# Patient Record
Sex: FEMALE | Race: WHITE | NOT HISPANIC OR LATINO | URBAN - METROPOLITAN AREA
[De-identification: names, ages, dates, MRNs, and addresses within clinical notes are randomized per-mention and may not be internally consistent; named-entity substitution may affect disease eponyms.]

---

## 2021-02-18 ENCOUNTER — EMERGENCY (EMERGENCY)
Facility: HOSPITAL | Age: 71
LOS: 1 days | Discharge: HOME | End: 2021-02-18
Attending: EMERGENCY MEDICINE | Admitting: EMERGENCY MEDICINE
Payer: MEDICARE

## 2021-02-18 VITALS
OXYGEN SATURATION: 99 % | SYSTOLIC BLOOD PRESSURE: 149 MMHG | TEMPERATURE: 97 F | DIASTOLIC BLOOD PRESSURE: 71 MMHG | WEIGHT: 119.93 LBS | HEART RATE: 81 BPM | RESPIRATION RATE: 18 BRPM | HEIGHT: 62 IN

## 2021-02-18 VITALS
HEART RATE: 76 BPM | TEMPERATURE: 98 F | SYSTOLIC BLOOD PRESSURE: 141 MMHG | RESPIRATION RATE: 18 BRPM | OXYGEN SATURATION: 97 % | DIASTOLIC BLOOD PRESSURE: 85 MMHG
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
## 2021-02-18 DIAGNOSIS — R51.9 HEADACHE, UNSPECIFIED: ICD-10-CM
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
## 2021-02-18 DIAGNOSIS — S02.2XXA FRACTURE OF NASAL BONES, INITIAL ENCOUNTER FOR CLOSED FRACTURE: ICD-10-CM
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
## 2021-02-18 PROCEDURE — 99284 EMERGENCY DEPT VISIT MOD MDM: CPT

## 2021-02-18 PROCEDURE — 70486 CT MAXILLOFACIAL W/O DYE: CPT | Mod: 26,MH

## 2021-02-18 PROCEDURE — 70450 CT HEAD/BRAIN W/O DYE: CPT | Mod: 26,MH

## 2021-02-18 NOTE — ED PROVIDER NOTE - OBJECTIVE STATEMENT
71 y/o F with ovarian cancer (on chemo) presents to the ED today s/p fall while getting out of bed. Patient sustained direct trauma to the face and subsequently developed epistaxis which resolved when applying pressure. She denies any LOC but does report swelling around the nasal area and HA. Otherwise patient denies any blurry vision, chest pain, shortness of breath, nausea, vomiting, abdominal pain.

## 2021-02-18 NOTE — ED ADULT NURSE NOTE - CHIEF COMPLAINT QUOTE
walk in s/p fall at home getting OOB and hit her nose on the night stand sustaining a nosebleed for a few minutes. slight dizziness and HA. on Chemotherapy for ovarian CA. Denies blood thinner

## 2021-02-18 NOTE — ED ADULT TRIAGE NOTE - CHIEF COMPLAINT QUOTE
walk in s/p fall at home getting OOB and hit her nose on the night stand sustaining a nosebleed for a few minutes. slight dizziness and HA. on Chemotherapy for ovarian CA. Denies blood thinner walk in s/p fall at home getting OOB and hit her nose on the night stand sustaining a nosebleed. slight dizziness and HA. on Chemotherapy for ovarian CA. Denies blood thinner

## 2021-02-18 NOTE — ED PROVIDER NOTE - CLINICAL SUMMARY MEDICAL DECISION MAKING FREE TEXT BOX
69 y/o F presents to the ED after sustaining direct trauma to the face while getting out of bed this morning with epistaxis that resolved with pressure. On exam, nasal and infraorbital swelling, ecchymosis noted over the nasal bridge with tenderness to palpation but no active bleeding. Will check heat CT o r/o fracture vs intracranial bleeding and reassess.

## 2021-02-18 NOTE — ED ADULT NURSE NOTE - OBJECTIVE STATEMENT
70y female presents to ED s/p fall. Pt states she rolled out of bed and struck her face on the nightstand. Nose bled, dizziness, and blurry vision afterwards that have since resolved. Pt has hx of ovarian cancer, on chemotherapy. A&Ox4.

## 2021-02-18 NOTE — ED PROVIDER NOTE - CARE PROVIDER_API CALL
Gen Magana)  Otolaryngology  [address] [facility], [address]nd [place] [address]  Phone: [phone]  Fax: [phone]  Follow Up Time:

## 2021-02-18 NOTE — ED PROVIDER NOTE - ENMT, MLM
Airway patent, ecchymosis and tenderness over the nasal bridge, no crepitus, no active bleeding. No septal hematoma, Swelling noted over the nasal bridge and infraorbital.

## 2021-02-18 NOTE — ED PROVIDER NOTE - PATIENT PORTAL LINK FT
You can access the FollowMyHealth Patient Portal offered by [facility] by registering at the following website: [email or web address]. By joining [employer]’s FollowMyHealth portal, you will also be able to view your health information using other applications (apps) compatible with our system.

## 2021-02-18 NOTE — ED PROVIDER NOTE - IV ALTEPLASE DOOR HIDDEN
I still want her on the Cipro but she can hold the citalopram until she is done with a course..  Find out how she is doing and if she was able to get an appointment with the urologist   show

## 2021-02-18 NOTE — ED ADULT NURSE REASSESSMENT NOTE - NS ED NURSE REASSESS COMMENT FT1
Pt alert with her significant other at the bedside, pt on the phone. Some swelling noted to her nose

## 2021-09-10 PROBLEM — C56.9 MALIGNANT NEOPLASM OF UNSPECIFIED OVARY: Chronic | Status: ACTIVE | Noted: 2021-02-18

## 2021-09-14 ENCOUNTER — OUTPATIENT (OUTPATIENT)
Dept: OUTPATIENT SERVICES | Facility: HOSPITAL | Age: 71
LOS: 1 days | End: 2021-09-14
Payer: MEDICARE

## 2021-09-14 ENCOUNTER — APPOINTMENT (OUTPATIENT)
Age: 71
End: 2021-09-14

## 2021-09-14 VITALS
DIASTOLIC BLOOD PRESSURE: 84 MMHG | SYSTOLIC BLOOD PRESSURE: 138 MMHG | WEIGHT: 110.01 LBS | HEIGHT: 61 IN | OXYGEN SATURATION: 98 % | RESPIRATION RATE: 17 BRPM | TEMPERATURE: 99 F | HEART RATE: 85 BPM

## 2021-09-14 PROCEDURE — 96409 CHEMO IV PUSH SNGL DRUG: CPT

## 2021-09-14 RX ORDER — FLUOROURACIL 50 MG/ML
2600 INJECTION, SOLUTION INTRAVENOUS ONCE
Refills: 0 | Status: COMPLETED | OUTPATIENT
Start: 2021-09-14 | End: 2021-09-14

## 2021-09-14 RX ADMIN — FLUOROURACIL 5.24 MILLIGRAM(S): 50 INJECTION, SOLUTION INTRAVENOUS at 16:29

## 2021-09-16 ENCOUNTER — APPOINTMENT (OUTPATIENT)
Age: 71
End: 2021-09-16

## 2021-09-16 ENCOUNTER — OUTPATIENT (OUTPATIENT)
Dept: OUTPATIENT SERVICES | Facility: HOSPITAL | Age: 71
LOS: 1 days | End: 2021-09-16

## 2021-09-16 VITALS
HEART RATE: 73 BPM | TEMPERATURE: 99 F | SYSTOLIC BLOOD PRESSURE: 103 MMHG | DIASTOLIC BLOOD PRESSURE: 64 MMHG | RESPIRATION RATE: 18 BRPM | OXYGEN SATURATION: 100 %

## 2021-09-16 DIAGNOSIS — C18.1 MALIGNANT NEOPLASM OF APPENDIX: ICD-10-CM

## 2021-09-17 ENCOUNTER — APPOINTMENT (OUTPATIENT)
Age: 71
End: 2021-09-17

## 2021-09-17 ENCOUNTER — OUTPATIENT (OUTPATIENT)
Dept: OUTPATIENT SERVICES | Facility: HOSPITAL | Age: 71
LOS: 1 days | End: 2021-09-17
Payer: MEDICARE

## 2021-09-17 DIAGNOSIS — C18.1 MALIGNANT NEOPLASM OF APPENDIX: ICD-10-CM

## 2021-09-17 PROCEDURE — 96374 THER/PROPH/DIAG INJ IV PUSH: CPT

## 2021-09-17 RX ORDER — ONDANSETRON 8 MG/1
8 TABLET, FILM COATED ORAL ONCE
Refills: 0 | Status: COMPLETED | OUTPATIENT
Start: 2021-09-17 | End: 2021-09-17

## 2021-09-17 RX ADMIN — ONDANSETRON 8 MILLIGRAM(S): 8 TABLET, FILM COATED ORAL at 10:45

## 2021-09-17 RX ADMIN — ONDANSETRON 216 MILLIGRAM(S): 8 TABLET, FILM COATED ORAL at 10:30

## 2021-09-22 DIAGNOSIS — C18.1 MALIGNANT NEOPLASM OF APPENDIX: ICD-10-CM

## 2021-10-01 ENCOUNTER — OUTPATIENT (OUTPATIENT)
Dept: OUTPATIENT SERVICES | Facility: HOSPITAL | Age: 71
LOS: 1 days | End: 2021-10-01
Payer: MEDICARE

## 2021-10-01 ENCOUNTER — APPOINTMENT (OUTPATIENT)
Age: 71
End: 2021-10-01

## 2021-10-01 VITALS
HEART RATE: 71 BPM | HEIGHT: 61 IN | SYSTOLIC BLOOD PRESSURE: 125 MMHG | OXYGEN SATURATION: 98 % | WEIGHT: 110.01 LBS | TEMPERATURE: 97 F | RESPIRATION RATE: 18 BRPM | DIASTOLIC BLOOD PRESSURE: 73 MMHG

## 2021-10-01 RX ORDER — FLUOROURACIL 50 MG/ML
2600 INJECTION, SOLUTION INTRAVENOUS ONCE
Refills: 0 | Status: COMPLETED | OUTPATIENT
Start: 2021-10-01 | End: 2021-10-01

## 2021-10-01 RX ADMIN — FLUOROURACIL 5.24 MILLIGRAM(S): 50 INJECTION, SOLUTION INTRAVENOUS at 16:25

## 2021-10-03 VITALS
DIASTOLIC BLOOD PRESSURE: 71 MMHG | SYSTOLIC BLOOD PRESSURE: 11 MMHG | HEART RATE: 71 BPM | TEMPERATURE: 98 F | OXYGEN SATURATION: 99 % | RESPIRATION RATE: 18 BRPM

## 2021-10-03 PROCEDURE — 96409 CHEMO IV PUSH SNGL DRUG: CPT

## 2021-10-03 RX ADMIN — Medication 300 UNIT(S): at 12:16

## 2021-10-08 DIAGNOSIS — C18.1 MALIGNANT NEOPLASM OF APPENDIX: ICD-10-CM

## 2021-10-13 ENCOUNTER — APPOINTMENT (OUTPATIENT)
Age: 71
End: 2021-10-13

## 2021-10-13 ENCOUNTER — OUTPATIENT (OUTPATIENT)
Dept: OUTPATIENT SERVICES | Facility: HOSPITAL | Age: 71
LOS: 1 days | End: 2021-10-13
Payer: MEDICARE

## 2021-10-13 VITALS
OXYGEN SATURATION: 100 % | DIASTOLIC BLOOD PRESSURE: 73 MMHG | SYSTOLIC BLOOD PRESSURE: 111 MMHG | HEART RATE: 71 BPM | RESPIRATION RATE: 16 BRPM | TEMPERATURE: 98 F

## 2021-10-13 DIAGNOSIS — C18.1 MALIGNANT NEOPLASM OF APPENDIX: ICD-10-CM

## 2021-10-13 PROCEDURE — 96409 CHEMO IV PUSH SNGL DRUG: CPT

## 2021-10-13 RX ORDER — FLUOROURACIL 50 MG/ML
2600 INJECTION, SOLUTION INTRAVENOUS ONCE
Refills: 0 | Status: COMPLETED | OUTPATIENT
Start: 2021-10-13 | End: 2021-10-13

## 2021-10-13 RX ADMIN — FLUOROURACIL 5.24 MILLIGRAM(S): 50 INJECTION, SOLUTION INTRAVENOUS at 16:00

## 2021-10-15 ENCOUNTER — APPOINTMENT (OUTPATIENT)
Age: 71
End: 2021-10-15

## 2021-10-26 ENCOUNTER — APPOINTMENT (OUTPATIENT)
Age: 71
End: 2021-10-26

## 2021-10-26 ENCOUNTER — OUTPATIENT (OUTPATIENT)
Dept: OUTPATIENT SERVICES | Facility: HOSPITAL | Age: 71
LOS: 1 days | End: 2021-10-26
Payer: MEDICARE

## 2021-10-26 VITALS
TEMPERATURE: 98 F | RESPIRATION RATE: 18 BRPM | SYSTOLIC BLOOD PRESSURE: 137 MMHG | DIASTOLIC BLOOD PRESSURE: 80 MMHG | OXYGEN SATURATION: 100 % | HEART RATE: 73 BPM

## 2021-10-26 DIAGNOSIS — C18.0 MALIGNANT NEOPLASM OF CECUM: ICD-10-CM

## 2021-10-26 PROCEDURE — 96409 CHEMO IV PUSH SNGL DRUG: CPT

## 2021-10-26 RX ORDER — FLUOROURACIL 50 MG/ML
2600 INJECTION, SOLUTION INTRAVENOUS ONCE
Refills: 0 | Status: COMPLETED | OUTPATIENT
Start: 2021-10-26 | End: 2021-10-26

## 2021-10-26 RX ADMIN — FLUOROURACIL 5.24 MILLIGRAM(S): 50 INJECTION, SOLUTION INTRAVENOUS at 16:40

## 2021-10-28 ENCOUNTER — APPOINTMENT (OUTPATIENT)
Age: 71
End: 2021-10-28

## 2021-10-28 ENCOUNTER — OUTPATIENT (OUTPATIENT)
Dept: OUTPATIENT SERVICES | Facility: HOSPITAL | Age: 71
LOS: 1 days | End: 2021-10-28

## 2021-10-28 VITALS
RESPIRATION RATE: 17 BRPM | SYSTOLIC BLOOD PRESSURE: 123 MMHG | TEMPERATURE: 99 F | HEART RATE: 75 BPM | OXYGEN SATURATION: 98 % | DIASTOLIC BLOOD PRESSURE: 79 MMHG

## 2021-10-28 DIAGNOSIS — C18.0 MALIGNANT NEOPLASM OF CECUM: ICD-10-CM

## 2021-11-08 ENCOUNTER — OUTPATIENT (OUTPATIENT)
Dept: OUTPATIENT SERVICES | Facility: HOSPITAL | Age: 71
LOS: 1 days | End: 2021-11-08
Payer: MEDICARE

## 2021-11-08 ENCOUNTER — APPOINTMENT (OUTPATIENT)
Dept: INTERVENTIONAL RADIOLOGY/VASCULAR | Facility: HOSPITAL | Age: 71
End: 2021-11-08

## 2021-11-08 PROCEDURE — 99152 MOD SED SAME PHYS/QHP 5/>YRS: CPT

## 2021-11-08 PROCEDURE — C1788: CPT

## 2021-11-08 PROCEDURE — 99153 MOD SED SAME PHYS/QHP EA: CPT

## 2021-11-08 PROCEDURE — 76937 US GUIDE VASCULAR ACCESS: CPT

## 2021-11-08 PROCEDURE — 36561 INSERT TUNNELED CV CATH: CPT

## 2021-11-08 PROCEDURE — 77001 FLUOROGUIDE FOR VEIN DEVICE: CPT | Mod: 26

## 2021-11-08 PROCEDURE — 36590 REMOVAL TUNNELED CV CATH: CPT

## 2021-11-08 PROCEDURE — 76937 US GUIDE VASCULAR ACCESS: CPT | Mod: 26

## 2021-11-08 PROCEDURE — C1769: CPT

## 2021-11-08 PROCEDURE — 77001 FLUOROGUIDE FOR VEIN DEVICE: CPT

## 2021-11-09 ENCOUNTER — APPOINTMENT (OUTPATIENT)
Age: 71
End: 2021-11-09

## 2021-11-15 ENCOUNTER — APPOINTMENT (OUTPATIENT)
Dept: INTERVENTIONAL RADIOLOGY/VASCULAR | Facility: HOSPITAL | Age: 71
End: 2021-11-15

## 2021-11-16 ENCOUNTER — OUTPATIENT (OUTPATIENT)
Dept: OUTPATIENT SERVICES | Facility: HOSPITAL | Age: 71
LOS: 1 days | End: 2021-11-16
Payer: MEDICARE

## 2021-11-16 ENCOUNTER — APPOINTMENT (OUTPATIENT)
Age: 71
End: 2021-11-16

## 2021-11-16 VITALS
OXYGEN SATURATION: 99 % | RESPIRATION RATE: 16 BRPM | SYSTOLIC BLOOD PRESSURE: 127 MMHG | HEART RATE: 67 BPM | DIASTOLIC BLOOD PRESSURE: 59 MMHG | TEMPERATURE: 98 F

## 2021-11-16 DIAGNOSIS — C18.0 MALIGNANT NEOPLASM OF CECUM: ICD-10-CM

## 2021-11-16 PROCEDURE — 96409 CHEMO IV PUSH SNGL DRUG: CPT

## 2021-11-16 RX ORDER — FLUOROURACIL 50 MG/ML
2600 INJECTION, SOLUTION INTRAVENOUS ONCE
Refills: 0 | Status: COMPLETED | OUTPATIENT
Start: 2021-11-16 | End: 2021-11-16

## 2021-11-16 RX ADMIN — FLUOROURACIL 5.24 MILLIGRAM(S): 50 INJECTION, SOLUTION INTRAVENOUS at 16:30

## 2021-11-18 ENCOUNTER — APPOINTMENT (OUTPATIENT)
Age: 71
End: 2021-11-18

## 2021-11-18 ENCOUNTER — OUTPATIENT (OUTPATIENT)
Dept: OUTPATIENT SERVICES | Facility: HOSPITAL | Age: 71
LOS: 1 days | End: 2021-11-18
Payer: MEDICARE

## 2021-11-18 VITALS
TEMPERATURE: 98 F | RESPIRATION RATE: 17 BRPM | DIASTOLIC BLOOD PRESSURE: 78 MMHG | SYSTOLIC BLOOD PRESSURE: 113 MMHG | OXYGEN SATURATION: 99 % | HEART RATE: 70 BPM

## 2021-11-18 DIAGNOSIS — C18.0 MALIGNANT NEOPLASM OF CECUM: ICD-10-CM

## 2021-11-18 PROCEDURE — 96523 IRRIG DRUG DELIVERY DEVICE: CPT

## 2021-11-18 RX ADMIN — Medication 300 UNIT(S): at 15:20

## 2021-11-23 NOTE — HISTORY OF PRESENT ILLNESS
[FreeTextEntry1] : Pt here for one week port check s/p removal on right chest and placement on left. Pt reports both sites are healing well, denies pain, redness, swelling, drainage, fever, chills. Pt reports glue and steri strips are still intact. Pt reports it has not been used. Pt reports mild tenderness of right and left chest but denies pain or throbbing. Pt is concerned about developing a hematoma but reports there is no swelling or drainage. Pt feels well overall. \par \par

## 2021-11-23 NOTE — ASSESSMENT
[FreeTextEntry1] : right chest and left chest and left neck with well healing linear surgical incision, wound edges well approximated. Dermabond and Steri-Strips intact. No erythema, edema, tenderness to palpation, or purulent drainage. No ecchymosis or hematoma palpated. Instructed pt to leave dermabond intact until it falls off on its own; pt verbalizes understanding.  No further questions.\par

## 2021-11-30 ENCOUNTER — APPOINTMENT (OUTPATIENT)
Age: 71
End: 2021-11-30

## 2021-11-30 ENCOUNTER — OUTPATIENT (OUTPATIENT)
Dept: OUTPATIENT SERVICES | Facility: HOSPITAL | Age: 71
LOS: 1 days | End: 2021-11-30
Payer: MEDICARE

## 2021-11-30 VITALS
SYSTOLIC BLOOD PRESSURE: 125 MMHG | OXYGEN SATURATION: 96 % | TEMPERATURE: 98 F | HEIGHT: 60 IN | DIASTOLIC BLOOD PRESSURE: 81 MMHG | WEIGHT: 132.28 LBS | HEART RATE: 69 BPM | RESPIRATION RATE: 16 BRPM

## 2021-11-30 DIAGNOSIS — C18.0 MALIGNANT NEOPLASM OF CECUM: ICD-10-CM

## 2021-11-30 PROCEDURE — 96409 CHEMO IV PUSH SNGL DRUG: CPT

## 2021-11-30 RX ORDER — FLUOROURACIL 50 MG/ML
2600 INJECTION, SOLUTION INTRAVENOUS ONCE
Refills: 0 | Status: COMPLETED | OUTPATIENT
Start: 2021-11-30 | End: 2021-11-30

## 2021-11-30 RX ADMIN — FLUOROURACIL 5.24 MILLIGRAM(S): 50 INJECTION, SOLUTION INTRAVENOUS at 15:59

## 2021-12-02 ENCOUNTER — OUTPATIENT (OUTPATIENT)
Dept: OUTPATIENT SERVICES | Facility: HOSPITAL | Age: 71
LOS: 1 days | End: 2021-12-02
Payer: MEDICARE

## 2021-12-02 ENCOUNTER — APPOINTMENT (OUTPATIENT)
Age: 71
End: 2021-12-02

## 2021-12-02 VITALS
RESPIRATION RATE: 17 BRPM | DIASTOLIC BLOOD PRESSURE: 78 MMHG | HEART RATE: 72 BPM | TEMPERATURE: 98 F | SYSTOLIC BLOOD PRESSURE: 130 MMHG | OXYGEN SATURATION: 98 %

## 2021-12-02 DIAGNOSIS — C18.0 MALIGNANT NEOPLASM OF CECUM: ICD-10-CM

## 2021-12-02 PROCEDURE — 96523 IRRIG DRUG DELIVERY DEVICE: CPT

## 2021-12-02 RX ADMIN — Medication 300 UNIT(S): at 15:34

## 2021-12-14 ENCOUNTER — APPOINTMENT (OUTPATIENT)
Age: 71
End: 2021-12-14

## 2021-12-14 ENCOUNTER — OUTPATIENT (OUTPATIENT)
Dept: OUTPATIENT SERVICES | Facility: HOSPITAL | Age: 71
LOS: 1 days | End: 2021-12-14
Payer: MEDICARE

## 2021-12-14 VITALS
HEART RATE: 74 BPM | DIASTOLIC BLOOD PRESSURE: 73 MMHG | OXYGEN SATURATION: 97 % | TEMPERATURE: 99 F | SYSTOLIC BLOOD PRESSURE: 120 MMHG | RESPIRATION RATE: 16 BRPM

## 2021-12-14 DIAGNOSIS — C18.0 MALIGNANT NEOPLASM OF CECUM: ICD-10-CM

## 2021-12-14 PROCEDURE — 96409 CHEMO IV PUSH SNGL DRUG: CPT

## 2021-12-14 RX ORDER — FLUOROURACIL 50 MG/ML
2600 INJECTION, SOLUTION INTRAVENOUS ONCE
Refills: 0 | Status: COMPLETED | OUTPATIENT
Start: 2021-12-14 | End: 2021-12-14

## 2021-12-14 RX ADMIN — FLUOROURACIL 5.24 MILLIGRAM(S): 50 INJECTION, SOLUTION INTRAVENOUS at 16:09

## 2021-12-16 ENCOUNTER — OUTPATIENT (OUTPATIENT)
Dept: OUTPATIENT SERVICES | Facility: HOSPITAL | Age: 71
LOS: 1 days | End: 2021-12-16
Payer: MEDICARE

## 2021-12-16 ENCOUNTER — APPOINTMENT (OUTPATIENT)
Age: 71
End: 2021-12-16

## 2021-12-16 VITALS
DIASTOLIC BLOOD PRESSURE: 77 MMHG | TEMPERATURE: 99 F | RESPIRATION RATE: 16 BRPM | WEIGHT: 132.94 LBS | OXYGEN SATURATION: 98 % | SYSTOLIC BLOOD PRESSURE: 127 MMHG | HEIGHT: 60 IN | HEART RATE: 74 BPM

## 2021-12-16 DIAGNOSIS — C18.0 MALIGNANT NEOPLASM OF CECUM: ICD-10-CM

## 2021-12-16 PROCEDURE — 96523 IRRIG DRUG DELIVERY DEVICE: CPT

## 2021-12-16 RX ADMIN — Medication 300 UNIT(S): at 14:54

## 2021-12-28 ENCOUNTER — APPOINTMENT (OUTPATIENT)
Age: 71
End: 2021-12-28

## 2021-12-28 ENCOUNTER — OUTPATIENT (OUTPATIENT)
Dept: OUTPATIENT SERVICES | Facility: HOSPITAL | Age: 71
LOS: 1 days | End: 2021-12-28
Payer: MEDICARE

## 2021-12-28 VITALS
TEMPERATURE: 99 F | HEART RATE: 74 BPM | SYSTOLIC BLOOD PRESSURE: 118 MMHG | OXYGEN SATURATION: 98 % | RESPIRATION RATE: 16 BRPM | DIASTOLIC BLOOD PRESSURE: 75 MMHG

## 2021-12-28 DIAGNOSIS — C18.0 MALIGNANT NEOPLASM OF CECUM: ICD-10-CM

## 2021-12-28 RX ORDER — FLUOROURACIL 50 MG/ML
2600 INJECTION, SOLUTION INTRAVENOUS ONCE
Refills: 0 | Status: COMPLETED | OUTPATIENT
Start: 2021-12-28 | End: 2021-12-28

## 2021-12-28 RX ADMIN — FLUOROURACIL 5.24 MILLIGRAM(S): 50 INJECTION, SOLUTION INTRAVENOUS at 15:26

## 2021-12-30 ENCOUNTER — APPOINTMENT (OUTPATIENT)
Age: 71
End: 2021-12-30

## 2022-01-11 ENCOUNTER — OUTPATIENT (OUTPATIENT)
Dept: OUTPATIENT SERVICES | Facility: HOSPITAL | Age: 72
LOS: 1 days | End: 2022-01-11
Payer: MEDICARE

## 2022-01-11 ENCOUNTER — APPOINTMENT (OUTPATIENT)
Age: 72
End: 2022-01-11

## 2022-01-11 VITALS
HEART RATE: 74 BPM | TEMPERATURE: 99 F | RESPIRATION RATE: 18 BRPM | SYSTOLIC BLOOD PRESSURE: 131 MMHG | HEIGHT: 60 IN | OXYGEN SATURATION: 98 % | DIASTOLIC BLOOD PRESSURE: 75 MMHG | WEIGHT: 132.94 LBS

## 2022-01-11 DIAGNOSIS — C18.0 MALIGNANT NEOPLASM OF CECUM: ICD-10-CM

## 2022-01-11 RX ORDER — FLUOROURACIL 50 MG/ML
2600 INJECTION, SOLUTION INTRAVENOUS ONCE
Refills: 0 | Status: COMPLETED | OUTPATIENT
Start: 2022-01-11 | End: 2022-01-11

## 2022-01-11 RX ADMIN — FLUOROURACIL 5.24 MILLIGRAM(S): 50 INJECTION, SOLUTION INTRAVENOUS at 16:36

## 2022-01-13 ENCOUNTER — APPOINTMENT (OUTPATIENT)
Age: 72
End: 2022-01-13

## 2022-01-13 ENCOUNTER — OUTPATIENT (OUTPATIENT)
Dept: OUTPATIENT SERVICES | Facility: HOSPITAL | Age: 72
LOS: 1 days | End: 2022-01-13
Payer: MEDICARE

## 2022-01-13 VITALS
TEMPERATURE: 98 F | HEART RATE: 72 BPM | OXYGEN SATURATION: 100 % | SYSTOLIC BLOOD PRESSURE: 117 MMHG | RESPIRATION RATE: 17 BRPM | DIASTOLIC BLOOD PRESSURE: 75 MMHG

## 2022-01-13 DIAGNOSIS — C18.0 MALIGNANT NEOPLASM OF CECUM: ICD-10-CM

## 2022-01-13 PROCEDURE — 96409 CHEMO IV PUSH SNGL DRUG: CPT

## 2022-01-13 PROCEDURE — 96523 IRRIG DRUG DELIVERY DEVICE: CPT

## 2022-01-13 RX ADMIN — Medication 300 UNIT(S): at 14:18

## 2022-01-25 ENCOUNTER — OUTPATIENT (OUTPATIENT)
Dept: OUTPATIENT SERVICES | Facility: HOSPITAL | Age: 72
LOS: 1 days | End: 2022-01-25
Payer: MEDICARE

## 2022-01-25 ENCOUNTER — APPOINTMENT (OUTPATIENT)
Age: 72
End: 2022-01-25

## 2022-01-25 VITALS
RESPIRATION RATE: 17 BRPM | OXYGEN SATURATION: 99 % | SYSTOLIC BLOOD PRESSURE: 131 MMHG | DIASTOLIC BLOOD PRESSURE: 79 MMHG | HEART RATE: 69 BPM | TEMPERATURE: 98 F

## 2022-01-25 DIAGNOSIS — C18.0 MALIGNANT NEOPLASM OF CECUM: ICD-10-CM

## 2022-01-25 PROCEDURE — 96409 CHEMO IV PUSH SNGL DRUG: CPT

## 2022-01-25 RX ORDER — FLUOROURACIL 50 MG/ML
2600 INJECTION, SOLUTION INTRAVENOUS ONCE
Refills: 0 | Status: COMPLETED | OUTPATIENT
Start: 2022-01-25 | End: 2022-01-25

## 2022-01-25 RX ADMIN — FLUOROURACIL 5.24 MILLIGRAM(S): 50 INJECTION, SOLUTION INTRAVENOUS at 15:17

## 2022-01-27 ENCOUNTER — OUTPATIENT (OUTPATIENT)
Dept: OUTPATIENT SERVICES | Facility: HOSPITAL | Age: 72
LOS: 1 days | End: 2022-01-27
Payer: MEDICARE

## 2022-01-27 ENCOUNTER — APPOINTMENT (OUTPATIENT)
Age: 72
End: 2022-01-27

## 2022-01-27 VITALS
SYSTOLIC BLOOD PRESSURE: 112 MMHG | HEART RATE: 72 BPM | TEMPERATURE: 98 F | RESPIRATION RATE: 17 BRPM | OXYGEN SATURATION: 99 % | DIASTOLIC BLOOD PRESSURE: 71 MMHG

## 2022-01-27 DIAGNOSIS — C18.0 MALIGNANT NEOPLASM OF CECUM: ICD-10-CM

## 2022-01-27 PROCEDURE — 96523 IRRIG DRUG DELIVERY DEVICE: CPT

## 2022-01-27 PROCEDURE — 96409 CHEMO IV PUSH SNGL DRUG: CPT

## 2022-01-27 RX ADMIN — Medication 300 UNIT(S): at 14:50

## 2022-01-27 RX ADMIN — Medication 300 UNIT(S): at 14:15

## 2022-02-08 ENCOUNTER — APPOINTMENT (OUTPATIENT)
Age: 72
End: 2022-02-08

## 2022-02-08 ENCOUNTER — OUTPATIENT (OUTPATIENT)
Dept: OUTPATIENT SERVICES | Facility: HOSPITAL | Age: 72
LOS: 1 days | End: 2022-02-08
Payer: MEDICARE

## 2022-02-08 VITALS
OXYGEN SATURATION: 99 % | RESPIRATION RATE: 18 BRPM | HEART RATE: 75 BPM | SYSTOLIC BLOOD PRESSURE: 131 MMHG | DIASTOLIC BLOOD PRESSURE: 72 MMHG | TEMPERATURE: 99 F

## 2022-02-08 DIAGNOSIS — C18.0 MALIGNANT NEOPLASM OF CECUM: ICD-10-CM

## 2022-02-08 PROCEDURE — 96409 CHEMO IV PUSH SNGL DRUG: CPT

## 2022-02-08 RX ORDER — FLUOROURACIL 50 MG/ML
2600 INJECTION, SOLUTION INTRAVENOUS ONCE
Refills: 0 | Status: COMPLETED | OUTPATIENT
Start: 2022-02-08 | End: 2022-02-08

## 2022-02-08 RX ADMIN — FLUOROURACIL 5.24 MILLIGRAM(S): 50 INJECTION, SOLUTION INTRAVENOUS at 16:06

## 2022-02-10 ENCOUNTER — APPOINTMENT (OUTPATIENT)
Age: 72
End: 2022-02-10

## 2022-02-10 ENCOUNTER — OUTPATIENT (OUTPATIENT)
Dept: OUTPATIENT SERVICES | Facility: HOSPITAL | Age: 72
LOS: 1 days | End: 2022-02-10
Payer: MEDICARE

## 2022-02-10 VITALS
HEART RATE: 70 BPM | RESPIRATION RATE: 16 BRPM | TEMPERATURE: 98 F | OXYGEN SATURATION: 99 % | SYSTOLIC BLOOD PRESSURE: 128 MMHG | DIASTOLIC BLOOD PRESSURE: 74 MMHG

## 2022-02-10 DIAGNOSIS — C18.0 MALIGNANT NEOPLASM OF CECUM: ICD-10-CM

## 2022-02-10 PROCEDURE — 96409 CHEMO IV PUSH SNGL DRUG: CPT

## 2022-02-10 RX ADMIN — Medication 300 UNIT(S): at 13:47

## 2022-02-10 RX ADMIN — FLUOROURACIL 2600 MILLIGRAM(S): 50 INJECTION, SOLUTION INTRAVENOUS at 13:46

## 2022-02-23 ENCOUNTER — APPOINTMENT (OUTPATIENT)
Age: 72
End: 2022-02-23

## 2022-02-23 ENCOUNTER — OUTPATIENT (OUTPATIENT)
Dept: OUTPATIENT SERVICES | Facility: HOSPITAL | Age: 72
LOS: 1 days | End: 2022-02-23
Payer: MEDICARE

## 2022-02-23 VITALS
DIASTOLIC BLOOD PRESSURE: 72 MMHG | RESPIRATION RATE: 16 BRPM | OXYGEN SATURATION: 98 % | TEMPERATURE: 99 F | HEIGHT: 60 IN | SYSTOLIC BLOOD PRESSURE: 121 MMHG | WEIGHT: 132.94 LBS | HEART RATE: 68 BPM

## 2022-02-23 DIAGNOSIS — C18.0 MALIGNANT NEOPLASM OF CECUM: ICD-10-CM

## 2022-02-23 PROCEDURE — 96413 CHEMO IV INFUSION 1 HR: CPT

## 2022-02-23 RX ORDER — FLUOROURACIL 50 MG/ML
2600 INJECTION, SOLUTION INTRAVENOUS ONCE
Refills: 0 | Status: COMPLETED | OUTPATIENT
Start: 2022-02-23 | End: 2022-02-23

## 2022-02-23 RX ADMIN — FLUOROURACIL 5.24 MILLIGRAM(S): 50 INJECTION, SOLUTION INTRAVENOUS at 12:39

## 2022-02-25 ENCOUNTER — APPOINTMENT (OUTPATIENT)
Age: 72
End: 2022-02-25

## 2022-02-25 ENCOUNTER — OUTPATIENT (OUTPATIENT)
Dept: OUTPATIENT SERVICES | Facility: HOSPITAL | Age: 72
LOS: 1 days | End: 2022-02-25
Payer: MEDICARE

## 2022-02-25 VITALS
TEMPERATURE: 98 F | DIASTOLIC BLOOD PRESSURE: 76 MMHG | WEIGHT: 117.95 LBS | RESPIRATION RATE: 18 BRPM | HEART RATE: 70 BPM | SYSTOLIC BLOOD PRESSURE: 124 MMHG | HEIGHT: 60 IN | OXYGEN SATURATION: 98 %

## 2022-02-25 DIAGNOSIS — C18.0 MALIGNANT NEOPLASM OF CECUM: ICD-10-CM

## 2022-02-25 PROCEDURE — 96523 IRRIG DRUG DELIVERY DEVICE: CPT

## 2022-02-25 PROCEDURE — 36593 DECLOT VASCULAR DEVICE: CPT

## 2022-02-25 RX ADMIN — Medication 300 UNIT(S): at 14:10

## 2022-06-28 ENCOUNTER — APPOINTMENT (OUTPATIENT)
Dept: INFUSION THERAPY | Facility: CLINIC | Age: 72
End: 2022-06-28

## 2022-06-28 ENCOUNTER — OUTPATIENT (OUTPATIENT)
Dept: OUTPATIENT SERVICES | Facility: HOSPITAL | Age: 72
LOS: 1 days | End: 2022-06-28
Payer: MEDICARE

## 2022-06-28 DIAGNOSIS — D64.9 ANEMIA, UNSPECIFIED: ICD-10-CM

## 2022-06-28 PROCEDURE — 86850 RBC ANTIBODY SCREEN: CPT

## 2022-06-28 PROCEDURE — 86901 BLOOD TYPING SEROLOGIC RH(D): CPT

## 2022-06-28 PROCEDURE — 36415 COLL VENOUS BLD VENIPUNCTURE: CPT

## 2022-06-28 PROCEDURE — 86900 BLOOD TYPING SEROLOGIC ABO: CPT

## 2022-06-28 PROCEDURE — 36430 TRANSFUSION BLD/BLD COMPNT: CPT

## 2022-06-28 RX ORDER — ACETAMINOPHEN 500 MG
650 TABLET ORAL ONCE
Refills: 0 | Status: COMPLETED | OUTPATIENT
Start: 2022-06-29 | End: 2022-06-29

## 2022-06-29 ENCOUNTER — OUTPATIENT (OUTPATIENT)
Dept: OUTPATIENT SERVICES | Facility: HOSPITAL | Age: 72
LOS: 1 days | End: 2022-06-29
Payer: MEDICARE

## 2022-06-29 ENCOUNTER — APPOINTMENT (OUTPATIENT)
Dept: INFUSION THERAPY | Facility: CLINIC | Age: 72
End: 2022-06-29

## 2022-06-29 VITALS
DIASTOLIC BLOOD PRESSURE: 74 MMHG | HEART RATE: 70 BPM | RESPIRATION RATE: 18 BRPM | OXYGEN SATURATION: 99 % | SYSTOLIC BLOOD PRESSURE: 120 MMHG | TEMPERATURE: 98 F

## 2022-06-29 DIAGNOSIS — D64.9 ANEMIA, UNSPECIFIED: ICD-10-CM

## 2022-06-29 PROCEDURE — 86923 COMPATIBILITY TEST ELECTRIC: CPT

## 2022-06-29 PROCEDURE — 36430 TRANSFUSION BLD/BLD COMPNT: CPT

## 2022-06-29 PROCEDURE — P9040: CPT

## 2022-06-29 RX ADMIN — Medication 650 MILLIGRAM(S): at 16:40

## 2022-08-30 ENCOUNTER — APPOINTMENT (OUTPATIENT)
Dept: INFUSION THERAPY | Facility: CLINIC | Age: 72
End: 2022-08-30

## 2022-08-30 ENCOUNTER — OUTPATIENT (OUTPATIENT)
Dept: OUTPATIENT SERVICES | Facility: HOSPITAL | Age: 72
LOS: 1 days | End: 2022-08-30
Payer: MEDICARE

## 2022-08-30 VITALS
RESPIRATION RATE: 18 BRPM | OXYGEN SATURATION: 99 % | DIASTOLIC BLOOD PRESSURE: 74 MMHG | SYSTOLIC BLOOD PRESSURE: 120 MMHG | HEART RATE: 70 BPM | TEMPERATURE: 98 F

## 2022-08-30 DIAGNOSIS — D64.9 ANEMIA, UNSPECIFIED: ICD-10-CM

## 2022-08-30 PROCEDURE — 86850 RBC ANTIBODY SCREEN: CPT

## 2022-08-30 PROCEDURE — 86900 BLOOD TYPING SEROLOGIC ABO: CPT

## 2022-08-30 PROCEDURE — 36415 COLL VENOUS BLD VENIPUNCTURE: CPT

## 2022-08-30 PROCEDURE — 36430 TRANSFUSION BLD/BLD COMPNT: CPT

## 2022-08-30 PROCEDURE — P9040: CPT

## 2022-08-30 PROCEDURE — 86901 BLOOD TYPING SEROLOGIC RH(D): CPT

## 2022-08-30 PROCEDURE — 86923 COMPATIBILITY TEST ELECTRIC: CPT

## 2022-08-30 RX ORDER — ACETAMINOPHEN 500 MG
650 TABLET ORAL ONCE
Refills: 0 | Status: COMPLETED | OUTPATIENT
Start: 2022-08-30 | End: 2022-08-30

## 2022-08-30 RX ADMIN — Medication 650 MILLIGRAM(S): at 15:08

## 2022-11-01 ENCOUNTER — INPATIENT (INPATIENT)
Facility: HOSPITAL | Age: 72
LOS: 9 days | Discharge: NURSING, REHAB OR LONG-TERM CARE | DRG: 70 | End: 2022-11-11
Attending: PSYCHIATRY & NEUROLOGY | Admitting: PSYCHIATRY & NEUROLOGY
Payer: MEDICARE

## 2022-11-01 VITALS
TEMPERATURE: 98 F | WEIGHT: 115.96 LBS | SYSTOLIC BLOOD PRESSURE: 164 MMHG | HEART RATE: 88 BPM | HEIGHT: 61 IN | RESPIRATION RATE: 18 BRPM | DIASTOLIC BLOOD PRESSURE: 74 MMHG | OXYGEN SATURATION: 96 %

## 2022-11-01 DIAGNOSIS — G40.909 EPILEPSY, UNSPECIFIED, NOT INTRACTABLE, WITHOUT STATUS EPILEPTICUS: ICD-10-CM

## 2022-11-01 LAB
ALBUMIN SERPL ELPH-MCNC: 2.7 G/DL — LOW (ref 3.3–5)
ALP SERPL-CCNC: 541 U/L — HIGH (ref 40–120)
ALT FLD-CCNC: 22 U/L — SIGNIFICANT CHANGE UP (ref 10–45)
ANION GAP SERPL CALC-SCNC: 8 MMOL/L — SIGNIFICANT CHANGE UP (ref 5–17)
ANISOCYTOSIS BLD QL: SIGNIFICANT CHANGE UP
APTT BLD: 28.4 SEC — SIGNIFICANT CHANGE UP (ref 27.5–35.5)
AST SERPL-CCNC: 37 U/L — SIGNIFICANT CHANGE UP (ref 10–40)
BASOPHILS # BLD AUTO: 0 K/UL — SIGNIFICANT CHANGE UP (ref 0–0.2)
BASOPHILS NFR BLD AUTO: 0 % — SIGNIFICANT CHANGE UP (ref 0–2)
BILIRUB SERPL-MCNC: 0.5 MG/DL — SIGNIFICANT CHANGE UP (ref 0.2–1.2)
BLD GP AB SCN SERPL QL: NEGATIVE — SIGNIFICANT CHANGE UP
BUN SERPL-MCNC: 25 MG/DL — HIGH (ref 7–23)
CALCIUM SERPL-MCNC: 7.8 MG/DL — LOW (ref 8.4–10.5)
CHLORIDE SERPL-SCNC: 104 MMOL/L — SIGNIFICANT CHANGE UP (ref 96–108)
CO2 SERPL-SCNC: 26 MMOL/L — SIGNIFICANT CHANGE UP (ref 22–31)
CREAT SERPL-MCNC: 1.35 MG/DL — HIGH (ref 0.5–1.3)
DACRYOCYTES BLD QL SMEAR: SLIGHT — SIGNIFICANT CHANGE UP
EGFR: 42 ML/MIN/1.73M2 — LOW
EOSINOPHIL # BLD AUTO: 0.13 K/UL — SIGNIFICANT CHANGE UP (ref 0–0.5)
EOSINOPHIL NFR BLD AUTO: 0.9 % — SIGNIFICANT CHANGE UP (ref 0–6)
GLUCOSE SERPL-MCNC: 110 MG/DL — HIGH (ref 70–99)
HCT VFR BLD CALC: 21.7 % — LOW (ref 34.5–45)
HGB BLD-MCNC: 6.8 G/DL — CRITICAL LOW (ref 11.5–15.5)
HYPOCHROMIA BLD QL: SLIGHT — SIGNIFICANT CHANGE UP
INR BLD: 1.04 — SIGNIFICANT CHANGE UP (ref 0.88–1.16)
LYMPHOCYTES # BLD AUTO: 0.39 K/UL — LOW (ref 1–3.3)
LYMPHOCYTES # BLD AUTO: 2.6 % — LOW (ref 13–44)
MACROCYTES BLD QL: SIGNIFICANT CHANGE UP
MANUAL SMEAR VERIFICATION: SIGNIFICANT CHANGE UP
MCHC RBC-ENTMCNC: 31.3 GM/DL — LOW (ref 32–36)
MCHC RBC-ENTMCNC: 34.7 PG — HIGH (ref 27–34)
MCV RBC AUTO: 110.7 FL — HIGH (ref 80–100)
MICROCYTES BLD QL: SLIGHT — SIGNIFICANT CHANGE UP
MONOCYTES # BLD AUTO: 0.25 K/UL — SIGNIFICANT CHANGE UP (ref 0–0.9)
MONOCYTES NFR BLD AUTO: 1.7 % — LOW (ref 2–14)
NEUTROPHILS # BLD AUTO: 14.19 K/UL — HIGH (ref 1.8–7.4)
NEUTROPHILS NFR BLD AUTO: 93.1 % — HIGH (ref 43–77)
NEUTS BAND # BLD: 1.7 % — SIGNIFICANT CHANGE UP (ref 0–8)
OVALOCYTES BLD QL SMEAR: SLIGHT — SIGNIFICANT CHANGE UP
PLAT MORPH BLD: NORMAL — SIGNIFICANT CHANGE UP
PLATELET # BLD AUTO: 37 K/UL — LOW (ref 150–400)
POIKILOCYTOSIS BLD QL AUTO: SIGNIFICANT CHANGE UP
POLYCHROMASIA BLD QL SMEAR: SLIGHT — SIGNIFICANT CHANGE UP
POTASSIUM SERPL-MCNC: 3.3 MMOL/L — LOW (ref 3.5–5.3)
POTASSIUM SERPL-SCNC: 3.3 MMOL/L — LOW (ref 3.5–5.3)
PROT SERPL-MCNC: 4.8 G/DL — LOW (ref 6–8.3)
PROTHROM AB SERPL-ACNC: 12.4 SEC — SIGNIFICANT CHANGE UP (ref 10.5–13.4)
RBC # BLD: 1.96 M/UL — LOW (ref 3.8–5.2)
RBC # FLD: 18.4 % — HIGH (ref 10.3–14.5)
RBC BLD AUTO: ABNORMAL
RH IG SCN BLD-IMP: POSITIVE — SIGNIFICANT CHANGE UP
SCHISTOCYTES BLD QL AUTO: SLIGHT — SIGNIFICANT CHANGE UP
SODIUM SERPL-SCNC: 138 MMOL/L — SIGNIFICANT CHANGE UP (ref 135–145)
WBC # BLD: 14.97 K/UL — HIGH (ref 3.8–10.5)
WBC # FLD AUTO: 14.97 K/UL — HIGH (ref 3.8–10.5)

## 2022-11-01 PROCEDURE — 99285 EMERGENCY DEPT VISIT HI MDM: CPT

## 2022-11-01 NOTE — CHART NOTE - NSCHARTNOTEFT_GEN_A_CORE
PALLIATIVE MEDICINE COORDINATION OF CARE NOTE FOR DESHAWN KING  [  ] ED Trigger   [  ] MICU Trigger     [  x] Consult    Patient last assessed: __11/1/22___  to manage: GOC/AD, Symptoms, and Support was recommended:__11/1/22____    ___40___ Minutes; Start: _2220____  End: __2300__, of non-face-to-face prolonged service provided that relates to (face-to-face) care that has or will occur and ongoing patient management, including one or more of the following:   - Reviewed records from other physicians or other health care professional services, including one or more of the following: other medical records and diagnostic / radiology study results     HPI:   **STROKE HPI***    HPI: 72y Female with PMHx of ?HTN, ovarian cancer on chemo (last treatment was 1 week ago), and recurrent anemia 2/2 chemotherapy, presented to the ER on 11/1 directly from oncologist for blood transfusion 2/2 Hgb 6.4, She received 2 units or PRBC's in the ER when she was trying to stand at time discharge (~0300) and noticed that her left leg was feeling numb and weak. Stroke code was called, NIHSS 3. NCHCT negative for hemorrhage, however, there is a patchy area of hypodensity along the right central sulcus which may be artifactual versus areas of age-indeterminate infarction.  CTA head and neck negative for significant steno-occlusive disease but does demonstrate extensive pulmonary metastatic disease. CT perfusion negative. Patient is not a tPA candidate 2/2 thrombocytopenia (repeat plt 36 after transfusion).     PAST MEDICAL & SURGICAL HISTORY:  Ovarian cancer      Ovarian ca      No significant past surgical history      T(C): 37 (11-02-22 @ 02:31), Max: 37 (11-02-22 @ 02:31)  HR: 88 (11-02-22 @ 02:31) (83 - 95)  BP: 184/92 (11-02-22 @ 02:31) (164/74 - 184/92)  RR: 18 (11-02-22 @ 02:31) (18 - 18)  SpO2: 95% (11-02-22 @ 02:31) (95% - 97%)    MEDICATION RECONCILIATION   MEDICATIONS  (STANDING):    MEDICATIONS  (PRN):    Allergies    Benadryl (Other)  Compazine (Unknown)    Intolerances      Vital Signs Last 24 Hrs  T(C): 37 (02 Nov 2022 02:31), Max: 37 (02 Nov 2022 02:31)  T(F): 98.6 (02 Nov 2022 02:31), Max: 98.6 (02 Nov 2022 02:31)  HR: 88 (02 Nov 2022 02:31) (83 - 95)  BP: 184/92 (02 Nov 2022 02:31) (164/74 - 184/92)  BP(mean): --  RR: 18 (02 Nov 2022 02:31) (18 - 18)  SpO2: 95% (02 Nov 2022 02:31) (95% - 97%)    Parameters below as of 02 Nov 2022 02:31  Patient On (Oxygen Delivery Method): room air     (02 Nov 2022 04:34)      - Other: iStop reviewed.    - Other: Medication reviewed.    The patient HAS NOT used PRN's in the last 24h.    MEDICATIONS  (STANDING):  enoxaparin Injectable 50 milliGRAM(s) SubCutaneous every 12 hours  escitalopram 7.5 milliGRAM(s) Oral daily  lactated ringers. 1000 milliLiter(s) (80 mL/Hr) IV Continuous <Continuous>  levETIRAcetam 500 milliGRAM(s) Oral two times a day  LORazepam     Tablet 0.5 milliGRAM(s) Oral every 8 hours  losartan 100 milliGRAM(s) Oral daily  NIFEdipine XL 30 milliGRAM(s) Oral daily  polyethylene glycol 3350 17 Gram(s) Oral daily    MEDICATIONS  (PRN):  morphine  - Injectable 1 milliGRAM(s) IV Push every 4 hours PRN Moderate Pain (4 - 6)      - Other: Advanced directives     Full Code/DNR DNI     No documented MOLST form found on [facility]     No documented HCP form found on [facility]     No Living will / POA / Advance directives found on [facility] / [facility].     No documented GOC notes on [facility]    - Other: Coordination/Plan of care     __3__ admissions in 1 year     Current admission LOS: _1__ days     LACE score: __15__ ADVANCE ILLNESS PATIENT.     Patient NOT previously seen by palliative medicine consult service.      Discussed with  Consult request for: "  Advanced Ovarian Cancer  "    Patient is an Advanced Illness patient hence the primary team will need to complete GOC document of any prior GOC discussions that were done during this admission so far as well as information available for Proxy/surrogates/NOK/guardians prior to a palliative contact.    Full consult to follow within 24h.    Will reassess later today during bedside rounds.

## 2022-11-01 NOTE — ED ADULT NURSE NOTE - OBJECTIVE STATEMENT
72y F, PMHx ovarian Ca on chemo (left chest port) last chemo last week, sent in to ED by oncologist for hemoglobin of 6.4 today. Pt is reporting fatigue and GILMORE. Denies chest pain, SOB, abdominal pain/n/v/d, lightheadedness, dizziness, rectal bleeding, black tarry stool. Pt A&Ox4, speaking in clear/full sentences.

## 2022-11-01 NOTE — ED PROVIDER NOTE - PATIENT PORTAL LINK FT
You can access the FollowMyHealth Patient Portal offered by [facility] by registering at the following website: [email or web address]. By joining [employer]’s FollowMyHealth portal, you will also be able to view your health information using other applications (apps) compatible with our system.

## 2022-11-01 NOTE — ED ADULT TRIAGE NOTE - CHIEF COMPLAINT QUOTE
Pt sent in by oncologist for hemoglobin 6.4 today. Pt currently being tx for ovarian cancer, last chemotherapy last week. Denies abdominal pain, rectal bleeding. Reports fatigue.

## 2022-11-01 NOTE — ED ADULT NURSE REASSESSMENT NOTE - NS ED NURSE REASSESS COMMENT FT1
Patient received 1 unit of blood. Pt denies any severe respiratory distress, flank pain, or any other major concerns. Report given to night RN. VSS. Will continue to monitor

## 2022-11-01 NOTE — ED PROVIDER NOTE - PROGRESS NOTE DETAILS
[facility] / chel  received on sign out. pt w hx of ovarian ca on chemo, recurrent anemia, transfusion dependent. unable to get outpt transfusion this week so here for tranfusion  thus far hgb 6.8 / hct 21.7. ordered for 2 uns  planned for dc after 2nd unit.   at time of sign out pending transfusion [facility] pt to be admitted to stroke service. [facility] - pt planned for dc after transfusion however after transfusion when trying to get up to walk pt was unable to walk due to new numbness in left leg. possibly related to sitting on stretcher for hours but after repositioning pt still w numbness to left leg and weakness.   stroke code activated.  slight swelling noted to extremity so ordered for US venous doppler and arterial studies.

## 2022-11-01 NOTE — ED PROVIDER NOTE - CLINICAL SUMMARY MEDICAL DECISION MAKING FREE TEXT BOX
71 y/o F pt presents to ED with symptomatic anemia secondary to chemotherapy. Plan to confirm hemoglobin and transfuse. If pt tolerates it, will discharge.

## 2022-11-01 NOTE — ED PROVIDER NOTE - NS_EDPROVIDERDISPOUSERTYPE_ED_A_ED
PROCEDURE:   Right posterior iliac crest bone marrow aspirate and biopsy.    INDICATIONS:   CLL  Anemia    EXAM:  MENTAL STATUS: A&O x 3.    DESCRIPTION OF PROCEDURE:   After verbal and written informed consent obtained, a \"Timeout\" was performed, verifing correct patient by using patient name and date of birth as well as verified the correct procedure.    The patient was prepped and draped in the usual fashion for a right posterior iliac crest bone marrow aspirate and biopsy. 10 cc of 2% Lidocaine were used by local infiltration to achieve local anesthesia. A scalpel blade was used to create a nick in the skin. A Jamshidi needle was used to obtain bone marrow aspirate for studies, then used to obtain a generous core biopsy for studies. The patient tolerated the procedure.    Per technician adequate spicules were seen.  Per technician non-heparinized specimen clotted quickly.    Estimated Blood Loss: Less than 5 cc.     Post procedure written and verbal instructions were given.  Patient cleared for discharge.     Supervising physician Dr. Gentry Magana PA-C       Scribe Attestation (For Scribes USE Only)... Attending Attestation (For Attendings USE Only).../Scribe Attestation (For Scribes USE Only)...

## 2022-11-01 NOTE — ED PROVIDER NOTE - OBJECTIVE STATEMENT
73 y/o F pt with PMHx of ovarian CA on chemo, last chemo 1w ago, and recurrent anemia secondary to chemo presents to ED sent in by oncologist for blood transfusion as pt's outpatient hemoglobin was 6.4 today. Pt endorses fatigue and exertional shortness of breath. She denies chest pain, lightheadedness, nausea, vomiting, black or bloody stools, fever, or any other acute complaints. Pt feels similar to prior anemia, and has tolerated transfusions before; her last transfusion was 6w ago.

## 2022-11-01 NOTE — ED PROVIDER NOTE - CONSTITUTIONAL, MLM
Pale appearing, awake, alert, oriented to person, place, time/situation and in no apparent distress. normal...

## 2022-11-02 DIAGNOSIS — D75.89 OTHER SPECIFIED DISEASES OF BLOOD AND BLOOD-FORMING ORGANS: ICD-10-CM

## 2022-11-02 DIAGNOSIS — E87.20 ACIDOSIS, UNSPECIFIED: ICD-10-CM

## 2022-11-02 DIAGNOSIS — C56.9 MALIGNANT NEOPLASM OF UNSPECIFIED OVARY: ICD-10-CM

## 2022-11-02 DIAGNOSIS — R56.9 UNSPECIFIED CONVULSIONS: ICD-10-CM

## 2022-11-02 DIAGNOSIS — D72.829 ELEVATED WHITE BLOOD CELL COUNT, UNSPECIFIED: ICD-10-CM

## 2022-11-02 DIAGNOSIS — E87.1 HYPO-OSMOLALITY AND HYPONATREMIA: ICD-10-CM

## 2022-11-02 DIAGNOSIS — R29.898 OTHER SYMPTOMS AND SIGNS INVOLVING THE MUSCULOSKELETAL SYSTEM: ICD-10-CM

## 2022-11-02 DIAGNOSIS — I10 ESSENTIAL (PRIMARY) HYPERTENSION: ICD-10-CM

## 2022-11-02 DIAGNOSIS — F41.9 ANXIETY DISORDER, UNSPECIFIED: ICD-10-CM

## 2022-11-02 DIAGNOSIS — Z51.5 ENCOUNTER FOR PALLIATIVE CARE: ICD-10-CM

## 2022-11-02 DIAGNOSIS — R79.89 OTHER SPECIFIED ABNORMAL FINDINGS OF BLOOD CHEMISTRY: ICD-10-CM

## 2022-11-02 DIAGNOSIS — E87.6 HYPOKALEMIA: ICD-10-CM

## 2022-11-02 DIAGNOSIS — R53.81 OTHER MALAISE: ICD-10-CM

## 2022-11-02 DIAGNOSIS — R52 PAIN, UNSPECIFIED: ICD-10-CM

## 2022-11-02 LAB
ALBUMIN SERPL ELPH-MCNC: 2.7 G/DL — LOW (ref 3.3–5)
ALBUMIN SERPL ELPH-MCNC: 3 G/DL — LOW (ref 3.3–5)
ALP SERPL-CCNC: 629 U/L — HIGH (ref 40–120)
ALP SERPL-CCNC: 645 U/L — HIGH (ref 40–120)
ALT FLD-CCNC: 26 U/L — SIGNIFICANT CHANGE UP (ref 10–45)
ALT FLD-CCNC: 27 U/L — SIGNIFICANT CHANGE UP (ref 10–45)
ANION GAP SERPL CALC-SCNC: 12 MMOL/L — SIGNIFICANT CHANGE UP (ref 5–17)
ANION GAP SERPL CALC-SCNC: 13 MMOL/L — SIGNIFICANT CHANGE UP (ref 5–17)
ANION GAP SERPL CALC-SCNC: 6 MMOL/L — SIGNIFICANT CHANGE UP (ref 5–17)
ANISOCYTOSIS BLD QL: SIGNIFICANT CHANGE UP
APTT BLD: 26.8 SEC — LOW (ref 27.5–35.5)
APTT BLD: 28.2 SEC — SIGNIFICANT CHANGE UP (ref 27.5–35.5)
AST SERPL-CCNC: 48 U/L — HIGH (ref 10–40)
AST SERPL-CCNC: 49 U/L — HIGH (ref 10–40)
BASE EXCESS BLDA CALC-SCNC: -0.5 MMOL/L — SIGNIFICANT CHANGE UP (ref -2–3)
BASOPHILS # BLD AUTO: 0 K/UL — SIGNIFICANT CHANGE UP (ref 0–0.2)
BASOPHILS NFR BLD AUTO: 0 % — SIGNIFICANT CHANGE UP (ref 0–2)
BILIRUB DIRECT SERPL-MCNC: 0.4 MG/DL — HIGH (ref 0–0.3)
BILIRUB INDIRECT FLD-MCNC: 0.8 MG/DL — SIGNIFICANT CHANGE UP (ref 0.2–1)
BILIRUB SERPL-MCNC: 1.2 MG/DL — SIGNIFICANT CHANGE UP (ref 0.2–1.2)
BILIRUB SERPL-MCNC: 1.2 MG/DL — SIGNIFICANT CHANGE UP (ref 0.2–1.2)
BUN SERPL-MCNC: 21 MG/DL — SIGNIFICANT CHANGE UP (ref 7–23)
BUN SERPL-MCNC: 21 MG/DL — SIGNIFICANT CHANGE UP (ref 7–23)
BUN SERPL-MCNC: 22 MG/DL — SIGNIFICANT CHANGE UP (ref 7–23)
BURR CELLS BLD QL SMEAR: PRESENT — SIGNIFICANT CHANGE UP
CALCIUM SERPL-MCNC: 7.3 MG/DL — LOW (ref 8.4–10.5)
CALCIUM SERPL-MCNC: 7.5 MG/DL — LOW (ref 8.4–10.5)
CALCIUM SERPL-MCNC: 8.4 MG/DL — SIGNIFICANT CHANGE UP (ref 8.4–10.5)
CHLORIDE SERPL-SCNC: 104 MMOL/L — SIGNIFICANT CHANGE UP (ref 96–108)
CHLORIDE SERPL-SCNC: 105 MMOL/L — SIGNIFICANT CHANGE UP (ref 96–108)
CHLORIDE SERPL-SCNC: 99 MMOL/L — SIGNIFICANT CHANGE UP (ref 96–108)
CK SERPL-CCNC: 78 U/L — SIGNIFICANT CHANGE UP (ref 25–170)
CO2 BLDA-SCNC: 24 MMOL/L — SIGNIFICANT CHANGE UP (ref 19–24)
CO2 SERPL-SCNC: 21 MMOL/L — LOW (ref 22–31)
CO2 SERPL-SCNC: 22 MMOL/L — SIGNIFICANT CHANGE UP (ref 22–31)
CO2 SERPL-SCNC: 23 MMOL/L — SIGNIFICANT CHANGE UP (ref 22–31)
CREAT SERPL-MCNC: 1.21 MG/DL — SIGNIFICANT CHANGE UP (ref 0.5–1.3)
CREAT SERPL-MCNC: 1.24 MG/DL — SIGNIFICANT CHANGE UP (ref 0.5–1.3)
CREAT SERPL-MCNC: 1.3 MG/DL — SIGNIFICANT CHANGE UP (ref 0.5–1.3)
EGFR: 43 ML/MIN/1.73M2 — LOW
EGFR: 46 ML/MIN/1.73M2 — LOW
EGFR: 47 ML/MIN/1.73M2 — LOW
EOSINOPHIL # BLD AUTO: 0 K/UL — SIGNIFICANT CHANGE UP (ref 0–0.5)
EOSINOPHIL NFR BLD AUTO: 0 % — SIGNIFICANT CHANGE UP (ref 0–6)
GAS PNL BLDA: SIGNIFICANT CHANGE UP
GLUCOSE BLDC GLUCOMTR-MCNC: 157 MG/DL — HIGH (ref 70–99)
GLUCOSE SERPL-MCNC: 111 MG/DL — HIGH (ref 70–99)
GLUCOSE SERPL-MCNC: 115 MG/DL — HIGH (ref 70–99)
GLUCOSE SERPL-MCNC: 168 MG/DL — HIGH (ref 70–99)
HCO3 BLDA-SCNC: 23 MMOL/L — SIGNIFICANT CHANGE UP (ref 21–28)
HCT VFR BLD CALC: 27.4 % — LOW (ref 34.5–45)
HCT VFR BLD CALC: 37.5 % — SIGNIFICANT CHANGE UP (ref 34.5–45)
HCT VFR BLD CALC: 37.8 % — SIGNIFICANT CHANGE UP (ref 34.5–45)
HGB BLD-MCNC: 12.5 G/DL — SIGNIFICANT CHANGE UP (ref 11.5–15.5)
HGB BLD-MCNC: 12.7 G/DL — SIGNIFICANT CHANGE UP (ref 11.5–15.5)
HGB BLD-MCNC: 8.9 G/DL — LOW (ref 11.5–15.5)
INR BLD: 1.05 — SIGNIFICANT CHANGE UP (ref 0.88–1.16)
INR BLD: 1.07 — SIGNIFICANT CHANGE UP (ref 0.88–1.16)
LACTATE SERPL-SCNC: 2.3 MMOL/L — HIGH (ref 0.5–2)
LACTATE SERPL-SCNC: 3.4 MMOL/L — HIGH (ref 0.5–2)
LYMPHOCYTES # BLD AUTO: 0.5 K/UL — LOW (ref 1–3.3)
LYMPHOCYTES # BLD AUTO: 1.7 % — LOW (ref 13–44)
MACROCYTES BLD QL: SLIGHT — SIGNIFICANT CHANGE UP
MAGNESIUM SERPL-MCNC: 1.8 MG/DL — SIGNIFICANT CHANGE UP (ref 1.6–2.6)
MANUAL SMEAR VERIFICATION: SIGNIFICANT CHANGE UP
MCHC RBC-ENTMCNC: 31.3 PG — SIGNIFICANT CHANGE UP (ref 27–34)
MCHC RBC-ENTMCNC: 31.4 PG — SIGNIFICANT CHANGE UP (ref 27–34)
MCHC RBC-ENTMCNC: 32.3 PG — SIGNIFICANT CHANGE UP (ref 27–34)
MCHC RBC-ENTMCNC: 32.5 GM/DL — SIGNIFICANT CHANGE UP (ref 32–36)
MCHC RBC-ENTMCNC: 33.1 GM/DL — SIGNIFICANT CHANGE UP (ref 32–36)
MCHC RBC-ENTMCNC: 33.9 GM/DL — SIGNIFICANT CHANGE UP (ref 32–36)
MCV RBC AUTO: 94.5 FL — SIGNIFICANT CHANGE UP (ref 80–100)
MCV RBC AUTO: 95.4 FL — SIGNIFICANT CHANGE UP (ref 80–100)
MCV RBC AUTO: 96.8 FL — SIGNIFICANT CHANGE UP (ref 80–100)
MICROCYTES BLD QL: SLIGHT — SIGNIFICANT CHANGE UP
MONOCYTES # BLD AUTO: 0.5 K/UL — SIGNIFICANT CHANGE UP (ref 0–0.9)
MONOCYTES NFR BLD AUTO: 1.7 % — LOW (ref 2–14)
NEUTROPHILS # BLD AUTO: 28.13 K/UL — HIGH (ref 1.8–7.4)
NEUTROPHILS NFR BLD AUTO: 95.7 % — HIGH (ref 43–77)
NEUTS BAND # BLD: 0.9 % — SIGNIFICANT CHANGE UP (ref 0–8)
NRBC # BLD: 0 /100 WBCS — SIGNIFICANT CHANGE UP (ref 0–0)
NRBC # BLD: 0 /100 WBCS — SIGNIFICANT CHANGE UP (ref 0–0)
OVALOCYTES BLD QL SMEAR: SLIGHT — SIGNIFICANT CHANGE UP
PCO2 BLDA: 34 MMHG — SIGNIFICANT CHANGE UP (ref 32–45)
PH BLDA: 7.44 — SIGNIFICANT CHANGE UP (ref 7.35–7.45)
PHOSPHATE SERPL-MCNC: 2.6 MG/DL — SIGNIFICANT CHANGE UP (ref 2.5–4.5)
PLAT MORPH BLD: NORMAL — SIGNIFICANT CHANGE UP
PLATELET # BLD AUTO: 28 K/UL — LOW (ref 150–400)
PLATELET # BLD AUTO: 31 K/UL — LOW (ref 150–400)
PLATELET # BLD AUTO: 36 K/UL — LOW (ref 150–400)
PO2 BLDA: 58 MMHG — LOW (ref 83–108)
POLYCHROMASIA BLD QL SMEAR: SLIGHT — SIGNIFICANT CHANGE UP
POTASSIUM SERPL-MCNC: 3.4 MMOL/L — LOW (ref 3.5–5.3)
POTASSIUM SERPL-MCNC: 3.6 MMOL/L — SIGNIFICANT CHANGE UP (ref 3.5–5.3)
POTASSIUM SERPL-MCNC: 3.9 MMOL/L — SIGNIFICANT CHANGE UP (ref 3.5–5.3)
POTASSIUM SERPL-SCNC: 3.4 MMOL/L — LOW (ref 3.5–5.3)
POTASSIUM SERPL-SCNC: 3.6 MMOL/L — SIGNIFICANT CHANGE UP (ref 3.5–5.3)
POTASSIUM SERPL-SCNC: 3.9 MMOL/L — SIGNIFICANT CHANGE UP (ref 3.5–5.3)
PROT SERPL-MCNC: 5.5 G/DL — LOW (ref 6–8.3)
PROT SERPL-MCNC: 5.5 G/DL — LOW (ref 6–8.3)
PROTHROM AB SERPL-ACNC: 12.5 SEC — SIGNIFICANT CHANGE UP (ref 10.5–13.4)
PROTHROM AB SERPL-ACNC: 12.8 SEC — SIGNIFICANT CHANGE UP (ref 10.5–13.4)
RBC # BLD: 2.83 M/UL — LOW (ref 3.8–5.2)
RBC # BLD: 3.93 M/UL — SIGNIFICANT CHANGE UP (ref 3.8–5.2)
RBC # BLD: 4 M/UL — SIGNIFICANT CHANGE UP (ref 3.8–5.2)
RBC # FLD: 22.2 % — HIGH (ref 10.3–14.5)
RBC # FLD: 23 % — HIGH (ref 10.3–14.5)
RBC # FLD: 23.8 % — HIGH (ref 10.3–14.5)
RBC BLD AUTO: ABNORMAL
SAO2 % BLDA: 91 % — LOW (ref 94–98)
SARS-COV-2 RNA SPEC QL NAA+PROBE: NEGATIVE — SIGNIFICANT CHANGE UP
SCHISTOCYTES BLD QL AUTO: SLIGHT — SIGNIFICANT CHANGE UP
SODIUM SERPL-SCNC: 127 MMOL/L — LOW (ref 135–145)
SODIUM SERPL-SCNC: 137 MMOL/L — SIGNIFICANT CHANGE UP (ref 135–145)
SODIUM SERPL-SCNC: 141 MMOL/L — SIGNIFICANT CHANGE UP (ref 135–145)
SPHEROCYTES BLD QL SMEAR: SLIGHT — SIGNIFICANT CHANGE UP
T4 FREE SERPL-MCNC: 1.43 NG/DL — SIGNIFICANT CHANGE UP (ref 0.93–1.7)
TSH SERPL-MCNC: 5.97 UIU/ML — HIGH (ref 0.27–4.2)
WBC # BLD: 19.22 K/UL — HIGH (ref 3.8–10.5)
WBC # BLD: 29.12 K/UL — HIGH (ref 3.8–10.5)
WBC # BLD: 32.07 K/UL — HIGH (ref 3.8–10.5)
WBC # FLD AUTO: 19.22 K/UL — HIGH (ref 3.8–10.5)
WBC # FLD AUTO: 29.12 K/UL — HIGH (ref 3.8–10.5)
WBC # FLD AUTO: 32.07 K/UL — HIGH (ref 3.8–10.5)

## 2022-11-02 PROCEDURE — 70470 CT HEAD/BRAIN W/O & W/DYE: CPT | Mod: 26

## 2022-11-02 PROCEDURE — 73560 X-RAY EXAM OF KNEE 1 OR 2: CPT | Mod: 26,LT

## 2022-11-02 PROCEDURE — 70496 CT ANGIOGRAPHY HEAD: CPT | Mod: 26,MA

## 2022-11-02 PROCEDURE — 73590 X-RAY EXAM OF LOWER LEG: CPT | Mod: 26,LT

## 2022-11-02 PROCEDURE — 99223 1ST HOSP IP/OBS HIGH 75: CPT

## 2022-11-02 PROCEDURE — 70498 CT ANGIOGRAPHY NECK: CPT | Mod: 26,MA

## 2022-11-02 PROCEDURE — 0042T: CPT

## 2022-11-02 RX ORDER — ENOXAPARIN SODIUM 100 MG/ML
40 INJECTION SUBCUTANEOUS EVERY 24 HOURS
Refills: 0 | Status: DISCONTINUED | OUTPATIENT
Start: 2022-11-02 | End: 2022-11-02

## 2022-11-02 RX ORDER — LOSARTAN POTASSIUM 100 MG/1
50 TABLET, FILM COATED ORAL DAILY
Refills: 0 | Status: DISCONTINUED | OUTPATIENT
Start: 2022-11-02 | End: 2022-11-04

## 2022-11-02 RX ORDER — LEVETIRACETAM 250 MG/1
1000 TABLET, FILM COATED ORAL ONCE
Refills: 0 | Status: COMPLETED | OUTPATIENT
Start: 2022-11-02 | End: 2022-11-02

## 2022-11-02 RX ORDER — POTASSIUM CHLORIDE 20 MEQ
40 PACKET (EA) ORAL ONCE
Refills: 0 | Status: COMPLETED | OUTPATIENT
Start: 2022-11-02 | End: 2022-11-02

## 2022-11-02 RX ORDER — LEVETIRACETAM 250 MG/1
500 TABLET, FILM COATED ORAL
Refills: 0 | Status: DISCONTINUED | OUTPATIENT
Start: 2022-11-02 | End: 2022-11-11

## 2022-11-02 RX ORDER — LIDOCAINE 4 G/100G
1 CREAM TOPICAL ONCE
Refills: 0 | Status: COMPLETED | OUTPATIENT
Start: 2022-11-02 | End: 2022-11-02

## 2022-11-02 RX ORDER — MORPHINE SULFATE 50 MG/1
2 CAPSULE, EXTENDED RELEASE ORAL EVERY 4 HOURS
Refills: 0 | Status: DISCONTINUED | OUTPATIENT
Start: 2022-11-02 | End: 2022-11-04

## 2022-11-02 RX ORDER — HYDRALAZINE HCL 50 MG
10 TABLET ORAL ONCE
Refills: 0 | Status: COMPLETED | OUTPATIENT
Start: 2022-11-02 | End: 2022-11-02

## 2022-11-02 RX ORDER — LABETALOL HCL 100 MG
10 TABLET ORAL ONCE
Refills: 0 | Status: COMPLETED | OUTPATIENT
Start: 2022-11-02 | End: 2022-11-02

## 2022-11-02 RX ORDER — ENOXAPARIN SODIUM 100 MG/ML
30 INJECTION SUBCUTANEOUS EVERY 24 HOURS
Refills: 0 | Status: DISCONTINUED | OUTPATIENT
Start: 2022-11-02 | End: 2022-11-03

## 2022-11-02 RX ORDER — ESCITALOPRAM OXALATE 10 MG/1
7.5 TABLET, FILM COATED ORAL DAILY
Refills: 0 | Status: DISCONTINUED | OUTPATIENT
Start: 2022-11-02 | End: 2022-11-11

## 2022-11-02 RX ORDER — AZITHROMYCIN 500 MG/1
0 TABLET, FILM COATED ORAL
Qty: 0 | Refills: 0 | DISCHARGE

## 2022-11-02 RX ORDER — MORPHINE SULFATE 50 MG/1
1 CAPSULE, EXTENDED RELEASE ORAL ONCE
Refills: 0 | Status: DISCONTINUED | OUTPATIENT
Start: 2022-11-02 | End: 2022-11-02

## 2022-11-02 RX ORDER — ESCITALOPRAM OXALATE 10 MG/1
0 TABLET, FILM COATED ORAL
Qty: 0 | Refills: 0 | DISCHARGE

## 2022-11-02 RX ADMIN — Medication 10 MILLIGRAM(S): at 06:19

## 2022-11-02 RX ADMIN — Medication 10 MILLIGRAM(S): at 09:23

## 2022-11-02 RX ADMIN — LEVETIRACETAM 500 MILLIGRAM(S): 250 TABLET, FILM COATED ORAL at 19:20

## 2022-11-02 RX ADMIN — MORPHINE SULFATE 2 MILLIGRAM(S): 50 CAPSULE, EXTENDED RELEASE ORAL at 21:11

## 2022-11-02 RX ADMIN — LEVETIRACETAM 400 MILLIGRAM(S): 250 TABLET, FILM COATED ORAL at 10:00

## 2022-11-02 RX ADMIN — MORPHINE SULFATE 1 MILLIGRAM(S): 50 CAPSULE, EXTENDED RELEASE ORAL at 13:24

## 2022-11-02 RX ADMIN — MORPHINE SULFATE 2 MILLIGRAM(S): 50 CAPSULE, EXTENDED RELEASE ORAL at 21:41

## 2022-11-02 RX ADMIN — Medication 10 MILLIGRAM(S): at 06:03

## 2022-11-02 RX ADMIN — Medication 40 MILLIEQUIVALENT(S): at 06:29

## 2022-11-02 RX ADMIN — LIDOCAINE 1 PATCH: 4 CREAM TOPICAL at 19:15

## 2022-11-02 RX ADMIN — MORPHINE SULFATE 1 MILLIGRAM(S): 50 CAPSULE, EXTENDED RELEASE ORAL at 13:30

## 2022-11-02 RX ADMIN — Medication 0.5 MILLIGRAM(S): at 22:56

## 2022-11-02 RX ADMIN — LIDOCAINE 1 PATCH: 4 CREAM TOPICAL at 12:31

## 2022-11-02 RX ADMIN — Medication 10 MILLIGRAM(S): at 05:46

## 2022-11-02 RX ADMIN — ENOXAPARIN SODIUM 30 MILLIGRAM(S): 100 INJECTION SUBCUTANEOUS at 19:20

## 2022-11-02 NOTE — CONSULT NOTE ADULT - PROBLEM SELECTOR RECOMMENDATION 9
sx concerning for CVA, in setting of malignancy; cont. work-up and mgmt per Neuro; PT/OT; plan for MRI brain; holding DAPT for now in setting of thrombocytopenia repleted

## 2022-11-02 NOTE — ED ADULT NURSE REASSESSMENT NOTE - NS ED NURSE REASSESS COMMENT FT1
patient still having difficulty using LLE.  loss of sensation still remains.  MD Hutchinson and RAJESH Bender made aware.

## 2022-11-02 NOTE — CONSULT NOTE ADULT - ATTENDING COMMENTS
Patient seen and examined.  Agree with fellow note.   Patient with stage IV ovarian cancer, extensive progression of disease.  Palliative care consulted for generalized pain and ongoing goals of care.  Please start Morphine 2mg IV Q4 hours prn.  Goals of care will occur once pain has improved.

## 2022-11-02 NOTE — CONSULT NOTE ADULT - PROBLEM SELECTOR RECOMMENDATION 5
due to chemotx; thrombocytopenia stable; anemia improved s/p PRBC transfusion (appears to have over-corrected, possible lab error?); monitor CBC
Palliative Care to continue to follow for pain and symptom management.    Sesar Espinal MD, PGY4  Palliative Care Fellow    Palliative Care is avalible 24/7,  please call [phone] with any questions.

## 2022-11-02 NOTE — CONSULT NOTE ADULT - PROBLEM SELECTOR RECOMMENDATION 6
# increasing, but Pt. is afebrile w/ no infectious signs/sx; # possibly reactive due to cancer and/or apparent seizure episode; suggest monitoring CBC off abx for now, f/u cultures and monitor for signs/sx of infection

## 2022-11-02 NOTE — PATIENT PROFILE ADULT - FALL HARM RISK - HARM RISK INTERVENTIONS

## 2022-11-02 NOTE — CONSULT NOTE ADULT - SUBJECTIVE AND OBJECTIVE BOX
Patient is a 72y old  Female who presents with a chief complaint of left leg weakness and decreased sensation (02 Nov 2022 04:34)    HPI:   **STROKE HPI***    HPI: 72y Female with PMHx of ?HTN, ovarian cancer on chemo (last treatment was 1 week ago), and recurrent anemia 2/2 chemotherapy, presented to the ER on 11/1 directly from oncologist for blood transfusion 2/2 Hgb 6.4, She received 2 units or PRBC's in the ER when she was trying to stand at time discharge (~0300) and noticed that her left leg was feeling numb and weak. Stroke code was called, NIHSS 3. NCHCT negative for hemorrhage, however, there is a patchy area of hypodensity along the right central sulcus which may be artifactual versus areas of age-indeterminate infarction.  CTA head and neck negative for significant steno-occlusive disease but does demonstrate extensive pulmonary metastatic disease. CT perfusion negative. Patient is not a tPA candidate 2/2 thrombocytopenia (repeat plt 36 after transfusion).      (02 Nov 2022 04:34)    Review of Systems: 12 point review of systems otherwise negative    PAST MEDICAL & SURGICAL HISTORY:  Ovarian cancer      Ovarian ca      No significant past surgical history    Social History:  SOCIAL HISTORY:   Patient lives with  (02 Nov 2022 04:34)    FAMILY HISTORY:  no documented h/o CVA on first-degree relatives      MEDICATIONS  (STANDING):  enoxaparin Injectable 40 milliGRAM(s) SubCutaneous every 24 hours  escitalopram 7.5 milliGRAM(s) Oral daily  losartan 50 milliGRAM(s) Oral daily    MEDICATIONS  (PRN):      Allergies    Benadryl (Other)  Compazine (Unknown)    Intolerances          Vital Signs Last 24 Hrs  T(C): 36.4 (02 Nov 2022 11:30), Max: 37 (02 Nov 2022 02:31)  T(F): 97.6 (02 Nov 2022 11:30), Max: 98.6 (02 Nov 2022 02:31)  HR: 78 (02 Nov 2022 13:16) (78 - 98)  BP: 137/74 (02 Nov 2022 13:16) (136/90 - 210/110)  BP(mean): --  RR: 18 (02 Nov 2022 13:16) (18 - 20)  SpO2: 95% (02 Nov 2022 13:16) (95% - 97%)    Parameters below as of 02 Nov 2022 13:16  Patient On (Oxygen Delivery Method): room air      CAPILLARY BLOOD GLUCOSE      POCT Blood Glucose.: 157 mg/dL (02 Nov 2022 08:44)        Physical Exam:  (earlier today)  Daily Height in cm: 154.94 (01 Nov 2022 16:28)    Daily   General:  non-toxic appearing in NAD  HEENT:  MMM  CV:  RRR  Lungs:  CTA B/L anteriorly  Abdomen:  soft NT ND  Skin:  WWP  Neuro:  AAOx3, no dysarthria    LABS:                        12.7   32.07 )-----------( 28       ( 02 Nov 2022 15:21 )             37.5     11-02    141  |  105  |  21  ----------------------------<  168<H>  3.9   |  23  |  1.30    Ca    8.4      02 Nov 2022 08:47  Phos  2.6     11-02  Mg     1.8     11-02    TPro  5.5<L>  /  Alb  3.0<L>  /  TBili  1.2  /  DBili  0.4<H>  /  AST  48<H>  /  ALT  26  /  AlkPhos  629<H>  11-02    PT/INR - ( 02 Nov 2022 08:47 )   PT: 12.5 sec;   INR: 1.05          PTT - ( 02 Nov 2022 08:47 )  PTT:28.2 sec

## 2022-11-02 NOTE — H&P ADULT - NSHPREVIEWOFSYSTEMS_GEN_ALL_CORE
ROS:   Constitutional: No fever, weight loss or fatigue  Eyes: No eye pain, visual disturbances, or discharge  ENMT:  No difficulty hearing, tinnitus, vertigo; No sinus or throat pain  Neck: No pain or stiffness  Respiratory: No cough, wheezing, chills or hemoptysis  Cardiovascular: No chest pain, palpitations, shortness of breath, dizziness or leg swelling  Gastrointestinal: No abdominal pain. No nausea, vomiting or hematemesis; No diarrhea or constipation.   Genitourinary: No dysuria, frequency, hematuria or incontinence  Neurological: As per HPI  Skin: No itching, burning, rashes or lesions   Endocrine: No heat or cold intolerance; No hair loss  Musculoskeletal: No joint pain or swelling; No muscle, back or extremity pain  Psychiatric: No depression, anxiety, mood swings or difficulty sleeping  Heme/Lymph: No easy bruising or bleeding gums

## 2022-11-02 NOTE — CONSULT NOTE ADULT - PROBLEM SELECTOR RECOMMENDATION 3
Unclear etiology of RLE pain, the pain is acute and started in the emergency room. Will defer work-up to primary team. Patient had a partial response to Morphine 1 mg IV.  -start Morphine 2 mg IV q4hr PRN for moderate-severe pain, this is equivalent to 6 mg Oral Morphine which has less potency then 5 mg Oxycodone.  -Will assess needs and titrate as needed  -Bowel regimen while on opiates. Unclear etiology of LLE pain, the pain is acute and started in the emergency room. Will defer work-up to primary team. Patient had a partial response to Morphine 1 mg IV.  -start Morphine 2 mg IV q4hr PRN for moderate-severe pain, this is equivalent to 6 mg Oral Morphine which has less potency then 5 mg Oxycodone.  -Will assess needs and titrate as needed  -Bowel regimen while on opiates.

## 2022-11-02 NOTE — PROGRESS NOTE ADULT - ASSESSMENT
Assessment and Plan :     Assessment :   72y Female with PMHx of ?HTN, ovarian cancer on chemo (last treatment was 1 week ago), and recurrent anemia 2/2 chemotherapy, presented to the ER on 11/1 directly from oncologist for blood transfusion 2/2 Hgb 6.4, She received 2 units or PRBC's in the ER when she was trying to stand at time discharge (~0300) and noticed that her left leg was feeling numb and weak. Stroke code was called, NIHSS 3. NCHCT negative for hemorrhage, however, there is a patchy area of hypodensity along the right central sulcus which may be artifactual versus areas of age-indeterminate infarction.  CTA head and neck negative for significant steno-occlusive disease but does demonstrate extensive pulmonary metastatic disease. CT perfusion negative. Patient is not a tPA candidate 2/2 thrombocytopenia (repeat plt 36 after transfusion). Admitted under stroke tele for further w/u.    Neuro  #CVA workup  - holding antiplatelet therapy for now in the setting of thrombocytopenia  - q4hr stroke neuro checks and vitals  - obtain MRI Brain with and without contrast  - Stroke Code HCT Results: Patchy area of hypodensity along the right central sulcus which may be artifactual versus areas of age-indeterminate infarction.  - Rpt CTH W/WO : Limited evaluation due to the presence of intravenous   contrast. Nonvisualization of the previously seen small acute to subacute   infarctions in the high right mesial frontal and parietal lobes.  - Stroke Code CTA Results: negative for steno-occlusive disease. demonstrates extensive pulmonary metastatic disease.  - vEEG- ordered.  - Stroke education    # Seizure   - s/p 1 episode of GTC in ER that lasted ~1min, did not need ativan.  - F/U vEEG  - s/p Keppra 1gm IV load and Keppra 500mg PO BID maintenance dose.  - Pt on Home Ativan- ? Withdrawal Sz  - Started on Home dose ativan 0.5mg PO TID   - Palliative consulted for pain management i/s/o ovarian CA.    # LLE pain and swelling  - ? unsure if pt had LLE pain prior to Sz  - L knee and L Tib/fib Xray obtained- No Acute Fx  - lidocaine patch ordered.  - Morphine 2mg IV Q4hrs prn ordered per palliative Reccs.  - F/U LLE venous doppler    #Resolved hyponatremia   - SNa(138-->127---> 137-->141)  - f/u AM BMP      Cards  #HTN  - permissive hypertension, Goal -180  - hold home blood pressure medication for now  - obtain TTE with bubble  - Stroke Code EKG Results: pending official read.    #anemia; s/p 2U PRBC's in ED  - continue to trend CBC Hb; 6.8-->8.9--->12.5  - Pt on iron @ home, need med rec to confirm the formulation and dosing.       #HLD  - holding for now i/s/o elevated liver enzymes.  - LDL results: pending    Pulm  - call provider if SPO2 < 94%    GI  #Nutrition/Fluids/Electrolytes   - replete K<4 and Mg <2  - Diet: DASH    Renal  - continue to trend,daily BMP      Infectious Disease  - continue to trend WBC 14.97-->19.22-->29.12  - afebrile (rectal temp 97.6)  - Consider infectious w/u if febrile    Endocrine  - A1C results: pending  - TSH results: pending    DVT Prophylaxis  - SCDs for DVT prophylaxis   - SQL (OK w/Dr. Callahan)    Dispo: pending PT/OT eval.     Discussed daily hospital plans and goals with patient and family at bedside.    Discussed with Neurology Attending Dr. Estela Ann         Assessment and Plan :     Assessment :   72y Female with PMHx of ?HTN, ovarian cancer on chemo (last treatment was 1 week ago), and recurrent anemia 2/2 chemotherapy, presented to the ER on 11/1 directly from oncologist for blood transfusion 2/2 Hgb 6.4, She received 2 units or PRBC's in the ER when she was trying to stand at time discharge (~0300) and noticed that her left leg was feeling numb and weak. Stroke code was called, NIHSS 3. NCHCT negative for hemorrhage, however, there is a patchy area of hypodensity along the right central sulcus which may be artifactual versus areas of age-indeterminate infarction.  CTA head and neck negative for significant steno-occlusive disease but does demonstrate extensive pulmonary metastatic disease. CT perfusion negative. Patient is not a tPA candidate 2/2 thrombocytopenia (repeat plt 36 after transfusion). Admitted under stroke tele for further w/u.    Neuro  #CVA workup  - holding antiplatelet therapy for now in the setting of thrombocytopenia  - q4hr stroke neuro checks and vitals  - obtain MRI Brain with and without contrast  - Stroke Code HCT Results: Patchy area of hypodensity along the right central sulcus which may be artifactual versus areas of age-indeterminate infarction.  - Rpt CTH W/WO : Limited evaluation due to the presence of intravenous   contrast. Nonvisualization of the previously seen small acute to subacute   infarctions in the high right mesial frontal and parietal lobes.  - Stroke Code CTA Results: negative for steno-occlusive disease. demonstrates extensive pulmonary metastatic disease.  - vEEG- ordered.  - Stroke education    # Seizure   - s/p 1 episode of GTC in ER that lasted ~1min, did not need ativan.  - F/U vEEG  - s/p Keppra 1gm IV load and Keppra 500mg PO BID maintenance dose.  - Pt on Home Ativan- ? Withdrawal Sz  - Started on Home dose ativan 0.5mg PO TID   - Palliative consulted for pain management i/s/o ovarian CA.    # LLE pain and swelling  - ? unsure if pt had LLE pain prior to Sz  - L knee and L Tib/fib Xray obtained- No Acute Fx  - lidocaine patch ordered.  - Morphine 2mg IV Q4hrs prn ordered per palliative Reccs.  - F/U LLE venous doppler    #Resolved hyponatremia   - SNa(138-->127---> 137-->141)  - f/u AM BMP      Cards  #HTN  - permissive hypertension, Goal -180  - hold home blood pressure medication for now  - obtain TTE with bubble  - Stroke Code EKG Results: pending official read.    #anemia; s/p 2U PRBC's in ED  - continue to trend CBC Hb; 6.8-->8.9--->12.5  - Pt on iron @ home, need med rec to confirm the formulation and dosing.       #HLD  - holding for now i/s/o elevated liver enzymes.  - LDL results: pending    Pulm  - call provider if SPO2 < 94%    GI  #Nutrition/Fluids/Electrolytes   - replete K<4 and Mg <2  - Diet: DASH    Renal  - continue to trend,daily BMP      Infectious Disease  - continue to trend WBC 14.97-->19.22-->29.12  - afebrile (rectal temp 97.6)  - Consider infectious w/u if febrile    Endocrine  - A1C results: pending  - TSH results: pending    DVT Prophylaxis  - SCDs for DVT prophylaxis   - SQL (OK w/Dr. Callahan)    Dispo: pending PT/OT eval.     Discussed daily hospital plans and goals with patient and family at bedside.    Discussed with Neurology Attending Dr. Callahan

## 2022-11-02 NOTE — H&P ADULT - NSHPLABSRESULTS_GEN_ALL_CORE
Fingerstick Blood Glucose: CAPILLARY BLOOD GLUCOSE        LABS:                        8.9    19.22 )-----------( 36       ( 02 Nov 2022 04:13 )             27.4     11-01    138  |  104  |  25<H>  ----------------------------<  110<H>  3.3<L>   |  26  |  1.35<H>    Ca    7.8<L>      01 Nov 2022 19:38    TPro  4.8<L>  /  Alb  2.7<L>  /  TBili  0.5  /  DBili  x   /  AST  37  /  ALT  22  /  AlkPhos  541<H>  11-01    PT/INR - ( 02 Nov 2022 04:20 )   PT: 12.8 sec;   INR: 1.07          PTT - ( 02 Nov 2022 04:20 )  PTT:26.8 sec          RADIOLOGY & ADDITIONAL STUDIES:  < from: CT Brain Stroke Protocol (11.02.22 @ 04:30) >    IMPRESSION: No acute intracranial hemorrhage, acute transcortical   infarction, extra-axial fluid collection or hydrocephalus. There is a   patchyarea of hypodensity along the right central sulcus which may be   artifactual versus areas of age-indeterminate infarction. (Further   assessment with diffusion weighted MRI may provide further information).    < end of copied text >    < from: CT Angio Neck Stroke Protocol w/ IV Cont (11.02.22 @ 04:32) >    IMPRESSION:  No high-grade stenosis or occlusion.  No hemodynamic significant stenosis at the right or left carotid   bifurcation.  Extensive pulmonary metastatic disease as described above.  Partially visualized small to moderate left pleural effusion.    < end of copied text >    < from: CT Perfusion w/ Maps w/ IV Cont (11.02.22 @ 04:32) >    IMPRESSION: No evidence of CBF less than 30% or Tmax greater than 6   seconds.    < end of copied text >

## 2022-11-02 NOTE — CONSULT NOTE ADULT - SUBJECTIVE AND OBJECTIVE BOX
HPI:    72y Female with PMHx of HTN, Ovarian Cancer on Chemo (last treatment was 1 week ago), and recurrent anemia 2/2 chemotherapy, presented to the ER on 11/1 directly from oncologist for blood transfusion 2/2 Hgb 6.4, She received 2 units or PRBC's in the ER when she was trying to stand at time discharge (~0300) and noticed that her left leg was feeling numb/weak, she collapsed during this episode. Stroke code was called, NIHSS 3. NCHCT negative for hemorrhage, however, there is a patchy area of hypodensity along the right central sulcus which may be artifactual versus areas of age-indeterminate infarction.  CTA head and neck negative for significant steno-occlusive disease but does demonstrate extensive pulmonary metastatic disease. CT perfusion negative. This morning RRT called for seizure-like episode that aborted on its own, patient with no previous seizure hx. Patient admitted to stroke service for close observation. Palliative is consulted for symptom management in the setting of advanced malignancy.     On examination patient complains of significant RLE pain which describes as 101/10, worse with movement. She is unable to localize the pain. Patient was given 1 mg of IV Morphine which provided partial pain relief. Patient with hx oxycodone use after surgery. Also complains of lumbar back pain which is chronic.     PAST MEDICAL & SURGICAL HISTORY:  Ovarian cancer      Ovarian ca      No significant past surgical history      T(C): 37 (11-02-22 @ 02:31), Max: 37 (11-02-22 @ 02:31)  HR: 88 (11-02-22 @ 02:31) (83 - 95)  BP: 184/92 (11-02-22 @ 02:31) (164/74 - 184/92)  RR: 18 (11-02-22 @ 02:31) (18 - 18)  SpO2: 95% (11-02-22 @ 02:31) (95% - 97%)    MEDICATION RECONCILIATION   MEDICATIONS  (STANDING):    MEDICATIONS  (PRN):    Allergies    Benadryl (Other)  Compazine (Unknown)    Intolerances      Vital Signs Last 24 Hrs  T(C): 37 (02 Nov 2022 02:31), Max: 37 (02 Nov 2022 02:31)  T(F): 98.6 (02 Nov 2022 02:31), Max: 98.6 (02 Nov 2022 02:31)  HR: 88 (02 Nov 2022 02:31) (83 - 95)  BP: 184/92 (02 Nov 2022 02:31) (164/74 - 184/92)  BP(mean): --  RR: 18 (02 Nov 2022 02:31) (18 - 18)  SpO2: 95% (02 Nov 2022 02:31) (95% - 97%)    Parameters below as of 02 Nov 2022 02:31  Patient On (Oxygen Delivery Method): room air     (02 Nov 2022 04:34)    PERTINENT PM/SXH:   Ovarian cancer    Ovarian ca      No significant past surgical history      FAMILY HISTORY:    Family Hx substance abuse [ ]yes [ ]no  ITEMS NOT CHECKED ARE NOT PRESENT    SOCIAL HISTORY:   Significant other/partner[x ]  Children[ ]  Hinduism/Spirituality:  Substance hx:  [ ]   Tobacco hx:  [ ]   Alcohol hx: [ ]   Home Opioid hx:  [x ] I-Stop Reference No: [id number]  Living Situation: [x ]Home  [ ]Long term care  [ ]Rehab [ ]Other    ADVANCE DIRECTIVES:    DNR/MOLST  [ ]  Living Will  [ ]   DECISION MAKER(s): Yash Oliva   [x ] Health Care Proxy(s)  [ ] Surrogate(s)  [ ] Guardian           Name(s): Yash Bergman Phone Number(s): [phone]    BASELINE (I)ADL(s) (prior to admission):  [place]: [ ]Total  [x ] Moderate [ ]Dependent    Allergies    Benadryl (Other)  Compazine (Unknown)    Intolerances    MEDICATIONS  (STANDING):  enoxaparin Injectable 40 milliGRAM(s) SubCutaneous every 24 hours  escitalopram 7.5 milliGRAM(s) Oral daily  losartan 50 milliGRAM(s) Oral daily    MEDICATIONS  (PRN):    PRESENT SYMPTOMS: [ ]Unable to self-report  [ ] CPOT [ ] PAINADs [ ] RDOS  Source if other than patient:  [ ]Family   [ ]Team     Pain: [x]yes [ ]no  QOL impact - unable to move   Location - RLE                     Aggravating factors - movement  Quality - sharp  Radiation - none  Timing- constant  Severity (0-10 scale): 10   Minimal acceptable level (0-10 scale):     Dyspnea:                           [ ]Mild [ ]Moderate [ ]Severe  Anxiety:                             [ ]Mild [ ]Moderate [ ]Severe  Fatigue:                             [ ]Mild [ ]Moderate [ ]Severe  Nausea:                             [ ]Mild [ ]Moderate [ ]Severe  Loss of appetite:              [ ]Mild [ ]Moderate [ ]Severe  Constipation:                    [ ]Mild [ ]Moderate [ ]Severe    PCSSQ[Palliative Care Spiritual Screening Question]   Severity (0-10):  Score of 4 or > indicate consideration of Chaplaincy referral.  Chaplaincy Referral: [ ] yes [ ] refused [ ] following [x ] Deferred     Caregiver [place]? : [ ] yes [ ] no [x ] Deferred [ ] Declined             Social work referral [ ] Patient & Family Centered Care Referral [ ]     Anticipatory Grief present?:  [ ] yes [ ] no  [x ] Deferred                  Social work referral [ ] Chaplaincy Referral[ ]      Other Symptoms:  [x]All other review of systems negative     Palliative Performance Status Version 2:       70 %    http://npcrc.org/files/news/palliative_performance_scale_ppsv2.pdf    PHYSICAL EXAM:  Vital Signs Last 24 Hrs  T(C): 36.4 (02 Nov 2022 11:30), Max: 37 (02 Nov 2022 02:31)  T(F): 97.6 (02 Nov 2022 11:30), Max: 98.6 (02 Nov 2022 02:31)  HR: 78 (02 Nov 2022 13:16) (78 - 98)  BP: 137/74 (02 Nov 2022 13:16) (136/90 - 210/110)  BP(mean): --  RR: 18 (02 Nov 2022 13:16) (18 - 20)  SpO2: 95% (02 Nov 2022 13:16) (95% - 97%)    Parameters below as of 02 Nov 2022 13:16  Patient On (Oxygen Delivery Method): room air     I&O's Summary    GENERAL: [ ]Cachexia    [x ]Alert  [ x]Oriented x 3   [ ]Lethargic  [ ]Unarousable  [ ]Verbal  [ ]Non-Verbal  Behavioral:   [x ] Anxiety  [ ] Delirium [ ] Agitation [ ] Other  HEENT:  [ ]Normal   [x ]Dry mouth   [ ]ET Tube/Trach  [ ]Oral lesions  PULMONARY:   [x ]Clear [ ]Tachypnea  [ ]Audible excessive secretions   [ ]Rhonchi        [ ]Right [ ]Left [ ]Bilateral  [ ]Crackles        [ ]Right [ ]Left [ ]Bilateral  [ ]Wheezing     [ ]Right [ ]Left [ ]Bilateral  [ ]Diminished breath sounds [ ]right [ ]left [ ]bilateral  CARDIOVASCULAR:    [x ]Regular [ ]Irregular [ ]Tachy  [ ]Lonnie [ ]Murmur [ ]Other  GASTROINTESTINAL:  [x ]Soft  [ ]Distended   [ ]+BS  [ ]Non tender [ ]Tender  [ ]Other [ ]PEG [ ]OGT/ NGT  Last BM:  GENITOURINARY:  [x ]Normal [ ] Incontinent   [ ]Oliguria/Anuria   [ ]Michaels  MUSCULOSKELETAL:   [ ]Normal   [ x]Weakness  [ ]Bed/Wheelchair bound [ ]Edema  NEUROLOGIC:   [ ]No focal deficits  [ ]Cognitive impairment  [ ]Dysphagia [ ]Dysarthria [ ]Paresis [ ]Other   SKIN:   [ ]Normal  [ ]Rash  [ ]Other  [ ]Pressure ulcer(s)       Present on admission [ ]y [ ]n    CRITICAL CARE:  [ ] Shock Present  [ ]Septic [ ]Cardiogenic [ ]Neurologic [ ]Hypovolemic  [ ]  Vasopressors [ ]  Inotropes   [ ]Respiratory failure present [ ]Mechanical ventilation [ ]Non-invasive ventilatory support [ ]High flow    [ ]Acute  [ ]Chronic [ ]Hypoxic  [ ]Hypercarbic [ ]Other  [ ]Other organ failure     LABS:                        12.7   32.07 )-----------( x        ( 02 Nov 2022 15:21 )             37.5   11-02    141  |  105  |  21  ----------------------------<  168<H>  3.9   |  23  |  1.30    Ca    8.4      02 Nov 2022 08:47  Phos  2.6     11-02  Mg     1.8     11-02    TPro  5.5<L>  /  Alb  3.0<L>  /  TBili  1.2  /  DBili  0.4<H>  /  AST  48<H>  /  ALT  26  /  AlkPhos  629<H>  11-02  PT/INR - ( 02 Nov 2022 08:47 )   PT: 12.5 sec;   INR: 1.05          PTT - ( 02 Nov 2022 08:47 )  PTT:28.2 sec      RADIOLOGY & ADDITIONAL STUDIES: < from: CT Head w/wo IV Cont (11.02.22 @ 10:36) >    ACC: [id number] EXAM:  CT BRAIN WAW IC                          PROCEDURE DATE:  11/02/2022          INTERPRETATION:  Weakness.    Technique: CT head was performed utilizing axial images from the base of   the skull through the vertex without the administration of intravenous   contrast. Images were reviewed in the bone, brain and subdural windows.    Findings: There comparison is made to a prior CT of the brain performed   on 11/2/2022.    There has been interval appearance of hyperdensity overlying the   bilateral tentorium and mildly within the interhemispheric fissure as   well as hyperdensity seen within the [place] of [place]; findings are   consistent with prior contrast injection. The previously seen small   patchy areas of low density within the high right mesial frontal and   parietal lobes that extend to the cortex are not visualized on this study   and due to the presence of contrast. The ventricles are normal in size   and caliber.  There is no evidence of mass-effect or midline shift. There is no   evidence of an intra or extra-axial fluid collection.    Visualized paranasal sinuses and bilateral mastoid air cells are clear.   Osteopenia.    Impression: Limited evaluation due to the presence of intravenous   contrast. Nonvisualization of the previously seen small acute to subacute   infarctions in the high right mesial frontal and parietal lobes.    --- End of Report ---            HIMANSHU KNOWLES MD; Attending Radiologist  This document has been electronically signed. Nov2 2022 10:50AM    < end of copied text >      PROTEIN CALORIE MALNUTRITION PRESENT: [ ]mild [ ]moderate [ ]severe [ ]underweight [ ]morbid obesity  https://www.andeal.org/vault/2440/web/files/ONC/Table_Clinical%20Characteristics%20to%20Document%20Malnutrition-White%20JV%20et%20al%202012.pdf    Height (cm): 154.9 (11-01-22 @ 16:28), 152.4 (02-25-22 @ 14:00)  Weight (kg): 52.6 (11-01-22 @ 16:28), 53.524 (02-25-22 @ 14:00)  BMI (kg/m2): 21.9 (11-01-22 @ 16:28), 23 (02-25-22 @ 14:00)    [ ]PPSV2 < or = to 30% [ ]significant weight loss  [ ]poor nutritional intake  [ ]anasarca[ ]Artificial Nutrition      Other REFERRALS:  [ ]Hospice  [ ]Child Life  [ ]Social Work  [ ]Case management [ ]Holistic Therapy  HPI:    72y Female with PMHx of HTN, Ovarian Cancer on Chemo (last treatment was 1 week ago), and recurrent anemia 2/2 chemotherapy, presented to the ER on 11/1 directly from oncologist for blood transfusion 2/2 Hgb 6.4, She received 2 units or PRBC's in the ER when she was trying to stand at time discharge (~0300) and noticed that her left leg was feeling numb/weak, she collapsed during this episode. Stroke code was called, NIHSS 3. NCHCT negative for hemorrhage, however, there is a patchy area of hypodensity along the right central sulcus which may be artifactual versus areas of age-indeterminate infarction.  CTA head and neck negative for significant steno-occlusive disease but does demonstrate extensive pulmonary metastatic disease. CT perfusion negative. This morning RRT called for seizure-like episode that aborted on its own, patient with no previous seizure hx. Patient admitted to stroke service for close observation. Palliative is consulted for symptom management in the setting of advanced malignancy.     On examination patient complains of significant LLE pain which describes as 101/10, worse with movement. She is unable to localize the pain. Patient was given 1 mg of IV Morphine which provided partial pain relief. Patient with hx oxycodone use after surgery. Also complains of lumbar back pain which is chronic.     PAST MEDICAL & SURGICAL HISTORY:  Ovarian cancer      Ovarian ca      No significant past surgical history      T(C): 37 (11-02-22 @ 02:31), Max: 37 (11-02-22 @ 02:31)  HR: 88 (11-02-22 @ 02:31) (83 - 95)  BP: 184/92 (11-02-22 @ 02:31) (164/74 - 184/92)  RR: 18 (11-02-22 @ 02:31) (18 - 18)  SpO2: 95% (11-02-22 @ 02:31) (95% - 97%)    MEDICATION RECONCILIATION   MEDICATIONS  (STANDING):    MEDICATIONS  (PRN):    Allergies    Benadryl (Other)  Compazine (Unknown)    Intolerances      Vital Signs Last 24 Hrs  T(C): 37 (02 Nov 2022 02:31), Max: 37 (02 Nov 2022 02:31)  T(F): 98.6 (02 Nov 2022 02:31), Max: 98.6 (02 Nov 2022 02:31)  HR: 88 (02 Nov 2022 02:31) (83 - 95)  BP: 184/92 (02 Nov 2022 02:31) (164/74 - 184/92)  BP(mean): --  RR: 18 (02 Nov 2022 02:31) (18 - 18)  SpO2: 95% (02 Nov 2022 02:31) (95% - 97%)    Parameters below as of 02 Nov 2022 02:31  Patient On (Oxygen Delivery Method): room air     (02 Nov 2022 04:34)    PERTINENT PM/SXH:   Ovarian cancer    Ovarian ca      No significant past surgical history      FAMILY HISTORY:    Family Hx substance abuse [ ]yes [ ]no  ITEMS NOT CHECKED ARE NOT PRESENT    SOCIAL HISTORY:   Significant other/partner[x ]  Children[ ]  Oriental orthodox/Spirituality:  Substance hx:  [ ]   Tobacco hx:  [ ]   Alcohol hx: [ ]   Home Opioid hx:  [x ] I-Stop Reference No: [id number]  Living Situation: [x ]Home  [ ]Long term care  [ ]Rehab [ ]Other    ADVANCE DIRECTIVES:    DNR/MOLST  [ ]  Living Will  [ ]   DECISION MAKER(s): Yash Oliva   [x ] Health Care Proxy(s)  [ ] Surrogate(s)  [ ] Guardian           Name(s): Yash Bergman Phone Number(s): [phone]    BASELINE (I)ADL(s) (prior to admission):  [place]: [ ]Total  [x ] Moderate [ ]Dependent    Allergies    Benadryl (Other)  Compazine (Unknown)    Intolerances    MEDICATIONS  (STANDING):  enoxaparin Injectable 40 milliGRAM(s) SubCutaneous every 24 hours  escitalopram 7.5 milliGRAM(s) Oral daily  losartan 50 milliGRAM(s) Oral daily    MEDICATIONS  (PRN):    PRESENT SYMPTOMS: [ ]Unable to self-report  [ ] CPOT [ ] PAINADs [ ] RDOS  Source if other than patient:  [ ]Family   [ ]Team     Pain: [x]yes [ ]no  QOL impact - unable to move   Location - RLE                     Aggravating factors - movement  Quality - sharp  Radiation - none  Timing- constant  Severity (0-10 scale): 10   Minimal acceptable level (0-10 scale):     Dyspnea:                           [ ]Mild [ ]Moderate [ ]Severe  Anxiety:                             [ ]Mild [ ]Moderate [ ]Severe  Fatigue:                             [ ]Mild [ ]Moderate [ ]Severe  Nausea:                             [ ]Mild [ ]Moderate [ ]Severe  Loss of appetite:              [ ]Mild [ ]Moderate [ ]Severe  Constipation:                    [ ]Mild [ ]Moderate [ ]Severe    PCSSQ[Palliative Care Spiritual Screening Question]   Severity (0-10):  Score of 4 or > indicate consideration of Chaplaincy referral.  Chaplaincy Referral: [ ] yes [ ] refused [ ] following [x ] Deferred     Caregiver [place]? : [ ] yes [ ] no [x ] Deferred [ ] Declined             Social work referral [ ] Patient & Family Centered Care Referral [ ]     Anticipatory Grief present?:  [ ] yes [ ] no  [x ] Deferred                  Social work referral [ ] Chaplaincy Referral[ ]      Other Symptoms:  [x]All other review of systems negative     Palliative Performance Status Version 2:       70 %    http://npcrc.org/files/news/palliative_performance_scale_ppsv2.pdf    PHYSICAL EXAM:  Vital Signs Last 24 Hrs  T(C): 36.4 (02 Nov 2022 11:30), Max: 37 (02 Nov 2022 02:31)  T(F): 97.6 (02 Nov 2022 11:30), Max: 98.6 (02 Nov 2022 02:31)  HR: 78 (02 Nov 2022 13:16) (78 - 98)  BP: 137/74 (02 Nov 2022 13:16) (136/90 - 210/110)  BP(mean): --  RR: 18 (02 Nov 2022 13:16) (18 - 20)  SpO2: 95% (02 Nov 2022 13:16) (95% - 97%)    Parameters below as of 02 Nov 2022 13:16  Patient On (Oxygen Delivery Method): room air     I&O's Summary    GENERAL: [ ]Cachexia    [x ]Alert  [ x]Oriented x 3   [ ]Lethargic  [ ]Unarousable  [ ]Verbal  [ ]Non-Verbal  Behavioral:   [x ] Anxiety  [ ] Delirium [ ] Agitation [ ] Other  HEENT:  [ ]Normal   [x ]Dry mouth   [ ]ET Tube/Trach  [ ]Oral lesions  PULMONARY:   [x ]Clear [ ]Tachypnea  [ ]Audible excessive secretions   [ ]Rhonchi        [ ]Right [ ]Left [ ]Bilateral  [ ]Crackles        [ ]Right [ ]Left [ ]Bilateral  [ ]Wheezing     [ ]Right [ ]Left [ ]Bilateral  [ ]Diminished breath sounds [ ]right [ ]left [ ]bilateral  CARDIOVASCULAR:    [x ]Regular [ ]Irregular [ ]Tachy  [ ]Lonnie [ ]Murmur [ ]Other  GASTROINTESTINAL:  [x ]Soft  [ ]Distended   [ ]+BS  [ ]Non tender [ ]Tender  [ ]Other [ ]PEG [ ]OGT/ NGT  Last BM:  GENITOURINARY:  [x ]Normal [ ] Incontinent   [ ]Oliguria/Anuria   [ ]Michaels  MUSCULOSKELETAL:   [ ]Normal   [ x]Weakness  [ ]Bed/Wheelchair bound [ ]Edema  NEUROLOGIC:   [ ]No focal deficits  [ ]Cognitive impairment  [ ]Dysphagia [ ]Dysarthria [ ]Paresis [ ]Other   SKIN:   [ ]Normal  [ ]Rash  [ ]Other  [ ]Pressure ulcer(s)       Present on admission [ ]y [ ]n    CRITICAL CARE:  [ ] Shock Present  [ ]Septic [ ]Cardiogenic [ ]Neurologic [ ]Hypovolemic  [ ]  Vasopressors [ ]  Inotropes   [ ]Respiratory failure present [ ]Mechanical ventilation [ ]Non-invasive ventilatory support [ ]High flow    [ ]Acute  [ ]Chronic [ ]Hypoxic  [ ]Hypercarbic [ ]Other  [ ]Other organ failure     LABS:                        12.7   32.07 )-----------( x        ( 02 Nov 2022 15:21 )             37.5   11-02    141  |  105  |  21  ----------------------------<  168<H>  3.9   |  23  |  1.30    Ca    8.4      02 Nov 2022 08:47  Phos  2.6     11-02  Mg     1.8     11-02    TPro  5.5<L>  /  Alb  3.0<L>  /  TBili  1.2  /  DBili  0.4<H>  /  AST  48<H>  /  ALT  26  /  AlkPhos  629<H>  11-02  PT/INR - ( 02 Nov 2022 08:47 )   PT: 12.5 sec;   INR: 1.05          PTT - ( 02 Nov 2022 08:47 )  PTT:28.2 sec      RADIOLOGY & ADDITIONAL STUDIES: < from: CT Head w/wo IV Cont (11.02.22 @ 10:36) >    ACC: [id number] EXAM:  CT BRAIN WAW IC                          PROCEDURE DATE:  11/02/2022          INTERPRETATION:  Weakness.    Technique: CT head was performed utilizing axial images from the base of   the skull through the vertex without the administration of intravenous   contrast. Images were reviewed in the bone, brain and subdural windows.    Findings: There comparison is made to a prior CT of the brain performed   on 11/2/2022.    There has been interval appearance of hyperdensity overlying the   bilateral tentorium and mildly within the interhemispheric fissure as   well as hyperdensity seen within the [place] of [place]; findings are   consistent with prior contrast injection. The previously seen small   patchy areas of low density within the high right mesial frontal and   parietal lobes that extend to the cortex are not visualized on this study   and due to the presence of contrast. The ventricles are normal in size   and caliber.  There is no evidence of mass-effect or midline shift. There is no   evidence of an intra or extra-axial fluid collection.    Visualized paranasal sinuses and bilateral mastoid air cells are clear.   Osteopenia.    Impression: Limited evaluation due to the presence of intravenous   contrast. Nonvisualization of the previously seen small acute to subacute   infarctions in the high right mesial frontal and parietal lobes.    --- End of Report ---            HIMANSHU KNOWLES MD; Attending Radiologist  This document has been electronically signed. Nov2 2022 10:50AM    < end of copied text >      PROTEIN CALORIE MALNUTRITION PRESENT: [ ]mild [ ]moderate [ ]severe [ ]underweight [ ]morbid obesity  https://www.andeal.org/vault/2440/web/files/ONC/Table_Clinical%20Characteristics%20to%20Document%20Malnutrition-White%20JV%20et%20al%202012.pdf    Height (cm): 154.9 (11-01-22 @ 16:28), 152.4 (02-25-22 @ 14:00)  Weight (kg): 52.6 (11-01-22 @ 16:28), 53.524 (02-25-22 @ 14:00)  BMI (kg/m2): 21.9 (11-01-22 @ 16:28), 23 (02-25-22 @ 14:00)    [ ]PPSV2 < or = to 30% [ ]significant weight loss  [ ]poor nutritional intake  [ ]anasarca[ ]Artificial Nutrition      Other REFERRALS:  [ ]Hospice  [ ]Child Life  [ ]Social Work  [ ]Case management [ ]Holistic Therapy

## 2022-11-02 NOTE — H&P ADULT - HISTORY OF PRESENT ILLNESS
**STROKE HPI***    HPI: 72y Female with PMHx of ?HTN, ovarian cancer on chemo (last treatment was 1 week ago), and recurrent anemia 2/2 chemotherapy, presented to the ER on 11/1 directly from oncologist for blood transfusion 2/2 Hgb 6.4, She received 2 units or PRBC's in the ER when she was trying to stand at time discharge (~0300) and noticed that her left leg was feeling numb and weak. Stroke code was called, NIHSS 3. NCHCT negative for hemorrhage, however, there is a questionable hypodensity in the right parietal area that may represent recent infarction. CTA head and neck wet read negative for LVO, CT perfusion negative. Patient is not a tPA candidate 2/2 thrombocytopenia (repeat plt 36 after transfusion)    PAST MEDICAL & SURGICAL HISTORY:  Ovarian cancer      Ovarian ca      No significant past surgical history      T(C): 37 (11-02-22 @ 02:31), Max: 37 (11-02-22 @ 02:31)  HR: 88 (11-02-22 @ 02:31) (83 - 95)  BP: 184/92 (11-02-22 @ 02:31) (164/74 - 184/92)  RR: 18 (11-02-22 @ 02:31) (18 - 18)  SpO2: 95% (11-02-22 @ 02:31) (95% - 97%)    MEDICATION RECONCILIATION   MEDICATIONS  (STANDING):    MEDICATIONS  (PRN):    Allergies    Benadryl (Other)  Compazine (Unknown)    Intolerances      Vital Signs Last 24 Hrs  T(C): 37 (02 Nov 2022 02:31), Max: 37 (02 Nov 2022 02:31)  T(F): 98.6 (02 Nov 2022 02:31), Max: 98.6 (02 Nov 2022 02:31)  HR: 88 (02 Nov 2022 02:31) (83 - 95)  BP: 184/92 (02 Nov 2022 02:31) (164/74 - 184/92)  BP(mean): --  RR: 18 (02 Nov 2022 02:31) (18 - 18)  SpO2: 95% (02 Nov 2022 02:31) (95% - 97%)    Parameters below as of 02 Nov 2022 02:31  Patient On (Oxygen Delivery Method): room air        Fingerstick Blood Glucose: CAPILLARY BLOOD GLUCOSE        LABS:                        8.9    19.22 )-----------( 36       ( 02 Nov 2022 04:13 )             27.4     11-01    138  |  104  |  25<H>  ----------------------------<  110<H>  3.3<L>   |  26  |  1.35<H>    Ca    7.8<L>      01 Nov 2022 19:38    TPro  4.8<L>  /  Alb  2.7<L>  /  TBili  0.5  /  DBili  x   /  AST  37  /  ALT  22  /  AlkPhos  541<H>  11-01    PT/INR - ( 02 Nov 2022 04:20 )   PT: 12.8 sec;   INR: 1.07          PTT - ( 02 Nov 2022 04:20 )  PTT:26.8 sec          RADIOLOGY & ADDITIONAL STUDIES:    HCT:     CTA:    -----------------------------------------------------------------------------------------    ASSESSMENT/PLAN:    72y Female w/ PMH ***. HCT showed ***. CTA showed ***. Given *** tPA was ***. Patient was admitted to **** for further workup    **STROKE HPI***    HPI: 72y Female with PMHx of ?HTN, ovarian cancer on chemo (last treatment was 1 week ago), and recurrent anemia 2/2 chemotherapy, presented to the ER on 11/1 directly from oncologist for blood transfusion 2/2 Hgb 6.4, She received 2 units or PRBC's in the ER when she was trying to stand at time discharge (~0300) and noticed that her left leg was feeling numb and weak. Stroke code was called, NIHSS 3. NCHCT negative for hemorrhage, however, there is a patchy area of hypodensity along the right central sulcus which may be artifactual versus areas of age-indeterminate infarction.  CTA head and neck negative for significant steno-occlusive disease but does demonstrate extensive pulmonary metastatic disease. CT perfusion negative. Patient is not a tPA candidate 2/2 thrombocytopenia (repeat plt 36 after transfusion).     PAST MEDICAL & SURGICAL HISTORY:  Ovarian cancer      Ovarian ca      No significant past surgical history      T(C): 37 (11-02-22 @ 02:31), Max: 37 (11-02-22 @ 02:31)  HR: 88 (11-02-22 @ 02:31) (83 - 95)  BP: 184/92 (11-02-22 @ 02:31) (164/74 - 184/92)  RR: 18 (11-02-22 @ 02:31) (18 - 18)  SpO2: 95% (11-02-22 @ 02:31) (95% - 97%)    MEDICATION RECONCILIATION   MEDICATIONS  (STANDING):    MEDICATIONS  (PRN):    Allergies    Benadryl (Other)  Compazine (Unknown)    Intolerances      Vital Signs Last 24 Hrs  T(C): 37 (02 Nov 2022 02:31), Max: 37 (02 Nov 2022 02:31)  T(F): 98.6 (02 Nov 2022 02:31), Max: 98.6 (02 Nov 2022 02:31)  HR: 88 (02 Nov 2022 02:31) (83 - 95)  BP: 184/92 (02 Nov 2022 02:31) (164/74 - 184/92)  BP(mean): --  RR: 18 (02 Nov 2022 02:31) (18 - 18)  SpO2: 95% (02 Nov 2022 02:31) (95% - 97%)    Parameters below as of 02 Nov 2022 02:31  Patient On (Oxygen Delivery Method): room air

## 2022-11-02 NOTE — ED ADULT NURSE REASSESSMENT NOTE - NS ED NURSE REASSESS COMMENT FT1
RN attempted assisting patient out of bed for dc.  patient experienced sudden weakness to LLE, some loss of sensation and described it as "pins and needles."  RN assisted patient back to bed.  MD Hutchinson and RAJESH Bender made aware.  RN placed pillows under L side of patient to relieve pressure from patient's LLE.  will reassess.

## 2022-11-02 NOTE — ED ADULT NURSE REASSESSMENT NOTE - NS ED NURSE REASSESS COMMENT FT1
Pt was noted to be convulsing with both arms and legs at 8:31am, witnessed by RN. Pt was placed on nonrebreather and RRT called. No incontinence or tongue biting noted. Pt broke out of seizure one minute later on own. RRT arrived, BP noted to be elevated. Pt postictal but moaning and oriented to self. Lactate level drawn arterially by MD.

## 2022-11-02 NOTE — H&P ADULT - NSHPPHYSICALEXAM_GEN_ALL_CORE
Physical exam:  General: Tearful    Neurologic:  -Mental status: Awake, alert, oriented to person, place, and time. Speech is fluent with intact naming, repetition, and comprehension, no dysarthria. Recent and remote memory intact. Follows commands. Attention/concentration intact.   -Cranial nerves:   II: Visual fields are full to confrontation.  III, IV, VI: Extraocular movements are intact without nystagmus. Pupils equally round and reactive to light  V:  Facial sensation V1-V3 equal and intact   VII: Face is symmetric with normal eye closure and smile  Motor: Normal bulk and tone. No pronator drift. Strength bilateral upper extremity 5/5. Right lower extremity 5/5. Left lower extremity able to briefly lift off of bed periodically, more weak distally but able to bend knee, at least 2+/5.   Sensation: Diminished sensation of left lower extremity.  Coordination: No dysmetria on finger-to-nose    NIHSS: 3

## 2022-11-02 NOTE — CONSULT NOTE ADULT - PROBLEM SELECTOR RECOMMENDATION 2
cont. work-up and mgmt per Neuro; started Keppra; plan for MRI brain; cont. seizure precautions
ISTOP reviewed: patient has been receiving Ativan 0.5 mg oral 3x daily since December 2021.  -please restart Ativan 0.5 mg oral TID to prevent withdrawal and for possible anxiety component to her acute pain crisis.

## 2022-11-02 NOTE — H&P ADULT - ASSESSMENT
72y Female with PMHx of ?HTN, ovarian cancer on chemo (last treatment was 1 week ago), and recurrent anemia 2/2 chemotherapy, presented to the ER on 11/1 directly from oncologist for blood transfusion 2/2 Hgb 6.4, She received 2 units or PRBC's in the ER when she was trying to stand at time discharge (~0300) and noticed that her left leg was feeling numb and weak. Stroke code was called, NIHSS 3. NCHCT negative for hemorrhage, however, there is a patchy area of hypodensity along the right central sulcus which may be artifactual versus areas of age-indeterminate infarction.  CTA head and neck negative for significant steno-occlusive disease but does demonstrate extensive pulmonary metastatic disease. CT perfusion negative. Patient is not a tPA candidate 2/2 thrombocytopenia (repeat plt 36 after transfusion).       72y Female with PMHx of ?HTN, ovarian cancer on chemo (last treatment was 1 week ago), and recurrent anemia 2/2 chemotherapy, presented to the ER on 11/1 directly from oncologist for blood transfusion 2/2 Hgb 6.4, She received 2 units or PRBC's in the ER when she was trying to stand at time discharge (~0300) and noticed that her left leg was feeling numb and weak. Stroke code was called, NIHSS 3. NCHCT negative for hemorrhage, however, there is a patchy area of hypodensity along the right central sulcus which may be artifactual versus areas of age-indeterminate infarction.  CTA head and neck negative for significant steno-occlusive disease but does demonstrate extensive pulmonary metastatic disease. CT perfusion negative. Patient is not a tPA candidate 2/2 thrombocytopenia (repeat plt 36 after transfusion).     Neuro  #CVA workup  - holding antiplatelet therapy for now in the setting of thrombocytopenia  - q4hr stroke neuro checks and vitals  - obtain MRI Brain with and without contrast  - Stroke Code HCT Results: Patchy area of hypodensity along the right central sulcus which may be artifactual versus areas of age-indeterminate infarction.  - Stroke Code CTA Results: negative for steno-occlusive disease. demonstrates extensive pulmonary metastatic disease  - Stroke education    Cards  #HTN  - permissive hypertension, Goal -180  - hold home blood pressure medication for now  - obtain TTE with bubble  - Stroke Code EKG Results:    #HLD  - holding for now, awaiting liver function tests  - LDL results: pending    Pulm  - call provider if SPO2 < 94%    GI  #Nutrition/Fluids/Electrolytes   - replete K<4 and Mg <2  - Diet: DASH    Renal  - continue to trend,daily BMP    Infectious Disease  - Stroke Code CXR results:   - continue to trend WBC    Endocrine    - A1C results: pending    - TSH results: pending    DVT Prophylaxis  - SCDs for DVT prophylaxis     Dispo: pending eval     Discussed daily hospital plans and goals with patient and family at bedside.    Discussed with Neurology Attending Dr. Estela Ann      72y Female with PMHx of ?HTN, ovarian cancer on chemo (last treatment was 1 week ago), and recurrent anemia 2/2 chemotherapy, presented to the ER on 11/1 directly from oncologist for blood transfusion 2/2 Hgb 6.4, She received 2 units or PRBC's in the ER when she was trying to stand at time discharge (~0300) and noticed that her left leg was feeling numb and weak. Stroke code was called, NIHSS 3. NCHCT negative for hemorrhage, however, there is a patchy area of hypodensity along the right central sulcus which may be artifactual versus areas of age-indeterminate infarction.  CTA head and neck negative for significant steno-occlusive disease but does demonstrate extensive pulmonary metastatic disease. CT perfusion negative. Patient is not a tPA candidate 2/2 thrombocytopenia (repeat plt 36 after transfusion).     Neuro  #CVA workup  - holding antiplatelet therapy for now in the setting of thrombocytopenia  - q4hr stroke neuro checks and vitals  - obtain MRI Brain with and without contrast  - Stroke Code HCT Results: Patchy area of hypodensity along the right central sulcus which may be artifactual versus areas of age-indeterminate infarction.  - Stroke Code CTA Results: negative for steno-occlusive disease. demonstrates extensive pulmonary metastatic disease  - Stroke education    Cards  #HTN  - permissive hypertension, Goal -180  - hold home blood pressure medication for now  - obtain TTE with bubble  - Stroke Code EKG Results:    #HLD  - holding for now, awaiting liver function tests  - LDL results: pending    Pulm  - call provider if SPO2 < 94%    GI  #Nutrition/Fluids/Electrolytes   - replete K<4 and Mg <2  - Diet: DASH    Renal  - continue to trend,daily BMP    #hyponatremia (138-->127)  - reached out to on call hospitalist regarding recommendations for management    Infectious Disease  - Stroke Code CXR results:   - continue to trend WBC    Endocrine    - A1C results: pending    - TSH results: pending    DVT Prophylaxis  - SCDs for DVT prophylaxis     Dispo: pending eval     Discussed daily hospital plans and goals with patient and family at bedside.    Discussed with Neurology Attending Dr. Estela Ann      72y Female with PMHx of ?HTN, ovarian cancer on chemo (last treatment was 1 week ago), and recurrent anemia 2/2 chemotherapy, presented to the ER on 11/1 directly from oncologist for blood transfusion 2/2 Hgb 6.4, She received 2 units or PRBC's in the ER when she was trying to stand at time discharge (~0300) and noticed that her left leg was feeling numb and weak. Stroke code was called, NIHSS 3. NCHCT negative for hemorrhage, however, there is a patchy area of hypodensity along the right central sulcus which may be artifactual versus areas of age-indeterminate infarction.  CTA head and neck negative for significant steno-occlusive disease but does demonstrate extensive pulmonary metastatic disease. CT perfusion negative. Patient is not a tPA candidate 2/2 thrombocytopenia (repeat plt 36 after transfusion).     Neuro  #CVA workup  - holding antiplatelet therapy for now in the setting of thrombocytopenia  - q4hr stroke neuro checks and vitals  - obtain MRI Brain with and without contrast  - Stroke Code HCT Results: Patchy area of hypodensity along the right central sulcus which may be artifactual versus areas of age-indeterminate infarction.  - Stroke Code CTA Results: negative for steno-occlusive disease. demonstrates extensive pulmonary metastatic disease  - Stroke education    Cards  #HTN  - permissive hypertension, Goal -180  - hold home blood pressure medication for now  - obtain TTE with bubble  - Stroke Code EKG Results:    #anemia; s/p 2U PRBC's in ED  - continue to trend CBC    #HLD  - holding for now, awaiting liver function tests  - LDL results: pending    Pulm  - call provider if SPO2 < 94%    GI  #Nutrition/Fluids/Electrolytes   - replete K<4 and Mg <2  - Diet: DASH    Renal  - continue to trend,daily BMP    #hyponatremia (138-->127)  - reached out to on call hospitalist regarding recommendations for management  - f/u rpt BMP    Infectious Disease  - Stroke Code CXR results:   - continue to trend WBC    Endocrine    - A1C results: pending    - TSH results: pending    DVT Prophylaxis  - SCDs for DVT prophylaxis     Dispo: pending eval     Discussed daily hospital plans and goals with patient and family at bedside.    Discussed with Neurology Attending Dr. Estela Ann

## 2022-11-02 NOTE — PROGRESS NOTE ADULT - SUBJECTIVE AND OBJECTIVE BOX
Neurology Stroke Progress Note    INTERVAL HPI/OVERNIGHT EVENTS:  Patient seen and examined @ bedside. Pt was still in ER, RRT called this am for seizure activity which was described as GTC of B/L UE and LE without any incontinence of bowel / bladder, tongue biting as witnessed by primary RN for which RRT was called. Seizure activity lasted for ~ 1min that broke on its own, no ativan given. Pt noted to be very anxious and Hysterical, crying. Rpt CTH w/wo obtained which did not show any new ICH/ infarct and the R central sulcus ? hypodensity that was seen on earlier CT was not visualised ? 2/2 contrast. Pt was noted to be hypertensive to SBPs of 200's and was given labetalol 10mg IVP X 1.     Later during rounds, pt was much more calmer and family was @ bedside. Pt c/o LLE pain for which lidocaine patch was given with little to no relief and morphine 1mg IVP X 1 was given to help relieve her anxiety and pain with some reported relief. Per family pt also takes ativan @ home which she has not been taking since she was in the hospital which raises c/f withdrawal Sz. palliative Cx called for pain management. Atival 0.5mg TID(home dose) and Morphine 2mg IV Q4H PRN for pain ordered. LLE venous dopplers done, pending results, and pending LLE arterial doppler. Obtained L knee and Tib/fib Xrays as pt was C/O pain in LLE , unsure if pain started post Sz with ? injury but negative for Fx and no reported injury during Sz activity.    O/N : Pt was Hypertensive with SBPs @ 200's and Rd Labetalol 10mg IV X 2 and Hydral 10mg IV X 1.    Her Gyn/Onc Dr: Dr. Hidalgo #[phone](need to be called for collateral tomorrow).    MEDICATIONS  (STANDING):  enoxaparin Injectable 30 milliGRAM(s) SubCutaneous every 24 hours  escitalopram 7.5 milliGRAM(s) Oral daily  levETIRAcetam 500 milliGRAM(s) Oral two times a day  LORazepam     Tablet 0.5 milliGRAM(s) Oral every 8 hours  losartan 50 milliGRAM(s) Oral daily    MEDICATIONS  (PRN):  morphine  - Injectable 2 milliGRAM(s) IV Push every 4 hours PRN Moderate Pain (4 - 6)      Allergies  Benadryl (Other)  Compazine (Unknown)      Vital Signs Last 24 Hrs  T(C): 36.5 (02 Nov 2022 16:54), Max: 37 (02 Nov 2022 02:31)  T(F): 97.7 (02 Nov 2022 16:54), Max: 98.6 (02 Nov 2022 02:31)  HR: 85 (02 Nov 2022 16:54) (78 - 98)  BP: 173/83 (02 Nov 2022 16:54) (136/90 - 210/110)  BP(mean): --  RR: 18 (02 Nov 2022 16:54) (18 - 20)  SpO2: 94% (02 Nov 2022 16:54) (94% - 97%)    Parameters below as of 02 Nov 2022 16:54  Patient On (Oxygen Delivery Method): room air      Physical exam:  General: anxious, tearful appearing fragile female pt.  Neck: trachea midline, FROM.  Cardiovascular: Regular rate and rhythm on the monitor.   Pulmonary: Anterior breath sounds clear bilaterally, no crackles or wheezing. No use of accessory muscles  Extremities: LLe swelling and pain +.    Neurologic:  -Mental status: Awake, alert, oriented to person, place, and time. speech is hypophonic, but fluent with intact naming, repetition, and comprehension, no dysarthria. Recent and remote memory intact. Follows commands.  Fund of knowledge appropriate. per pts family her speech is at her baseline.    -Cranial nerves:   II: Visual fields are full to confrontation.  III, IV, VI: Extraocular movements are intact without nystagmus. Pupils equally round and reactive to light  V:  Facial sensation V1-V3 equal and intact   VII: Face is symmetric with normal eye closure and smile  VIII: Hearing is bilaterally intact to finger rub  IX, X: Uvula is midline and soft palate rises symmetrically  XI: Head turning and shoulder shrug are intact.  XII: Tongue protrudes midline  Motor: Normal bulk and tone. B/L UE antigravity and able to hold it up for count of 10, No pronator drift. RLE:  5/5, LLE : 2/5- able to bend @ knee level, weak plantar flexion and no dorsiflexion.  Sensation: Intact to light touch bilaterally. No neglect or extinction on double simultaneous testing.  Coordination: unable to test as pt was unable to follow commands , very anxious post RRT and Sz.  Gait:Deferred    LABS:                        12.7   32.07 )-----------( 28       ( 02 Nov 2022 15:21 )             37.5     11-02    141  |  105  |  21  ----------------------------<  168<H>  3.9   |  23  |  1.30    Ca    8.4      02 Nov 2022 08:47  Phos  2.6     11-02  Mg     1.8     11-02    TPro  5.5<L>  /  Alb  3.0<L>  /  TBili  1.2  /  DBili  0.4<H>  /  AST  48<H>  /  ALT  26  /  AlkPhos  629<H>  11-02    PT/INR - ( 02 Nov 2022 08:47 )   PT: 12.5 sec;   INR: 1.05          PTT - ( 02 Nov 2022 08:47 )  PTT:28.2 sec      RADIOLOGY & ADDITIONAL TESTS:    CT Brain Stroke Protocol (11.02.22 @ 04:30)   IMPRESSION: No acute intracranial hemorrhage, acute transcortical   infarction, extra-axial fluid collection or hydrocephalus. There is a   patchyarea of hypodensity along the right central sulcus which may be   artifactual versus areas of age-indeterminate infarction. (Further   assessment with diffusion weighted MRI may provide further information).      CT Head w/wo IV Cont (11.02.22 @ 10:36)     Impression: Limited evaluation due to the presence of intravenous   contrast. Nonvisualization of the previously seen small acute to subacute   infarctions in the high right mesial frontal and parietal lobes.

## 2022-11-02 NOTE — CONSULT NOTE ADULT - ASSESSMENT
72y Female with PMHx of HTN, Ovarian Cancer on Chemo (last treatment was 1 week ago), and recurrent anemia 2/2 chemotherapy, presented to the ER on 11/1 directly from oncologist for blood transfusion 2/2 Hgb 6.4, She received 2 units or PRBC's in the ER when she was trying to stand at time discharge (~0300) and noticed that her left leg was feeling numb/weak, she collapsed during this episode. Stroke code was called, NIHSS 3. NCHCT negative for hemorrhage, however, there is a patchy area of hypodensity along the right central sulcus which may be artifactual versus areas of age-indeterminate infarction.  CTA head and neck negative for significant steno-occlusive disease but does demonstrate extensive pulmonary metastatic disease. CT perfusion negative. This morning RRT called for seizure-like episode that aborted on its own, patient with no previous seizure hx. Patient admitted to stroke service for close observation. Palliative is consulted for symptom management in the setting of advanced malignancy.

## 2022-11-02 NOTE — STROKE CODE NOTE - NIH STROKE SCALE: 6A. MOTOR LEG, LEFT, QM
(3) No effort against gravity; leg falls to bed immediately (2) Some effort against gravity; leg falls to bed by 5 secs, but has some effort against gravity

## 2022-11-02 NOTE — CONSULT NOTE ADULT - PROBLEM SELECTOR RECOMMENDATION 4
Last chemo 1 week ago. Follows with private Onc Dr. Hidalgo.
BP goal per Neuro; cont. ARB, DASH diet

## 2022-11-02 NOTE — ED ADULT NURSE REASSESSMENT NOTE - NS ED NURSE REASSESS COMMENT FT1
attempted to assist patient out of bed for dc, patient became weak, difficulty using L leg. patient described as "pins and needles".  MD Hutchinson and RAJESH Bender made aware.  patient assisted back to bed by RN, pillows placed to left side of patient to relieve pressure from L leg.  will reassess.

## 2022-11-03 DIAGNOSIS — R56.9 UNSPECIFIED CONVULSIONS: ICD-10-CM

## 2022-11-03 LAB
A1C WITH ESTIMATED AVERAGE GLUCOSE RESULT: 4.8 % — SIGNIFICANT CHANGE UP (ref 4–5.6)
CHOLEST SERPL-MCNC: 157 MG/DL — SIGNIFICANT CHANGE UP
ESTIMATED AVERAGE GLUCOSE: 91 MG/DL — SIGNIFICANT CHANGE UP (ref 68–114)
HCT VFR BLD CALC: 32.6 % — LOW (ref 34.5–45)
HDLC SERPL-MCNC: 57 MG/DL — SIGNIFICANT CHANGE UP
HGB BLD-MCNC: 10.4 G/DL — LOW (ref 11.5–15.5)
LIPID PNL WITH DIRECT LDL SERPL: 69 MG/DL — SIGNIFICANT CHANGE UP
MAGNESIUM SERPL-MCNC: 1.9 MG/DL — SIGNIFICANT CHANGE UP (ref 1.6–2.6)
MCHC RBC-ENTMCNC: 31.4 PG — SIGNIFICANT CHANGE UP (ref 27–34)
MCHC RBC-ENTMCNC: 31.9 GM/DL — LOW (ref 32–36)
MCV RBC AUTO: 98.5 FL — SIGNIFICANT CHANGE UP (ref 80–100)
NON HDL CHOLESTEROL: 100 MG/DL — SIGNIFICANT CHANGE UP
NRBC # BLD: 0 /100 WBCS — SIGNIFICANT CHANGE UP (ref 0–0)
PHOSPHATE SERPL-MCNC: 3.4 MG/DL — SIGNIFICANT CHANGE UP (ref 2.5–4.5)
PLATELET # BLD AUTO: 25 K/UL — LOW (ref 150–400)
RBC # BLD: 3.31 M/UL — LOW (ref 3.8–5.2)
RBC # FLD: 23.9 % — HIGH (ref 10.3–14.5)
TRIGL SERPL-MCNC: 153 MG/DL — HIGH
TSH SERPL-MCNC: 6.7 UIU/ML — HIGH (ref 0.27–4.2)
WBC # BLD: 22.41 K/UL — HIGH (ref 3.8–10.5)
WBC # FLD AUTO: 22.41 K/UL — HIGH (ref 3.8–10.5)

## 2022-11-03 PROCEDURE — 93971 EXTREMITY STUDY: CPT | Mod: 26,LT

## 2022-11-03 PROCEDURE — 99233 SBSQ HOSP IP/OBS HIGH 50: CPT

## 2022-11-03 PROCEDURE — 70553 MRI BRAIN STEM W/O & W/DYE: CPT | Mod: 26

## 2022-11-03 PROCEDURE — 93926 LOWER EXTREMITY STUDY: CPT | Mod: 26,LT

## 2022-11-03 PROCEDURE — 95720 EEG PHY/QHP EA INCR W/VEEG: CPT

## 2022-11-03 PROCEDURE — 71045 X-RAY EXAM CHEST 1 VIEW: CPT | Mod: 26

## 2022-11-03 PROCEDURE — 99232 SBSQ HOSP IP/OBS MODERATE 35: CPT | Mod: GC

## 2022-11-03 RX ORDER — FERROUS SULFATE 325(65) MG
325 TABLET ORAL DAILY
Refills: 0 | Status: DISCONTINUED | OUTPATIENT
Start: 2022-11-03 | End: 2022-11-05

## 2022-11-03 RX ADMIN — LEVETIRACETAM 500 MILLIGRAM(S): 250 TABLET, FILM COATED ORAL at 06:51

## 2022-11-03 RX ADMIN — Medication 0.5 MILLIGRAM(S): at 14:38

## 2022-11-03 RX ADMIN — Medication 325 MILLIGRAM(S): at 19:28

## 2022-11-03 RX ADMIN — MORPHINE SULFATE 2 MILLIGRAM(S): 50 CAPSULE, EXTENDED RELEASE ORAL at 07:12

## 2022-11-03 RX ADMIN — LOSARTAN POTASSIUM 50 MILLIGRAM(S): 100 TABLET, FILM COATED ORAL at 06:51

## 2022-11-03 RX ADMIN — Medication 0.5 MILLIGRAM(S): at 06:51

## 2022-11-03 RX ADMIN — LIDOCAINE 1 PATCH: 4 CREAM TOPICAL at 00:12

## 2022-11-03 RX ADMIN — LEVETIRACETAM 500 MILLIGRAM(S): 250 TABLET, FILM COATED ORAL at 17:51

## 2022-11-03 RX ADMIN — MORPHINE SULFATE 2 MILLIGRAM(S): 50 CAPSULE, EXTENDED RELEASE ORAL at 07:42

## 2022-11-03 NOTE — OCCUPATIONAL THERAPY INITIAL EVALUATION ADULT - PERTINENT HX OF CURRENT PROBLEM, REHAB EVAL
72y Female with PMHx of HTN, ovarian cancer on chemo (last treatment was 1 week ago), and recurrent anemia 2/2 chemotherapy, presented to the ER on 11/1 directly from oncologist for blood transfusion 2/2 Hgb 6.4, She received 2 units or PRBC's in the ER when she was trying to stand at time discharge (~0300) and noticed that her left leg was feeling numb and weak. Stroke code was called, NIHSS 3. NCHCT negative for hemorrhage, however, there is a patchy area of hypodensity along the right central sulcus which may be artifactual versus areas of age-indeterminate infarction.

## 2022-11-03 NOTE — PROGRESS NOTE ADULT - PROBLEM SELECTOR PLAN 1
sx concerning for CVA, in setting of malignancy; x-ray unremarkable; cont. work-up and mgmt per Neuro; PT/OT; plan for MRI brain, LE doppler U/S; holding DAPT for now in setting of thrombocytopenia; holding statin in setting of elevated LFTs

## 2022-11-03 NOTE — PROGRESS NOTE ADULT - PROBLEM SELECTOR PLAN 3
Pain controlled overnight on current regimen. Given concurrent use of benzo, keppra, opioid medications the patient is at risk for oversedation and appears so on examination  -change Morphine to 1 mg q4hr IV PRN for moderate-severe pain.  -Bowel regimen while on opiates.

## 2022-11-03 NOTE — PROGRESS NOTE ADULT - PROBLEM SELECTOR PLAN 7
possibly reactive, due to cancer and/or apparent seizure episode 11/2; # now normalizing; suggest monitoring CBC off abx for now, as Pt. is afebrile w/ no infectious signs/sx; f/u cultures

## 2022-11-03 NOTE — PROGRESS NOTE ADULT - PROBLEM SELECTOR PLAN 2
cont. work-up and mgmt per Neuro; started Keppra; plan for MRI brain, as above; f/u EEG; cont. seizure precautions; per I-STOP, Pt. prescribed Ativan for anxiety -- possible withdrawal seizure? although no other signs/sx of withdrawal; will cont. Ativan as prescribed and monitor

## 2022-11-03 NOTE — PROGRESS NOTE ADULT - SUBJECTIVE AND OBJECTIVE BOX
Neurology Stroke Progress Note    INTERVAL HPI/OVERNIGHT EVENTS:  Patient seen and examined. No overnight events. Lethargic this am, but able to wake up and follow commands. EEG negative     MEDICATIONS  (STANDING):  escitalopram 7.5 milliGRAM(s) Oral daily  levETIRAcetam 500 milliGRAM(s) Oral two times a day  LORazepam     Tablet 0.5 milliGRAM(s) Oral every 8 hours  losartan 50 milliGRAM(s) Oral daily    MEDICATIONS  (PRN):  morphine  - Injectable 2 milliGRAM(s) IV Push every 4 hours PRN Moderate Pain (4 - 6)      Allergies    Benadryl (Other)  Compazine (Unknown)    Intolerances        Vital Signs Last 24 Hrs  T(C): 36.8 (03 Nov 2022 09:10), Max: 36.8 (03 Nov 2022 09:10)  T(F): 98.2 (03 Nov 2022 09:10), Max: 98.2 (03 Nov 2022 09:10)  HR: 87 (03 Nov 2022 09:40) (75 - 89)  BP: 177/77 (03 Nov 2022 09:40) (142/75 - 177/77)  BP(mean): 111 (03 Nov 2022 09:40) (97 - 113)  RR: 14 (03 Nov 2022 09:40) (13 - 22)  SpO2: 93% (03 Nov 2022 09:40) (92% - 97%)    Parameters below as of 03 Nov 2022 09:40  Patient On (Oxygen Delivery Method): room air        Physical exam:  General: No acute distress, awake and alert  Eyes: Anicteric sclerae, moist conjunctivae, see below for CNs  Neck: trachea midline, FROM, supple, no thyromegaly or lymphadenopathy  Extremities: no edema    Neurologic:  -Mental status: Awake, alert, oriented to person, place, and time. Speech is slow, hypophonic, but responds appropriately to questions, no dysarthria. Follows commands. Attention/concentration intact.   -Cranial nerves:   II: Visual fields grossly full   III, IV, VI: Extraocular movements are intact without nystagmus. Pupils equally round and reactive to light  VII: Face is symmetric   VIII: Hearing is bilaterally intact   Motor: Normal bulk and tone. B/l arms at least 4/5 strength with no drift. Right leg antigravity at least 3/5. Left leg with toe wiggle with activation of thigh, but unable to bend knee.   Sensation: Intact to light touch bilaterally.     LABS:                        10.4   22.41 )-----------( 25       ( 03 Nov 2022 05:30 )             32.6     11-02    141  |  105  |  21  ----------------------------<  168<H>  3.9   |  23  |  1.30    Ca    8.4      02 Nov 2022 08:47  Phos  3.4     11-03  Mg     1.9     11-03    TPro  5.5<L>  /  Alb  3.0<L>  /  TBili  1.2  /  DBili  0.4<H>  /  AST  48<H>  /  ALT  26  /  AlkPhos  629<H>  11-02    PT/INR - ( 02 Nov 2022 08:47 )   PT: 12.5 sec;   INR: 1.05          PTT - ( 02 Nov 2022 08:47 )  PTT:28.2 sec      RADIOLOGY & ADDITIONAL TESTS:

## 2022-11-03 NOTE — PROGRESS NOTE ADULT - PROBLEM SELECTOR PLAN 6
Palliative Care to continue to follow for pain and symptom management.    Sesar Espinal MD, PGY4  Palliative Care Fellow    Palliative Care is avalible 24/7,  please call [phone] with any questions. Palliative Care to continue to follow for pain and symptom management.    Sesar Espinal MD, PGY4  Palliative Care Fellow    Palliative Care is available 24/7,  please call [phone] with any questions.

## 2022-11-03 NOTE — PROGRESS NOTE ADULT - SUBJECTIVE AND OBJECTIVE BOX
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 Admitted to medicine after stroke code for weakness and RRT for seizure. Admitted to neurology for close monitoring. Palliative Care consulted for pain management in the setting of metastatic malignancy.    OVERNIGHT EVENTS: Overnight the patient used Morphine 2 mg IV x 2 for pain. Patient observed at the bedside, sedated, answering questions appropriately. Pain controlled. Xrays performed yesterday on LLE, negative for fracture.     DNR on chart:  Allergies    Benadryl (Other)  Compazine (Unknown)    Intolerances    MEDICATIONS  (STANDING):  escitalopram 7.5 milliGRAM(s) Oral daily  levETIRAcetam 500 milliGRAM(s) Oral two times a day  LORazepam     Tablet 0.5 milliGRAM(s) Oral every 8 hours  losartan 50 milliGRAM(s) Oral daily    MEDICATIONS  (PRN):  morphine  - Injectable 2 milliGRAM(s) IV Push every 4 hours PRN Moderate Pain (4 - 6)      ITEMS UNCHECKED ARE NOT PRESENT    PRESENT SYMPTOMS: [ ]Unable to self-report - see [ ] CPOT [ ] PAINADS [ ] RDOS  Source if other than patient:  [ ]Family   [ ]Team     Pain:  [ ]yes [x]no  QOL impact -   Location -                    Aggravating factors -  Quality -  Radiation -  Timing-  Severity (0-10 scale):  Minimal acceptable level (0-10 scale):     Dyspnea:                           [ ]Mild [ ]Moderate [ ]Severe  Anxiety:                             [ ]Mild [ ]Moderate [ ]Severe  Fatigue:                             [ ]Mild [ ]Moderate [ ]Severe  Nausea:                             [ ]Mild [ ]Moderate [ ]Severe  Loss of appetite:              [ ]Mild [ ]Moderate [ ]Severe  Constipation:                    [ ]Mild [ ]Moderate [ ]Severe    PCSSQ[Palliative Care Spiritual Screening Question]   Severity (0-10):  Score of 4 or > indicate consideration of Chaplaincy referral.  Chaplaincy Referral: [ ] yes [ ] refused [ ] following [x] Deferred     Caregiver Bradford? : [ ] yes [ ] no [x] Deferred [ ] Declined             Social work referral [ ] Patient & Family Centered Care Referral [ ]     Anticipatory Grief present?:  [ ] yes [ ] no  [x] Deferred                  Social work referral [ ] Chaplaincy Referral[ ]    Other Symptoms:  [x]All other review of systems negative     Palliative Performance Status Version 2:         70%      [email or web address]    PHYSICAL EXAM:  Vital Signs Last 24 Hrs  T(C): 36.8 (03 Nov 2022 09:10), Max: 36.8 (03 Nov 2022 09:10)  T(F): 98.2 (03 Nov 2022 09:10), Max: 98.2 (03 Nov 2022 09:10)  HR: 87 (03 Nov 2022 09:40) (75 - 89)  BP: 177/77 (03 Nov 2022 09:40) (137/74 - 177/77)  BP(mean): 111 (03 Nov 2022 09:40) (97 - 113)  RR: 14 (03 Nov 2022 09:40) (13 - 22)  SpO2: 93% (03 Nov 2022 09:40) (93% - 97%)    Parameters below as of 03 Nov 2022 09:40  Patient On (Oxygen Delivery Method): room air     I&O's Summary    02 Nov 2022 07:01  -  03 Nov 2022 07:00  --------------------------------------------------------  IN: 0 mL / OUT: 200 mL / NET: -200 mL       GENERAL: [ ]Cachexia    [ ]Alert  [x]Oriented x 3  [x]Lethargic  [ ]Unarousable  [ ]Verbal  [ ]Non-Verbal  Behavioral:   [ ]Anxiety  [ ]Delirium [ ]Agitation [ ]Other  HEENT:  [x]Normal   [ ]Dry mouth   [ ]ET Tube/Trach  [ ]Oral lesions  PULMONARY:   [x]Clear [ ]Tachypnea  [ ]Audible excessive secretions   [ ]Rhonchi        [ ]Right [ ]Left [ ]Bilateral  [ ]Crackles        [ ]Right [ ]Left [ ]Bilateral  [ ]Wheezing     [ ]Right [ ]Left [ ]Bilateral  [ ]Diminished BS [ ] Right [ ]Left [ ]Bilateral  CARDIOVASCULAR:    [x ]Regular [ ]Irregular [ ]Tachy  [ ]Lonnie [ ]Murmur [ ]Other  GASTROINTESTINAL:  [ x]Soft  [ ]Distended   [ ]+BS  [ ]Non tender [ ]Tender  [ ]Other [ ]PEG [ ]OGT/ NGT   Last BM:   GENITOURINARY:  [ x]Normal [ ]Incontinent   [ ]Oliguria/Anuria   [ ]Michaels  MUSCULOSKELETAL:   [ ]Normal   [x]Weakness  [ ]Bed/Wheelchair bound [ ]Edema  NEUROLOGIC:   [x]No focal deficits  [ ] Cognitive impairment  [ ] Dysphagia [ ]Dysarthria [ ] Paresis [ ]Other   SKIN:   [x ]Normal  [ ]Rash  [ ]Other  [ ]Pressure ulcer(s) [ ]y [ ]n present on admission    CRITICAL CARE:  [ ]Shock Present  [ ]Septic [ ]Cardiogenic [ ]Neurologic [ ]Hypovolemic  [ ]Vasopressors [ ]Inotropes  [ ]Respiratory failure present [ ]Mechanical Ventilation [ ]Non-invasive ventilatory support [ ]High-Flow   [ ]Acute  [ ]Chronic [ ]Hypoxic  [ ]Hypercarbic [ ]Other  [ ]Other organ failure     LABS:                        10.4   22.41 )-----------( 25       ( 03 Nov 2022 05:30 )             32.6   11-02    141  |  105  |  21  ----------------------------<  168<H>  3.9   |  23  |  1.30    Ca    8.4      02 Nov 2022 08:47  Phos  3.4     11-03  Mg     1.9     11-03    TPro  5.5<L>  /  Alb  3.0<L>  /  TBili  1.2  /  DBili  0.4<H>  /  AST  48<H>  /  ALT  26  /  AlkPhos  629<H>  11-02  PT/INR - ( 02 Nov 2022 08:47 )   PT: 12.5 sec;   INR: 1.05          PTT - ( 02 Nov 2022 08:47 )  PTT:28.2 sec      RADIOLOGY & ADDITIONAL STUDIES: < from: Xray Tibia + Fibula 2 Views, Left (11.02.22 @ 15:54) >    ACC: [id number] EXAM:  XR TIB FIB 2 VIEWS LT                        ACC: [id number] EXAM:  XR KNEE 1-2 VIEWS LT                          PROCEDURE DATE:  11/02/2022          INTERPRETATION:  CLINICAL HISTORY: 72-year-old with left lower extremity   swelling.          IMPRESSION: Frontal and lateral views of the left knee and frontal and   lateral views of the left tibia/fibula are negative for fracture or   dislocation. Diffuse edematous infiltration of the subcutaneous tissues.   Osteopenia. Mild narrowing of medial compartment. No fracture. No   dislocation. Tibiotalar joint space is preserved.              Dr. Nataliia Mejia can be reached at [email or web address] with questions   regarding this report.    --- End of Report ---            NATALIIA MEJIA MD; Attending Radiologist  This document has been electronically signed. Nov 2 2022  4:00PM    < end of copied text >      Protein Calorie Malnutrition Present: [ ]mild [ ]moderate [ ]severe [ ]underweight [ ]morbid obesity  https://www.andeal.org/vault/2440/web/files/ONC/Table_Clinical%20Characteristics%20to%20Document%20Malnutrition-White%20JV%20et%20al%202012.pdf    Height (cm): 154.9 (11-01-22 @ 16:28), 152.4 (02-25-22 @ 14:00)  Weight (kg): 52.6 (11-01-22 @ 16:28), 53.524 (02-25-22 @ 14:00)  BMI (kg/m2): 21.9 (11-01-22 @ 16:28), 23 (02-25-22 @ 14:00)    [ ]PPSV2 < or = 30%  [ ]significant weight loss [ ]poor nutritional intake [ ]anasarca[ ]Artificial Nutrition    Other REFERRALS:  [ ]Hospice  [ ]Child Life  [ ]Social Work  [ ]Case management [ ]Holistic Therapy    SUBJECTIVE AND OBJECTIVE  Indication for Geriatrics and Palliative Care Services/INTERVAL HPI: 73 yo PMH ovarian CA (on chemo), 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 Admitted to medicine after stroke code for weakness and RRT for seizure. Admitted to neurology for close monitoring. Palliative Care consulted for pain management in the setting of metastatic malignancy.    OVERNIGHT EVENTS: Overnight the patient used Morphine 2 mg IV x 2 for pain. Patient observed at the bedside, sedated, answering questions appropriately. Pain controlled. Xrays performed yesterday on LLE, negative for fracture.     DNR on chart:  Allergies    Benadryl (Other)  Compazine (Unknown)    Intolerances    MEDICATIONS  (STANDING):  escitalopram 7.5 milliGRAM(s) Oral daily  levETIRAcetam 500 milliGRAM(s) Oral two times a day  LORazepam     Tablet 0.5 milliGRAM(s) Oral every 8 hours  losartan 50 milliGRAM(s) Oral daily    MEDICATIONS  (PRN):  morphine  - Injectable 2 milliGRAM(s) IV Push every 4 hours PRN Moderate Pain (4 - 6)      ITEMS UNCHECKED ARE NOT PRESENT    PRESENT SYMPTOMS: [ ]Unable to self-report - see [ ] CPOT [ ] PAINADS [ ] RDOS  Source if other than patient:  [ ]Family   [ ]Team     Pain:  [ ]yes [x]no  QOL impact -   Location -                    Aggravating factors -  Quality -  Radiation -  Timing-  Severity (0-10 scale):  Minimal acceptable level (0-10 scale):     Dyspnea:                           [ ]Mild [ ]Moderate [ ]Severe  Anxiety:                             [ ]Mild [ ]Moderate [ ]Severe  Fatigue:                             [ ]Mild [ ]Moderate [ ]Severe  Nausea:                             [ ]Mild [ ]Moderate [ ]Severe  Loss of appetite:              [ ]Mild [ ]Moderate [ ]Severe  Constipation:                    [ ]Mild [ ]Moderate [ ]Severe    PCSSQ[Palliative Care Spiritual Screening Question]   Severity (0-10):  Score of 4 or > indicate consideration of Chaplaincy referral.  Chaplaincy Referral: [ ] yes [ ] refused [ ] following [x] Deferred     Caregiver [place]? : [ ] yes [ ] no [x] Deferred [ ] Declined             Social work referral [ ] Patient & Family Centered Care Referral [ ]     Anticipatory Grief present?:  [ ] yes [ ] no  [x] Deferred                  Social work referral [ ] Chaplaincy Referral[ ]    Other Symptoms:  [x]All other review of systems negative     Palliative Performance Status Version 2:         50%      [email or web address]    PHYSICAL EXAM:  Vital Signs Last 24 Hrs  T(C): 36.8 (03 Nov 2022 09:10), Max: 36.8 (03 Nov 2022 09:10)  T(F): 98.2 (03 Nov 2022 09:10), Max: 98.2 (03 Nov 2022 09:10)  HR: 87 (03 Nov 2022 09:40) (75 - 89)  BP: 177/77 (03 Nov 2022 09:40) (137/74 - 177/77)  BP(mean): 111 (03 Nov 2022 09:40) (97 - 113)  RR: 14 (03 Nov 2022 09:40) (13 - 22)  SpO2: 93% (03 Nov 2022 09:40) (93% - 97%)    Parameters below as of 03 Nov 2022 09:40  Patient On (Oxygen Delivery Method): room air     I&O's Summary    02 Nov 2022 07:01  -  03 Nov 2022 07:00  --------------------------------------------------------  IN: 0 mL / OUT: 200 mL / NET: -200 mL       GENERAL: [ ]Cachexia    [ ]Alert  [x]Oriented x 3  [x]Lethargic  [ ]Unarousable  [ ]Verbal  [ ]Non-Verbal  Behavioral:   [ ]Anxiety  [ ]Delirium [ ]Agitation [ ]Other  HEENT:  [x]Normal   [ ]Dry mouth   [ ]ET Tube/Trach  [ ]Oral lesions  PULMONARY:   [x]Clear [ ]Tachypnea  [ ]Audible excessive secretions   [ ]Rhonchi        [ ]Right [ ]Left [ ]Bilateral  [ ]Crackles        [ ]Right [ ]Left [ ]Bilateral  [ ]Wheezing     [ ]Right [ ]Left [ ]Bilateral  [ ]Diminished BS [ ] Right [ ]Left [ ]Bilateral  CARDIOVASCULAR:    [x ]Regular [ ]Irregular [ ]Tachy  [ ]Lonnie [ ]Murmur [ ]Other  GASTROINTESTINAL:  [ x]Soft  [ ]Distended   [ ]+BS  [ ]Non tender [ ]Tender  [ ]Other [ ]PEG [ ]OGT/ NGT   Last BM:   GENITOURINARY:  [ x]Normal [ ]Incontinent   [ ]Oliguria/Anuria   [ ]Michaels  MUSCULOSKELETAL:   [ ]Normal   [x]Weakness  [ ]Bed/Wheelchair bound [ ]Edema  NEUROLOGIC:   [x]No focal deficits  [ ] Cognitive impairment  [ ] Dysphagia [ ]Dysarthria [ ] Paresis [ ]Other   SKIN:   [x ]Normal  [ ]Rash  [ ]Other  [ ]Pressure ulcer(s) [ ]y [ ]n present on admission    CRITICAL CARE:  [ ]Shock Present  [ ]Septic [ ]Cardiogenic [ ]Neurologic [ ]Hypovolemic  [ ]Vasopressors [ ]Inotropes  [ ]Respiratory failure present [ ]Mechanical Ventilation [ ]Non-invasive ventilatory support [ ]High-Flow   [ ]Acute  [ ]Chronic [ ]Hypoxic  [ ]Hypercarbic [ ]Other  [ ]Other organ failure     LABS:                        10.4   22.41 )-----------( 25       ( 03 Nov 2022 05:30 )             32.6   11-02    141  |  105  |  21  ----------------------------<  168<H>  3.9   |  23  |  1.30    Ca    8.4      02 Nov 2022 08:47  Phos  3.4     11-03  Mg     1.9     11-03    TPro  5.5<L>  /  Alb  3.0<L>  /  TBili  1.2  /  DBili  0.4<H>  /  AST  48<H>  /  ALT  26  /  AlkPhos  629<H>  11-02  PT/INR - ( 02 Nov 2022 08:47 )   PT: 12.5 sec;   INR: 1.05          PTT - ( 02 Nov 2022 08:47 )  PTT:28.2 sec      RADIOLOGY & ADDITIONAL STUDIES: < from: Xray Tibia + Fibula 2 Views, Left (11.02.22 @ 15:54) >    ACC: [id number] EXAM:  XR TIB FIB 2 VIEWS LT                        ACC: [id number] EXAM:  XR KNEE 1-2 VIEWS LT                          PROCEDURE DATE:  11/02/2022          INTERPRETATION:  CLINICAL HISTORY: 72-year-old with left lower extremity   swelling.          IMPRESSION: Frontal and lateral views of the left knee and frontal and   lateral views of the left tibia/fibula are negative for fracture or   dislocation. Diffuse edematous infiltration of the subcutaneous tissues.   Osteopenia. Mild narrowing of medial compartment. No fracture. No   dislocation. Tibiotalar joint space is preserved.              Dr. Nataliia Mejia can be reached at [email or web address] with questions   regarding this report.    --- End of Report ---            NATALIIA MEJIA MD; Attending Radiologist  This document has been electronically signed. Nov 2 2022  4:00PM    < end of copied text >      Protein Calorie Malnutrition Present: [ ]mild [ ]moderate [ ]severe [ ]underweight [ ]morbid obesity  https://www.andeal.org/vault/2440/web/files/ONC/Table_Clinical%20Characteristics%20to%20Document%20Malnutrition-White%20JV%20et%20al%202012.pdf    Height (cm): 154.9 (11-01-22 @ 16:28), 152.4 (02-25-22 @ 14:00)  Weight (kg): 52.6 (11-01-22 @ 16:28), 53.524 (02-25-22 @ 14:00)  BMI (kg/m2): 21.9 (11-01-22 @ 16:28), 23 (02-25-22 @ 14:00)    [ ]PPSV2 < or = 30%  [ ]significant weight loss [ ]poor nutritional intake [ ]anasarca[ ]Artificial Nutrition    Other REFERRALS:  [ ]Hospice  [ ]Child Life  [ ]Social Work  [ ]Case management [ ]Holistic Therapy

## 2022-11-03 NOTE — PROGRESS NOTE ADULT - ASSESSMENT
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 Admitted to medicine after stroke code for weakness and RRT for seizure. Admitted to neurology for close monitoring. Palliative Care consulted for pain management in the setting of metastatic malignancy.

## 2022-11-03 NOTE — PROGRESS NOTE ADULT - PROBLEM SELECTOR PLAN 2
ISTOP reviewed: patient has been receiving Ativan 0.5 mg oral 3x daily since December 2021.  -c/w Ativan 0.5 mg oral TID, consider obtaining collateral regarding indication for this medication and starting slow yolanda

## 2022-11-03 NOTE — PROGRESS NOTE ADULT - PROBLEM SELECTOR PLAN 3
e/o lung mets on imaging; on chemotx; need collateral info; suggest Gyn-Onc and Palliative Care consults

## 2022-11-03 NOTE — OCCUPATIONAL THERAPY INITIAL EVALUATION ADULT - MANUAL MUSCLE TESTING RESULTS, REHAB EVAL
RUE 2+/5; R shoulder 2+/5; elbow 3/5; wrist 2-/5; digits 2+/5; L shoulder 1+/5; elbow 2-/5; wrist and digits 1/5

## 2022-11-03 NOTE — OCCUPATIONAL THERAPY INITIAL EVALUATION ADULT - MD ORDER
S/P Stroke code - Left leg weakness and decreased sensation; Anemia (HGB 6.4) - s/p 2U PRBC's in ED  (CTA head and neck negative for significant steno-occlusive disease but does demonstrate extensive pulmonary metastatic disease. CT perfusion negative.   Pending CVA w/u S/P Stroke code - +Left leg weakness and decreased sensation; Anemia (HGB 6.4) - s/p 2U PRBC's in ED  (CTA head and neck negative for significant steno-occlusive disease but does demonstrate extensive pulmonary metastatic disease. CT perfusion negative.   Pending CVA w/u

## 2022-11-03 NOTE — OCCUPATIONAL THERAPY INITIAL EVALUATION ADULT - ADDITIONAL COMMENTS
Pt states that she lives w/ her  in apt w/ elevator. Pt states that she was independent prior to admission w/ no prior use/possession of AEs/DMEs.

## 2022-11-03 NOTE — OCCUPATIONAL THERAPY INITIAL EVALUATION ADULT - GENERAL OBSERVATIONS, REHAB EVAL
Pt's RN Chela aware of intent to eval/tx; cleared Pt. Pt received in supine - +VEEG, telemetry, heplock, purewick, bed alarm. Pt agreeable to OT.

## 2022-11-03 NOTE — OCCUPATIONAL THERAPY INITIAL EVALUATION ADULT - SENSORY TESTS
CN Testing: b/l frontalis asymmetrical - +weakness to E eye; b/l buccinator weakness; smile symmetrical; tongue protrusion at midline; b/l eyes open/close intact; b/l shoulder elevation asymetrical - +weakness to L shoulder

## 2022-11-03 NOTE — PROGRESS NOTE ADULT - ASSESSMENT
72y Female with PMHx of ?HTN, ovarian cancer on chemo (last treatment was 1 week ago), and recurrent anemia 2/2 chemotherapy, presented to the ER on 11/1 directly from oncologist for blood transfusion 2/2 Hgb 6.4 s/p 2 unite PRBC. When she was trying to stand at time discharge (~0300) from ED, her left leg was numb and weak. NIHSS 3. CTH with patchy area of hypodensity along the right central sulcus which may be artifactual versus areas of age-indeterminate infarction.  CTA head and neck negative for significant steno-occlusive disease but does demonstrate extensive pulmonary metastatic disease. CT perfusion negative. Patient is not a tPA candidate 2/2 thrombocytopenia (repeat plt 36 after transfusion).    Neuro  #CVA workup  - holding antiplatelet therapy and chemical DVT ppx for now in the setting of thrombocytopenia  - q4hr stroke neuro checks and vitals  - obtain MRI Brain with and without contrast  - Stroke Code HCT Results: Patchy area of hypodensity along the right central sulcus which may be artifactual versus areas of age-indeterminate infarction.  - Rpt CTH W/WO : Limited evaluation due to the presence of intravenous contrast. Nonvisualization of the previously seen small acute to subacute infarctions in the high right mesial frontal and parietal lobes.  - Stroke Code CTA Results: negative for steno-occlusive disease. Demonstrates extensive pulmonary metastatic disease.  - Stroke education    #Seizure   s/p 1 episode of GTC in ER that lasted ~1min, did not need ativan. S/p keppra load. Possibly related to ?ativan withdrawal, but seized within half a day of admit, so would expect patient to be on high dose of ativan at home which she is not. Patient also did not respond to questions about dosing/frequency of ativan when discussing with palliative.   - EEG 11/2-11/3 with generalized slowing, no seizure activity   - c/w home ativan 0.5mg q8hr    Vascular  #LLE pain and swelling  Unsure if pt had LLE pain prior to Sz  - L knee and L Tib/fib Xray: fo fx No Acute Fx  - lidocaine patch ordered.  - Morphine 2mg IV Q4hrs prn ordered per palliative Reccs.  - F/U LLE venous and arterial doppler    Cards  #HTN  - permissive hypertension, Goal -180  - hold home blood pressure medication for now  - obtain TTE with bubble    #HLD  holding for now i/s/o elevated liver enzymes.  - LDL results: 69    Pulm  - call provider if SPO2 < 94%    GI  #Nutrition/Fluids/Electrolytes   - replete K<4 and Mg <2  - Diet: DASH      Infectious Disease  WBC fluctuating, afebrile. May be related to her active cancer    Oncology  #ovarian CA  Follows Dr. Hidalgo [phone]. Had chemo treatment a week prior to presentation, anemic secondary to chemo tx. Liver and pulm mets known. with radiofrequency ablation of liver mets. Patient with uterine and ovarian malignancy and mets to peritoneum.   - 11/1 Hgb 6.3, WBC 9-10, PLt 48    Endocrine  - A1C results: 4.8  - TSH results: 6.7, FT4 1.4    Heme  #anemia; s/p 2U PRBC's in ED  - c/w home iron supplements   - Pt on iron @ home, need med rec to confirm the formulation and dosing.     DVT Prophylaxis  - SCDs for DVT prophylaxis   - SQL (OK w/Dr. Callahan)    Dispo: pending PT/OT eval.     Discussed daily hospital plans and goals with patient and family at bedside.    Discussed with Neurology Attending Dr. Callahan         72y Female with PMHx of ?HTN, ovarian cancer on chemo (last treatment was 1 week ago), and recurrent anemia 2/2 chemotherapy, presented to the ER on 11/1 directly from oncologist for blood transfusion 2/2 Hgb 6.4 s/p 2 unite PRBC. When she was trying to stand at time discharge (~0300) from ED, her left leg was numb and weak. NIHSS 3. CTH with patchy area of hypodensity along the right central sulcus which may be artifactual versus areas of age-indeterminate infarction.  CTA head and neck negative for significant steno-occlusive disease but does demonstrate extensive pulmonary metastatic disease. CT perfusion negative. Patient is not a tPA candidate 2/2 thrombocytopenia (repeat plt 36 after transfusion).    Neuro  #CVA workup  - holding antiplatelet therapy and chemical DVT ppx for now in the setting of thrombocytopenia  - q4hr stroke neuro checks and vitals  - obtain MRI Brain with and without contrast  - Stroke Code HCT Results: Patchy area of hypodensity along the right central sulcus which may be artifactual versus areas of age-indeterminate infarction.  - Rpt CTH W/WO : Limited evaluation due to the presence of intravenous contrast. Nonvisualization of the previously seen small acute to subacute infarctions in the high right mesial frontal and parietal lobes.  - Stroke Code CTA Results: negative for steno-occlusive disease. Demonstrates extensive pulmonary metastatic disease.  - Stroke education    #Seizure   s/p 1 episode of GTC in ER that lasted ~1min, did not need ativan. S/p keppra load. Possibly related to ?ativan withdrawal, but seized within half a day of admit, so would expect patient to be on high dose of ativan at home which she is not. Patient also did not respond to questions about dosing/frequency of ativan when discussing with palliative.   - EEG 11/2-11/3 with generalized slowing, no seizure activity   - c/w home ativan 0.5mg q8hr    Vascular  #LLE pain and swelling  Unsure if pt had LLE pain prior to Sz  - L knee and L Tib/fib Xray: fo fx No Acute Fx  - lidocaine patch ordered.  - Morphine 2mg IV Q4hrs prn ordered per palliative Reccs.  - F/U LLE venous and arterial doppler    Cards  #HTN  - permissive hypertension, Goal -180  - hold home blood pressure medication for now  - obtain TTE with bubble    #HLD  holding for now i/s/o elevated liver enzymes.  - LDL results: 69    Pulm  - call provider if SPO2 < 94%    GI  #Nutrition/Fluids/Electrolytes   - replete K<4 and Mg <2  - Diet: DASH      Infectious Disease  WBC fluctuating, afebrile. Collateral obtained from onc (see below)  - will pursue infectious w/u: Bcx, Cxr    Oncology  #ovarian CA  Follows Dr. Hidalgo [phone]. Had chemo treatment a week prior to presentation, anemic secondary to chemo tx. Liver and pulm mets known. with radiofrequency ablation of liver mets. Patient with uterine and ovarian malignancy and mets to peritoneum.   - 11/1 Hgb 6.3, WBC 9-10, PLt 48  - pall care appreciated for pain  management    Endocrine  - A1C results: 4.8  - TSH results: 6.7, FT4 1.4    Heme  #anemia; s/p 2U PRBC's in ED  - c/w home iron supplements     #thrombocytopenia  Per onc, baseline PLT 40s  - will dc all DVT ppx and DAPT    DVT Prophylaxis  - SCDs for DVT prophylaxis   - SQL (OK w/Dr. Callahan)    Dispo: pending PT/OT eval.     Discussed daily hospital plans and goals with patient and family at bedside.    Discussed with Neurology Attending Dr. Callahan         72y Female with PMHx of ?HTN, ovarian cancer on chemo (last treatment was 1 week ago), and recurrent anemia 2/2 chemotherapy, presented to the ER on 11/1 directly from oncologist for blood transfusion 2/2 Hgb 6.4 s/p 2 unite PRBC. When she was trying to stand at time discharge (~0300) from ED, her left leg was numb and weak. NIHSS 3. CTH with patchy area of hypodensity along the right central sulcus which may be artifactual versus areas of age-indeterminate infarction.  CTA head and neck negative for significant steno-occlusive disease but does demonstrate extensive pulmonary metastatic disease. CT perfusion negative. Patient is not a tPA candidate 2/2 thrombocytopenia (repeat plt 36 after transfusion).    Neuro  #CVA workup  - holding antiplatelet therapy and chemical DVT ppx for now in the setting of thrombocytopenia  - q4hr stroke neuro checks and vitals  - obtain MRI Brain with and without contrast  - Stroke Code HCT Results: Patchy area of hypodensity along the right central sulcus which may be artifactual versus areas of age-indeterminate infarction.  - Rpt CTH W/WO : Limited evaluation due to the presence of intravenous contrast. Nonvisualization of the previously seen small acute to subacute infarctions in the high right mesial frontal and parietal lobes.  - Stroke Code CTA Results: negative for steno-occlusive disease. Demonstrates extensive pulmonary metastatic disease.  - Stroke education    #Seizure   s/p 1 episode of GTC in ER that lasted ~1min, did not need ativan. S/p keppra load. Possibly related to ?ativan withdrawal, but seized within half a day of admit, so would expect patient to be on high dose of ativan at home which she is not. Patient also did not respond to questions about dosing/frequency of ativan when discussing with palliative.   - EEG 11/2-11/3 with generalized slowing, no seizure activity   - c/w home ativan 0.5mg q8hr    Vascular  #LLE pain and swelling  Unsure if pt had LLE pain prior to Sz  - L knee and L Tib/fib Xray: fo fx No Acute Fx  - lidocaine patch ordered.  - Morphine 2mg IV Q4hrs prn ordered per palliative Reccs.  - F/U LLE venous and arterial doppler- LLE DVT in left femoral     Cards  #HTN  - permissive hypertension, Goal -180  - hold home blood pressure medication for now  - obtain TTE with bubble    #HLD  holding for now i/s/o elevated liver enzymes.  - LDL results: 69    Pulm  - call provider if SPO2 < 94%    GI  #Nutrition/Fluids/Electrolytes   - replete K<4 and Mg <2  - Diet: DASH      Infectious Disease  WBC fluctuating, afebrile. Collateral obtained from onc (see below)  - will pursue infectious w/u: Bcx, Cxr    Oncology  #ovarian CA  Follows Dr. Hidalgo [phone]. Had chemo treatment a week prior to presentation, anemic secondary to chemo tx. Liver and pulm mets known. with radiofrequency ablation of liver mets. Patient with uterine and ovarian malignancy and mets to peritoneum.   - 11/1 Hgb 6.3, WBC 9-10, PLt 48  - pall care appreciated for pain  management. will need to discuss with family GOC now with acute LLE DVT.    Endocrine  - A1C results: 4.8  - TSH results: 6.7, FT4 1.4    Heme  #anemia; s/p 2U PRBC's in ED  - c/w home iron supplements     #thrombocytopenia  Per onc, baseline PLT 40s  - will dc all DVT ppx and DAPT    DVT Prophylaxis  - SCDs for DVT prophylaxis   - SQL (OK w/Dr. Callahan)    Dispo: pending PT/OT eval.     Discussed daily hospital plans and goals with patient and family at bedside.    Discussed with Neurology Attending Dr. Callahan

## 2022-11-03 NOTE — CHART NOTE - NSCHARTNOTEFT_GEN_A_CORE
Hematology Oncology Note (FULL CONSULT NOTE TO FOLLOW)    History of Present Illness:   **STROKE HPI***  72 y Female with PMHx of ?HTN, ovarian cancer on chemo (last treatment was 1 week ago), and recurrent anemia 2/2 chemotherapy, presented to the ER on 11/1 directly from oncologist for blood transfusion 2/2 Hgb 6.4, She received 2 units or PRBC's in the ER when she was trying to stand at time discharge (~0300) and noticed that her left leg was feeling numb and weak. Stroke code was called, NIHSS 3. NCHCT negative for hemorrhage, however, there is a patchy area of hypodensity along the right central sulcus which may be artifactual versus areas of age-indeterminate infarction.  CTA head and neck negative for significant steno-occlusive disease but does demonstrate extensive pulmonary metastatic disease. CT perfusion negative. Patient is not a tPA candidate 2/2 thrombocytopenia (repeat plt 36 after transfusion).      (02 Nov 2022 04:34)    Hematology oncology consulted for metastatic ovarian cancer on chemotherapy and anemia/thrombocytopenia.    OBJECTIVE:    VITAL SIGNS:  ICU Vital Signs Last 24 Hrs  T(C): 36.9 (03 Nov 2022 14:07), Max: 36.9 (03 Nov 2022 14:07)  T(F): 98.4 (03 Nov 2022 14:07), Max: 98.4 (03 Nov 2022 14:07)  HR: 93 (03 Nov 2022 17:50) (75 - 93)  BP: 160/76 (03 Nov 2022 17:50) (144/81 - 177/77)  BP(mean): 106 (03 Nov 2022 17:50) (97 - 113)  ABP: --  ABP(mean): --  RR: 18 (03 Nov 2022 17:50) (13 - 22)  SpO2: 94% (03 Nov 2022 17:50) (92% - 97%)    O2 Parameters below as of 03 Nov 2022 17:50  Patient On (Oxygen Delivery Method): room air    LABS:                        10.4   22.41 )-----------( 25       ( 03 Nov 2022 05:30 )             32.6     11-02    141  |  105  |  21  ----------------------------<  168<H>  3.9   |  23  |  1.30    Ca    8.4      02 Nov 2022 08:47  Phos  3.4     11-03  Mg     1.9     11-03    TPro  5.5<L>  /  Alb  3.0<L>  /  TBili  1.2  /  DBili  0.4<H>  /  AST  48<H>  /  ALT  26  /  AlkPhos  629<H>  11-02    PT/INR - ( 02 Nov 2022 08:47 )   PT: 12.5 sec;   INR: 1.05          PTT - ( 02 Nov 2022 08:47 )  PTT:28.2 sec      RADIOLOGY & ADDITIONAL TESTS: Reviewed.    CT Brain Stroke Protocol 11/2/22:  No acute intracranial hemorrhage, acute transcortical infarction, extra-axial fluid collection or hydrocephalus. There is a patchyarea of hypodensity along the right central sulcus which may be artifactual versus areas of age-indeterminate infarction.    CT Angio Brain 11/2/22:  CTA NECK:  The right common carotid artery is normal in course and caliber. There is no hemodynamically significant stenosis of the right internal carotid artery. The right external carotid artery is also patent.  The left common carotid artery is normal in course and caliber. There is no hemodynamically significant stenosis of the left internal carotid artery. The left external carotid artery is also patent. The extracranial segments of the  vertebral arteries are patent.  Numerous pulmonary masses the largest at the region of the right hilum is incompletely visualized and measures up to 4.9 x 4.0 cm. Additional smaller circumscribed masses throughout the visualized left and right lung field. Partially visualized small to moderate left pleural effusion with overlying.    CTA HEAD:  The bilateral internal carotid arteries are patent. The middle cerebral and anteriorcerebral arteries are patent.  There is normal opacification of the distal vertebral arteries and basilar artery. The bilateral SCA and PCA arteries are patent.    IMPRESSION:  No high-grade stenosis or occlusion. No hemodynamic significant stenosis at the right or left carotid bifurcation. Extensive pulmonary metastatic disease as described above. Partially visualized small to moderate left pleural effusion.    CT Brain with contrast 11/2/22:  Limited evaluation due to the presence of intravenous   contrast. Nonvisualization of the previously seen small acute to subacute   infarctions in the high right mesial frontal and parietal lobes.    Ultrasound Duplex LLE 11/3/22:  IMPRESSION: Left common femoral vein and saphenofemoral junction deep venous   thrombosis      Assessment and Plan:    72 y Female with PMHx of ?HTN, ovarian cancer on chemo (last treatment was 1 week ago), and recurrent anemia 2/2 chemotherapy, presented to the ER on 11/1 for blood transfusion 2/2 Hgb 6.4. Found to have left leg numbness/weakness with c/f stroke on CT head, but unclear if infarction. ultrasound on 11/3 showing LLE DVT.    # Metastatic Ovarian Cancer  # Anemia  # Thrombocytopenia  Currently receiving chemotherapy with known myelosuppression, last dose within past week. Found to have outpatient labs with thrombocytopenia and anemia requiring referral to ED. Hemoglobin improved from 6.8 to 12.5 after 2 units of pRBCs. Platelets have continued downtrending from 37 to 25. CT concerning for possible area of infarction, but unclear type of infarction or lesion based on CT reads. Now with LLE DVT.    Recommend:  - Maintain active type and screen  - f/u retic, LDH, hapto, ferritin, iron studies, folate, b12 in AM  - Would push for urgent MRI brain overnight to classify type of lesion to ensure that there is no presence of bleed given significant thrombocytopenia and no hemorraghic component to lesion (hemorrhagic stroke vs hemorrhagic metastasis)  - Pending imaging, will need to discuss anticoagulation benefits and risks given severe thrombocytopenia. Vascular following and IVC filter may be a discussion.  - Transfuse platelets for <50k with bleeding, <20k with fever, <10k  - Monitor for signs and symptoms of bleeding or changes in neurological status  - Daily CBC with differential    Discussed with hematology oncology attending Dr. Hidalgo and blood bank attending Dr. Blair.  Full consult note to follow in AM Hematology Oncology Note (FULL CONSULT NOTE TO FOLLOW)    History of Present Illness:   **STROKE HPI***  72 y Female with PMHx of ?HTN, ovarian cancer on chemo (last treatment was 1 week ago), and recurrent anemia 2/2 chemotherapy, presented to the ER on 11/1 directly from oncologist for blood transfusion 2/2 Hgb 6.4, She received 2 units or PRBC's in the ER when she was trying to stand at time discharge (~0300) and noticed that her left leg was feeling numb and weak. Stroke code was called, NIHSS 3. NCHCT negative for hemorrhage, however, there is a patchy area of hypodensity along the right central sulcus which may be artifactual versus areas of age-indeterminate infarction.  CTA head and neck negative for significant steno-occlusive disease but does demonstrate extensive pulmonary metastatic disease. CT perfusion negative. Patient is not a tPA candidate 2/2 thrombocytopenia (repeat plt 36 after transfusion).      (02 Nov 2022 04:34)    Hematology oncology consulted for metastatic ovarian cancer on chemotherapy and anemia/thrombocytopenia.    OBJECTIVE:    VITAL SIGNS:  ICU Vital Signs Last 24 Hrs  T(C): 36.9 (03 Nov 2022 14:07), Max: 36.9 (03 Nov 2022 14:07)  T(F): 98.4 (03 Nov 2022 14:07), Max: 98.4 (03 Nov 2022 14:07)  HR: 93 (03 Nov 2022 17:50) (75 - 93)  BP: 160/76 (03 Nov 2022 17:50) (144/81 - 177/77)  BP(mean): 106 (03 Nov 2022 17:50) (97 - 113)  ABP: --  ABP(mean): --  RR: 18 (03 Nov 2022 17:50) (13 - 22)  SpO2: 94% (03 Nov 2022 17:50) (92% - 97%)    O2 Parameters below as of 03 Nov 2022 17:50  Patient On (Oxygen Delivery Method): room air    LABS:                        10.4   22.41 )-----------( 25       ( 03 Nov 2022 05:30 )             32.6     11-02    141  |  105  |  21  ----------------------------<  168<H>  3.9   |  23  |  1.30    Ca    8.4      02 Nov 2022 08:47  Phos  3.4     11-03  Mg     1.9     11-03    TPro  5.5<L>  /  Alb  3.0<L>  /  TBili  1.2  /  DBili  0.4<H>  /  AST  48<H>  /  ALT  26  /  AlkPhos  629<H>  11-02    PT/INR - ( 02 Nov 2022 08:47 )   PT: 12.5 sec;   INR: 1.05          PTT - ( 02 Nov 2022 08:47 )  PTT:28.2 sec      RADIOLOGY & ADDITIONAL TESTS: Reviewed.    CT Brain Stroke Protocol 11/2/22:  No acute intracranial hemorrhage, acute transcortical infarction, extra-axial fluid collection or hydrocephalus. There is a patchyarea of hypodensity along the right central sulcus which may be artifactual versus areas of age-indeterminate infarction.    CT Angio Brain 11/2/22:  CTA NECK:  The right common carotid artery is normal in course and caliber. There is no hemodynamically significant stenosis of the right internal carotid artery. The right external carotid artery is also patent.  The left common carotid artery is normal in course and caliber. There is no hemodynamically significant stenosis of the left internal carotid artery. The left external carotid artery is also patent. The extracranial segments of the  vertebral arteries are patent.  Numerous pulmonary masses the largest at the region of the right hilum is incompletely visualized and measures up to 4.9 x 4.0 cm. Additional smaller circumscribed masses throughout the visualized left and right lung field. Partially visualized small to moderate left pleural effusion with overlying.    CTA HEAD:  The bilateral internal carotid arteries are patent. The middle cerebral and anteriorcerebral arteries are patent.  There is normal opacification of the distal vertebral arteries and basilar artery. The bilateral SCA and PCA arteries are patent.    IMPRESSION:  No high-grade stenosis or occlusion. No hemodynamic significant stenosis at the right or left carotid bifurcation. Extensive pulmonary metastatic disease as described above. Partially visualized small to moderate left pleural effusion.    CT Brain with contrast 11/2/22:  Limited evaluation due to the presence of intravenous   contrast. Nonvisualization of the previously seen small acute to subacute   infarctions in the high right mesial frontal and parietal lobes.    Ultrasound Duplex LLE 11/3/22:  IMPRESSION: Left common femoral vein and saphenofemoral junction deep venous   thrombosis      Assessment and Plan:    72 y Female with PMHx of ?HTN, ovarian cancer on chemo (last treatment was 1 week ago), and recurrent anemia 2/2 chemotherapy, presented to the ER on 11/1 for blood transfusion 2/2 Hgb 6.4. Found to have left leg numbness/weakness with c/f stroke on CT head, but unclear if infarction. ultrasound on 11/3 showing LLE DVT.    # Metastatic Ovarian Cancer  # Anemia  # Severe Thrombocytopenia  # LLE DVT  Currently receiving chemotherapy with known myelosuppression, last dose within past week. Found to have outpatient labs with thrombocytopenia and anemia requiring referral to ED. Hemoglobin improved from 6.8 to 12.5 after 2 units of pRBCs. Platelets have continued downtrending from 37 to 25. CT concerning for possible area of infarction, but unclear type of infarction or lesion based on CT reads. Now with LLE DVT.    Recommend:  - Maintain active type and screen  - f/u retic, LDH, hapto, ferritin, iron studies, folate, b12 in AM  - Would push for urgent MRI brain overnight to classify type of lesion to ensure that there is no presence of bleed given significant thrombocytopenia and no hemorraghic component to lesion (hemorrhagic stroke vs hemorrhagic metastasis)  - Pending imaging, will need to discuss anticoagulation benefits and risks given severe thrombocytopenia. Vascular following and IVC filter may be a discussion.  - Transfuse platelets for <50k with bleeding, <20k with fever, <10k  - Monitor for signs and symptoms of bleeding or changes in neurological status  - Daily CBC with differential    Discussed with hematology oncology attending Dr. Hidalgo and blood bank attending Dr. Blair.  Full consult note to follow in AM

## 2022-11-03 NOTE — OCCUPATIONAL THERAPY INITIAL EVALUATION ADULT - IMPAIRED TRANSFERS: SIT/STAND, REHAB EVAL
impaired balance/impaired coordination/decreased flexibility/abnormal muscle tone/impaired postural control/decreased ROM/decreased strength

## 2022-11-03 NOTE — CONSULT NOTE ADULT - SUBJECTIVE AND OBJECTIVE BOX
HPI:   **STROKE HPI***    HPI: 72y Female with PMHx of ?HTN, ovarian cancer on chemo (last treatment was 1 week ago), and recurrent anemia 2/2 chemotherapy, presented to the ER on 11/1 directly from oncologist for blood transfusion 2/2 Hgb 6.4, She received 2 units or PRBC's in the ER when she was trying to stand at time discharge (~0300) and noticed that her left leg was feeling numb and weak. Stroke code was called, NIHSS 3. NCHCT negative for hemorrhage, however, there is a patchy area of hypodensity along the right central sulcus which may be artifactual versus areas of age-indeterminate infarction.  CTA head and neck negative for significant steno-occlusive disease but does demonstrate extensive pulmonary metastatic disease. CT perfusion negative. Patient is not a tPA candidate 2/2 thrombocytopenia (repeat plt 36 after transfusion).     PAST MEDICAL & SURGICAL HISTORY:  Ovarian cancer    No significant past surgical history    VASCULAR SURGERY ADDENDUM: HPI as above. Pt sent into the hospital on 11/1 from oncologists office for blood transfusion in the setting of anemia (received 2U PRBC). Prior to discharge, complaint of new onset left leg numbness for which stroke code was called. LLE U/S demonstrating left common femoral vein and saphenofemoral junction deep venous thrombosis. Vascular surgery consulted for finding of DVT in the setting of thrombocytopenia with current plt level of 26. Per primary team unsure regarding ability to anticoagulate. Heme/onc also consulted on the matter. Pt states ________________________________________________      Allergies    Benadryl (Other)  Compazine (Unknown)    Intolerances        ICU Vital Signs Last 24 Hrs  T(F): 98.4 (11-03-22 @ 14:07), Max: 98.4 (11-03-22 @ 14:07)  HR: 93 (11-03-22 @ 17:50) (75 - 93)  BP: 160/76 (11-03-22 @ 17:50) (144/81 - 177/77)  BP(mean): 106 (11-03-22 @ 17:50) (97 - 113)  ABP: --  RR: 18 (11-03-22 @ 17:50) (13 - 22)  SpO2: 94% (11-03-22 @ 17:50) (92% - 97%)    General: AAOx3, NAD, WDWN, laying comfortably in bed  Cardio: S1,S2, No MRG, RRR  Pulm: Lungs bilaterally clear to auscultation  Abdomen: soft, NTND  Extremities: WWP, peripheral pulses appreciated  Vasc: ________________________      LABS:    11-02    141  |  105  |  21  ----------------------------<  168<H>  3.9   |  23  |  1.30    Ca    8.4      02 Nov 2022 08:47  Phos  3.4     11-03  Mg     1.9     11-03    TPro  5.5<L>  /  Alb  3.0<L>  /  TBili  1.2  /  DBili  0.4<H>  /  AST  48<H>  /  ALT  26  /  AlkPhos  629<H>  11-02  LIVER FUNCTIONS - ( 02 Nov 2022 08:50 )  Alb: 3.0 g/dL / Pro: 5.5 g/dL / ALK PHOS: 629 U/L / ALT: 26 U/L / AST: 48 U/L / GGT: x                               10.4   22.41 )-----------( 25       ( 03 Nov 2022 05:30 )             32.6   PT/INR - ( 02 Nov 2022 08:47 )   PT: 12.5 sec;   INR: 1.05          PTT - ( 02 Nov 2022 08:47 )  PTT:28.2 secCARDIAC MARKERS ( 02 Nov 2022 08:47 )  x     / x     / 78 U/L / x     / x        ABG - ( 02 Nov 2022 10:01 )  pH, Arterial: 7.44  pH, Blood: x     /  pCO2: 34    /  pO2: 58    / HCO3: 23    / Base Excess: -0.5  /  SaO2: 91.0                 HPI:     HPI: 72y Female with PMHx of ?HTN, ovarian cancer on chemo (last treatment was 1 week ago), and recurrent anemia 2/2 chemotherapy, presented to the ER on 11/1 directly from oncologist for blood transfusion 2/2 Hgb 6.4, She received 2 units or PRBC's in the ER when she was trying to stand at time discharge (~0300) and noticed that her left leg was feeling numb and weak. Stroke code was called, NIHSS 3. NCHCT negative for hemorrhage, however, there is a patchy area of hypodensity along the right central sulcus which may be artifactual versus areas of age-indeterminate infarction.  CTA head and neck negative for significant steno-occlusive disease but does demonstrate extensive pulmonary metastatic disease. CT perfusion negative. Patient is not a tPA candidate 2/2 thrombocytopenia (repeat plt 36 after transfusion).     PAST MEDICAL & SURGICAL HISTORY:  Ovarian cancer    No significant past surgical history    VASCULAR SURGERY ADDENDUM: HPI as above. Pt sent into the hospital on 11/1 from oncologists office for blood transfusion in the setting of anemia (received 2U PRBC). Prior to discharge, complaint of new onset left leg numbness for which stroke code was called. LLE U/S demonstrating left common femoral vein and saphenofemoral junction deep venous thrombosis. Vascular surgery consulted for finding of DVT in the setting of thrombocytopenia with current plt level of 26. Per primary team unsure regarding ability to anticoagulate. Heme/onc also consulted on the matter.       Allergies    Benadryl (Other)  Compazine (Unknown)    Intolerances        ICU Vital Signs Last 24 Hrs  T(F): 98.4 (11-03-22 @ 14:07), Max: 98.4 (11-03-22 @ 14:07)  HR: 93 (11-03-22 @ 17:50) (75 - 93)  BP: 160/76 (11-03-22 @ 17:50) (144/81 - 177/77)  BP(mean): 106 (11-03-22 @ 17:50) (97 - 113)  ABP: --  RR: 18 (11-03-22 @ 17:50) (13 - 22)  SpO2: 94% (11-03-22 @ 17:50) (92% - 97%)    General: AAOx3, NAD, WDWN, laying comfortably in bed  Cardio: S1,S2, No MRG, RRR  Pulm: Lungs bilaterally clear to auscultation  Abdomen: soft, NTND  Extremities: WWP, 1+ pitting edema, peripheral pulses appreciated palp DP/PT    ABS:    11-02    141  |  105  |  21  ----------------------------<  168<H>  3.9   |  23  |  1.30    Ca    8.4      02 Nov 2022 08:47  Phos  3.4     11-03  Mg     1.9     11-03    TPro  5.5<L>  /  Alb  3.0<L>  /  TBili  1.2  /  DBili  0.4<H>  /  AST  48<H>  /  ALT  26  /  AlkPhos  629<H>  11-02  LIVER FUNCTIONS - ( 02 Nov 2022 08:50 )  Alb: 3.0 g/dL / Pro: 5.5 g/dL / ALK PHOS: 629 U/L / ALT: 26 U/L / AST: 48 U/L / GGT: x                               10.4   22.41 )-----------( 25       ( 03 Nov 2022 05:30 )             32.6   PT/INR - ( 02 Nov 2022 08:47 )   PT: 12.5 sec;   INR: 1.05          PTT - ( 02 Nov 2022 08:47 )  PTT:28.2 secCARDIAC MARKERS ( 02 Nov 2022 08:47 )  x     / x     / 78 U/L / x     / x        ABG - ( 02 Nov 2022 10:01 )  pH, Arterial: 7.44  pH, Blood: x     /  pCO2: 34    /  pO2: 58    / HCO3: 23    / Base Excess: -0.5  /  SaO2: 91.0

## 2022-11-03 NOTE — CONSULT NOTE ADULT - ASSESSMENT
A/P: 72y.o w/ LLE DVT in the setting of Ovarian Ca on Chemotherapy.    -Recommend: Compression, Elevation with ace  -Full Anticoagulation

## 2022-11-03 NOTE — PROGRESS NOTE ADULT - ATTENDING COMMENTS
Patient seen and examined.  Agree with fellow note.  Meds, labs and vitals all reviewed.  Patient with stage IV ovarian cancer, still requiring IV opiates.  For the time being, decrease dose to 1mg of Morphine prn.  Concern of sedation.

## 2022-11-03 NOTE — PROGRESS NOTE ADULT - SUBJECTIVE AND OBJECTIVE BOX
Patient is a 72y old  Female who presents with a chief complaint of left leg weakness and decreased sensation (03 Nov 2022 12:18)      INTERVAL HPI/OVERNIGHT EVENTS:    Pt. seen and examined earlier today  Pt. c/o L leg weakness, unchanged; reports L leg pain is better controlled  Pt. denies F/C, CP, SOB, lightheadedness, bleeding sx    Review of Systems: 12 point review of systems otherwise negative    MEDICATIONS  (STANDING):  escitalopram 7.5 milliGRAM(s) Oral daily  levETIRAcetam 500 milliGRAM(s) Oral two times a day  LORazepam     Tablet 0.5 milliGRAM(s) Oral every 8 hours  losartan 50 milliGRAM(s) Oral daily    MEDICATIONS  (PRN):  morphine  - Injectable 2 milliGRAM(s) IV Push every 4 hours PRN Moderate Pain (4 - 6)      Allergies    Benadryl (Other)  Compazine (Unknown)    Intolerances          Vital Signs Last 24 Hrs  T(C): 36.8 (03 Nov 2022 09:10), Max: 36.8 (03 Nov 2022 09:10)  T(F): 98.2 (03 Nov 2022 09:10), Max: 98.2 (03 Nov 2022 09:10)  HR: 87 (03 Nov 2022 09:40) (75 - 89)  BP: 177/77 (03 Nov 2022 09:40) (142/75 - 177/77)  BP(mean): 111 (03 Nov 2022 09:40) (97 - 113)  RR: 14 (03 Nov 2022 09:40) (13 - 22)  SpO2: 93% (03 Nov 2022 09:40) (92% - 97%)    Parameters below as of 03 Nov 2022 09:40  Patient On (Oxygen Delivery Method): room air      CAPILLARY BLOOD GLUCOSE          11-02 @ 07:01  -  11-03 @ 07:00  --------------------------------------------------------  IN: 0 mL / OUT: 200 mL / NET: -200 mL        Physical Exam:  (earlier today)  Daily     Daily   General:  comfortable-appearing in NAD  HEENT:  MMM, +EEG  CV:  RRR  Lungs:  CTA B/L anteriorly  Abdomen:  soft NT ND  Skin:  WWP, no petechiae  Neuro:  AAOx3, no dysarthria, 2/5 strength LLE     LABS:                        10.4   22.41 )-----------( 25       ( 03 Nov 2022 05:30 )             32.6     11-02    141  |  105  |  21  ----------------------------<  168<H>  3.9   |  23  |  1.30    Ca    8.4      02 Nov 2022 08:47  Phos  3.4     11-03  Mg     1.9     11-03    TPro  5.5<L>  /  Alb  3.0<L>  /  TBili  1.2  /  DBili  0.4<H>  /  AST  48<H>  /  ALT  26  /  AlkPhos  629<H>  11-02    PT/INR - ( 02 Nov 2022 08:47 )   PT: 12.5 sec;   INR: 1.05          PTT - ( 02 Nov 2022 08:47 )  PTT:28.2 sec

## 2022-11-03 NOTE — PROGRESS NOTE ADULT - PROBLEM SELECTOR PLAN 6
due to chemotx; no bleeding sx; thrombocytopenia stable; anemia improved s/p PRBC transfusion; monitor CBC

## 2022-11-04 DIAGNOSIS — I82.409 ACUTE EMBOLISM AND THROMBOSIS OF UNSPECIFIED DEEP VEINS OF UNSPECIFIED LOWER EXTREMITY: ICD-10-CM

## 2022-11-04 LAB
ALBUMIN SERPL ELPH-MCNC: 2.7 G/DL — LOW (ref 3.3–5)
ALP SERPL-CCNC: 521 U/L — HIGH (ref 40–120)
ALT FLD-CCNC: 18 U/L — SIGNIFICANT CHANGE UP (ref 10–45)
ANION GAP SERPL CALC-SCNC: 10 MMOL/L — SIGNIFICANT CHANGE UP (ref 5–17)
ANISOCYTOSIS BLD QL: SIGNIFICANT CHANGE UP
APTT BLD: 30.5 SEC — SIGNIFICANT CHANGE UP (ref 27.5–35.5)
AST SERPL-CCNC: 36 U/L — SIGNIFICANT CHANGE UP (ref 10–40)
BASOPHILS # BLD AUTO: 0.08 K/UL — SIGNIFICANT CHANGE UP (ref 0–0.2)
BASOPHILS # BLD AUTO: 0.11 K/UL — SIGNIFICANT CHANGE UP (ref 0–0.2)
BASOPHILS NFR BLD AUTO: 0.5 % — SIGNIFICANT CHANGE UP (ref 0–2)
BASOPHILS NFR BLD AUTO: 0.9 % — SIGNIFICANT CHANGE UP (ref 0–2)
BILIRUB DIRECT SERPL-MCNC: 0.2 MG/DL — SIGNIFICANT CHANGE UP (ref 0–0.3)
BILIRUB INDIRECT FLD-MCNC: 0.3 MG/DL — SIGNIFICANT CHANGE UP (ref 0.2–1)
BILIRUB SERPL-MCNC: 0.6 MG/DL — SIGNIFICANT CHANGE UP (ref 0.2–1.2)
BLD GP AB SCN SERPL QL: NEGATIVE — SIGNIFICANT CHANGE UP
BUN SERPL-MCNC: 19 MG/DL — SIGNIFICANT CHANGE UP (ref 7–23)
BURR CELLS BLD QL SMEAR: SLIGHT — SIGNIFICANT CHANGE UP
CALCIUM SERPL-MCNC: 8.2 MG/DL — LOW (ref 8.4–10.5)
CHLORIDE SERPL-SCNC: 109 MMOL/L — HIGH (ref 96–108)
CO2 SERPL-SCNC: 27 MMOL/L — SIGNIFICANT CHANGE UP (ref 22–31)
CREAT SERPL-MCNC: 1.4 MG/DL — HIGH (ref 0.5–1.3)
DACRYOCYTES BLD QL SMEAR: SLIGHT — SIGNIFICANT CHANGE UP
DAT POLY-SP REAG RBC QL: NEGATIVE — SIGNIFICANT CHANGE UP
EGFR: 40 ML/MIN/1.73M2 — LOW
EOSINOPHIL # BLD AUTO: 0.09 K/UL — SIGNIFICANT CHANGE UP (ref 0–0.5)
EOSINOPHIL # BLD AUTO: 0.1 K/UL — SIGNIFICANT CHANGE UP (ref 0–0.5)
EOSINOPHIL NFR BLD AUTO: 0.6 % — SIGNIFICANT CHANGE UP (ref 0–6)
EOSINOPHIL NFR BLD AUTO: 0.8 % — SIGNIFICANT CHANGE UP (ref 0–6)
FERRITIN SERPL-MCNC: 3636 NG/ML — HIGH (ref 15–150)
FIBRINOGEN PPP-MCNC: 330 MG/DL — SIGNIFICANT CHANGE UP (ref 258–438)
FOLATE SERPL-MCNC: 7.5 NG/ML — SIGNIFICANT CHANGE UP
GIANT PLATELETS BLD QL SMEAR: PRESENT — SIGNIFICANT CHANGE UP
GLUCOSE SERPL-MCNC: 97 MG/DL — SIGNIFICANT CHANGE UP (ref 70–99)
HAPTOGLOB SERPL-MCNC: <10 MG/DL — LOW (ref 34–200)
HCT VFR BLD CALC: 31.3 % — LOW (ref 34.5–45)
HCT VFR BLD CALC: 31.8 % — LOW (ref 34.5–45)
HGB BLD-MCNC: 10 G/DL — LOW (ref 11.5–15.5)
HGB BLD-MCNC: 10.3 G/DL — LOW (ref 11.5–15.5)
IMM GRANULOCYTES NFR BLD AUTO: 1.1 % — HIGH (ref 0–0.9)
INR BLD: 1.1 — SIGNIFICANT CHANGE UP (ref 0.88–1.16)
IRON SATN MFR SERPL: 32 % — SIGNIFICANT CHANGE UP (ref 14–50)
IRON SATN MFR SERPL: 46 UG/DL — SIGNIFICANT CHANGE UP (ref 30–160)
LDH SERPL L TO P-CCNC: 581 U/L — HIGH (ref 50–242)
LYMPHOCYTES # BLD AUTO: 0.11 K/UL — LOW (ref 1–3.3)
LYMPHOCYTES # BLD AUTO: 0.24 K/UL — LOW (ref 1–3.3)
LYMPHOCYTES # BLD AUTO: 0.9 % — LOW (ref 13–44)
LYMPHOCYTES # BLD AUTO: 1.5 % — LOW (ref 13–44)
MACROCYTES BLD QL: SLIGHT — SIGNIFICANT CHANGE UP
MAGNESIUM SERPL-MCNC: 1.9 MG/DL — SIGNIFICANT CHANGE UP (ref 1.6–2.6)
MANUAL SMEAR VERIFICATION: SIGNIFICANT CHANGE UP
MCHC RBC-ENTMCNC: 31.7 PG — SIGNIFICANT CHANGE UP (ref 27–34)
MCHC RBC-ENTMCNC: 31.9 GM/DL — LOW (ref 32–36)
MCHC RBC-ENTMCNC: 32.4 GM/DL — SIGNIFICANT CHANGE UP (ref 32–36)
MCHC RBC-ENTMCNC: 32.4 PG — SIGNIFICANT CHANGE UP (ref 27–34)
MCV RBC AUTO: 100 FL — SIGNIFICANT CHANGE UP (ref 80–100)
MCV RBC AUTO: 99.4 FL — SIGNIFICANT CHANGE UP (ref 80–100)
MONOCYTES # BLD AUTO: 0.93 K/UL — HIGH (ref 0–0.9)
MONOCYTES # BLD AUTO: 1.19 K/UL — HIGH (ref 0–0.9)
MONOCYTES NFR BLD AUTO: 7.5 % — SIGNIFICANT CHANGE UP (ref 2–14)
MONOCYTES NFR BLD AUTO: 7.8 % — SIGNIFICANT CHANGE UP (ref 2–14)
NEUTROPHILS # BLD AUTO: 10.72 K/UL — HIGH (ref 1.8–7.4)
NEUTROPHILS # BLD AUTO: 14.12 K/UL — HIGH (ref 1.8–7.4)
NEUTROPHILS NFR BLD AUTO: 88.7 % — HIGH (ref 43–77)
NEUTROPHILS NFR BLD AUTO: 88.8 % — HIGH (ref 43–77)
NEUTS BAND # BLD: 0.9 % — SIGNIFICANT CHANGE UP (ref 0–8)
NRBC # BLD: 0 /100 WBCS — SIGNIFICANT CHANGE UP (ref 0–0)
OVALOCYTES BLD QL SMEAR: SLIGHT — SIGNIFICANT CHANGE UP
PHOSPHATE SERPL-MCNC: 3.2 MG/DL — SIGNIFICANT CHANGE UP (ref 2.5–4.5)
PLAT MORPH BLD: ABNORMAL
PLATELET # BLD AUTO: 32 K/UL — LOW (ref 150–400)
PLATELET # BLD AUTO: 53 K/UL — LOW (ref 150–400)
POIKILOCYTOSIS BLD QL AUTO: SLIGHT — SIGNIFICANT CHANGE UP
POLYCHROMASIA BLD QL SMEAR: SIGNIFICANT CHANGE UP
POTASSIUM SERPL-MCNC: 3.1 MMOL/L — LOW (ref 3.5–5.3)
POTASSIUM SERPL-SCNC: 3.1 MMOL/L — LOW (ref 3.5–5.3)
PROT SERPL-MCNC: 4.6 G/DL — LOW (ref 6–8.3)
PROTHROM AB SERPL-ACNC: 13.1 SEC — SIGNIFICANT CHANGE UP (ref 10.5–13.4)
RBC # BLD: 3.15 M/UL — LOW (ref 3.8–5.2)
RBC # BLD: 3.15 M/UL — LOW (ref 3.8–5.2)
RBC # BLD: 3.18 M/UL — LOW (ref 3.8–5.2)
RBC # FLD: 23.3 % — HIGH (ref 10.3–14.5)
RBC # FLD: 23.7 % — HIGH (ref 10.3–14.5)
RBC BLD AUTO: ABNORMAL
RETICS #: 71.2 K/UL — SIGNIFICANT CHANGE UP (ref 25–125)
RETICS/RBC NFR: 2.3 % — SIGNIFICANT CHANGE UP (ref 0.5–2.5)
RH IG SCN BLD-IMP: POSITIVE — SIGNIFICANT CHANGE UP
SCHISTOCYTES BLD QL AUTO: SLIGHT — SIGNIFICANT CHANGE UP
SODIUM SERPL-SCNC: 146 MMOL/L — HIGH (ref 135–145)
SPHEROCYTES BLD QL SMEAR: SLIGHT — SIGNIFICANT CHANGE UP
T4 FREE SERPL-MCNC: 1.4 NG/DL — SIGNIFICANT CHANGE UP (ref 0.93–1.7)
TIBC SERPL-MCNC: 144 UG/DL — LOW (ref 220–430)
TRANSFERRIN SERPL-MCNC: 123 MG/DL — LOW (ref 200–360)
UIBC SERPL-MCNC: 98 UG/DL — LOW (ref 110–370)
VIT B12 SERPL-MCNC: 1517 PG/ML — HIGH (ref 232–1245)
WBC # BLD: 11.96 K/UL — HIGH (ref 3.8–10.5)
WBC # BLD: 15.9 K/UL — HIGH (ref 3.8–10.5)
WBC # FLD AUTO: 11.96 K/UL — HIGH (ref 3.8–10.5)
WBC # FLD AUTO: 15.9 K/UL — HIGH (ref 3.8–10.5)

## 2022-11-04 PROCEDURE — 93306 TTE W/DOPPLER COMPLETE: CPT | Mod: 26

## 2022-11-04 PROCEDURE — 99232 SBSQ HOSP IP/OBS MODERATE 35: CPT | Mod: 25,GC

## 2022-11-04 PROCEDURE — 99497 ADVNCD CARE PLAN 30 MIN: CPT

## 2022-11-04 PROCEDURE — 99233 SBSQ HOSP IP/OBS HIGH 50: CPT

## 2022-11-04 RX ORDER — LOSARTAN POTASSIUM 100 MG/1
50 TABLET, FILM COATED ORAL ONCE
Refills: 0 | Status: COMPLETED | OUTPATIENT
Start: 2022-11-04 | End: 2022-11-04

## 2022-11-04 RX ORDER — POTASSIUM CHLORIDE 20 MEQ
40 PACKET (EA) ORAL
Refills: 0 | Status: DISCONTINUED | OUTPATIENT
Start: 2022-11-04 | End: 2022-11-04

## 2022-11-04 RX ORDER — SODIUM CHLORIDE 9 MG/ML
1000 INJECTION INTRAMUSCULAR; INTRAVENOUS; SUBCUTANEOUS
Refills: 0 | Status: DISCONTINUED | OUTPATIENT
Start: 2022-11-04 | End: 2022-11-05

## 2022-11-04 RX ORDER — POTASSIUM CHLORIDE 20 MEQ
40 PACKET (EA) ORAL EVERY 4 HOURS
Refills: 0 | Status: COMPLETED | OUTPATIENT
Start: 2022-11-04 | End: 2022-11-04

## 2022-11-04 RX ORDER — LOSARTAN POTASSIUM 100 MG/1
100 TABLET, FILM COATED ORAL DAILY
Refills: 0 | Status: DISCONTINUED | OUTPATIENT
Start: 2022-11-05 | End: 2022-11-08

## 2022-11-04 RX ORDER — MORPHINE SULFATE 50 MG/1
1 CAPSULE, EXTENDED RELEASE ORAL EVERY 4 HOURS
Refills: 0 | Status: DISCONTINUED | OUTPATIENT
Start: 2022-11-04 | End: 2022-11-09

## 2022-11-04 RX ORDER — NIFEDIPINE 30 MG
30 TABLET, EXTENDED RELEASE 24 HR ORAL DAILY
Refills: 0 | Status: DISCONTINUED | OUTPATIENT
Start: 2022-11-04 | End: 2022-11-07

## 2022-11-04 RX ORDER — ENOXAPARIN SODIUM 100 MG/ML
50 INJECTION SUBCUTANEOUS EVERY 12 HOURS
Refills: 0 | Status: DISCONTINUED | OUTPATIENT
Start: 2022-11-04 | End: 2022-11-11

## 2022-11-04 RX ADMIN — Medication 0.5 MILLIGRAM(S): at 21:59

## 2022-11-04 RX ADMIN — Medication 30 MILLIGRAM(S): at 16:16

## 2022-11-04 RX ADMIN — LEVETIRACETAM 500 MILLIGRAM(S): 250 TABLET, FILM COATED ORAL at 18:18

## 2022-11-04 RX ADMIN — ESCITALOPRAM OXALATE 7.5 MILLIGRAM(S): 10 TABLET, FILM COATED ORAL at 12:55

## 2022-11-04 RX ADMIN — MORPHINE SULFATE 1 MILLIGRAM(S): 50 CAPSULE, EXTENDED RELEASE ORAL at 16:16

## 2022-11-04 RX ADMIN — Medication 40 MILLIEQUIVALENT(S): at 18:17

## 2022-11-04 RX ADMIN — SODIUM CHLORIDE 30 MILLILITER(S): 9 INJECTION INTRAMUSCULAR; INTRAVENOUS; SUBCUTANEOUS at 15:30

## 2022-11-04 RX ADMIN — LOSARTAN POTASSIUM 50 MILLIGRAM(S): 100 TABLET, FILM COATED ORAL at 12:14

## 2022-11-04 RX ADMIN — LEVETIRACETAM 500 MILLIGRAM(S): 250 TABLET, FILM COATED ORAL at 06:07

## 2022-11-04 RX ADMIN — Medication 0.5 MILLIGRAM(S): at 15:00

## 2022-11-04 RX ADMIN — MORPHINE SULFATE 1 MILLIGRAM(S): 50 CAPSULE, EXTENDED RELEASE ORAL at 16:40

## 2022-11-04 RX ADMIN — LOSARTAN POTASSIUM 50 MILLIGRAM(S): 100 TABLET, FILM COATED ORAL at 06:07

## 2022-11-04 RX ADMIN — Medication 40 MILLIEQUIVALENT(S): at 13:00

## 2022-11-04 RX ADMIN — Medication 325 MILLIGRAM(S): at 12:14

## 2022-11-04 RX ADMIN — Medication 0.5 MILLIGRAM(S): at 06:07

## 2022-11-04 NOTE — PROGRESS NOTE ADULT - SUBJECTIVE AND OBJECTIVE BOX
Patient is a 72y old  Female who presents with a chief complaint of left leg weakness and decreased sensation (04 Nov 2022 20:50)    INTERVAL EVENTS:  - urinating without difficulty. no dysuria.   - last BM yesterday     SUBJECTIVE:  Patient was seen and examined at bedside.  Review of systems: No fever, chills, CP, dyspnea, nausea or vomiting, dysuria, changes in bowel movements, LE edema. Rest of 12 point Review of systems negative unless otherwise documented elsewhere in note.   Diet, Regular (11-04-22 @ 12:09) [Active]    MEDICATIONS:  MEDICATIONS  (STANDING):  enoxaparin Injectable 52 milliGRAM(s) SubCutaneous every 12 hours  escitalopram 7.5 milliGRAM(s) Oral daily  ferrous    sulfate 325 milliGRAM(s) Oral daily  levETIRAcetam 500 milliGRAM(s) Oral two times a day  LORazepam     Tablet 0.5 milliGRAM(s) Oral every 8 hours  NIFEdipine XL 30 milliGRAM(s) Oral daily  sodium chloride 0.9%. 1000 milliLiter(s) (30 mL/Hr) IV Continuous <Continuous>    MEDICATIONS  (PRN):  morphine  - Injectable 1 milliGRAM(s) IV Push every 4 hours PRN Moderate Pain (4 - 6)    Allergies    Benadryl (Other)  Compazine (Unknown)    Intolerances        OBJECTIVE:  Vital Signs Last 24 Hrs  T(C): 37.1 (04 Nov 2022 22:05), Max: 37.2 (04 Nov 2022 17:15)  T(F): 98.7 (04 Nov 2022 22:05), Max: 98.9 (04 Nov 2022 17:15)  HR: 104 (04 Nov 2022 20:20) (80 - 104)  BP: 162/76 (04 Nov 2022 20:20) (161/79 - 192/92)  BP(mean): 109 (04 Nov 2022 20:20) (109 - 132)  RR: 16 (04 Nov 2022 20:20) (16 - 20)  SpO2: 92% (04 Nov 2022 20:20) (92% - 95%)    Parameters below as of 04 Nov 2022 20:20  Patient On (Oxygen Delivery Method): room air      I&O's Summary    03 Nov 2022 07:01  -  04 Nov 2022 07:00  --------------------------------------------------------  IN: 310 mL / OUT: 400 mL / NET: -90 mL    04 Nov 2022 07:01  -  04 Nov 2022 23:40  --------------------------------------------------------  IN: 463 mL / OUT: 0 mL / NET: 463 mL        PHYSICAL EXAM:  Gen: Reclining in bed at time of exam, appears stated age  HEENT: MMM, clear OP  Neck: supple, trachea at midline  CV: RRR, +S1/S2  Pulm: adequate respiratory effort, no increase in work of breathing  Abd: soft, NTND  Skin: warm and dry,   Ext: WWP, no LE edema  Neuro: AOx3, speaking in full sentences  Psych: affect and behavior appropriate, pleasant at time of interview    LABS:                        10.3   11.96 )-----------( 53       ( 04 Nov 2022 21:25 )             31.8     11-04    146<H>  |  109<H>  |  19  ----------------------------<  97  3.1<L>   |  27  |  1.40<H>    Ca    8.2<L>      04 Nov 2022 08:01  Phos  3.2     11-04  Mg     1.9     11-04    TPro  4.6<L>  /  Alb  2.7<L>  /  TBili  0.6  /  DBili  0.2  /  AST  36  /  ALT  18  /  AlkPhos  521<H>  11-04    LIVER FUNCTIONS - ( 04 Nov 2022 08:01 )  Alb: 2.7 g/dL / Pro: 4.6 g/dL / ALK PHOS: 521 U/L / ALT: 18 U/L / AST: 36 U/L / GGT: x           PT/INR - ( 04 Nov 2022 21:25 )   PT: 13.1 sec;   INR: 1.10          PTT - ( 04 Nov 2022 21:25 )  PTT:30.5 sec  CAPILLARY BLOOD GLUCOSE            MICRODATA:    Culture - Blood (collected 03 Nov 2022 20:15)  Source: .Blood Blood  Preliminary Report (04 Nov 2022 22:00):    No growth at 1 day.        RADIOLOGY/OTHER STUDIES:

## 2022-11-04 NOTE — PROGRESS NOTE ADULT - PROBLEM SELECTOR PLAN 2
cont. work-up and mgmt per Neuro; started Keppra; f/u EEG; cont. seizure precautions; per I-STOP, Pt. prescribed Ativan for anxiety -- possible withdrawal seizure? although no other signs/sx of withdrawal; will cont. Ativan as prescribed and monitor

## 2022-11-04 NOTE — PROGRESS NOTE ADULT - ASSESSMENT
72y Female with PMHx of ?HTN, ovarian cancer on chemo (last treatment was 1 week ago), and recurrent anemia 2/2 chemotherapy, presented to the ER on 11/1 directly from oncologist for blood transfusion 2/2 Hgb 6.4 s/p 2 unite PRBC. When she was trying to stand at time discharge (~0300) from ED, her left leg was numb and weak. NIHSS 3. CTH with patchy area of hypodensity along the right central sulcus which may be artifactual versus areas of age-indeterminate infarction.  CTA head and neck negative for significant steno-occlusive disease but does demonstrate extensive pulmonary metastatic disease. CT perfusion negative. Patient is not a tPA candidate 2/2 thrombocytopenia (repeat plt 36 after transfusion).    Neuro  #PRES  Initially admitted for r/o stroke due to left leg weakness. Found with LLE femoral DVT. MRI with PRES, no infarct nor mets present.  - holding antiplatelet therapy and chemical DVT ppx for now in the setting of thrombocytopenia  - q4hr  vitals  - MRI Brain with and without contrast: PRES, no enhancement suggestive of mets. No acute infarcts nor hemorrhage.   - Stroke Code HCT Results: Patchy area of hypodensity along the right central sulcus which may be artifactual versus areas of age-indeterminate infarction.  - Rpt CTH W/WO : Limited evaluation due to the presence of intravenous contrast. Nonvisualization of the previously seen small acute to subacute infarctions in the high right mesial frontal and parietal lobes.  - Stroke Code CTA Results: negative for steno-occlusive disease. Demonstrates extensive pulmonary metastatic disease.  - Stroke education    #Seizure   s/p 1 episode of GTC in ER that lasted ~1min, did not need ativan. S/p keppra load. Possibly related to ?ativan withdrawal, but seized within half a day of admit, so would expect patient to be on high dose of ativan at home which she is not. Patient also did not respond to questions about dosing/frequency of ativan when discussing with palliative team.   - EEG 11/2-11/3 with generalized slowing, no seizure activity   - c/w home ativan 0.5mg q8hr    Vascular  #LLE DVT  Arterial LLE duplex negative  - vascular surgery consulted, not a candidate for IVC filter  - in setting of hypercoagulability due to cancer and sedentary lifestyle with no mets to brain on MRI, heme/onc team recommending PLT transfusion to increased PLT >55 and then starting therapeutic lovenox 1mg/kg BID     Cards  #HTN/PRES  Unclear etiology of elevated pressures. Thought to be labile pressure in outpatient setting (however  and pt reports BP reading 130-140. Patient also received Ativan 1 month ago, associated with PRES  - goal normotension  - increased losartan 100mg daily, started nifedipine 30mg daily   - TTE: no pfo, EF 60%      Pulm  - call provider if SPO2 < 94%    GI  #Nutrition/Fluids/Electrolytes   - replete K<4 and Mg <2  - Diet: Regular      Infectious Disease  WBC fluctuating, afebrile. Collateral obtained from onc (see below)  - CXR with nodules. Unclear infiltrates  - BCx 11/3 NGTD  - currently downtrending with no other evidence of infection    Oncology  #ovarian and uterine CA  Follows Dr. Hidalgo [phone]. Had chemo treatment a week prior to presentation, anemic secondary to chemo tx. Known mets to liver, lung and peritoneum with radiofrequency ablation of liver lesions as MSK. Chemotherapy treatment 1 week before hospital presentation  - Dr. Hidalgo following and will continue with chem tx outpatient    Endocrine  - A1C results: 4.8  - TSH results: 6.7, FT4 1.4    Heme  #anemia; s/p 2U PRBC's in ED  - c/w home iron supplements     DVT Prophylaxis  - therapeutic lovenox     Dispo: [facility], patient prefers home      Discussed daily hospital plans and goals with patient and family at bedside.    Discussed with Neurology Attending Dr. Callahan         72y Female with PMHx of ?HTN, ovarian cancer on chemo (last treatment was 1 week ago), and recurrent anemia 2/2 chemotherapy, presented to the ER on 11/1 directly from oncologist for blood transfusion 2/2 Hgb 6.4 s/p 2 unite PRBC. When she was trying to stand at time discharge (~0300) from ED, her left leg was numb and weak. NIHSS 3. CTH with patchy area of hypodensity along the right central sulcus which may be artifactual versus areas of age-indeterminate infarction.  CTA head and neck negative for significant steno-occlusive disease but does demonstrate extensive pulmonary metastatic disease. CT perfusion negative. Patient is not a tPA candidate 2/2 thrombocytopenia (repeat plt 36 after transfusion).    Neuro  #PRES  Initially admitted for r/o stroke due to left leg weakness. Found with LLE femoral DVT. MRI with PRES, no infarct nor mets present.  - holding antiplatelet therapy and chemical DVT ppx for now in the setting of thrombocytopenia  - q4hr  vitals  - MRI Brain with and without contrast: PRES, no enhancement suggestive of mets. No acute infarcts nor hemorrhage.   - Stroke Code HCT Results: Patchy area of hypodensity along the right central sulcus which may be artifactual versus areas of age-indeterminate infarction.  - Rpt CTH W/WO : Limited evaluation due to the presence of intravenous contrast. Nonvisualization of the previously seen small acute to subacute infarctions in the high right mesial frontal and parietal lobes.  - Stroke Code CTA Results: negative for steno-occlusive disease. Demonstrates extensive pulmonary metastatic disease.  - Stroke education    #Seizure   s/p 1 episode of GTC in ER that lasted ~1min, did not need ativan. S/p keppra load. Possibly related to ?ativan withdrawal, but seized within half a day of admit, so would expect patient to be on high dose of ativan at home which she is not. Patient also did not respond to questions about dosing/frequency of ativan when discussing with palliative team.   - EEG 11/2-11/3 with generalized slowing, no seizure activity   - c/w home ativan 0.5mg q8hr    Vascular  #LLE DVT  Arterial LLE duplex negative  - vascular surgery consulted, not a candidate for IVC filter  - in setting of hypercoagulability due to cancer and sedentary lifestyle with no mets to brain on MRI, heme/onc team recommending PLT transfusion to increased PLT >55 and then starting therapeutic lovenox 1mg/kg BID     Cards  #HTN/PRES  Unclear etiology of elevated pressures. Thought to be labile pressure in outpatient setting (however  and pt reports BP reading 130-140. Patient also received Ativan 1 month ago, associated with PRES  - goal normotension  - increased losartan 100mg daily, started nifedipine 30mg daily   - TTE: no pfo, EF 60%      Pulm  - call provider if SPO2 < 94%    GI  #Nutrition/Fluids/Electrolytes   - replete K<4 and Mg <2  - Diet: Regular      Infectious Disease  WBC fluctuating, afebrile. Collateral obtained from onc (see below)  - CXR with nodules. Unclear infiltrates  - BCx 11/3 NGTD  - currently downtrending with no other evidence of infection    Heme/Oncology  #ovarian and uterine CA  Follows Dr. Hidalgo [phone]. Had chemo treatment a week prior to presentation, anemic secondary to chemo tx. Known mets to liver, lung and peritoneum with radiofrequency ablation of liver lesions as MSK. Chemotherapy treatment 1 week before hospital presentation  - Dr. Hidalgo following and will continue with chem tx outpatient    #anemia  s.p 2U PRBC  - c/w iron supplements    Endocrine  - A1C results: 4.8  - TSH results: 6.7, FT4 1.4      Renal   #RUBIN  i/s/o poor PO intake and s/p MRI contrast  - NS 50 x 8hr    DVT Prophylaxis  - therapeutic lovenox     Dispo: [facility], patient prefers home      Discussed daily hospital plans and goals with patient and family at bedside.    Discussed with Neurology Attending Dr. Callahan

## 2022-11-04 NOTE — CONSULT NOTE ADULT - ASSESSMENT
72 y Female with PMHx of ?HTN, ovarian/uterine cancer on chemo (last treatment was 1 week ago), and recurrent anemia 2/2 chemotherapy, presented to the ER on 11/1 for blood transfusion 2/2 Hgb 6.4. Found to have left leg numbness/weakness with c/f stroke on CT head, but unclear if infarction. ultrasound on 11/3 showing LLE DVT.    # Metastatic Ovarian/Uterine Cancer  History of metastatic Ovarian/Uterine Cancer actively on chemotherapy with Dr. Hidalgo. Last treatment approximately 1 week ago. History of myelosuppression with prior treatments, but did not experience the elevated WBC, transfusion level anemia, and low platelets. Past regimen includes avastin use, current regimen on keytruda, dose reduced gemzar, cytoxan, 5-FU continual infusion, docetaxel, and carboplatin. Currently patient undergoing stroke work up with findings of likely posterior reversible encephalopathy syndrome (PRES) on MRI Brain. No current radiographic evidence of intraparenchymal mass, hemorrhage, or infarct per radiology report. Patient with history of avastin use (last 1 month ago, now off, but associated with the risk of PRES development) and difficulty controlling BP in the outpatient setting, currently with labile elevated BP inpatient.   - Will plan for further chemotherapy outpatient pending functional improvement and recovery of cytopenias with Dr. Hidalgo  - Blood pressure control per neurology stroke team    # Anemia, improving  # Severe Thrombocytopenia, improving  Likely multifactorial in setting of myelosuppression given recent chemotherapy. Anemia improved s/p 2 units pRBCs. Appears stable at 10.0 (11/4) from 10.4 (11/3). LDH elevated and low haptoglobin concerning for hemolysis, but only 1 schistocyte per HPF. Favor underlying diffusely metastatic cancer process as potential cause given stable Hgb and improving platelet counts, but will need to be carefully monitored. Thrombocytopenia appears to be improving, but will need level >50k to safely administer anticoagulation.  - Daily CBC with differential  - Maintain active type and screen  - Transfuse platelets for levels <50k with active bleeding, <20k with fever, or <10k  - Monitor for signs and symptoms of bleeding    # LLE DVT  Platelets have continued downtrending from 37 to 25. CT concerning for possible area of infarction, but unclear type of infarction or lesion based on CT reads. Now with LLE DVT by ultrasound. Risk factors include diffuse cancer and decreased mobility. Discussed with vascular regarding utility of IVC filter, but suboptimal candidate for filter placement. Will opt for short term platelet transfusion (given recovery of platelets) to increase platelet count above 50 to start full anticoagulation. Discussed risks and benefits of anticoagulation vs withholding anticoagulation with patient who verbalized understanding and agreement with plan.  - Transfuse 1 unit of platelets and repeat CBC with differential. Repeat transfusion until platelet count above 50.  - If platelets >55, recommend starting full AC with lovenox 1mg/kg every 12 hours  - Monitor for signs and symptoms of bleeding  - Repeat CBC with differential in AM or with acute change in hemodynamic status    Discussed with hematology oncology attending Dr. Hidalgo and primary stroke team. 72 y Female with PMHx of ?HTN, ovarian/uterine cancer on chemo (last treatment was 1 week ago), and recurrent anemia 2/2 chemotherapy, presented to the ER on 11/1 for blood transfusion 2/2 Hgb 6.4. Found to have left leg numbness/weakness with c/f stroke on CT head, but unclear if infarction. ultrasound on 11/3 showing LLE DVT.    # Metastatic Ovarian/Uterine Cancer  History of metastatic Ovarian/Uterine Cancer actively on chemotherapy with Dr. Hidalgo. Last treatment approximately 1 week ago. History of myelosuppression with prior treatments, but did not experience the elevated WBC, transfusion level anemia, and low platelets. Past regimen includes avastin use, current regimen on keytruda, dose reduced gemzar, cytoxan, 5-FU continual infusion, docetaxel, and carboplatin. Currently patient undergoing stroke work up with findings of likely posterior reversible encephalopathy syndrome (PRES) on MRI Brain. No current radiographic evidence of intraparenchymal mass, hemorrhage, or infarct per radiology report. Patient with history of avastin use (last 1 month ago, now off, but associated with the risk of PRES development) and difficulty controlling BP in the outpatient setting, currently with labile elevated BP inpatient.   - Will plan for further chemotherapy outpatient pending functional improvement and recovery of cytopenias with Dr. Hidalgo  - Blood pressure control per neurology stroke team    # Anemia, improving  # Severe Thrombocytopenia, improving  Likely multifactorial in setting of myelosuppression given recent chemotherapy. Anemia improved s/p 2 units pRBCs. Appears stable at 10.0 (11/4) from 10.4 (11/3). LDH elevated and low haptoglobin concerning for hemolysis, but only 1 schistocyte per HPF. Favor underlying diffusely metastatic cancer process as potential cause given stable Hgb and improving platelet counts, but will need to be carefully monitored. Thrombocytopenia appears to be improving, but will need level >50k to safely administer anticoagulation.  - Daily CBC with differential  - Maintain active type and screen  - Transfuse platelets for levels <50k with active bleeding, <20k with fever, or <10k  - f/u fibrinogen level, direct jorge alberto  - Monitor for signs and symptoms of bleeding    # LLE DVT  Platelets have continued downtrending from 37 to 25. CT concerning for possible area of infarction, but unclear type of infarction or lesion based on CT reads. Now with LLE DVT by ultrasound. Risk factors include diffuse cancer and decreased mobility. Discussed with vascular regarding utility of IVC filter, but suboptimal candidate for filter placement. Will opt for short term platelet transfusion (given recovery of platelets) to increase platelet count above 50 to start full anticoagulation. Discussed risks and benefits of anticoagulation vs withholding anticoagulation with patient who verbalized understanding and agreement with plan.  - Transfuse 1 unit of platelets and repeat CBC with differential. Repeat transfusion until platelet count above 50.  - If platelets >55, recommend starting full AC with lovenox 1mg/kg every 12 hours  - Monitor for signs and symptoms of bleeding  - Repeat CBC with differential in AM or with acute change in hemodynamic status    Discussed with hematology oncology attending Dr. Hidalgo and primary stroke team.

## 2022-11-04 NOTE — PROGRESS NOTE ADULT - PROBLEM SELECTOR PLAN 1
sx concerning for CVA, in setting of malignancy; x-ray unremarkable;   MRI brain showign infarction, possible mets and PRES.   cont. work-up and mgmt per Neuro;   BP targets per neuro   PT/OT; LE doppler U/S; holding DAPT for now in setting of thrombocytopenia; holding statin in setting of elevated LFTs sx concerning for CVA, in setting of malignancy; x-ray unremarkable;   MRI brain showign infarction, possible mets and PRES.   LE dopplers showing left common femoral and saphenofemoral DVT - started on therapeutic dose lovenox   cont. work-up and mgmt per Neuro;   BP targets per neuro   PT/OT; holding DAPT for now in setting of thrombocytopenia - timing of starting antiplatelet per primary team   holding statin in setting of elevated LFTs

## 2022-11-04 NOTE — PROGRESS NOTE ADULT - PROBLEM SELECTOR PLAN 5
Last chemo 1 week ago. Follows with private Onc Dr. Hidalgo. Last chemo 1 week ago. Follows with private Onc Dr. Hidalgo. Discussed with Dr. Hidalgo this morning, she plans to resume chemotherapy after this hospitalization.

## 2022-11-04 NOTE — PROGRESS NOTE ADULT - ATTENDING COMMENTS
Patient seen and examined.  Agree with fellow note.  Still requiring IV Morphine for pain control.    [facility] minutes spent with patient and partner at bedside.  Discussion of advancing metastatic disease, poor candidacy for further chemotherapy and worsening symptomatology and new DVT and possible strokes.  Hospice service discussion initiated.

## 2022-11-04 NOTE — CONSULT NOTE ADULT - CONSULT REASON
thrombocytopenia and hx of cancer
DVT with questionable ability to anticoagulate
symptom management in the setting metastatic disease
co-mgmt

## 2022-11-04 NOTE — PROGRESS NOTE ADULT - PROBLEM SELECTOR PLAN 6
Palliative Care to continue to follow for pain and symptom management.    Sesar Espinal MD, PGY4  Palliative Care Fellow    Palliative Care is available 24/7,  please call [phone] with any questions. US read as above. Consideration of IVC filter vs AC w/ heme and vascular input.

## 2022-11-04 NOTE — PROGRESS NOTE ADULT - SUBJECTIVE AND OBJECTIVE BOX
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 Admitted to medicine after stroke code for weakness and RRT for seizure. Admitted to neurology for close monitoring. Palliative Care consulted for pain management in the setting of metastatic malignancy.    OVERNIGHT EVENTS: Overnight the patient used Morphine 2 mg IV x 2 for pain. Patient observed at the bedside, sedated, answering questions appropriately. Pain controlled. Xrays performed yesterday on LLE, negative for fracture.     DNR on chart:  Allergies    Benadryl (Other)  Compazine (Unknown)    Intolerances    MEDICATIONS  (STANDING):  escitalopram 7.5 milliGRAM(s) Oral daily  levETIRAcetam 500 milliGRAM(s) Oral two times a day  LORazepam     Tablet 0.5 milliGRAM(s) Oral every 8 hours  losartan 50 milliGRAM(s) Oral daily    MEDICATIONS  (PRN):  morphine  - Injectable 2 milliGRAM(s) IV Push every 4 hours PRN Moderate Pain (4 - 6)      ITEMS UNCHECKED ARE NOT PRESENT    PRESENT SYMPTOMS: [ ]Unable to self-report - see [ ] CPOT [ ] PAINADS [ ] RDOS  Source if other than patient:  [ ]Family   [ ]Team     Pain:  [ ]yes [x]no  QOL impact -   Location -                    Aggravating factors -  Quality -  Radiation -  Timing-  Severity (0-10 scale):  Minimal acceptable level (0-10 scale):     Dyspnea:                           [ ]Mild [ ]Moderate [ ]Severe  Anxiety:                             [ ]Mild [ ]Moderate [ ]Severe  Fatigue:                             [ ]Mild [ ]Moderate [ ]Severe  Nausea:                             [ ]Mild [ ]Moderate [ ]Severe  Loss of appetite:              [ ]Mild [ ]Moderate [ ]Severe  Constipation:                    [ ]Mild [ ]Moderate [ ]Severe    PCSSQ[Palliative Care Spiritual Screening Question]   Severity (0-10):  Score of 4 or > indicate consideration of Chaplaincy referral.  Chaplaincy Referral: [ ] yes [ ] refused [ ] following [x] Deferred     Caregiver [place]? : [ ] yes [ ] no [x] Deferred [ ] Declined             Social work referral [ ] Patient & Family Centered Care Referral [ ]     Anticipatory Grief present?:  [ ] yes [ ] no  [x] Deferred                  Social work referral [ ] Chaplaincy Referral[ ]    Other Symptoms:  [x]All other review of systems negative     Palliative Performance Status Version 2:         50%      [email or web address]    PHYSICAL EXAM:  Vital Signs Last 24 Hrs  T(C): 36.8 (03 Nov 2022 09:10), Max: 36.8 (03 Nov 2022 09:10)  T(F): 98.2 (03 Nov 2022 09:10), Max: 98.2 (03 Nov 2022 09:10)  HR: 87 (03 Nov 2022 09:40) (75 - 89)  BP: 177/77 (03 Nov 2022 09:40) (137/74 - 177/77)  BP(mean): 111 (03 Nov 2022 09:40) (97 - 113)  RR: 14 (03 Nov 2022 09:40) (13 - 22)  SpO2: 93% (03 Nov 2022 09:40) (93% - 97%)    Parameters below as of 03 Nov 2022 09:40  Patient On (Oxygen Delivery Method): room air     I&O's Summary    02 Nov 2022 07:01  -  03 Nov 2022 07:00  --------------------------------------------------------  IN: 0 mL / OUT: 200 mL / NET: -200 mL       GENERAL: [ ]Cachexia    [ ]Alert  [x]Oriented x 3  [x]Lethargic  [ ]Unarousable  [ ]Verbal  [ ]Non-Verbal  Behavioral:   [ ]Anxiety  [ ]Delirium [ ]Agitation [ ]Other  HEENT:  [x]Normal   [ ]Dry mouth   [ ]ET Tube/Trach  [ ]Oral lesions  PULMONARY:   [x]Clear [ ]Tachypnea  [ ]Audible excessive secretions   [ ]Rhonchi        [ ]Right [ ]Left [ ]Bilateral  [ ]Crackles        [ ]Right [ ]Left [ ]Bilateral  [ ]Wheezing     [ ]Right [ ]Left [ ]Bilateral  [ ]Diminished BS [ ] Right [ ]Left [ ]Bilateral  CARDIOVASCULAR:    [x ]Regular [ ]Irregular [ ]Tachy  [ ]Lonnie [ ]Murmur [ ]Other  GASTROINTESTINAL:  [ x]Soft  [ ]Distended   [ ]+BS  [ ]Non tender [ ]Tender  [ ]Other [ ]PEG [ ]OGT/ NGT   Last BM:   GENITOURINARY:  [ x]Normal [ ]Incontinent   [ ]Oliguria/Anuria   [ ]Michaels  MUSCULOSKELETAL:   [ ]Normal   [x]Weakness  [ ]Bed/Wheelchair bound [ ]Edema  NEUROLOGIC:   [x]No focal deficits  [ ] Cognitive impairment  [ ] Dysphagia [ ]Dysarthria [ ] Paresis [ ]Other   SKIN:   [x ]Normal  [ ]Rash  [ ]Other  [ ]Pressure ulcer(s) [ ]y [ ]n present on admission    CRITICAL CARE:  [ ]Shock Present  [ ]Septic [ ]Cardiogenic [ ]Neurologic [ ]Hypovolemic  [ ]Vasopressors [ ]Inotropes  [ ]Respiratory failure present [ ]Mechanical Ventilation [ ]Non-invasive ventilatory support [ ]High-Flow   [ ]Acute  [ ]Chronic [ ]Hypoxic  [ ]Hypercarbic [ ]Other  [ ]Other organ failure     LABS:                        10.4   22.41 )-----------( 25       ( 03 Nov 2022 05:30 )             32.6   11-02    141  |  105  |  21  ----------------------------<  168<H>  3.9   |  23  |  1.30    Ca    8.4      02 Nov 2022 08:47  Phos  3.4     11-03  Mg     1.9     11-03    TPro  5.5<L>  /  Alb  3.0<L>  /  TBili  1.2  /  DBili  0.4<H>  /  AST  48<H>  /  ALT  26  /  AlkPhos  629<H>  11-02  PT/INR - ( 02 Nov 2022 08:47 )   PT: 12.5 sec;   INR: 1.05          PTT - ( 02 Nov 2022 08:47 )  PTT:28.2 sec      RADIOLOGY & ADDITIONAL STUDIES: < from: Xray Tibia + Fibula 2 Views, Left (11.02.22 @ 15:54) >    ACC: [id number] EXAM:  XR TIB FIB 2 VIEWS LT                        ACC: [id number] EXAM:  XR KNEE 1-2 VIEWS LT                          PROCEDURE DATE:  11/02/2022          INTERPRETATION:  CLINICAL HISTORY: 72-year-old with left lower extremity   swelling.          IMPRESSION: Frontal and lateral views of the left knee and frontal and   lateral views of the left tibia/fibula are negative for fracture or   dislocation. Diffuse edematous infiltration of the subcutaneous tissues.   Osteopenia. Mild narrowing of medial compartment. No fracture. No   dislocation. Tibiotalar joint space is preserved.              Dr. Nataliia Mejia can be reached at [email or web address] with questions   regarding this report.    --- End of Report ---            NATALIIA MEJIA MD; Attending Radiologist  This document has been electronically signed. Nov 2 2022  4:00PM    < end of copied text >      Protein Calorie Malnutrition Present: [ ]mild [ ]moderate [ ]severe [ ]underweight [ ]morbid obesity  https://www.andeal.org/vault/2440/web/files/ONC/Table_Clinical%20Characteristics%20to%20Document%20Malnutrition-White%20JV%20et%20al%202012.pdf    Height (cm): 154.9 (11-01-22 @ 16:28), 152.4 (02-25-22 @ 14:00)  Weight (kg): 52.6 (11-01-22 @ 16:28), 53.524 (02-25-22 @ 14:00)  BMI (kg/m2): 21.9 (11-01-22 @ 16:28), 23 (02-25-22 @ 14:00)    [ ]PPSV2 < or = 30%  [ ]significant weight loss [ ]poor nutritional intake [ ]anasarca[ ]Artificial Nutrition    Other REFERRALS:  [ ]Hospice  [ ]Child Life  [ ]Social Work  [ ]Case management [ ]Holistic Therapy    SUBJECTIVE AND OBJECTIVE  Indication for Geriatrics and Palliative Care Services/INTERVAL HPI: 71 yo PMH ovarian CA (on chemo), 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 Admitted to medicine after stroke code for weakness and RRT for seizure. Course complicated by DVT. Admitted to neurology for close monitoring. Palliative Care consulted for pain management in the setting of metastatic malignancy.    OVERNIGHT EVENTS: No PRN Morphine use overnight. Patient observed at the bedside, sedated, answering questions appropriately. US LE doppler showed femoral DVT. Prelim read on MRI showed multiple strokes possible metastasis (awaiting official read)    DNR on chart:  Allergies    Benadryl (Other)  Compazine (Unknown)    Intolerances    MEDICATIONS  (STANDING):  escitalopram 7.5 milliGRAM(s) Oral daily  levETIRAcetam 500 milliGRAM(s) Oral two times a day  LORazepam     Tablet 0.5 milliGRAM(s) Oral every 8 hours  losartan 50 milliGRAM(s) Oral daily    MEDICATIONS  (PRN):  morphine  - Injectable 2 milliGRAM(s) IV Push every 4 hours PRN Moderate Pain (4 - 6)      ITEMS UNCHECKED ARE NOT PRESENT    PRESENT SYMPTOMS: [ ]Unable to self-report - see [ ] CPOT [ ] PAINADS [ ] RDOS  Source if other than patient:  [ ]Family   [ ]Team     Pain:  [ ]yes [x]no  QOL impact -   Location -                    Aggravating factors -  Quality -  Radiation -  Timing-  Severity (0-10 scale):  Minimal acceptable level (0-10 scale):     Dyspnea:                           [ ]Mild [ ]Moderate [ ]Severe  Anxiety:                             [ ]Mild [ ]Moderate [ ]Severe  Fatigue:                             [ ]Mild [ ]Moderate [ ]Severe  Nausea:                             [ ]Mild [ ]Moderate [ ]Severe  Loss of appetite:              [ ]Mild [ ]Moderate [ ]Severe  Constipation:                    [ ]Mild [ ]Moderate [ ]Severe    PCSSQ[Palliative Care Spiritual Screening Question]   Severity (0-10):  Score of 4 or > indicate consideration of Chaplaincy referral.  Chaplaincy Referral: [ ] yes [ ] refused [ ] following [x] Deferred     Caregiver [place]? : [ ] yes [ ] no [x] Deferred [ ] Declined             Social work referral [ ] Patient & Family Centered Care Referral [ ]     Anticipatory Grief present?:  [ ] yes [ ] no  [x] Deferred                  Social work referral [ ] Chaplaincy Referral[ ]    Other Symptoms:  [x]All other review of systems negative     Palliative Performance Status Version 2:         40%      [email or web address]    PHYSICAL EXAM:  Vital Signs Last 24 Hrs  T(C): 36.7 (04 Nov 2022 09:54), Max: 36.9 (03 Nov 2022 14:07)  T(F): 98.1 (04 Nov 2022 09:54), Max: 98.4 (03 Nov 2022 14:07)  HR: 102 (04 Nov 2022 13:02) (80 - 102)  BP: 185/84 (04 Nov 2022 13:02) (144/81 - 192/92)  BP(mean): 121 (04 Nov 2022 13:02) (106 - 132)  RR: 16 (04 Nov 2022 13:02) (15 - 20)  SpO2: 94% (04 Nov 2022 13:02) (92% - 96%)    Parameters below as of 04 Nov 2022 13:02  Patient On (Oxygen Delivery Method): room air           GENERAL: [ ]Cachexia    [ ]Alert  [x]Oriented x 3  [x]Lethargic  [ ]Unarousable  [ ]Verbal  [ ]Non-Verbal  Behavioral:   [ ]Anxiety  [ ]Delirium [ ]Agitation [ ]Other  HEENT:  [x]Normal   [ ]Dry mouth   [ ]ET Tube/Trach  [ ]Oral lesions  PULMONARY:   [x]Clear [ ]Tachypnea  [ ]Audible excessive secretions   [ ]Rhonchi        [ ]Right [ ]Left [ ]Bilateral  [ ]Crackles        [ ]Right [ ]Left [ ]Bilateral  [ ]Wheezing     [ ]Right [ ]Left [ ]Bilateral  [ ]Diminished BS [ ] Right [ ]Left [ ]Bilateral  CARDIOVASCULAR:    [x ]Regular [ ]Irregular [ ]Tachy  [ ]Lonnie [ ]Murmur [ ]Other  GASTROINTESTINAL:  [ x]Soft  [ ]Distended   [ ]+BS  [ ]Non tender [ ]Tender  [ ]Other [ ]PEG [ ]OGT/ NGT   Last BM:   GENITOURINARY:  [ x]Normal [ ]Incontinent   [ ]Oliguria/Anuria   [ ]Michaels  MUSCULOSKELETAL:   [ ]Normal   [x]Weakness  [ ]Bed/Wheelchair bound [ ]Edema  NEUROLOGIC:   [x]No focal deficits  [ ] Cognitive impairment  [ ] Dysphagia [ ]Dysarthria [ ] Paresis [ ]Other   SKIN:   [x ]Normal  [ ]Rash  [ ]Other  [ ]Pressure ulcer(s) [ ]y [ ]n present on admission    CRITICAL CARE:  [ ]Shock Present  [ ]Septic [ ]Cardiogenic [ ]Neurologic [ ]Hypovolemic  [ ]Vasopressors [ ]Inotropes  [ ]Respiratory failure present [ ]Mechanical Ventilation [ ]Non-invasive ventilatory support [ ]High-Flow   [ ]Acute  [ ]Chronic [ ]Hypoxic  [ ]Hypercarbic [ ]Other  [ ]Other organ failure     LABS:                        10.0   15.90 )-----------( 32       ( 04 Nov 2022 08:01 )             31.3     Hemoglobin: 10.0 g/dL (11-04 @ 08:01)  Hemoglobin: 10.4 g/dL (11-03 @ 05:30)  Hemoglobin: 12.7 g/dL (11-02 @ 15:21)  Hemoglobin: 12.5 g/dL (11-02 @ 08:47)  Hemoglobin: 8.9 g/dL (11-02 @ 04:13)    CBC Full  -  ( 04 Nov 2022 08:01 )  WBC Count : 15.90 K/uL  RBC Count : 3.15 M/uL  Hemoglobin : 10.0 g/dL  Hematocrit : 31.3 %  Platelet Count - Automated : 32 K/uL  Mean Cell Volume : 99.4 fl  Mean Cell Hemoglobin : 31.7 pg  Mean Cell Hemoglobin Concentration : 31.9 gm/dL  Auto Neutrophil # : 14.12 K/uL  Auto Lymphocyte # : 0.24 K/uL  Auto Monocyte # : 1.19 K/uL  Auto Eosinophil # : 0.09 K/uL  Auto Basophil # : 0.08 K/uL  Auto Neutrophil % : 88.8 %  Auto Lymphocyte % : 1.5 %  Auto Monocyte % : 7.5 %  Auto Eosinophil % : 0.6 %  Auto Basophil % : 0.5 %    11-04    146<H>  |  109<H>  |  19  ----------------------------<  97  3.1<L>   |  27  |  1.40<H>    Ca    8.2<L>      04 Nov 2022 08:01  Phos  3.2     11-04  Mg     1.9     11-04    TPro  4.6<L>  /  Alb  2.7<L>  /  TBili  0.6  /  DBili  0.2  /  AST  36  /  ALT  18  /  AlkPhos  521<H>  11-04    Creatinine Trend: 1.40<--, 1.30<--, 1.24<--, 1.21<--, 1.35<--  LIVER FUNCTIONS - ( 04 Nov 2022 08:01 )  Alb: 2.7 g/dL / Pro: 4.6 g/dL / ALK PHOS: 521 U/L / ALT: 18 U/L / AST: 36 U/L / GGT: x           RADIOLOGY & ADDITIONAL STUDIES: < from: US Duplex Venous Lower Ext Ltd, Left (11.03.22 @ 16:18) >    ACC: [id number] EXAM:  US DPLX LWR EXT VEINS LTD LT                          PROCEDURE DATE:  11/03/2022          INTERPRETATION:  CLINICAL INFORMATION: Left lower extremity pain and   swelling. Rule out stroke. Left leg swelling. Numbness.    COMPARISON: None available.    TECHNIQUE: Duplex sonography of the LEFT LOWER extremity veins with color   and spectral Doppler, with and without compression.    FINDINGS:    The left common femoral vein and saphenofemoral junction are expanded by   echogenic material and is noncompressible with diminished flow on Doppler   evaluation, consistent with deep venous thrombosis.  There is normal compressibility of the left femoral and popliteal veins.  The contralateral common femoral vein is patent.  Doppler examination shows normal spontaneous and phasic flow.    No calf vein thrombosis is detected.    IMPRESSION:  Left common femoral vein and saphenofemoral junction deep venous   thrombosis      Findings were discussed with RAJESH Arechiga on 11/3/2022 4:23 PM by Dr. Swann   with read back confirmation.    --- End of Report ---          ERICK SWANN MD; Resident Radiologist  This document has been electronically signed.  SURYA HARDING MD; Attending Radiologist  This document has been electronically signed. Nov  3 2022  4:55PM    < end of copied text >      Protein Calorie Malnutrition Present: [ ]mild [ ]moderate [ ]severe [ ]underweight [ ]morbid obesity  https://www.andeal.org/vault/2440/web/files/ONC/Table_Clinical%20Characteristics%20to%20Document%20Malnutrition-White%20JV%20et%20al%202012.pdf    Height (cm): 154.9 (11-01-22 @ 16:28), 152.4 (02-25-22 @ 14:00)  Weight (kg): 52.6 (11-01-22 @ 16:28), 53.524 (02-25-22 @ 14:00)  BMI (kg/m2): 21.9 (11-01-22 @ 16:28), 23 (02-25-22 @ 14:00)    [ ]PPSV2 < or = 30%  [ ]significant weight loss [ ]poor nutritional intake [ ]anasarca[ ]Artificial Nutrition    Other REFERRALS:  [ ]Hospice  [ ]Child Life  [ ]Social Work  [ ]Case management [ ]Holistic Therapy

## 2022-11-04 NOTE — PROGRESS NOTE ADULT - SUBJECTIVE AND OBJECTIVE BOX
SUBJECTIVE: Patient seen and examined bedside. Pt with no acute complaints at this time but continues to state that she is unable to move her left leg.     NIFEdipine XL 30 milliGRAM(s) Oral daily    Vital Signs Last 24 Hrs  T(C): 37.2 (04 Nov 2022 17:15), Max: 37.2 (04 Nov 2022 17:15)  T(F): 98.9 (04 Nov 2022 17:15), Max: 98.9 (04 Nov 2022 17:15)  HR: 90 (04 Nov 2022 17:00) (80 - 102)  BP: 168/83 (04 Nov 2022 17:00) (160/76 - 192/92)  BP(mean): 119 (04 Nov 2022 17:00) (106 - 132)  RR: 16 (04 Nov 2022 17:00) (15 - 20)  SpO2: 94% (04 Nov 2022 17:00) (92% - 96%)    Parameters below as of 04 Nov 2022 17:00  Patient On (Oxygen Delivery Method): room air      I&O's Detail    03 Nov 2022 07:01  -  04 Nov 2022 07:00  --------------------------------------------------------  IN:    Oral Fluid: 310 mL  Total IN: 310 mL    OUT:    Voided (mL): 400 mL  Total OUT: 400 mL    Total NET: -90 mL      04 Nov 2022 07:01  -  04 Nov 2022 17:43  --------------------------------------------------------  IN:    sodium chloride 0.9%: 120 mL  Total IN: 120 mL    OUT:  Total OUT: 0 mL    Total NET: 120 mL          General: NAD, resting comfortably in bed  C/V: NSR  Pulm: Nonlabored breathing, no respiratory distress  Abd: soft, NT/ND.  Extrem: WWP, 1+ pitting edema, peripheral pulses appreciated palp DP/PT        LABS:                        10.0   15.90 )-----------( 32       ( 04 Nov 2022 08:01 )             31.3     11-04    146<H>  |  109<H>  |  19  ----------------------------<  97  3.1<L>   |  27  |  1.40<H>    Ca    8.2<L>      04 Nov 2022 08:01  Phos  3.2     11-04  Mg     1.9     11-04    TPro  4.6<L>  /  Alb  2.7<L>  /  TBili  0.6  /  DBili  0.2  /  AST  36  /  ALT  18  /  AlkPhos  521<H>  11-04          RADIOLOGY & ADDITIONAL STUDIES:

## 2022-11-04 NOTE — CONSULT NOTE ADULT - REASON FOR ADMISSION
left leg weakness and decreased sensation

## 2022-11-04 NOTE — PROGRESS NOTE ADULT - ASSESSMENT
73 yo PMH ovarian CA (on chemo), 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 Admitted to medicine after stroke code for weakness and RRT for seizure. Admitted to neurology for close monitoring. Palliative Care consulted for pain management in the setting of metastatic malignancy. 73 yo PMH ovarian CA (on chemo), 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 Admitted to medicine after stroke code for weakness and RRT for seizure. Admitted to neurology for close monitoring. Course complicated by DVT. Palliative Care consulted for pain management in the setting of metastatic malignancy.

## 2022-11-04 NOTE — PROGRESS NOTE ADULT - SUBJECTIVE AND OBJECTIVE BOX
Neurology Stroke Progress Note    INTERVAL HPI/OVERNIGHT EVENTS:  Patient seen and examined. MRI brain wwo done. Final read with PRES, no evidence of mets nor infarcts.     MEDICATIONS  (STANDING):  escitalopram 7.5 milliGRAM(s) Oral daily  ferrous    sulfate 325 milliGRAM(s) Oral daily  levETIRAcetam 500 milliGRAM(s) Oral two times a day  LORazepam     Tablet 0.5 milliGRAM(s) Oral every 8 hours  NIFEdipine XL 30 milliGRAM(s) Oral daily  sodium chloride 0.9%. 1000 milliLiter(s) (30 mL/Hr) IV Continuous <Continuous>    MEDICATIONS  (PRN):  morphine  - Injectable 1 milliGRAM(s) IV Push every 4 hours PRN Moderate Pain (4 - 6)      Allergies    Benadryl (Other)  Compazine (Unknown)    Intolerances        Vital Signs Last 24 Hrs  T(C): 37.2 (04 Nov 2022 17:15), Max: 37.2 (04 Nov 2022 17:15)  T(F): 98.9 (04 Nov 2022 17:15), Max: 98.9 (04 Nov 2022 17:15)  HR: 90 (04 Nov 2022 17:00) (80 - 102)  BP: 168/83 (04 Nov 2022 17:00) (161/79 - 192/92)  BP(mean): 119 (04 Nov 2022 17:00) (111 - 132)  RR: 16 (04 Nov 2022 17:00) (16 - 20)  SpO2: 94% (04 Nov 2022 17:00) (92% - 95%)    Parameters below as of 04 Nov 2022 17:00  Patient On (Oxygen Delivery Method): room air        Physical exam:  General: No acute distress, awake and alert  Eyes: Anicteric sclerae, moist conjunctivae, see below for CNs  Neck: trachea midline, FROM, supple, no thyromegaly or lymphadenopathy  Extremities:  no edema    Neurologic:  -Mental status: Awake, alert, oriented to person, place, and time. Speech is hypophonic, fluent with intact naming, repetition, and comprehension, no dysarthria. Recent and remote memory intact. Follows commands. Attention/concentration intact. Fund of knowledge appropriate.  -Cranial nerves:   II: Visual fields are full to confrontation.  III, IV, VI: Extraocular movements are intact without nystagmus. Pupils equally round and reactive to light  V:  Facial sensation V1-V3 equal and intact   VII: Face is symmetric with normal eye closure and smile  VIII: Hearing is bilaterally intact   Motor: ?mild left sided weakness compared to right, however, both antigravity with at least 3/5 strength. Right leg antigravity. Left leg with no toe wiggle today, but minimal knee flexion.   Sensation: Intact to light touch bilaterally. No neglect or extinction on double simultaneous testing.    LABS:                        10.0   15.90 )-----------( 32       ( 04 Nov 2022 08:01 )             31.3     11-04    146<H>  |  109<H>  |  19  ----------------------------<  97  3.1<L>   |  27  |  1.40<H>    Ca    8.2<L>      04 Nov 2022 08:01  Phos  3.2     11-04  Mg     1.9     11-04    TPro  4.6<L>  /  Alb  2.7<L>  /  TBili  0.6  /  DBili  0.2  /  AST  36  /  ALT  18  /  AlkPhos  521<H>  11-04          RADIOLOGY & ADDITIONAL TESTS:  < from: MR Head w/wo IV Cont (11.03.22 @ 22:41) >  IMPRESSION:    Extensive areas of vasogenic edema primarily located in the bilateral   occipital and parietal regions with extension into the frontal lobes   consistent with posterior reversible encephalopathy syndrome (PRES).    Prominent vasogenic edema with associated leptomeningeal enhancement in   the left cerebellar hemisphere is favored to represent additional site of   PRES.    Follow-up dedicated MRI brain with and without contrast is recommended   upon resolution of findings to exclude underlying metastatic disease.    < end of copied text >  < from: VA Duplex Low Ext Arterial, Ltd, Left (11.03.22 @ 16:23) >    IMPRESSION:  No hemodynamically significant stenosis.    < end of copied text >  < from: US Duplex Venous Lower Ext Ltd, Left (11.03.22 @ 16:18) >  IMPRESSION:  Left common femoral vein and saphenofemoral junction deep venous   thrombosis    < end of copied text >

## 2022-11-04 NOTE — PROGRESS NOTE ADULT - PROBLEM SELECTOR PLAN 4
Pending MRI for stroke up, currently on vEEG. On keppra, management as per neuro Preliminary MRI read show multiples strokes and possible metastasis's , vEEG normal. On keppra, management as per neuro

## 2022-11-04 NOTE — PROGRESS NOTE ADULT - PROBLEM SELECTOR PLAN 7
Palliative Care to continue to follow for pain and symptom management.    Sesar Espinal MD, PGY4  Palliative Care Fellow    Palliative Care is available 24/7,  please call [phone] with any questions.

## 2022-11-04 NOTE — PROGRESS NOTE ADULT - ASSESSMENT
72y.o w/ LLE DVT in the setting of Ovarian Ca on chemotherapy. Pt admitted to the stroke service 2/2 new onset LLE weakness. Plt count increased to 32 from 26 yesterday, suspect thrombocytopenia 2/2 recent chemotherapy. MRI head with findings consistent with posterior reversible encephalopathy syndrome without evidence of acute infarction, intracranial hemorrhage or intraparenchymal mass. In the setting of active malignancy, pt suboptimal candidate for IVC filter placement.     Recommendations:  Transfuse with platelets per hematology recs  Full AC for LLE DVT  Vascular surgery will continue to follow

## 2022-11-05 LAB
ALBUMIN SERPL ELPH-MCNC: 2.5 G/DL — LOW (ref 3.3–5)
ALP SERPL-CCNC: 533 U/L — HIGH (ref 40–120)
ALT FLD-CCNC: 18 U/L — SIGNIFICANT CHANGE UP (ref 10–45)
ANION GAP SERPL CALC-SCNC: 7 MMOL/L — SIGNIFICANT CHANGE UP (ref 5–17)
ANISOCYTOSIS BLD QL: SLIGHT — SIGNIFICANT CHANGE UP
APTT BLD: 31.2 SEC — SIGNIFICANT CHANGE UP (ref 27.5–35.5)
AST SERPL-CCNC: 39 U/L — SIGNIFICANT CHANGE UP (ref 10–40)
BASO STIPL BLD QL SMEAR: PRESENT — SIGNIFICANT CHANGE UP
BASOPHILS # BLD AUTO: 0.07 K/UL — SIGNIFICANT CHANGE UP (ref 0–0.2)
BASOPHILS NFR BLD AUTO: 0.9 % — SIGNIFICANT CHANGE UP (ref 0–2)
BILIRUB DIRECT SERPL-MCNC: 0.2 MG/DL — SIGNIFICANT CHANGE UP (ref 0–0.3)
BILIRUB INDIRECT FLD-MCNC: 0.4 MG/DL — SIGNIFICANT CHANGE UP (ref 0.2–1)
BILIRUB SERPL-MCNC: 0.7 MG/DL — SIGNIFICANT CHANGE UP (ref 0.2–1.2)
BUN SERPL-MCNC: 19 MG/DL — SIGNIFICANT CHANGE UP (ref 7–23)
CALCIUM SERPL-MCNC: 8.2 MG/DL — LOW (ref 8.4–10.5)
CHLORIDE SERPL-SCNC: 110 MMOL/L — HIGH (ref 96–108)
CO2 SERPL-SCNC: 27 MMOL/L — SIGNIFICANT CHANGE UP (ref 22–31)
CREAT SERPL-MCNC: 1.49 MG/DL — HIGH (ref 0.5–1.3)
DACRYOCYTES BLD QL SMEAR: SLIGHT — SIGNIFICANT CHANGE UP
EGFR: 37 ML/MIN/1.73M2 — LOW
EOSINOPHIL # BLD AUTO: 0.07 K/UL — SIGNIFICANT CHANGE UP (ref 0–0.5)
EOSINOPHIL NFR BLD AUTO: 0.9 % — SIGNIFICANT CHANGE UP (ref 0–6)
FIBRINOGEN PPP-MCNC: 338 MG/DL — SIGNIFICANT CHANGE UP (ref 258–438)
GIANT PLATELETS BLD QL SMEAR: PRESENT — SIGNIFICANT CHANGE UP
GLUCOSE SERPL-MCNC: 109 MG/DL — HIGH (ref 70–99)
HAPTOGLOB SERPL-MCNC: <10 MG/DL — LOW (ref 34–200)
HCT VFR BLD CALC: 33.8 % — LOW (ref 34.5–45)
HGB BLD-MCNC: 10.7 G/DL — LOW (ref 11.5–15.5)
INR BLD: 1.08 — SIGNIFICANT CHANGE UP (ref 0.88–1.16)
LDH SERPL L TO P-CCNC: 597 U/L — HIGH (ref 50–242)
LYMPHOCYTES # BLD AUTO: 0.19 K/UL — LOW (ref 1–3.3)
LYMPHOCYTES # BLD AUTO: 2.6 % — LOW (ref 13–44)
MACROCYTES BLD QL: SLIGHT — SIGNIFICANT CHANGE UP
MAGNESIUM SERPL-MCNC: 1.9 MG/DL — SIGNIFICANT CHANGE UP (ref 1.6–2.6)
MANUAL SMEAR VERIFICATION: SIGNIFICANT CHANGE UP
MCHC RBC-ENTMCNC: 31.7 GM/DL — LOW (ref 32–36)
MCHC RBC-ENTMCNC: 32 PG — SIGNIFICANT CHANGE UP (ref 27–34)
MCV RBC AUTO: 101.2 FL — HIGH (ref 80–100)
MICROCYTES BLD QL: SLIGHT — SIGNIFICANT CHANGE UP
MONOCYTES # BLD AUTO: 0.69 K/UL — SIGNIFICANT CHANGE UP (ref 0–0.9)
MONOCYTES NFR BLD AUTO: 9.5 % — SIGNIFICANT CHANGE UP (ref 2–14)
NEUTROPHILS # BLD AUTO: 6.2 K/UL — SIGNIFICANT CHANGE UP (ref 1.8–7.4)
NEUTROPHILS NFR BLD AUTO: 85.2 % — HIGH (ref 43–77)
NRBC # BLD: 2 /100 — HIGH (ref 0–0)
NRBC # BLD: SIGNIFICANT CHANGE UP /100 WBCS (ref 0–0)
OVALOCYTES BLD QL SMEAR: SLIGHT — SIGNIFICANT CHANGE UP
PHOSPHATE SERPL-MCNC: 2.8 MG/DL — SIGNIFICANT CHANGE UP (ref 2.5–4.5)
PLAT MORPH BLD: ABNORMAL
PLATELET # BLD AUTO: 55 K/UL — LOW (ref 150–400)
POIKILOCYTOSIS BLD QL AUTO: SLIGHT — SIGNIFICANT CHANGE UP
POLYCHROMASIA BLD QL SMEAR: SLIGHT — SIGNIFICANT CHANGE UP
POTASSIUM SERPL-MCNC: 4.1 MMOL/L — SIGNIFICANT CHANGE UP (ref 3.5–5.3)
POTASSIUM SERPL-SCNC: 4.1 MMOL/L — SIGNIFICANT CHANGE UP (ref 3.5–5.3)
PROT SERPL-MCNC: 5.1 G/DL — LOW (ref 6–8.3)
PROTHROM AB SERPL-ACNC: 12.9 SEC — SIGNIFICANT CHANGE UP (ref 10.5–13.4)
RBC # BLD: 3.34 M/UL — LOW (ref 3.8–5.2)
RBC # FLD: 23.5 % — HIGH (ref 10.3–14.5)
RBC BLD AUTO: ABNORMAL
SCHISTOCYTES BLD QL AUTO: SLIGHT — SIGNIFICANT CHANGE UP
SODIUM SERPL-SCNC: 144 MMOL/L — SIGNIFICANT CHANGE UP (ref 135–145)
SPHEROCYTES BLD QL SMEAR: SLIGHT — SIGNIFICANT CHANGE UP
VARIANT LYMPHS # BLD: 0.9 % — SIGNIFICANT CHANGE UP (ref 0–6)
WBC # BLD: 7.28 K/UL — SIGNIFICANT CHANGE UP (ref 3.8–10.5)
WBC # FLD AUTO: 7.28 K/UL — SIGNIFICANT CHANGE UP (ref 3.8–10.5)

## 2022-11-05 PROCEDURE — 99233 SBSQ HOSP IP/OBS HIGH 50: CPT

## 2022-11-05 PROCEDURE — 72156 MRI NECK SPINE W/O & W/DYE: CPT | Mod: 26

## 2022-11-05 RX ORDER — FERROUS SULFATE 325(65) MG
325 TABLET ORAL
Refills: 0 | Status: DISCONTINUED | OUTPATIENT
Start: 2022-11-07 | End: 2022-11-11

## 2022-11-05 RX ORDER — SODIUM CHLORIDE 9 MG/ML
1000 INJECTION, SOLUTION INTRAVENOUS
Refills: 0 | Status: DISCONTINUED | OUTPATIENT
Start: 2022-11-05 | End: 2022-11-05

## 2022-11-05 RX ORDER — POLYETHYLENE GLYCOL 3350 17 G/17G
17 POWDER, FOR SOLUTION ORAL DAILY
Refills: 0 | Status: DISCONTINUED | OUTPATIENT
Start: 2022-11-05 | End: 2022-11-11

## 2022-11-05 RX ADMIN — Medication 0.5 MILLIGRAM(S): at 22:07

## 2022-11-05 RX ADMIN — LOSARTAN POTASSIUM 100 MILLIGRAM(S): 100 TABLET, FILM COATED ORAL at 05:51

## 2022-11-05 RX ADMIN — Medication 325 MILLIGRAM(S): at 11:29

## 2022-11-05 RX ADMIN — ESCITALOPRAM OXALATE 7.5 MILLIGRAM(S): 10 TABLET, FILM COATED ORAL at 11:28

## 2022-11-05 RX ADMIN — ENOXAPARIN SODIUM 50 MILLIGRAM(S): 100 INJECTION SUBCUTANEOUS at 05:52

## 2022-11-05 RX ADMIN — LEVETIRACETAM 500 MILLIGRAM(S): 250 TABLET, FILM COATED ORAL at 05:52

## 2022-11-05 RX ADMIN — POLYETHYLENE GLYCOL 3350 17 GRAM(S): 17 POWDER, FOR SOLUTION ORAL at 19:01

## 2022-11-05 RX ADMIN — LEVETIRACETAM 500 MILLIGRAM(S): 250 TABLET, FILM COATED ORAL at 19:01

## 2022-11-05 RX ADMIN — MORPHINE SULFATE 1 MILLIGRAM(S): 50 CAPSULE, EXTENDED RELEASE ORAL at 15:56

## 2022-11-05 RX ADMIN — Medication 30 MILLIGRAM(S): at 05:52

## 2022-11-05 RX ADMIN — Medication 0.5 MILLIGRAM(S): at 05:51

## 2022-11-05 RX ADMIN — ENOXAPARIN SODIUM 50 MILLIGRAM(S): 100 INJECTION SUBCUTANEOUS at 19:00

## 2022-11-05 RX ADMIN — SODIUM CHLORIDE 80 MILLILITER(S): 9 INJECTION, SOLUTION INTRAVENOUS at 15:46

## 2022-11-05 RX ADMIN — MORPHINE SULFATE 1 MILLIGRAM(S): 50 CAPSULE, EXTENDED RELEASE ORAL at 16:30

## 2022-11-05 RX ADMIN — Medication 0.5 MILLIGRAM(S): at 15:46

## 2022-11-05 NOTE — PROGRESS NOTE ADULT - SUBJECTIVE AND OBJECTIVE BOX
Patient is a 72y old  Female who presents with a chief complaint of left leg weakness and decreased sensation (05 Nov 2022 14:21)    INTERVAL EVENTS:  - Cr uptrending today   - continues to have poor PO intake   - BP better controlled with addition of nifedipine.     SUBJECTIVE:  Patient was seen and examined at bedside.  Review of systems: No fever, chills, HA, CP, dyspnea, nausea or vomiting, dysuria,  + LE edema. Rest of 12 point Review of systems negative unless otherwise documented elsewhere in note.   Diet, Regular (11-04-22 @ 12:09) [Active]    MEDICATIONS:  MEDICATIONS  (STANDING):  enoxaparin Injectable 50 milliGRAM(s) SubCutaneous every 12 hours  escitalopram 7.5 milliGRAM(s) Oral daily  lactated ringers. 1000 milliLiter(s) (80 mL/Hr) IV Continuous <Continuous>  levETIRAcetam 500 milliGRAM(s) Oral two times a day  LORazepam     Tablet 0.5 milliGRAM(s) Oral every 8 hours  losartan 100 milliGRAM(s) Oral daily  NIFEdipine XL 30 milliGRAM(s) Oral daily  polyethylene glycol 3350 17 Gram(s) Oral daily    MEDICATIONS  (PRN):  morphine  - Injectable 1 milliGRAM(s) IV Push every 4 hours PRN Moderate Pain (4 - 6)    Allergies    Benadryl (Other)  Compazine (Unknown)    Intolerances    OBJECTIVE:  Vital Signs Last 24 Hrs  T(C): 36.5 (05 Nov 2022 18:33), Max: 37.1 (04 Nov 2022 22:05)  T(F): 97.7 (05 Nov 2022 18:33), Max: 98.7 (04 Nov 2022 22:05)  HR: 96 (05 Nov 2022 20:41) (90 - 108)  BP: 186/81 (05 Nov 2022 20:41) (143/71 - 186/81)  BP(mean): 116 (05 Nov 2022 20:41) (101 - 127)  RR: 16 (05 Nov 2022 20:41) (16 - 18)  SpO2: 93% (05 Nov 2022 20:41) (92% - 95%)    Parameters below as of 05 Nov 2022 20:41  Patient On (Oxygen Delivery Method): room air      I&O's Summary    04 Nov 2022 07:01  -  05 Nov 2022 07:00  --------------------------------------------------------  IN: 463 mL / OUT: 500 mL / NET: -37 mL    05 Nov 2022 08:01  -  05 Nov 2022 21:01  --------------------------------------------------------  IN: 240 mL / OUT: 0 mL / NET: 240 mL    PHYSICAL EXAM:  Gen: Reclining in bed at time of exam, appears stated age  HEENT: NCAT, mucosa dry, clear OP  Neck: supple, trachea at midline  CV: RRR, +S1/S2  Pulm: adequate respiratory effort, no increase in work of breathing  Abd: soft, NTND  Skin: warm and dry,   Ext: WWP, left leg with pitting edema   Neuro: AOx3, speaking in full sentences   Psych: affect and behavior appropriate, pleasant at time of interview    LABS:                        10.7   7.28  )-----------( 55       ( 05 Nov 2022 06:09 )             33.8     11-05    144  |  110<H>  |  19  ----------------------------<  109<H>  4.1   |  27  |  1.49<H>    Ca    8.2<L>      05 Nov 2022 06:09  Phos  2.8     11-05  Mg     1.9     11-05    TPro  5.1<L>  /  Alb  2.5<L>  /  TBili  0.7  /  DBili  0.2  /  AST  39  /  ALT  18  /  AlkPhos  533<H>  11-05    LIVER FUNCTIONS - ( 05 Nov 2022 06:09 )  Alb: 2.5 g/dL / Pro: 5.1 g/dL / ALK PHOS: 533 U/L / ALT: 18 U/L / AST: 39 U/L / GGT: x           PT/INR - ( 05 Nov 2022 06:09 )   PT: 12.9 sec;   INR: 1.08          PTT - ( 05 Nov 2022 06:09 )  PTT:31.2 sec  CAPILLARY BLOOD GLUCOSE            MICRODATA:    Culture - Blood (collected 03 Nov 2022 20:15)  Source: .Blood Blood  Preliminary Report (04 Nov 2022 22:00):    No growth at 1 day.        RADIOLOGY/OTHER STUDIES:

## 2022-11-05 NOTE — PROGRESS NOTE ADULT - SUBJECTIVE AND OBJECTIVE BOX
Hematology Oncology Progress Note      HPI:   **STROKE HPI***    HPI: 72y Female with PMHx of ?HTN, ovarian cancer on chemo (last treatment was 1 week ago), and recurrent anemia 2/2 chemotherapy, presented to the ER on 11/1 directly from oncologist for blood transfusion 2/2 Hgb 6.4, She received 2 units or PRBC's in the ER when she was trying to stand at time discharge (~0300) and noticed that her left leg was feeling numb and weak. Stroke code was called, NIHSS 3. NCHCT negative for hemorrhage, however, there is a patchy area of hypodensity along the right central sulcus which may be artifactual versus areas of age-indeterminate infarction.  CTA head and neck negative for significant steno-occlusive disease but does demonstrate extensive pulmonary metastatic disease. CT perfusion negative. Patient is not a tPA candidate 2/2 thrombocytopenia (repeat plt 36 after transfusion).    (02 Nov 2022 04:34)      Interval History:    SUBJECTIVE: Patient seen and examined at bedside.    OBJECTIVE:    VITAL SIGNS:  ICU Vital Signs Last 24 Hrs  T(C): 36.9 (05 Nov 2022 12:45), Max: 37.2 (04 Nov 2022 17:15)  T(F): 98.4 (05 Nov 2022 12:45), Max: 98.9 (04 Nov 2022 17:15)  HR: 108 (05 Nov 2022 12:05) (90 - 108)  BP: 143/71 (05 Nov 2022 12:05) (143/71 - 179/87)  BP(mean): 101 (05 Nov 2022 12:05) (101 - 123)  ABP: --  ABP(mean): --  RR: 16 (05 Nov 2022 12:05) (16 - 18)  SpO2: 92% (05 Nov 2022 12:05) (92% - 95%)    O2 Parameters below as of 05 Nov 2022 12:05  Patient On (Oxygen Delivery Method): room air              11-04 @ 07:01  -  11-05 @ 07:00  --------------------------------------------------------  IN: 463 mL / OUT: 500 mL / NET: -37 mL      CAPILLARY BLOOD GLUCOSE          PHYSICAL EXAM:  General: NAD, answering questions, pleasantly conversant, chronically ill appearing  HEENT: NC/AT; PERRL, clear conjunctiva  Neck: supple  Respiratory: CTA b/l  Cardiovascular: +S1/S2; RRR  Abdomen: soft, NT/ND; +BS x4  Extremities: WWP, 2+ peripheral pulses b/l; no LE edema  Skin: normal color and turgor; no rash  Neurological: AAOx3, left lower extremity motor weakness, barely able to lift against gravity. Sensation intact.      MEDICATIONS:  MEDICATIONS  (STANDING):  enoxaparin Injectable 50 milliGRAM(s) SubCutaneous every 12 hours  escitalopram 7.5 milliGRAM(s) Oral daily  lactated ringers. 1000 milliLiter(s) (80 mL/Hr) IV Continuous <Continuous>  levETIRAcetam 500 milliGRAM(s) Oral two times a day  LORazepam     Tablet 0.5 milliGRAM(s) Oral every 8 hours  losartan 100 milliGRAM(s) Oral daily  NIFEdipine XL 30 milliGRAM(s) Oral daily  polyethylene glycol 3350 17 Gram(s) Oral daily    MEDICATIONS  (PRN):  morphine  - Injectable 1 milliGRAM(s) IV Push every 4 hours PRN Moderate Pain (4 - 6)      ALLERGIES:  Allergies    Benadryl (Other)  Compazine (Unknown)    Intolerances        LABS:                        10.7   7.28  )-----------( 55       ( 05 Nov 2022 06:09 )             33.8     11-05    144  |  110<H>  |  19  ----------------------------<  109<H>  4.1   |  27  |  1.49<H>    Ca    8.2<L>      05 Nov 2022 06:09  Phos  2.8     11-05  Mg     1.9     11-05    TPro  5.1<L>  /  Alb  2.5<L>  /  TBili  0.7  /  DBili  0.2  /  AST  39  /  ALT  18  /  AlkPhos  533<H>  11-05    PT/INR - ( 05 Nov 2022 06:09 )   PT: 12.9 sec;   INR: 1.08          PTT - ( 05 Nov 2022 06:09 )  PTT:31.2 sec          Culture - Blood (collected 11-03-22 @ 20:15)  Source: .Blood Blood  Preliminary Report (11-04-22 @ 22:00):    No growth at 1 day.            RADIOLOGY & ADDITIONAL TESTS: Reviewed.

## 2022-11-05 NOTE — PROGRESS NOTE ADULT - ASSESSMENT
72y Female with PMHx of ?HTN, ovarian cancer on chemo (last treatment was 1 week ago), and recurrent anemia 2/2 chemotherapy, presented to the ER on 11/1 directly from oncologist for blood transfusion 2/2 Hgb 6.4 s/p 2 unite PRBC. When she was trying to stand at time discharge (~0300) from ED, her left leg was numb and weak. NIHSS 3. CTH with patchy area of hypodensity along the right central sulcus which may be artifactual versus areas of age-indeterminate infarction.  CTA head and neck negative for significant steno-occlusive disease but does demonstrate extensive pulmonary metastatic disease. CT perfusion negative. Patient is not a tPA candidate 2/2 thrombocytopenia (repeat plt 36 after transfusion).    Neuro  #PRES  Initially admitted for r/o stroke due to left leg weakness. Found with LLE femoral DVT. MRI with PRES, no infarct nor mets present.  - holding antiplatelet therapy and chemical DVT ppx for now in the setting of thrombocytopenia  - q4hr  vitals  - MRI Brain with and without contrast: PRES, no enhancement suggestive of mets. No acute infarcts nor hemorrhage.   - Stroke Code HCT Results: Patchy area of hypodensity along the right central sulcus which may be artifactual versus areas of age-indeterminate infarction.  - Rpt CTH W/WO : Limited evaluation due to the presence of intravenous contrast. Nonvisualization of the previously seen small acute to subacute infarctions in the high right mesial frontal and parietal lobes.  - Stroke Code CTA Results: negative for steno-occlusive disease. Demonstrates extensive pulmonary metastatic disease.  - Stroke education    #Seizure   s/p 1 episode of GTC in ER that lasted ~1min, did not need ativan. S/p keppra load. Possibly related to ?ativan withdrawal, but seized within half a day of admit, so would expect patient to be on high dose of ativan at home which she is not. Patient also did not respond to questions about dosing/frequency of ativan when discussing with palliative team.   - EEG 11/2-11/3 with generalized slowing, no seizure activity   - c/w home ativan 0.5mg q8hr    Vascular  #LLE DVT  Arterial LLE duplex negative  - vascular surgery consulted, not a candidate for IVC filter  - in setting of hypercoagulability due to cancer and sedentary lifestyle with no mets to brain on MRI, heme/onc team recommending PLT transfusion to increased PLT >55 and then starting therapeutic lovenox 1mg/kg BID     Cards  #HTN/PRES  Unclear etiology of elevated pressures. Thought to be labile pressure in outpatient setting (however  and pt reports BP reading 130-140. Patient also received Ativan 1 month ago, associated with PRES  - goal normotension  - increased losartan 100mg daily, started nifedipine 30mg daily   - TTE: no pfo, EF 60%      Pulm  - call provider if SPO2 < 94%    GI  #Nutrition/Fluids/Electrolytes   - replete K<4 and Mg <2  - Diet: Regular      Infectious Disease  WBC fluctuating, afebrile. Collateral obtained from onc (see below)  - CXR with nodules. Unclear infiltrates  - BCx 11/3 NGTD  - currently downtrending with no other evidence of infection    Heme/Oncology  #ovarian and uterine CA  Follows Dr. Hidalgo [phone]. Had chemo treatment a week prior to presentation, anemic secondary to chemo tx. Known mets to liver, lung and peritoneum with radiofrequency ablation of liver lesions as MSK. Chemotherapy treatment 1 week before hospital presentation  - Dr. Hidalgo following and will continue with chem tx outpatient    #anemia  s.p 2U PRBC  - c/w iron supplements    Endocrine  - A1C results: 4.8  - TSH results: 6.7, FT4 1.4      Renal   #RUBIN  i/s/o poor PO intake and s/p MRI contrast  - NS 50 x 8hr    DVT Prophylaxis  - therapeutic lovenox     Dispo: [facility], patient prefers home      Discussed daily hospital plans and goals with patient and family at bedside.    Discussed with Neurology Attending Dr. Callahan         72y Female with PMHx of ?HTN, ovarian cancer on chemo (last treatment was 1 week ago), and recurrent anemia 2/2 chemotherapy, presented to the ER on 11/1 directly from oncologist for blood transfusion 2/2 Hgb 6.4 s/p 2 unite PRBC. When she was trying to stand at time discharge (~0300) from ED, her left leg was numb and weak. NIHSS 3. CTH with patchy area of hypodensity along the right central sulcus which may be artifactual versus areas of age-indeterminate infarction.  CTA head and neck negative for significant steno-occlusive disease but does demonstrate extensive pulmonary metastatic disease. CT perfusion negative. Patient is not a tPA candidate 2/2 thrombocytopenia (repeat plt 36 after transfusion).    Neuro  #PRES  Initially admitted for r/o stroke due to left leg weakness. Found with LLE femoral DVT. MRI with PRES, no infarct nor mets present.  - holding antiplatelet therapy and chemical DVT ppx for now in the setting of thrombocytopenia  - q4hr  vitals  - F/U MRI cervical w/ and w/o contrast  - MRI Brain with and without contrast: PRES, no enhancement suggestive of mets. No acute infarcts nor hemorrhage.   - Stroke Code HCT Results: Patchy area of hypodensity along the right central sulcus which may be artifactual versus areas of age-indeterminate infarction.  - Rpt CTH W/WO : Limited evaluation due to the presence of intravenous contrast. Nonvisualization of the previously seen small acute to subacute infarctions in the high right mesial frontal and parietal lobes.  - Stroke Code CTA Results: negative for steno-occlusive disease. Demonstrates extensive pulmonary metastatic disease.  - Stroke education    #Seizure   s/p 1 episode of GTC in ER that lasted ~1min, did not need ativan. S/p keppra load. Possibly related to ?ativan withdrawal, but seized within half a day of admit, so would expect patient to be on high dose of ativan at home which she is not. Patient also did not respond to questions about dosing/frequency of ativan when discussing with palliative team.   - EEG 11/2-11/3 with generalized slowing, no seizure activity   - c/w home ativan 0.5mg q8hr    Vascular  #LLE DVT  Arterial LLE duplex negative  - vascular surgery consulted, not a candidate for IVC filter  - in setting of hypercoagulability due to cancer and sedentary lifestyle with no mets to brain on MRI, heme/onc team recommending PLT transfusion when PLT <50 while on AC    Cards  #HTN/PRES  Unclear etiology of elevated pressures. Thought to be labile pressure in outpatient setting (however  and pt reports BP reading 130-140. Patient also received Ativan 1 month ago, associated with PRES  - goal normotension  - Cont losartan 100mg daily and nifedipine 30mg daily   - TTE: no pfo, EF 60%      Pulm  - call provider if SPO2 < 94%    GI  #Nutrition/Fluids/Electrolytes   - replete K<4 and Mg <2  - IVF: LR @ 80cc/hr  - Diet: Regular      Infectious Disease  WBC fluctuating, afebrile. Collateral obtained from onc (see below)  - CXR with nodules. Unclear infiltrates  - BCx 11/3 NGTD  - currently downtrending with no other evidence of infection    Heme/Oncology  #ovarian and uterine CA  Follows Dr. Hidalgo [phone]. Had chemo treatment a week prior to presentation, anemic secondary to chemo tx. Known mets to liver, lung and peritoneum with radiofrequency ablation of liver lesions as MSK. Chemotherapy treatment 1 week before hospital presentation  - Dr. Hidalgo following and will continue with chem tx outpatient    #anemia  s.p 2U PRBC  - c/w iron supplements    Endocrine  - A1C results: 4.8  - TSH results: 6.7, FT4 1.4      Renal   #RUBIN  i/s/o poor PO intake and s/p MRI contrast  - LR 80cc x 12hr, reassess for need to continue in AM    DVT Prophylaxis  - therapeutic lovenox     Dispo: [facility], patient prefers home      Discussed daily hospital plans and goals with patient and family at bedside.    Discussed with Neurology Attending Dr. Callahan

## 2022-11-05 NOTE — PROGRESS NOTE ADULT - PROBLEM SELECTOR PLAN 1
sx concerning for CVA, in setting of malignancy; x-ray unremarkable;   MRI brain showign infarction, possible mets and PRES.   LE dopplers showing left common femoral and saphenofemoral DVT - started on therapeutic dose lovenox   cont. work-up and mgmt per Neuro;   BP targets per neuro   PT/OT; holding DAPT for now in setting of thrombocytopenia - timing of starting antiplatelet per primary team   holding statin in setting of elevated LFTs

## 2022-11-05 NOTE — PROGRESS NOTE ADULT - NSPROGADDITIONALINFOA_GEN_ALL_CORE
DVT ppx: already on full dose AC (lovenox )  Fluids: Supplemental fluids given poor PO intake, uptrending BUN , Cr ; LR 80ml/hr for now     Re: Transfer request:   Thrombocytopenic with Platelets ~50, just started on therapeutic dose AC - would benefit from close monitoring of hemodynamics   in addition, monitoring BP in setting of PRES  given limitations in monitoring of vitals , I/Os while on regional medical floor, recommend against transfer to regional medical floor at this juncture. Recommend continuing on telemetry

## 2022-11-05 NOTE — PROGRESS NOTE ADULT - SUBJECTIVE AND OBJECTIVE BOX
Neurology Stroke Progress Note    INTERVAL HPI/OVERNIGHT EVENTS:  Patient seen and examined.  number ______ used.    MEDICATIONS  (STANDING):  enoxaparin Injectable 50 milliGRAM(s) SubCutaneous every 12 hours  escitalopram 7.5 milliGRAM(s) Oral daily  ferrous    sulfate 325 milliGRAM(s) Oral daily  levETIRAcetam 500 milliGRAM(s) Oral two times a day  LORazepam     Tablet 0.5 milliGRAM(s) Oral every 8 hours  losartan 100 milliGRAM(s) Oral daily  NIFEdipine XL 30 milliGRAM(s) Oral daily  sodium chloride 0.9%. 1000 milliLiter(s) (30 mL/Hr) IV Continuous <Continuous>    MEDICATIONS  (PRN):  morphine  - Injectable 1 milliGRAM(s) IV Push every 4 hours PRN Moderate Pain (4 - 6)      Allergies    Benadryl (Other)  Compazine (Unknown)    Intolerances        Vital Signs Last 24 Hrs  T(C): 36.6 (05 Nov 2022 06:00), Max: 37.2 (04 Nov 2022 17:15)  T(F): 97.8 (05 Nov 2022 06:00), Max: 98.9 (04 Nov 2022 17:15)  HR: 90 (05 Nov 2022 04:20) (90 - 104)  BP: 144/79 (05 Nov 2022 04:20) (144/79 - 192/92)  BP(mean): 104 (05 Nov 2022 04:20) (104 - 132)  RR: 16 (05 Nov 2022 04:20) (16 - 18)  SpO2: 93% (05 Nov 2022 04:20) (92% - 95%)    Parameters below as of 05 Nov 2022 04:20  Patient On (Oxygen Delivery Method): room air        Physical exam:  General: No acute distress, awake and alert  Eyes: Anicteric sclerae, moist conjunctivae, see below for CNs  Neck: trachea midline, FROM, supple, no thyromegaly or lymphadenopathy  Cardiovascular: Regular rate and rhythm, no murmurs, rubs, or gallops. No carotid bruits.   Pulmonary: Anterior breath sounds clear bilaterally, no crackles or wheezing. No use of accessory muscles  GI: Abdomen soft, non-distended, non-tender  Extremities: Radial and DP pulses +2, no edema    Neurologic:  -Mental status: Awake, alert, oriented to person, place, and time. Speech is fluent with intact naming, repetition, and comprehension, no dysarthria. Recent and remote memory intact. Follows commands. Attention/concentration intact. Fund of knowledge appropriate.  -Cranial nerves:   II: Visual fields are full to confrontation.  III, IV, VI: Extraocular movements are intact without nystagmus. Pupils equally round and reactive to light  V:  Facial sensation V1-V3 equal and intact   VII: Face is symmetric with normal eye closure and smile  VIII: Hearing is bilaterally intact to finger rub  IX, X: Uvula is midline and soft palate rises symmetrically  XI: Head turning and shoulder shrug are intact.  XII: Tongue protrudes midline  Motor: Normal bulk and tone. No pronator drift. Strength bilateral upper extremity 5/5, bilateral lower extremities 5/5.  Rapid alternating movements intact and symmetric  Sensation: Intact to light touch bilaterally. No neglect or extinction on double simultaneous testing.  Coordination: No dysmetria on finger-to-nose and heel-to-shin bilaterally  Reflexes: Downgoing toes bilaterally   Gait: Narrow gait and steady    LABS:                        10.7   7.28  )-----------( 55       ( 05 Nov 2022 06:09 )             33.8     11-05    144  |  110<H>  |  19  ----------------------------<  109<H>  4.1   |  27  |  1.49<H>    Ca    8.2<L>      05 Nov 2022 06:09  Phos  2.8     11-05  Mg     1.9     11-05    TPro  5.1<L>  /  Alb  2.5<L>  /  TBili  0.7  /  DBili  0.2  /  AST  39  /  ALT  18  /  AlkPhos  533<H>  11-05    PT/INR - ( 05 Nov 2022 06:09 )   PT: 12.9 sec;   INR: 1.08          PTT - ( 05 Nov 2022 06:09 )  PTT:31.2 sec      RADIOLOGY & ADDITIONAL TESTS:     Neurology Stroke Progress Note    INTERVAL HPI/OVERNIGHT EVENTS:  Patient seen and examined. NICA overnight, patient has no complaints but states she is very tired.     MEDICATIONS  (STANDING):  enoxaparin Injectable 50 milliGRAM(s) SubCutaneous every 12 hours  escitalopram 7.5 milliGRAM(s) Oral daily  ferrous    sulfate 325 milliGRAM(s) Oral daily  levETIRAcetam 500 milliGRAM(s) Oral two times a day  LORazepam     Tablet 0.5 milliGRAM(s) Oral every 8 hours  losartan 100 milliGRAM(s) Oral daily  NIFEdipine XL 30 milliGRAM(s) Oral daily  sodium chloride 0.9%. 1000 milliLiter(s) (30 mL/Hr) IV Continuous <Continuous>    MEDICATIONS  (PRN):  morphine  - Injectable 1 milliGRAM(s) IV Push every 4 hours PRN Moderate Pain (4 - 6)      Allergies    Benadryl (Other)  Compazine (Unknown)    Intolerances        Vital Signs Last 24 Hrs  T(C): 36.6 (05 Nov 2022 06:00), Max: 37.2 (04 Nov 2022 17:15)  T(F): 97.8 (05 Nov 2022 06:00), Max: 98.9 (04 Nov 2022 17:15)  HR: 90 (05 Nov 2022 04:20) (90 - 104)  BP: 144/79 (05 Nov 2022 04:20) (144/79 - 192/92)  BP(mean): 104 (05 Nov 2022 04:20) (104 - 132)  RR: 16 (05 Nov 2022 04:20) (16 - 18)  SpO2: 93% (05 Nov 2022 04:20) (92% - 95%)    Parameters below as of 05 Nov 2022 04:20  Patient On (Oxygen Delivery Method): room air    Physical exam:  General: No acute distress, appears groggy  Eyes: Anicteric sclerae, moist conjunctivae, see below for CNs  Neck: trachea midline, FROM, supple, no thyromegaly or lymphadenopathy  Cardiovascular: Regular rate and rhythm, no murmurs, rubs, or gallops. No carotid bruits.   Pulmonary: Anterior breath sounds clear bilaterally, no crackles or wheezing. No use of accessory muscles  GI: Abdomen soft, non-distended, non-tender  Extremities: Radial and DP pulses +2, no edema    Neurologic:  -Mental status: Awake, alert, oriented to person. Speech is fluent with intact naming, repetition, and comprehension, no dysarthria. Recent and remote memory intact. Follows commands. Attention/concentration intact. Fund of knowledge appropriate.  -Cranial nerves:   II: Visual fields are full to confrontation.  III, IV, VI: Extraocular movements are intact without nystagmus. Pupils equally round and reactive to light  V:  Facial sensation V1-V3 equal and intact   VII: Face is symmetric with normal eye closure and smile  VIII: Hearing is bilaterally intact to finger rub  IX, X: Uvula is midline and soft palate rises symmetrically  XI: Head turning and shoulder shrug are intact.  XII: Tongue protrudes midline  Motor: Normal bulk and tone. No pronator drift. Strength bilateral upper extremity 3/5, LE exam limited by effort  Sensation: Intact to light touch bilaterally. No neglect or extinction on double simultaneous testing.  Coordination: No dysmetria on finger-to-nose and heel-to-shin bilaterally  Reflexes: Downgoing toes bilaterally     LABS:                        10.7   7.28  )-----------( 55       ( 05 Nov 2022 06:09 )             33.8     11-05    144  |  110<H>  |  19  ----------------------------<  109<H>  4.1   |  27  |  1.49<H>    Ca    8.2<L>      05 Nov 2022 06:09  Phos  2.8     11-05  Mg     1.9     11-05    TPro  5.1<L>  /  Alb  2.5<L>  /  TBili  0.7  /  DBili  0.2  /  AST  39  /  ALT  18  /  AlkPhos  533<H>  11-05    PT/INR - ( 05 Nov 2022 06:09 )   PT: 12.9 sec;   INR: 1.08          PTT - ( 05 Nov 2022 06:09 )  PTT:31.2 sec      RADIOLOGY & ADDITIONAL TESTS:

## 2022-11-05 NOTE — PROGRESS NOTE ADULT - ATTENDING COMMENTS
The patient is a 72-year-old female with a history of metastatic ovarian cancer, currently on chemo, with recent severe anemia requiring transfusion and thrombocytopenia admitted with left leg numbness/weakness after receiving blood transfusion in the ED. On exam, she has severe left arm/leg weakness and also left facial weakness.  Initial head imaging was unremarkable.  She was not a candidate for any acute intervention. Course complicated by seizure (now on Keppra). MRI showed no acute ischemia but was concerning for PRES.  She was also found to have an extensive left lower extremity DVT, likely be secondary to hypercoagulable state in the setting of active malignancy. Rec MRI C spine with/without contrast to ensure no metastatic disease is causing weakness of arm/leg. Will discuss anticoagulation in the setting of severe anemia/thrombocytopenia w heme/onc. Monitor cell counts, electrolytes, renal function. Appreciate pall care involvement. The patient is a 72-year-old female with a history of metastatic ovarian cancer, currently on chemo, with recent severe anemia requiring transfusion and thrombocytopenia admitted with left leg numbness/weakness after receiving blood transfusion in the ED. On exam, she has severe left arm/leg weakness and also left facial weakness.  Initial head imaging was unremarkable.  She was not a candidate for any acute intervention. Course complicated by seizure (now on Keppra). MRI showed no acute ischemia but was concerning for PRES- possibly related to labile BP vs chemo.  She was also found to have an extensive left lower extremity DVT, likely be secondary to hypercoagulable state in the setting of active malignancy. Rec MRI C spine with/without contrast to ensure no metastatic disease is causing weakness of arm/leg. Will discuss anticoagulation in the setting of severe anemia/thrombocytopenia w heme/onc. Monitor cell counts, electrolytes, renal function. Appreciate pall care involvement.

## 2022-11-05 NOTE — PROGRESS NOTE ADULT - ASSESSMENT
72 y Female with PMHx of ?HTN, ovarian/uterine cancer on chemo (last treatment was 1 week ago), and recurrent anemia 2/2 chemotherapy, presented to the ER on 11/1 for blood transfusion 2/2 Hgb 6.4. Found to have left leg numbness/weakness with c/f stroke on CT head, but unclear if infarction. ultrasound on 11/3 showing LLE DVT.    # Metastatic Ovarian/Uterine Cancer  History of metastatic Ovarian/Uterine Cancer actively on chemotherapy with Dr. Hidalgo. Last treatment approximately 1 week ago. History of myelosuppression with prior treatments, but did not experience the elevated WBC, transfusion level anemia, and low platelets. Past regimen includes avastin use, current regimen on keytruda, dose reduced gemzar, cytoxan, 5-FU continual infusion, docetaxel, and carboplatin. Currently patient undergoing stroke work up with findings of likely posterior reversible encephalopathy syndrome (PRES) on MRI Brain. No current radiographic evidence of intraparenchymal mass, hemorrhage, or infarct per radiology report. Patient with history of avastin use (last 1 month ago, now off, but associated with the risk of PRES development) and difficulty controlling BP in the outpatient setting, currently with labile elevated BP inpatient.   - Will plan for further chemotherapy outpatient pending functional improvement and recovery of cytopenias with Dr. Hidalgo  - Blood pressure control per neurology stroke team    # Anemia, improving  # Severe Thrombocytopenia, improving  Likely multifactorial in setting of myelosuppression given recent chemotherapy. Anemia improved s/p 2 units pRBCs. Appears stable at 10.0 (11/4) from 10.4 (11/3). LDH elevated and low haptoglobin concerning for hemolysis, but only 1 schistocyte per HPF. Favor underlying diffusely metastatic cancer process as potential cause given stable Hgb and improving platelet counts, but will need to be carefully monitored. Thrombocytopenia appears to be improving, but will need level >50k to safely administer anticoagulation.  - Daily CBC with differential  - Maintain active type and screen  - Transfuse platelets for levels <50k with active bleeding, <20k with fever, or <10k  - f/u fibrinogen level, direct jorge alberto  - Monitor for signs and symptoms of bleeding    # LLE DVT  Platelets have continued downtrending from 37 to 25. CT concerning for possible area of infarction, but unclear type of infarction or lesion based on CT reads. Now with LLE DVT by ultrasound. Risk factors include diffuse cancer and decreased mobility. Discussed with vascular regarding utility of IVC filter, but suboptimal candidate for filter placement. Will opt for short term platelet transfusion (given recovery of platelets) to increase platelet count above 50 to start full anticoagulation. Discussed risks and benefits of anticoagulation vs withholding anticoagulation with patient who verbalized understanding and agreement with plan.  - Transfuse 1 unit of platelets and repeat CBC with differential. Repeat transfusion until platelet count above 50.  - If platelets >55, recommend starting full AC with lovenox 1mg/kg every 12 hours  - Monitor for signs and symptoms of bleeding  - Repeat CBC with differential in AM or with acute change in hemodynamic status    Discussed with hematology oncology attending Dr. Hidalgo and primary stroke team. 72 y Female with PMHx of ?HTN, ovarian/uterine cancer on chemo (last treatment was 1 week ago), and recurrent anemia 2/2 chemotherapy, presented to the ER on 11/1 for blood transfusion 2/2 Hgb 6.4. Found to have left leg numbness/weakness with c/f stroke on CT head, but unclear if infarction. ultrasound on 11/3 showing LLE DVT.    # Metastatic Ovarian/Uterine Cancer  History of metastatic Ovarian/Uterine Cancer actively on chemotherapy with Dr. Hidalgo. Last treatment approximately 1 week ago. History of myelosuppression with prior treatments, but did not experience the elevated WBC, transfusion level anemia, and low platelets. Past regimen includes avastin use, current regimen on keytruda, dose reduced gemzar, cytoxan, 5-FU continual infusion, docetaxel, and carboplatin. Currently patient undergoing stroke work up with findings of likely posterior reversible encephalopathy syndrome (PRES) on MRI Brain. No current radiographic evidence of intraparenchymal mass, hemorrhage, or infarct per radiology report. Patient with history of avastin use (last 1 month ago, now off, but associated with the risk of PRES development) and difficulty controlling BP in the outpatient setting, currently with labile elevated BP inpatient.   - Will plan for further chemotherapy outpatient pending functional improvement and recovery of cytopenias with Dr. Hidalgo  - Blood pressure control per neurology stroke team    # Anemia, improving  # Severe Thrombocytopenia, improving  Likely multifactorial in setting of myelosuppression given recent chemotherapy. Anemia improved s/p 2 units pRBCs. Appears stable at 10.0 (11/4) from 10.4 (11/3). LDH elevated and low haptoglobin concerning for hemolysis, but only 1 schistocyte per HPF. Favor underlying diffusely metastatic cancer process as potential cause given stable Hgb and improving platelet counts, but will need to be carefully monitored. Thrombocytopenia appears to be improving, but will need level >50k to safely administer anticoagulation.  - Daily CBC with differential  - Maintain active type and screen  - Please transfuse for platelets <50 while on anticoagulation, <50k with active bleeding, <20k with fever, or <10k  - Monitor for signs and symptoms of bleeding    # LLE DVT  Platelets have continued downtrending from 37 to 25. CT concerning for possible area of infarction, but unclear type of infarction or lesion based on CT reads. Now with LLE DVT by ultrasound. Risk factors include diffuse cancer and decreased mobility. Discussed with vascular regarding utility of IVC filter, but suboptimal candidate for filter placement. Will opt for short term platelet transfusion (given recovery of platelets) to increase platelet count above 50 to start full anticoagulation. Discussed risks and benefits of anticoagulation vs withholding anticoagulation with patient who verbalized understanding and agreement with plan.  - Transfuse 1 unit of platelets and repeat CBC with differential. Repeat transfusion until platelet count above 50.  - If platelets >50, continue full AC with lovenox 1mg/kg every 12 hours  - Please transfuse for platelets <50 while on anticoagulation  - Monitor for signs and symptoms of bleeding  - Repeat CBC with differential in AM or with acute change in hemodynamic status    At discharge, patient should continue oncologic follow up with Dr. Melani Hidalgo.    Discussed with hematology oncology attending Dr. Hidalgo and primary stroke team.

## 2022-11-06 LAB
ANION GAP SERPL CALC-SCNC: 10 MMOL/L — SIGNIFICANT CHANGE UP (ref 5–17)
ANISOCYTOSIS BLD QL: SLIGHT — SIGNIFICANT CHANGE UP
BASOPHILS # BLD AUTO: 0.06 K/UL — SIGNIFICANT CHANGE UP (ref 0–0.2)
BASOPHILS NFR BLD AUTO: 1.7 % — SIGNIFICANT CHANGE UP (ref 0–2)
BUN SERPL-MCNC: 21 MG/DL — SIGNIFICANT CHANGE UP (ref 7–23)
CALCIUM SERPL-MCNC: 8.1 MG/DL — LOW (ref 8.4–10.5)
CHLORIDE SERPL-SCNC: 106 MMOL/L — SIGNIFICANT CHANGE UP (ref 96–108)
CO2 SERPL-SCNC: 24 MMOL/L — SIGNIFICANT CHANGE UP (ref 22–31)
CREAT SERPL-MCNC: 1.41 MG/DL — HIGH (ref 0.5–1.3)
DACRYOCYTES BLD QL SMEAR: SLIGHT — SIGNIFICANT CHANGE UP
EGFR: 39 ML/MIN/1.73M2 — LOW
EOSINOPHIL # BLD AUTO: 0.03 K/UL — SIGNIFICANT CHANGE UP (ref 0–0.5)
EOSINOPHIL NFR BLD AUTO: 0.9 % — SIGNIFICANT CHANGE UP (ref 0–6)
GIANT PLATELETS BLD QL SMEAR: PRESENT — SIGNIFICANT CHANGE UP
GLUCOSE SERPL-MCNC: 110 MG/DL — HIGH (ref 70–99)
HCT VFR BLD CALC: 34.6 % — SIGNIFICANT CHANGE UP (ref 34.5–45)
HGB BLD-MCNC: 10.9 G/DL — LOW (ref 11.5–15.5)
LYMPHOCYTES # BLD AUTO: 0.19 K/UL — LOW (ref 1–3.3)
LYMPHOCYTES # BLD AUTO: 5.3 % — LOW (ref 13–44)
MACROCYTES BLD QL: SLIGHT — SIGNIFICANT CHANGE UP
MAGNESIUM SERPL-MCNC: 2 MG/DL — SIGNIFICANT CHANGE UP (ref 1.6–2.6)
MANUAL SMEAR VERIFICATION: SIGNIFICANT CHANGE UP
MCHC RBC-ENTMCNC: 31.5 GM/DL — LOW (ref 32–36)
MCHC RBC-ENTMCNC: 32.3 PG — SIGNIFICANT CHANGE UP (ref 27–34)
MCV RBC AUTO: 102.7 FL — HIGH (ref 80–100)
MICROCYTES BLD QL: SLIGHT — SIGNIFICANT CHANGE UP
MONOCYTES # BLD AUTO: 1.25 K/UL — HIGH (ref 0–0.9)
MONOCYTES NFR BLD AUTO: 35.1 % — HIGH (ref 2–14)
NEUTROPHILS # BLD AUTO: 2.02 K/UL — SIGNIFICANT CHANGE UP (ref 1.8–7.4)
NEUTROPHILS NFR BLD AUTO: 57 % — SIGNIFICANT CHANGE UP (ref 43–77)
NRBC # BLD: 1 /100 — HIGH (ref 0–0)
NRBC # BLD: SIGNIFICANT CHANGE UP /100 WBCS (ref 0–0)
OVALOCYTES BLD QL SMEAR: SLIGHT — SIGNIFICANT CHANGE UP
PHOSPHATE SERPL-MCNC: 3 MG/DL — SIGNIFICANT CHANGE UP (ref 2.5–4.5)
PLAT MORPH BLD: ABNORMAL
PLATELET # BLD AUTO: 57 K/UL — LOW (ref 150–400)
POIKILOCYTOSIS BLD QL AUTO: SLIGHT — SIGNIFICANT CHANGE UP
POLYCHROMASIA BLD QL SMEAR: SLIGHT — SIGNIFICANT CHANGE UP
POTASSIUM SERPL-MCNC: 3.6 MMOL/L — SIGNIFICANT CHANGE UP (ref 3.5–5.3)
POTASSIUM SERPL-SCNC: 3.6 MMOL/L — SIGNIFICANT CHANGE UP (ref 3.5–5.3)
RBC # BLD: 3.37 M/UL — LOW (ref 3.8–5.2)
RBC # FLD: 23.3 % — HIGH (ref 10.3–14.5)
RBC BLD AUTO: ABNORMAL
SCHISTOCYTES BLD QL AUTO: SLIGHT — SIGNIFICANT CHANGE UP
SODIUM SERPL-SCNC: 140 MMOL/L — SIGNIFICANT CHANGE UP (ref 135–145)
SPHEROCYTES BLD QL SMEAR: SLIGHT — SIGNIFICANT CHANGE UP
WBC # BLD: 3.55 K/UL — LOW (ref 3.8–10.5)
WBC # FLD AUTO: 3.55 K/UL — LOW (ref 3.8–10.5)

## 2022-11-06 PROCEDURE — 99233 SBSQ HOSP IP/OBS HIGH 50: CPT

## 2022-11-06 RX ADMIN — Medication 30 MILLIGRAM(S): at 07:09

## 2022-11-06 RX ADMIN — Medication 0.5 MILLIGRAM(S): at 13:20

## 2022-11-06 RX ADMIN — ESCITALOPRAM OXALATE 7.5 MILLIGRAM(S): 10 TABLET, FILM COATED ORAL at 11:24

## 2022-11-06 RX ADMIN — LOSARTAN POTASSIUM 100 MILLIGRAM(S): 100 TABLET, FILM COATED ORAL at 06:46

## 2022-11-06 RX ADMIN — ENOXAPARIN SODIUM 50 MILLIGRAM(S): 100 INJECTION SUBCUTANEOUS at 17:13

## 2022-11-06 RX ADMIN — POLYETHYLENE GLYCOL 3350 17 GRAM(S): 17 POWDER, FOR SOLUTION ORAL at 11:24

## 2022-11-06 RX ADMIN — ENOXAPARIN SODIUM 50 MILLIGRAM(S): 100 INJECTION SUBCUTANEOUS at 06:46

## 2022-11-06 RX ADMIN — LEVETIRACETAM 500 MILLIGRAM(S): 250 TABLET, FILM COATED ORAL at 17:13

## 2022-11-06 RX ADMIN — LEVETIRACETAM 500 MILLIGRAM(S): 250 TABLET, FILM COATED ORAL at 06:46

## 2022-11-06 RX ADMIN — Medication 0.5 MILLIGRAM(S): at 06:46

## 2022-11-06 RX ADMIN — Medication 0.5 MILLIGRAM(S): at 21:23

## 2022-11-06 NOTE — PROGRESS NOTE ADULT - NSPROGADDITIONALINFOA_GEN_ALL_CORE
DVT ppx: already on full dose AC (lovenox )  Fluids: Supplemental fluids given poor PO intake, uptrending BUN , Cr ; Recommend LR 80ml/hr for now     Re: Transfer request:   Thrombocytopenic with Platelets ~50, just started on therapeutic dose AC - would benefit from close monitoring of hemodynamics   in addition, also being treated for PRES and has been having labile BPs (BP up to 180s mmHg systolic overnight)   given limitations in monitoring of vitals , I/Os while on regional medical floor, recommend against transfer to regional medical floor at this juncture. Recommend continuing on telemetry

## 2022-11-06 NOTE — PROGRESS NOTE ADULT - NS ATTEST RISK PROBLEM GEN_ALL_CORE FT
intravenous morphine PRN for pain
Patient remains on IV opiates, with poorly controlled symptoms.
IV opiates.   Discussion about de-escalation of care in the setting of advancing end stage malignancy.

## 2022-11-06 NOTE — PROGRESS NOTE ADULT - ATTENDING COMMENTS
The patient is a 72-year-old female with a history of metastatic ovarian cancer, currently on chemo, with recent severe anemia requiring transfusion and thrombocytopenia admitted with left leg numbness/weakness after receiving blood transfusion in the ED. MRI showed no acute ischemia but was concerning for PRES- possibly related to labile BP vs chemo. MRI C spine negative. She was also found to have an extensive left lower extremity DVT, likely be secondary to hypercoagulable state in the setting of active malignancy. Continue therapeutic Lovenox, monitoring anemia/thrombocytopenia.

## 2022-11-06 NOTE — PROGRESS NOTE ADULT - ASSESSMENT
72y Female with PMHx of ?HTN, ovarian cancer on chemo (last treatment was 1 week ago), and recurrent anemia 2/2 chemotherapy, presented to the ER on 11/1 directly from oncologist for blood transfusion 2/2 Hgb 6.4 s/p 2 unite PRBC. When she was trying to stand at time discharge (~0300) from ED, her left leg was numb and weak. NIHSS 3. CTH with patchy area of hypodensity along the right central sulcus which may be artifactual versus areas of age-indeterminate infarction.  CTA head and neck negative for significant steno-occlusive disease but does demonstrate extensive pulmonary metastatic disease. CT perfusion negative. Patient is not a tPA candidate 2/2 thrombocytopenia (repeat plt 36 after transfusion).    Neuro  #PRES  Initially admitted for r/o stroke due to left leg weakness. Found with LLE femoral DVT. MRI with PRES, no infarct nor mets present.  - holding antiplatelet therapy and chemical DVT ppx for now in the setting of thrombocytopenia  - q4hr  vitals  - F/U MRI cervical w/ and w/o contrast  - MRI Brain with and without contrast: PRES, no enhancement suggestive of mets. No acute infarcts nor hemorrhage.   - Stroke Code HCT Results: Patchy area of hypodensity along the right central sulcus which may be artifactual versus areas of age-indeterminate infarction.  - Rpt CTH W/WO : Limited evaluation due to the presence of intravenous contrast. Nonvisualization of the previously seen small acute to subacute infarctions in the high right mesial frontal and parietal lobes.  - Stroke Code CTA Results: negative for steno-occlusive disease. Demonstrates extensive pulmonary metastatic disease.  - Stroke education    #Seizure   s/p 1 episode of GTC in ER that lasted ~1min, did not need ativan. S/p keppra load. Possibly related to ?ativan withdrawal, but seized within half a day of admit, so would expect patient to be on high dose of ativan at home which she is not. Patient also did not respond to questions about dosing/frequency of ativan when discussing with palliative team.   - EEG 11/2-11/3 with generalized slowing, no seizure activity   - c/w home ativan 0.5mg q8hr    Vascular  #LLE DVT  Arterial LLE duplex negative  - vascular surgery consulted, not a candidate for IVC filter  - in setting of hypercoagulability due to cancer and sedentary lifestyle with no mets to brain on MRI, heme/onc team recommending PLT transfusion when PLT <50 while on AC    Cards  #HTN/PRES  Unclear etiology of elevated pressures. Thought to be labile pressure in outpatient setting (however  and pt reports BP reading 130-140. Patient also received Ativan 1 month ago, associated with PRES  - goal normotension  - Cont losartan 100mg daily and nifedipine 30mg daily   - TTE: no pfo, EF 60%      Pulm  - call provider if SPO2 < 94%    GI  #Nutrition/Fluids/Electrolytes   - replete K<4 and Mg <2  - IVF: LR @ 80cc/hr  - Diet: Regular      Infectious Disease  WBC fluctuating, afebrile. Collateral obtained from onc (see below)  - CXR with nodules. Unclear infiltrates  - BCx 11/3 NGTD  - currently downtrending with no other evidence of infection    Heme/Oncology  #ovarian and uterine CA  Follows Dr. Hidalgo [phone]. Had chemo treatment a week prior to presentation, anemic secondary to chemo tx. Known mets to liver, lung and peritoneum with radiofrequency ablation of liver lesions as MSK. Chemotherapy treatment 1 week before hospital presentation  - Dr. Hidalgo following and will continue with chem tx outpatient    #anemia  s.p 2U PRBC  - c/w iron supplements    Endocrine  - A1C results: 4.8  - TSH results: 6.7, FT4 1.4      Renal   #RUBIN  i/s/o poor PO intake and s/p MRI contrast  - LR 80cc x 12hr, reassess for need to continue in AM    DVT Prophylaxis  - therapeutic lovenox     Dispo: [facility], patient prefers home      Discussed daily hospital plans and goals with patient and family at bedside.    Discussed with Neurology Attending Dr. Callahan         72y Female with PMHx of ?HTN, ovarian cancer on chemo (last treatment was 1 week ago), and recurrent anemia 2/2 chemotherapy, presented to the ER on 11/1 directly from oncologist for blood transfusion 2/2 Hgb 6.4 s/p 2 unite PRBC. When she was trying to stand at time discharge (~0300) from ED, her left leg was numb and weak. NIHSS 3. CTH with patchy area of hypodensity along the right central sulcus which may be artifactual versus areas of age-indeterminate infarction.  CTA head and neck negative for significant steno-occlusive disease but does demonstrate extensive pulmonary metastatic disease. CT perfusion negative. Patient is not a tPA candidate 2/2 thrombocytopenia (repeat plt 36 after transfusion).    Neuro  #PRES  Initially admitted for r/o stroke due to left leg weakness. MRI head with PRES, no infarct nor mets present.  - holding antiplatelet therapy and chemical DVT ppx for now in the setting of thrombocytopenia  - q4hr  vitals  - F/U read MRI cervical w/ and w/o contrast  - MRI Brain with and without contrast: PRES, no enhancement suggestive of mets. No acute infarcts nor hemorrhage.   - Stroke Code HCT Results: Patchy area of hypodensity along the right central sulcus which may be artifactual versus areas of age-indeterminate infarction.  - Rpt CTH W/WO : Limited evaluation due to the presence of intravenous contrast. Nonvisualization of the previously seen small acute to subacute infarctions in the high right mesial frontal and parietal lobes.  - Stroke Code CTA Results: negative for steno-occlusive disease. Demonstrates extensive pulmonary metastatic disease.  - Stroke education    #Seizure   s/p 1 episode of GTC in ER that lasted ~1min, did not need ativan. S/p keppra load. Possibly related to ?ativan withdrawal, but seized within half a day of admit, so would expect patient to be on high dose of ativan at home which she is not. Patient also did not respond to questions about dosing/frequency of ativan when discussing with palliative team.   - EEG 11/2-11/3 with generalized slowing, no seizure activity   - c/w home ativan 0.5mg q8hr    Vascular  #LLE DVT  Arterial LLE duplex negative  - vascular surgery consulted, not a candidate for IVC filter  - in setting of hypercoagulability due to cancer and sedentary lifestyle with no mets to brain on MRI, heme/onc team recommending PLT transfusion when PLT <50 while on AC    Cards  #HTN/PRES  Unclear etiology of elevated pressures. Thought to be labile pressure in outpatient setting (however  and pt reports BP reading 130-140. Patient also received Ativan 1 month ago, associated with PRES  - goal normotension  - Cont losartan 100mg daily and nifedipine 30mg daily   - TTE: no pfo, EF 60%      Pulm  - call provider if SPO2 < 94%    GI  #Nutrition/Fluids/Electrolytes   - replete K<4 and Mg <2  - IVF: LR @ 80cc/hr  - Diet: Regular      Infectious Disease  WBC fluctuating, afebrile. Collateral obtained from onc (see below)  - CXR with nodules. Unclear infiltrates  - BCx 11/3 NGTD  - currently downtrending with no other evidence of infection    Heme/Oncology  #Ovarian/uterine CA   Follows Dr. Hidalgo [phone]. Had chemo treatment a week prior to presentation, anemic secondary to chemo tx. Known mets to liver, lung and peritoneum with radiofrequency ablation of liver lesions as MSK. Chemotherapy treatment 1 week before hospital presentation  - Dr. Hidalgo following and will continue with chem tx outpatient    #Bicytopenia (Hgb 10-11, platelet approx 30k)  s.p 2U PRBC and 1unit platelets   - trend hgb/platelets  - maintain active T&S  - transfuse if Hgb <7  - transfuse platelets if less than 50k or bleeding  - c/w iron supplements      Endocrine  - A1C results: 4.8  - TSH results: 6.7, FT4 1.4      Renal   #RUBIN- resolving, i/s/o poor PO intake and s/p MRI contrast  - encourage PO intake  - trend Cr  - avoid nephrotoxic drugs, renally dose meds  - will consider obtaining urine lytes if not improving      DVT Prophylaxis  - therapeutic lovenox     Dispo: [facility], patient prefers home      Discussed daily hospital plans and goals with patient and family at bedside.    Discussed with Neurology Attending Dr. Callahan

## 2022-11-06 NOTE — PROGRESS NOTE ADULT - PROBLEM SELECTOR PLAN 1
Alert-The patient is alert, awake and responds to voice. The patient is oriented to time, place, and person. The triage nurse is able to obtain subjective information. sx concerning for CVA, in setting of malignancy; x-ray unremarkable;   MRI brain showign infarction, possible mets and PRES.   LE dopplers showing left common femoral and saphenofemoral DVT - started on therapeutic dose lovenox   cont. work-up and mgmt per Neuro;   BP targets per neuro   PT/OT; holding DAPT for now in setting of thrombocytopenia - timing of starting antiplatelet per primary team   holding statin in setting of elevated LFTs

## 2022-11-06 NOTE — PROGRESS NOTE ADULT - SUBJECTIVE AND OBJECTIVE BOX
Patient is a 72y old  Female who presents with a chief complaint of left leg weakness and decreased sensation (06 Nov 2022 08:08)    INTERVAL EVENTS:  - continues to have poor PO intake   - BP up to 180s mmHg systolic overnight  ; labile     SUBJECTIVE:  Patient was seen and examined at bedside.  Review of systems: No fever, chills, HA, CP, dyspnea, nausea or vomiting, dysuria,  + LE edema in left leg . Rest of 12 point Review of systems negative unless otherwise documented elsewhere in note.   Diet, Regular (11-04-22 @ 12:09) [Active]    MEDICATIONS:  MEDICATIONS  (STANDING):  enoxaparin Injectable 50 milliGRAM(s) SubCutaneous every 12 hours  escitalopram 7.5 milliGRAM(s) Oral daily  levETIRAcetam 500 milliGRAM(s) Oral two times a day  LORazepam     Tablet 0.5 milliGRAM(s) Oral every 8 hours  losartan 100 milliGRAM(s) Oral daily  NIFEdipine XL 30 milliGRAM(s) Oral daily  polyethylene glycol 3350 17 Gram(s) Oral daily    MEDICATIONS  (PRN):  morphine  - Injectable 1 milliGRAM(s) IV Push every 4 hours PRN Moderate Pain (4 - 6)    Allergies    Benadryl (Other)  Compazine (Unknown)    Intolerances    OBJECTIVE:  Vital Signs Last 24 Hrs  T(C): 36.9 (06 Nov 2022 14:00), Max: 37 (05 Nov 2022 22:45)  T(F): 98.5 (06 Nov 2022 14:00), Max: 98.6 (05 Nov 2022 22:45)  HR: 96 (06 Nov 2022 12:05) (88 - 104)  BP: 140/77 (06 Nov 2022 12:05) (136/68 - 186/81)  BP(mean): 103 (06 Nov 2022 12:05) (97 - 127)  RR: 18 (06 Nov 2022 12:05) (16 - 18)  SpO2: 95% (06 Nov 2022 12:05) (93% - 95%)    Parameters below as of 06 Nov 2022 12:05  Patient On (Oxygen Delivery Method): room air      I&O's Summary    05 Nov 2022 08:01  -  06 Nov 2022 07:00  --------------------------------------------------------  IN: 240 mL / OUT: 250 mL / NET: -10 mL    PHYSICAL EXAM:  Gen: Reclining in bed at time of exam, appears stated age  HEENT: NCAT, mucosa dry, clear OP  Neck: supple, trachea at midline  CV: RRR, +S1/S2  Pulm: adequate respiratory effort, no increase in work of breathing  Abd: soft, NTND  Skin: warm and dry,   Ext: WWP, left leg with pitting edema   Neuro: AOx3, speaking in full sentences   Psych: affect and behavior appropriate, pleasant at time of interview      LABS:                        10.9   3.55  )-----------( 57       ( 06 Nov 2022 07:47 )             34.6     11-06    140  |  106  |  21  ----------------------------<  110<H>  3.6   |  24  |  1.41<H>    Ca    8.1<L>      06 Nov 2022 07:47  Phos  3.0     11-06  Mg     2.0     11-06    TPro  5.1<L>  /  Alb  2.5<L>  /  TBili  0.7  /  DBili  0.2  /  AST  39  /  ALT  18  /  AlkPhos  533<H>  11-05    LIVER FUNCTIONS - ( 05 Nov 2022 06:09 )  Alb: 2.5 g/dL / Pro: 5.1 g/dL / ALK PHOS: 533 U/L / ALT: 18 U/L / AST: 39 U/L / GGT: x           PT/INR - ( 05 Nov 2022 06:09 )   PT: 12.9 sec;   INR: 1.08          PTT - ( 05 Nov 2022 06:09 )  PTT:31.2 sec  CAPILLARY BLOOD GLUCOSE            MICRODATA:    Culture - Blood (collected 03 Nov 2022 20:15)  Source: .Blood Blood  Preliminary Report (05 Nov 2022 22:01):    No growth at 2 days.        RADIOLOGY/OTHER STUDIES:

## 2022-11-06 NOTE — PROGRESS NOTE ADULT - PROBLEM SELECTOR PLAN 10
Decision re: AC per primary team , hemonc consult and vascular consult   Started on lovenox treatment dose on 11/4

## 2022-11-07 LAB
ANION GAP SERPL CALC-SCNC: 6 MMOL/L — SIGNIFICANT CHANGE UP (ref 5–17)
BLD GP AB SCN SERPL QL: NEGATIVE — SIGNIFICANT CHANGE UP
BUN SERPL-MCNC: 20 MG/DL — SIGNIFICANT CHANGE UP (ref 7–23)
CALCIUM SERPL-MCNC: 8.1 MG/DL — LOW (ref 8.4–10.5)
CHLORIDE SERPL-SCNC: 104 MMOL/L — SIGNIFICANT CHANGE UP (ref 96–108)
CO2 SERPL-SCNC: 27 MMOL/L — SIGNIFICANT CHANGE UP (ref 22–31)
CREAT SERPL-MCNC: 1.4 MG/DL — HIGH (ref 0.5–1.3)
EGFR: 40 ML/MIN/1.73M2 — LOW
GLUCOSE SERPL-MCNC: 104 MG/DL — HIGH (ref 70–99)
HCT VFR BLD CALC: 32.6 % — LOW (ref 34.5–45)
HGB BLD-MCNC: 10.2 G/DL — LOW (ref 11.5–15.5)
MAGNESIUM SERPL-MCNC: 1.8 MG/DL — SIGNIFICANT CHANGE UP (ref 1.6–2.6)
MCHC RBC-ENTMCNC: 31.3 GM/DL — LOW (ref 32–36)
MCHC RBC-ENTMCNC: 31.8 PG — SIGNIFICANT CHANGE UP (ref 27–34)
MCV RBC AUTO: 101.6 FL — HIGH (ref 80–100)
NRBC # BLD: 0 /100 WBCS — SIGNIFICANT CHANGE UP (ref 0–0)
PHOSPHATE SERPL-MCNC: 2.7 MG/DL — SIGNIFICANT CHANGE UP (ref 2.5–4.5)
PLATELET # BLD AUTO: 70 K/UL — LOW (ref 150–400)
POTASSIUM SERPL-MCNC: 3.5 MMOL/L — SIGNIFICANT CHANGE UP (ref 3.5–5.3)
POTASSIUM SERPL-SCNC: 3.5 MMOL/L — SIGNIFICANT CHANGE UP (ref 3.5–5.3)
RBC # BLD: 3.21 M/UL — LOW (ref 3.8–5.2)
RBC # FLD: 22.5 % — HIGH (ref 10.3–14.5)
RH IG SCN BLD-IMP: POSITIVE — SIGNIFICANT CHANGE UP
SODIUM SERPL-SCNC: 137 MMOL/L — SIGNIFICANT CHANGE UP (ref 135–145)
WBC # BLD: 2.85 K/UL — LOW (ref 3.8–10.5)
WBC # FLD AUTO: 2.85 K/UL — LOW (ref 3.8–10.5)

## 2022-11-07 PROCEDURE — 99233 SBSQ HOSP IP/OBS HIGH 50: CPT

## 2022-11-07 PROCEDURE — 99358 PROLONG SERVICE W/O CONTACT: CPT | Mod: NC

## 2022-11-07 RX ORDER — POTASSIUM CHLORIDE 20 MEQ
40 PACKET (EA) ORAL ONCE
Refills: 0 | Status: COMPLETED | OUTPATIENT
Start: 2022-11-07 | End: 2022-11-07

## 2022-11-07 RX ORDER — NIFEDIPINE 30 MG
60 TABLET, EXTENDED RELEASE 24 HR ORAL DAILY
Refills: 0 | Status: DISCONTINUED | OUTPATIENT
Start: 2022-11-07 | End: 2022-11-07

## 2022-11-07 RX ORDER — NIFEDIPINE 30 MG
30 TABLET, EXTENDED RELEASE 24 HR ORAL ONCE
Refills: 0 | Status: COMPLETED | OUTPATIENT
Start: 2022-11-07 | End: 2022-11-07

## 2022-11-07 RX ORDER — MAGNESIUM OXIDE 400 MG ORAL TABLET 241.3 MG
400 TABLET ORAL ONCE
Refills: 0 | Status: COMPLETED | OUTPATIENT
Start: 2022-11-07 | End: 2022-11-07

## 2022-11-07 RX ORDER — NIFEDIPINE 30 MG
90 TABLET, EXTENDED RELEASE 24 HR ORAL DAILY
Refills: 0 | Status: DISCONTINUED | OUTPATIENT
Start: 2022-11-08 | End: 2022-11-08

## 2022-11-07 RX ADMIN — Medication 30 MILLIGRAM(S): at 06:41

## 2022-11-07 RX ADMIN — Medication 30 MILLIGRAM(S): at 18:25

## 2022-11-07 RX ADMIN — Medication 0.5 MILLIGRAM(S): at 06:41

## 2022-11-07 RX ADMIN — Medication 0.5 MILLIGRAM(S): at 21:56

## 2022-11-07 RX ADMIN — LOSARTAN POTASSIUM 100 MILLIGRAM(S): 100 TABLET, FILM COATED ORAL at 06:41

## 2022-11-07 RX ADMIN — Medication 325 MILLIGRAM(S): at 06:41

## 2022-11-07 RX ADMIN — ENOXAPARIN SODIUM 50 MILLIGRAM(S): 100 INJECTION SUBCUTANEOUS at 06:40

## 2022-11-07 RX ADMIN — MORPHINE SULFATE 1 MILLIGRAM(S): 50 CAPSULE, EXTENDED RELEASE ORAL at 17:30

## 2022-11-07 RX ADMIN — MORPHINE SULFATE 1 MILLIGRAM(S): 50 CAPSULE, EXTENDED RELEASE ORAL at 23:30

## 2022-11-07 RX ADMIN — MORPHINE SULFATE 1 MILLIGRAM(S): 50 CAPSULE, EXTENDED RELEASE ORAL at 22:55

## 2022-11-07 RX ADMIN — MAGNESIUM OXIDE 400 MG ORAL TABLET 400 MILLIGRAM(S): 241.3 TABLET ORAL at 12:52

## 2022-11-07 RX ADMIN — ESCITALOPRAM OXALATE 7.5 MILLIGRAM(S): 10 TABLET, FILM COATED ORAL at 12:53

## 2022-11-07 RX ADMIN — POLYETHYLENE GLYCOL 3350 17 GRAM(S): 17 POWDER, FOR SOLUTION ORAL at 12:52

## 2022-11-07 RX ADMIN — LEVETIRACETAM 500 MILLIGRAM(S): 250 TABLET, FILM COATED ORAL at 18:07

## 2022-11-07 RX ADMIN — LEVETIRACETAM 500 MILLIGRAM(S): 250 TABLET, FILM COATED ORAL at 06:41

## 2022-11-07 RX ADMIN — Medication 40 MILLIEQUIVALENT(S): at 12:52

## 2022-11-07 RX ADMIN — MORPHINE SULFATE 1 MILLIGRAM(S): 50 CAPSULE, EXTENDED RELEASE ORAL at 16:17

## 2022-11-07 RX ADMIN — Medication 0.5 MILLIGRAM(S): at 13:52

## 2022-11-07 RX ADMIN — ENOXAPARIN SODIUM 50 MILLIGRAM(S): 100 INJECTION SUBCUTANEOUS at 18:08

## 2022-11-07 RX ADMIN — Medication 60 MILLIGRAM(S): at 12:53

## 2022-11-07 NOTE — PHYSICAL THERAPY INITIAL EVALUATION ADULT - RANGE OF MOTION EXAMINATION, REHAB EVAL
PROM for LLE/bilateral upper extremity ROM was WFL (within functional limits)/bilateral lower extremity ROM was WFL (within functional limits)

## 2022-11-07 NOTE — PHYSICAL THERAPY INITIAL EVALUATION ADULT - PERTINENT HX OF CURRENT PROBLEM, REHAB EVAL
Pt. is a 72 y.o female with h/o ovarian CA, on chemo presenting with new onset LLE weakness/numbness; admitted for further stroke w/u.

## 2022-11-07 NOTE — PROGRESS NOTE ADULT - SUBJECTIVE AND OBJECTIVE BOX
Patient was seen and examined at bedside. Case discuss with resident. Pt reports that she is having some discomfort in her left leg this morning.     OBJECTIVE:  Vital Signs Last 24 Hrs  T(C): 36.3 (07 Nov 2022 09:16), Max: 37.2 (06 Nov 2022 22:02)  T(F): 97.4 (07 Nov 2022 09:16), Max: 99 (06 Nov 2022 22:02)  HR: 94 (07 Nov 2022 12:10) (82 - 100)  BP: 162/77 (07 Nov 2022 12:10) (159/77 - 177/77)  BP(mean): 111 (07 Nov 2022 12:10) (111 - 116)  RR: 18 (07 Nov 2022 12:10) (17 - 18)  SpO2: 95% (07 Nov 2022 12:10) (91% - 95%)      PHYSICAL EXAM:  Gen: NAD sitting in bed   CV: RRR, +S1/S2, no mumur  Pulm: CTA b/l no wheezing or crackles   Abd: soft, NTND + BS no rebound or guarding   left leg with some edema       LABS:                        10.2   2.85  )-----------( 70       ( 07 Nov 2022 05:30 )             32.6     11-07    137  |  104  |  20  ----------------------------<  104<H>  3.5   |  27  |  1.40<H>    Ca    8.1<L>      07 Nov 2022 05:30  Phos  2.7     11-07  Mg     1.8     11-07    MEDICATIONS  (STANDING):  enoxaparin Injectable 50 milliGRAM(s) SubCutaneous every 12 hours  escitalopram 7.5 milliGRAM(s) Oral daily  ferrous    sulfate 325 milliGRAM(s) Oral <User Schedule>  levETIRAcetam 500 milliGRAM(s) Oral two times a day  LORazepam     Tablet 0.5 milliGRAM(s) Oral every 8 hours  losartan 100 milliGRAM(s) Oral daily  magnesium oxide 400 milliGRAM(s) Oral once  NIFEdipine XL 60 milliGRAM(s) Oral daily  polyethylene glycol 3350 17 Gram(s) Oral daily  potassium chloride   Powder 40 milliEquivalent(s) Oral once

## 2022-11-07 NOTE — CHART NOTE - NSCHARTNOTEFT_GEN_A_CORE
Patient seen and observed at the bedside. Remains tearful about her medical condition and current hospitalization. However, goals of care are clear, patient plans to follow w/ oncology for further DMT upon discharge. Palliative Care to sign off, please re-consult as needed.     Recs:  -change IV Morphine 1 mg q4hr PRN to Oxycodone 2.5 mg PO q4hr PRN for pain  -Monitor for sedation, bowel regimen while on opiates     Sesar Espinal MD, PGY4  Palliative Care Fellow    Palliative Care is avalible 24/7,  please call [phone] with any questions. Patient seen and observed at the bedside. Remains tearful about her medical condition and current hospitalization. However, goals of care are clear, patient plans to follow w/ oncology for further DMT upon discharge. Palliative Care to sign off, please re-consult as needed.     Recs:  -change IV Morphine 1 mg q4hr PRN to Oxycodone 2.5 mg PO q4hr PRN for pain  -Monitor for sedation, bowel regimen while on opiates     Sesar Espinal MD, PGY4  Palliative Care Fellow    Palliative Care is available 24/7,  please call [phone] with any questions.          PALLIATIVE MEDICINE COORDINATION OF CARE DOCUMENTATION: [x] Inpatient Consult  Non-Face-to-Face prolonged service provided that relates to (face-to-face) care that has or will occur and ongoing patient management, including one or more of the following: -Documentation review from other physicians and health care professional services -Review of medical records and diagnostic/radiology study results -Coordination with patient's support system  ************************************************************************  MEDICATION REVIEW:  MEDICATIONS  (STANDING):  enoxaparin Injectable 50 milliGRAM(s) SubCutaneous every 12 hours  escitalopram 7.5 milliGRAM(s) Oral daily  ferrous    sulfate 325 milliGRAM(s) Oral <User Schedule>  levETIRAcetam 500 milliGRAM(s) Oral two times a day  LORazepam     Tablet 0.5 milliGRAM(s) Oral every 8 hours  losartan 100 milliGRAM(s) Oral daily  NIFEdipine XL 60 milliGRAM(s) Oral daily  polyethylene glycol 3350 17 Gram(s) Oral daily    MEDICATIONS  (PRN):  morphine  - Injectable 1 milliGRAM(s) IV Push every 4 hours PRN Moderate Pain (4 - 6)    - 24hr PRN use reviewed  ------------------------------------------------------------------------    COORDINATION OF CARE:  - Palliative Care consulted for: GOC / Symptom Management  - Patient (to be) assessed: 11/4/22  - Patient previously seen by Palliative Care service: NO    ADVANCE CARE PLANNING  - Code status: Full Code  - MOLST reviewed in chart: NONE; None found on [facility]  - GOC documents: NONE found on [facility]  - HCP/Living will/Other Advanced Directives in [facility]: NONE found on [facility]  ------------------------------------------------------------------------  CARE PROVIDER DOCUMENTATION:  - SW/CM notes: Remains medically active  - Primary Team and Consultant Notes reviewed    PLAN OF CARE  - Current Admit Date: 11/2/22  - LOS: 5  - Current Dispo Plan: [facility]  ------------------------------------------------------------------------  - Time Spent/Chart reviewed: 31 Minutes [including time used to gather, review and transfer data]  - Start: 10:00AM  - End: 10:31AM    Prolonged services rendered, as part of this patient's care provided by Palliative Medicine, include: i. chart review for provider and ancillary service documentation, ii. pertinent diagnostics including laboratory and imaging studies, iii. medication review including PRN use, iv. admission history including previous palliative care encounters and GOC notes, v. advance care planning documents including HCP and MOLST forms in [facility]. Part of Palliative Medicine extended evaluation and management also involves coordination of care with our IDT, the primary and consulting teams, and unit CM/SW and Hospice if eligible. Recommendations based on the information gathered and discussed are outlined in the A/P of Palliative notes.    Brenden Valentin, Palliative Care Attending  Questions/Concerns: Call [phone] (including Nights/Weekend) or message on [employer] Teams (Brenden Valentin)

## 2022-11-07 NOTE — PROGRESS NOTE ADULT - NS ATTEND AMEND GEN_ALL_CORE FT
The patient is a 72-year-old female with a history of metastatic ovarian cancer, currently on chemo, with recent severe anemia requiring transfusion and thrombocytopenia admitted with left leg numbness/weakness after receiving blood transfusio. MRI showed no acute ischemia but was concerning for PRES- possibly related to labile BP vs chemo. MRI C spine negative. She was also found to have an extensive left lower extremity DVT, likely be secondary to hypercoagulable state in the setting of active malignancy. Continue therapeutic Lovenox, monitoring anemia/thrombocytopenia. May benefit from repeat MRI brain to clarify reason for leg weakness.

## 2022-11-07 NOTE — PROGRESS NOTE ADULT - SUBJECTIVE AND OBJECTIVE BOX
Hematology Oncology Progress Note      HPI:   **STROKE HPI***    HPI: 72y Female with PMHx of ?HTN, ovarian cancer on chemo (last treatment was 1 week ago), and recurrent anemia 2/2 chemotherapy, presented to the ER on 11/1 directly from oncologist for blood transfusion 2/2 Hgb 6.4, She received 2 units or PRBC's in the ER when she was trying to stand at time discharge (~0300) and noticed that her left leg was feeling numb and weak. Stroke code was called, NIHSS 3. NCHCT negative for hemorrhage, however, there is a patchy area of hypodensity along the right central sulcus which may be artifactual versus areas of age-indeterminate infarction.  CTA head and neck negative for significant steno-occlusive disease but does demonstrate extensive pulmonary metastatic disease. CT perfusion negative. Patient is not a tPA candidate 2/2 thrombocytopenia (repeat plt 36 after transfusion).  (02 Nov 2022 04:34)      SUBJECTIVE: Patient seen and examined at bedside. Eating well. Still with left leg weakness. Denies cough, fever, SOB.    OBJECTIVE:    VITAL SIGNS:  ICU Vital Signs Last 24 Hrs  T(C): 36.3 (07 Nov 2022 09:16), Max: 37.2 (06 Nov 2022 22:02)  T(F): 97.4 (07 Nov 2022 09:16), Max: 99 (06 Nov 2022 22:02)  HR: 92 (07 Nov 2022 09:00) (82 - 100)  BP: 177/77 (07 Nov 2022 09:00) (140/77 - 177/77)  BP(mean): 111 (07 Nov 2022 09:00) (103 - 116)  ABP: --  ABP(mean): --  RR: 17 (07 Nov 2022 09:00) (17 - 18)  SpO2: 94% (07 Nov 2022 09:00) (91% - 95%)    O2 Parameters below as of 07 Nov 2022 09:00  Patient On (Oxygen Delivery Method): room air              11-06 @ 07:01  -  11-07 @ 07:00  --------------------------------------------------------  IN: 0 mL / OUT: 1400 mL / NET: -1400 mL      CAPILLARY BLOOD GLUCOSE          PHYSICAL EXAM:  General: NAD, answering questions, pleasantly conversant, chronically ill appearing  HEENT: NC/AT; PERRL, clear conjunctiva  Neck: supple  Respiratory: CTA b/l  Cardiovascular: +S1/S2; RRR  Abdomen: soft, NT/ND; +BS x4  Extremities: WWP, 2+ peripheral pulses b/l; no LE edema  Skin: normal color and turgor; no rash  Neurological: AAOx3, left lower extremity motor weakness, barely able to lift against gravity. Sensation intact.    MEDICATIONS:  MEDICATIONS  (STANDING):  enoxaparin Injectable 50 milliGRAM(s) SubCutaneous every 12 hours  escitalopram 7.5 milliGRAM(s) Oral daily  ferrous    sulfate 325 milliGRAM(s) Oral <User Schedule>  levETIRAcetam 500 milliGRAM(s) Oral two times a day  LORazepam     Tablet 0.5 milliGRAM(s) Oral every 8 hours  losartan 100 milliGRAM(s) Oral daily  magnesium oxide 400 milliGRAM(s) Oral once  NIFEdipine XL 60 milliGRAM(s) Oral daily  polyethylene glycol 3350 17 Gram(s) Oral daily  potassium chloride   Powder 40 milliEquivalent(s) Oral once    MEDICATIONS  (PRN):  morphine  - Injectable 1 milliGRAM(s) IV Push every 4 hours PRN Moderate Pain (4 - 6)      ALLERGIES:  Allergies    Benadryl (Other)  Compazine (Unknown)    Intolerances        LABS:                        10.2   2.85  )-----------( 70       ( 07 Nov 2022 05:30 )             32.6     11-07    137  |  104  |  20  ----------------------------<  104<H>  3.5   |  27  |  1.40<H>    Ca    8.1<L>      07 Nov 2022 05:30  Phos  2.7     11-07  Mg     1.8     11-07                      RADIOLOGY & ADDITIONAL TESTS: Reviewed.

## 2022-11-07 NOTE — PROGRESS NOTE ADULT - SUBJECTIVE AND OBJECTIVE BOX
INTERVAL HPI/OVERNIGHT EVENTS:  Patient was seen and examined at bedside. As per nurse and patient, no o/n events, patient resting comfortably. No complaints at this time. states she wants to get better with her LLE.     Pt noted to be emotional and tearful during rounds as nobody was visiting her , wanting to call her . Was upset about her LLE, explained that i/s/o ovarian cancer with Mets and LLE DVT, pt is being treated with SQl and will have PT/OT work with her. MRI w/ PRES, pt with labile BP, will obtain better BP control, increased Nifedipine XL from 30mg daily to 60mg and then to 90mg daily.       Vital Signs Last 24 Hrs  T(C): 36.9 (11-07-22 @ 16:36), Max: 37.2 (11-06-22 @ 22:02)  T(F): 98.4 (11-07-22 @ 16:36), Max: 99 (11-06-22 @ 22:02)  HR: 96 (11-07-22 @ 20:06) (82 - 100)  BP: 162/75 (11-07-22 @ 20:06) (143/78 - 177/77)  BP(mean): 108 (11-07-22 @ 20:06) (105 - 117)  RR: 18 (11-07-22 @ 12:10) (17 - 18)  SpO2: 96% (11-07-22 @ 20:06) (91% - 96%)        Physical exam:  General: Fragile anxious female in  No acute distress, alert,awake  Eyes: Anicteric sclerae, moist conjunctivae, see below for CNs  Neck: trachea midline, FROM.  Cardiovascular: Regular rate and rhythm on the monitor.  Pulmonary: Anterior breath sounds clear bilaterally.  GI: Abdomen soft, non-distended, non-tender      Neurologic:  -Mental status: Awake, alert, oriented to person. Speech is fluent with intact naming, repetition, and comprehension, no dysarthria. Recent and remote memory intact. Follows commands. Attention/concentration intact. Fund of knowledge appropriate.  -Cranial nerves:   II: Visual fields are full to confrontation.  III, IV, VI: Extraocular movements are intact without nystagmus. Pupils equally round and reactive to light  V:  Facial sensation V1-V3 equal and intact   VII: ? Mild L NLFF+  VIII: Hearing is bilaterally intact to finger rub  IX, X: Uvula is midline and soft palate rises symmetrically  XI: Head turning and shoulder shrug are intact.  XII: Tongue protrudes midline  Motor: Normal bulk and tone. No pronator drift. Strength LUE : 4-/5, RUE : 4+/5 LE exam limited by effort, briefly antigravity.  Sensation: Intact to light touch bilaterally. No neglect or extinction on double simultaneous testing.  Coordination: No dysmetria on finger-to-nose and heel-to-shin bilaterally  Reflexes: Downgoing toes bilaterally     MEDICATIONS  (STANDING):  enoxaparin Injectable 50 milliGRAM(s) SubCutaneous every 12 hours  escitalopram 7.5 milliGRAM(s) Oral daily  ferrous    sulfate 325 milliGRAM(s) Oral <User Schedule>  levETIRAcetam 500 milliGRAM(s) Oral two times a day  LORazepam     Tablet 0.5 milliGRAM(s) Oral every 8 hours  losartan 100 milliGRAM(s) Oral daily  polyethylene glycol 3350 17 Gram(s) Oral daily    MEDICATIONS  (PRN):  morphine  - Injectable 1 milliGRAM(s) IV Push every 4 hours PRN Moderate Pain (4 - 6)        Allergies  Benadryl (Other)  Compazine (Unknown)    Intolerances        LABS:                                   10.2   2.85  )-----------( 70       ( 07 Nov 2022 05:30 )             32.6     11-07    137  |  104  |  20  ----------------------------<  104<H>  3.5   |  27  |  1.40<H>    Ca    8.1<L>      07 Nov 2022 05:30  Phos  2.7     11-07  Mg     1.8     11-07        RADIOLOGY & ADDITIONAL TESTS:  Reviewed INTERVAL HPI/OVERNIGHT EVENTS:  Patient was seen and examined at bedside. As per nurse and patient, no o/n events, patient resting comfortably. No complaints at this time. states she wants to get better with her LLE.     Pt noted to be emotional and tearful during rounds as nobody was visiting her , wanting to call her . Was upset about her LLE, explained that i/s/o ovarian cancer with Mets and LLE DVT, pt is being treated with SQl and will have PT/OT work with her. MRI w/ PRES, pt with labile BP, will obtain better BP control, increased Nifedipine XL from 30mg daily to 60mg and then to 90mg daily.       Vital Signs Last 24 Hrs  T(C): 36.9 (11-07-22 @ 16:36), Max: 37.2 (11-06-22 @ 22:02)  T(F): 98.4 (11-07-22 @ 16:36), Max: 99 (11-06-22 @ 22:02)  HR: 96 (11-07-22 @ 20:06) (82 - 100)  BP: 162/75 (11-07-22 @ 20:06) (143/78 - 177/77)  BP(mean): 108 (11-07-22 @ 20:06) (105 - 117)  RR: 18 (11-07-22 @ 12:10) (17 - 18)  SpO2: 96% (11-07-22 @ 20:06) (91% - 96%)        Physical exam:  General: Fragile anxious female in  No acute distress, alert,awake  Eyes: Anicteric sclerae, moist conjunctivae, see below for CNs  Neck: trachea midline, FROM.  Cardiovascular: Regular rate and rhythm on the monitor.  Pulmonary: Anterior breath sounds clear bilaterally.  GI: Abdomen soft, non-distended, non-tender      Neurologic:  -Mental status: Awake, alert, oriented to person. Speech is fluent with intact naming, repetition, and comprehension, no dysarthria. Recent and remote memory intact. Follows commands. Attention/concentration intact. Fund of knowledge appropriate.  -Cranial nerves:   II: Visual fields are full to confrontation.  III, IV, VI: Extraocular movements are intact without nystagmus. Pupils equally round and reactive to light  V:  Facial sensation V1-V3 equal and intact   VII: ? Mild L NLFF+  VIII: Hearing is bilaterally intact to finger rub  IX, X: Uvula is midline and soft palate rises symmetrically  XI: Head turning and shoulder shrug are intact.  XII: Tongue protrudes midline  Motor: Normal bulk and tone. No pronator drift. Strength LUE : 4-/5, RUE : 4+/5 LE exam limited by effort, briefly antigravity.  Sensation: Unable to identify LLE being touched on DSST, impaired sensation LLE.  Coordination: No dysmetria on finger-to-nose and heel-to-shin bilaterally  Reflexes: Downgoing toes bilaterally     MEDICATIONS  (STANDING):  enoxaparin Injectable 50 milliGRAM(s) SubCutaneous every 12 hours  escitalopram 7.5 milliGRAM(s) Oral daily  ferrous    sulfate 325 milliGRAM(s) Oral <User Schedule>  levETIRAcetam 500 milliGRAM(s) Oral two times a day  LORazepam     Tablet 0.5 milliGRAM(s) Oral every 8 hours  losartan 100 milliGRAM(s) Oral daily  polyethylene glycol 3350 17 Gram(s) Oral daily    MEDICATIONS  (PRN):  morphine  - Injectable 1 milliGRAM(s) IV Push every 4 hours PRN Moderate Pain (4 - 6)        Allergies  Benadryl (Other)  Compazine (Unknown)    Intolerances        LABS:                                   10.2   2.85  )-----------( 70       ( 07 Nov 2022 05:30 )             32.6     11-07    137  |  104  |  20  ----------------------------<  104<H>  3.5   |  27  |  1.40<H>    Ca    8.1<L>      07 Nov 2022 05:30  Phos  2.7     11-07  Mg     1.8     11-07        RADIOLOGY & ADDITIONAL TESTS:  Reviewed

## 2022-11-07 NOTE — PROGRESS NOTE ADULT - ASSESSMENT
72y Female with PMHx of ?HTN, ovarian cancer on chemo (last treatment was 1 week ago), and recurrent anemia 2/2 chemotherapy, presented to the ER on 11/1 directly from oncologist for blood transfusion 2/2 Hgb 6.4 s/p 2 unite PRBC. When she was trying to stand at time discharge (~0300) from ED, her left leg was numb and weak. NIHSS 3. CTH with patchy area of hypodensity along the right central sulcus which may be artifactual versus areas of age-indeterminate infarction.  CTA head and neck negative for significant steno-occlusive disease but does demonstrate extensive pulmonary metastatic disease. CT perfusion negative. Patient is not a tPA candidate 2/2 thrombocytopenia (repeat plt 36 after transfusion).    Neuro  #PRES  Initially admitted for r/o stroke due to left leg weakness. MRI head with PRES, no infarct nor mets present.  - C/W Therapeutic SQL i/s/o LLE DVT, tolerated well, Plts @ 70(improved).  - q4hr  vitals  - F/U read MRI cervical w/ and w/o contrast- mild cervical disks degenerative changes without any cord compression.  - MRI Brain with and without contrast: PRES, no enhancement suggestive of mets. No acute infarcts nor hemorrhage.   - Stroke Code HCT Results: Patchy area of hypodensity along the right central sulcus which may be artifactual versus areas of age-indeterminate infarction.  - Rpt CTH W/WO : Limited evaluation due to the presence of intravenous contrast. Nonvisualization of the previously seen small acute to subacute infarctions in the high right mesial frontal and parietal lobes.  - Stroke Code CTA Results: negative for steno-occlusive disease. Demonstrates extensive pulmonary metastatic disease.  - Stroke education    #Seizure   s/p 1 episode of GTC in ER that lasted ~1min, did not need ativan. S/p keppra load. Possibly related to ?ativan withdrawal, but seized within half a day of admit, so would expect patient to be on high dose of ativan at home which she is not. Patient also did not respond to questions about dosing/frequency of ativan when discussing with palliative team.   - EEG 11/2-11/3 with generalized slowing, no seizure activity   - c/w home ativan 0.5mg q8hr    Vascular  #LLE DVT- venous doppler.  Arterial LLE duplex negative  - vascular surgery consulted, not a candidate for IVC filter  - in setting of hypercoagulability due to cancer and sedentary lifestyle with no mets to brain on MRI, heme/onc team recommending PLT transfusion when PLT <50 while on AC    Cards  #HTN/PRES  Unclear etiology of elevated pressures. Thought to be labile pressure in outpatient setting (however  and pt reports BP reading 130-140. Patient also received Ativan 1 month ago, associated with PRES  - goal normotension  - Cont losartan 100mg daily and nifedipine increased to 90mg daily   - TTE: no pfo, EF 60%      Pulm  - call provider if SPO2 < 94%    GI  #Nutrition/Fluids/Electrolytes   - replete K<4 and Mg <2  - IVF: None for now, tolerating PO well, added ensure to diet.  - Diet: Regular w/ ensure TID      Infectious Disease  WBC fluctuating, afebrile. Collateral obtained from onc (see below)  - CXR with nodules. Unclear infiltrates  - BCx 11/3 NGTD  - currently downtrending with no other evidence of infection    Heme/Oncology  #Ovarian/uterine CA   Follows w/ Dr. Hidalgo [phone]. Had chemo treatment a week prior to presentation, anemic secondary to chemo tx. Known mets to liver, lung and peritoneum with radiofrequency ablation of liver lesions as MSK. Chemotherapy treatment 1 week before hospital presentation  - Dr. Hidalgo following and will continue with chem tx outpatient    #Bicytopenia (Hgb 10-11, platelet approx 30k)  s.p 2U PRBC and 1unit platelets in ER.  - trend hgb/platelets 10.2/32.6  - maintain active T&S  - transfuse if Hgb <7  - transfuse platelets if less than 50k or bleeding- @ 70K  - c/w iron supplements      Endocrine  - A1C results: 4.8  - TSH results: 6.7, FT4 1.4      Renal   #RUBIN- resolving, i/s/o poor PO intake and s/p MRI contrast  - encourage PO intake  - trend Cr-1.49-->1.41-->1.40 downtrending  - avoid nephrotoxic drugs, renally dose meds  - will consider obtaining urine lytes if not improving      DVT Prophylaxis  - therapeutic lovenox     Dispo: [facility], patient prefers home, pending PT eval     Discussed daily hospital plans and goals with patient and family at bedside.    Discussed with Neurology Attending Dr. Ann

## 2022-11-07 NOTE — PROGRESS NOTE ADULT - ASSESSMENT
A/P: 72y Female with PMHx of ?HTN, ovarian cancer on chemo (last treatment was 1 week ago), and recurrent anemia 2/2 chemotherapy, presented to the ER on 11/1 directly from oncologist for blood transfusion 2/2 Hgb 6.4 s/p 2 unite PRBC. Pt later developed left leg was numb and weak.  Pt had an MRI Brain which was consistent with PRES.     #Left leg weakness secondary to PRES  cont. work-up and mgmt per Neuro;   BP targets per neuro   PT/OT; holding DAPT for now in setting of thrombocytopenia - timing of starting antiplatelet per primary team   holding statin in setting of elevated LFTs. A/P: 72y Female with PMHx of ?HTN, ovarian cancer on chemo (last treatment was 1 week ago), and recurrent anemia 2/2 chemotherapy, presented to the ER on 11/1 directly from oncologist for blood transfusion 2/2 Hgb 6.4 s/p 2 unite PRBC. Pt later developed left leg was numb and weak.  Pt had an MRI Brain which was consistent with PRES.     #Left leg weakness secondary to PRES  -cont. work-up and mgmt per Neuro;   -BP targets per neuro   -Awaiting PT/OT   holding DAPT for now in setting of thrombocytopenia - timing of starting antiplatelet per primary team   holding statin in setting of elevated LFTs    #Seizure  -Continue Keppra and seizure precautions  -Continue  mgmt per Neuro    #HTN  - Continue  NIFEdipine XL and Losartan     #Anxiety   -Continue Ativan      #DVT lower extremity   -Continue Lovenox     #Bicytopenia  #Ovarian cancer    ·-Bicytopenia likely due to chemotx  -Pt w/o bleeding sx; thrombocytopenia stable; anemia improved s/p PRBC transfusion; monitor CBC A/P: 72y Female with PMHx of ?HTN, ovarian cancer on chemo (last treatment was 1 week ago), and recurrent anemia 2/2 chemotherapy, presented to the ER on 11/1 directly from oncologist for blood transfusion 2/2 Hgb 6.4 s/p 2 unite PRBC. Pt later developed left leg was numb and weak.  Pt had an MRI Brain which was consistent with PRES.     #Left leg weakness secondary to PRES  -cont. work-up and mgmt per Neuro;   -BP targets per neuro   -Awaiting PT/OT   holding DAPT for now in setting of thrombocytopenia - timing of starting antiplatelet per primary team   holding statin in setting of elevated LFTs    #Seizure  -Continue Keppra and seizure precautions  -Continue  mgmt per Neuro    #HTN  - Continue  NIFEdipine XL and Losartan     #Anxiety   -Continue Ativan      #DVT lower extremity   -Continue Lovenox     #Bicytopenia  #Ovarian cancer    - Per [place] further chemotherapy outpatient pending functional improvement and recovery of cytopenias with Dr. Hidalgo  ·-Bicytopenia likely due to chemotx  -Pt w/o bleeding sx; thrombocytopenia stable; anemia improved s/p PRBC transfusion; monitor CBC

## 2022-11-07 NOTE — PROGRESS NOTE ADULT - ASSESSMENT
72 y Female with PMHx of ?HTN, ovarian/uterine cancer on chemo (last treatment was 1 week ago), and recurrent anemia 2/2 chemotherapy, presented to the ER on 11/1 for blood transfusion 2/2 Hgb 6.4. Found to have left leg numbness/weakness with c/f stroke on CT head, but unclear if infarction. ultrasound on 11/3 showing LLE DVT.    # Metastatic Ovarian/Uterine Cancer  History of metastatic Ovarian/Uterine Cancer actively on chemotherapy with Dr. Hidalgo. Last treatment approximately 1 week ago. History of myelosuppression with prior treatments, but did not experience the elevated WBC, transfusion level anemia, and low platelets. Past regimen includes avastin use, current regimen on keytruda, dose reduced gemzar, cytoxan, 5-FU continual infusion, docetaxel, and carboplatin. Currently patient undergoing stroke work up with findings of likely posterior reversible encephalopathy syndrome (PRES) on MRI Brain. No current radiographic evidence of intraparenchymal mass, hemorrhage, or infarct per radiology report. Patient with history of avastin use (last 1 month ago, now off, but associated with the risk of PRES development) and difficulty controlling BP in the outpatient setting, currently with labile elevated BP inpatient.   - Will plan for further chemotherapy outpatient pending functional improvement and recovery of cytopenias with Dr. Hidalgo  - Blood pressure control per neurology stroke team    # Leukopenia, likely 2/2 chemotherapy  # Anemia, improving  # Severe Thrombocytopenia, improving  Likely multifactorial in setting of myelosuppression given recent chemotherapy. Anemia improved s/p 2 units pRBCs. Appears stable at 10.0 (11/4) from 10.4 (11/3). LDH elevated and low haptoglobin concerning for hemolysis, but only 1 schistocyte per HPF. Favor underlying diffusely metastatic cancer process as potential cause given stable Hgb and improving platelet counts, but will need to be carefully monitored. Thrombocytopenia appears to be improving, but will need level >50k to safely administer anticoagulation. Now with several days >50k plts.  - Daily CBC with differential  - Maintain active type and screen  - Please transfuse for platelets <50 while on anticoagulation, <50k with active bleeding, <20k with fever, or <10k  - Monitor for signs and symptoms of bleeding    # LLE DVT  Platelets have continued downtrending from 37 to 25. CT concerning for possible area of infarction, but unclear type of infarction or lesion based on CT reads. Now with LLE DVT by ultrasound. Risk factors include diffuse cancer and decreased mobility. Discussed with vascular regarding utility of IVC filter, but suboptimal candidate for filter placement. Will opt for short term platelet transfusion (given recovery of platelets) to increase platelet count above 50 to start full anticoagulation. Discussed risks and benefits of anticoagulation vs withholding anticoagulation with patient who verbalized understanding and agreement with plan.  - Continue full AC with lovenox 1mg/kg every 12 hours  - Please transfuse for platelets <50k while on anticoagulation    At discharge, patient should continue with full anticoagulation with lovenox and should continue oncologic follow up with Dr. Melani Hidalgo within 1-2 weeks.    Discussed with hematology oncology attending Dr. Hidalgo

## 2022-11-07 NOTE — DIETITIAN INITIAL EVALUATION ADULT - PERTINENT MEDS FT
MEDICATIONS  (STANDING):  enoxaparin Injectable 50 milliGRAM(s) SubCutaneous every 12 hours  escitalopram 7.5 milliGRAM(s) Oral daily  ferrous    sulfate 325 milliGRAM(s) Oral <User Schedule>  levETIRAcetam 500 milliGRAM(s) Oral two times a day  LORazepam     Tablet 0.5 milliGRAM(s) Oral every 8 hours  losartan 100 milliGRAM(s) Oral daily  NIFEdipine XL 60 milliGRAM(s) Oral daily  polyethylene glycol 3350 17 Gram(s) Oral daily    MEDICATIONS  (PRN):  morphine  - Injectable 1 milliGRAM(s) IV Push every 4 hours PRN Moderate Pain (4 - 6)

## 2022-11-07 NOTE — PHYSICAL THERAPY INITIAL EVALUATION ADULT - MODALITIES TREATMENT COMMENTS
Facial symmetry, strength, sensation - intact; vision/oculomotor - intact, tongue protrudes at midline, shoulder shrug 5/5 bilat; speech fluent.

## 2022-11-07 NOTE — DIETITIAN INITIAL EVALUATION ADULT - OTHER INFO
72y Female with PMHx of ?HTN, ovarian cancer on chemo (last treatment was 1 week ago), and recurrent anemia 2/2 chemotherapy, presented to the ER on 11/1 directly from oncologist for blood transfusion 2/2 Hgb 6.4, She received 2 units or PRBC's in the ER when she was trying to stand at time discharge (~0300) and noticed that her left leg was feeling numb and weak. Stroke code was called, NIHSS 3. NCHCT negative for hemorrhage, however, there is a patchy area of hypodensity along the right central sulcus which may be artifactual versus areas of age-indeterminate infarction.  CTA head and neck negative for significant steno-occlusive disease but does demonstrate extensive pulmonary metastatic disease. CT perfusion negative. Patient is not a tPA candidate 2/2 thrombocytopenia (repeat plt 36 after transfusion).     Pt assessed at bedside with family. Rx and labs reviewed. Pt presents with severe wasting of the orbital, temporal, buccal, and interosseous regions. Pt states she has a fair appetite; meal tray at bedside <25% consumed. Endorses a UBW of 115 lbs; CBW of 116 lbs c/w reports stable wt trend. Pt agreeable to ONS regimen and reportedly takes several per day at home. No c/o NVCD or difficult chew/swallow. NKA to food. RDN will continue to reassess, intervene, and monitor as appropriate.     Pain: 4/10, unspecified location   GI: Abdomen ND/NT, +BS x4, LBM 11/6 constipation prior   Skin: WDI, +1 LLE

## 2022-11-07 NOTE — DIETITIAN INITIAL EVALUATION ADULT - PERTINENT LABORATORY DATA
11-07    137  |  104  |  20  ----------------------------<  104<H>  3.5   |  27  |  1.40<H>    Ca    8.1<L>      07 Nov 2022 05:30  Phos  2.7     11-07  Mg     1.8     11-07    A1C with Estimated Average Glucose Result: 4.8 % (11-03-22 @ 05:30)

## 2022-11-07 NOTE — DIETITIAN INITIAL EVALUATION ADULT - ADD RECOMMEND
-Continue current diet   -Add Ensure TID   -Encourage good PO intake and ONS compliance   -Align nutrition with GOC at all times  -Monitor chemistry, GI fxn, and skin integrity

## 2022-11-07 NOTE — DIETITIAN INITIAL EVALUATION ADULT - OTHER CALCULATIONS
Actual body weight used to calculate needs due to pt's current body weight within % ideal body weight adjusted for cancer with treatment and PCM.

## 2022-11-08 DIAGNOSIS — Z29.9 ENCOUNTER FOR PROPHYLACTIC MEASURES, UNSPECIFIED: ICD-10-CM

## 2022-11-08 DIAGNOSIS — I67.83 POSTERIOR REVERSIBLE ENCEPHALOPATHY SYNDROME: ICD-10-CM

## 2022-11-08 DIAGNOSIS — I10 ESSENTIAL (PRIMARY) HYPERTENSION: ICD-10-CM

## 2022-11-08 LAB
ANION GAP SERPL CALC-SCNC: 7 MMOL/L — SIGNIFICANT CHANGE UP (ref 5–17)
ANISOCYTOSIS BLD QL: SLIGHT — SIGNIFICANT CHANGE UP
BASOPHILS # BLD AUTO: 0 K/UL — SIGNIFICANT CHANGE UP (ref 0–0.2)
BASOPHILS NFR BLD AUTO: 0 % — SIGNIFICANT CHANGE UP (ref 0–2)
BUN SERPL-MCNC: 22 MG/DL — SIGNIFICANT CHANGE UP (ref 7–23)
CALCIUM SERPL-MCNC: 8.2 MG/DL — LOW (ref 8.4–10.5)
CHLORIDE SERPL-SCNC: 106 MMOL/L — SIGNIFICANT CHANGE UP (ref 96–108)
CO2 SERPL-SCNC: 26 MMOL/L — SIGNIFICANT CHANGE UP (ref 22–31)
CREAT SERPL-MCNC: 1.44 MG/DL — HIGH (ref 0.5–1.3)
CULTURE RESULTS: SIGNIFICANT CHANGE UP
EGFR: 38 ML/MIN/1.73M2 — LOW
EOSINOPHIL # BLD AUTO: 0.03 K/UL — SIGNIFICANT CHANGE UP (ref 0–0.5)
EOSINOPHIL NFR BLD AUTO: 0.9 % — SIGNIFICANT CHANGE UP (ref 0–6)
GIANT PLATELETS BLD QL SMEAR: PRESENT — SIGNIFICANT CHANGE UP
GLUCOSE SERPL-MCNC: 110 MG/DL — HIGH (ref 70–99)
HCT VFR BLD CALC: 35.9 % — SIGNIFICANT CHANGE UP (ref 34.5–45)
HGB BLD-MCNC: 11.1 G/DL — LOW (ref 11.5–15.5)
HYPOCHROMIA BLD QL: SLIGHT — SIGNIFICANT CHANGE UP
LYMPHOCYTES # BLD AUTO: 0.28 K/UL — LOW (ref 1–3.3)
LYMPHOCYTES # BLD AUTO: 8.7 % — LOW (ref 13–44)
MACROCYTES BLD QL: SLIGHT — SIGNIFICANT CHANGE UP
MAGNESIUM SERPL-MCNC: 1.8 MG/DL — SIGNIFICANT CHANGE UP (ref 1.6–2.6)
MANUAL SMEAR VERIFICATION: SIGNIFICANT CHANGE UP
MCHC RBC-ENTMCNC: 30.9 GM/DL — LOW (ref 32–36)
MCHC RBC-ENTMCNC: 32 PG — SIGNIFICANT CHANGE UP (ref 27–34)
MCV RBC AUTO: 103.5 FL — HIGH (ref 80–100)
METAMYELOCYTES # FLD: 0.9 % — HIGH (ref 0–0)
MICROCYTES BLD QL: SLIGHT — SIGNIFICANT CHANGE UP
MONOCYTES # BLD AUTO: 0.63 K/UL — SIGNIFICANT CHANGE UP (ref 0–0.9)
MONOCYTES NFR BLD AUTO: 19.3 % — HIGH (ref 2–14)
MYELOCYTES NFR BLD: 0.9 % — HIGH (ref 0–0)
NEUTROPHILS # BLD AUTO: 2.23 K/UL — SIGNIFICANT CHANGE UP (ref 1.8–7.4)
NEUTROPHILS NFR BLD AUTO: 68.4 % — SIGNIFICANT CHANGE UP (ref 43–77)
NRBC # BLD: 1 /100 — HIGH (ref 0–0)
NRBC # BLD: SIGNIFICANT CHANGE UP /100 WBCS (ref 0–0)
OVALOCYTES BLD QL SMEAR: SLIGHT — SIGNIFICANT CHANGE UP
PLAT MORPH BLD: ABNORMAL
PLATELET # BLD AUTO: 88 K/UL — LOW (ref 150–400)
POLYCHROMASIA BLD QL SMEAR: SLIGHT — SIGNIFICANT CHANGE UP
POTASSIUM SERPL-MCNC: 4.5 MMOL/L — SIGNIFICANT CHANGE UP (ref 3.5–5.3)
POTASSIUM SERPL-SCNC: 4.5 MMOL/L — SIGNIFICANT CHANGE UP (ref 3.5–5.3)
RBC # BLD: 3.47 M/UL — LOW (ref 3.8–5.2)
RBC # FLD: 22.1 % — HIGH (ref 10.3–14.5)
RBC BLD AUTO: ABNORMAL
SCHISTOCYTES BLD QL AUTO: SLIGHT — SIGNIFICANT CHANGE UP
SMUDGE CELLS # BLD: PRESENT — SIGNIFICANT CHANGE UP
SODIUM SERPL-SCNC: 139 MMOL/L — SIGNIFICANT CHANGE UP (ref 135–145)
SPECIMEN SOURCE: SIGNIFICANT CHANGE UP
SPHEROCYTES BLD QL SMEAR: SLIGHT — SIGNIFICANT CHANGE UP
VARIANT LYMPHS # BLD: 0.9 % — SIGNIFICANT CHANGE UP (ref 0–6)
WBC # BLD: 3.26 K/UL — LOW (ref 3.8–10.5)
WBC # FLD AUTO: 3.26 K/UL — LOW (ref 3.8–10.5)

## 2022-11-08 PROCEDURE — 99233 SBSQ HOSP IP/OBS HIGH 50: CPT

## 2022-11-08 RX ORDER — SODIUM CHLORIDE 9 MG/ML
250 INJECTION INTRAMUSCULAR; INTRAVENOUS; SUBCUTANEOUS ONCE
Refills: 0 | Status: COMPLETED | OUTPATIENT
Start: 2022-11-08 | End: 2022-11-08

## 2022-11-08 RX ORDER — MAGNESIUM OXIDE 400 MG ORAL TABLET 241.3 MG
400 TABLET ORAL ONCE
Refills: 0 | Status: COMPLETED | OUTPATIENT
Start: 2022-11-08 | End: 2022-11-08

## 2022-11-08 RX ORDER — LOSARTAN POTASSIUM 100 MG/1
100 TABLET, FILM COATED ORAL DAILY
Refills: 0 | Status: DISCONTINUED | OUTPATIENT
Start: 2022-11-09 | End: 2022-11-10

## 2022-11-08 RX ORDER — NIFEDIPINE 30 MG
90 TABLET, EXTENDED RELEASE 24 HR ORAL EVERY 24 HOURS
Refills: 0 | Status: DISCONTINUED | OUTPATIENT
Start: 2022-11-09 | End: 2022-11-11

## 2022-11-08 RX ORDER — LOSARTAN POTASSIUM 100 MG/1
100 TABLET, FILM COATED ORAL DAILY
Refills: 0 | Status: DISCONTINUED | OUTPATIENT
Start: 2022-11-08 | End: 2022-11-08

## 2022-11-08 RX ADMIN — MORPHINE SULFATE 1 MILLIGRAM(S): 50 CAPSULE, EXTENDED RELEASE ORAL at 17:05

## 2022-11-08 RX ADMIN — Medication 0.5 MILLIGRAM(S): at 06:50

## 2022-11-08 RX ADMIN — ENOXAPARIN SODIUM 50 MILLIGRAM(S): 100 INJECTION SUBCUTANEOUS at 06:49

## 2022-11-08 RX ADMIN — Medication 0.5 MILLIGRAM(S): at 21:45

## 2022-11-08 RX ADMIN — MAGNESIUM OXIDE 400 MG ORAL TABLET 400 MILLIGRAM(S): 241.3 TABLET ORAL at 17:05

## 2022-11-08 RX ADMIN — LEVETIRACETAM 500 MILLIGRAM(S): 250 TABLET, FILM COATED ORAL at 17:57

## 2022-11-08 RX ADMIN — Medication 90 MILLIGRAM(S): at 06:49

## 2022-11-08 RX ADMIN — LEVETIRACETAM 500 MILLIGRAM(S): 250 TABLET, FILM COATED ORAL at 06:50

## 2022-11-08 RX ADMIN — ENOXAPARIN SODIUM 50 MILLIGRAM(S): 100 INJECTION SUBCUTANEOUS at 17:57

## 2022-11-08 RX ADMIN — LOSARTAN POTASSIUM 100 MILLIGRAM(S): 100 TABLET, FILM COATED ORAL at 06:50

## 2022-11-08 RX ADMIN — SODIUM CHLORIDE 500 MILLILITER(S): 9 INJECTION INTRAMUSCULAR; INTRAVENOUS; SUBCUTANEOUS at 14:32

## 2022-11-08 RX ADMIN — MORPHINE SULFATE 1 MILLIGRAM(S): 50 CAPSULE, EXTENDED RELEASE ORAL at 21:49

## 2022-11-08 RX ADMIN — MORPHINE SULFATE 1 MILLIGRAM(S): 50 CAPSULE, EXTENDED RELEASE ORAL at 18:23

## 2022-11-08 RX ADMIN — MORPHINE SULFATE 1 MILLIGRAM(S): 50 CAPSULE, EXTENDED RELEASE ORAL at 21:44

## 2022-11-08 NOTE — PROGRESS NOTE ADULT - PROBLEM SELECTOR PLAN 6
Patient with hx of depression and severe anxiety, on home Lexapro 7.5mg qd and ativan 0.5 TID.  - c/w home medications

## 2022-11-08 NOTE — PROGRESS NOTE ADULT - SUBJECTIVE AND OBJECTIVE BOX
INTERVAL HPI/OVERNIGHT EVENTS:  Patient was seen and examined at bedside. Pt alert, awake, no distress. follows commands. No overnight events. Pt with better control of BP with nifedipine.        Vital Signs Last 24 Hrs  T(C): 36.7 (11-08-22 @ 14:29), Max: 36.9 (11-07-22 @ 16:36)  T(F): 98.1 (11-08-22 @ 14:29), Max: 98.5 (11-08-22 @ 01:30)  HR: 90 (11-08-22 @ 11:56) (78 - 98)  BP: 140/71 (11-08-22 @ 11:56) (111/57 - 176/81)  BP(mean): 98 (11-08-22 @ 11:56) (76 - 117)  RR: 18 (11-08-22 @ 11:56) (17 - 18)  SpO2: 94% (11-08-22 @ 11:56) (91% - 96%)        Physical exam:  General: Fragile anxious female in  No acute distress, alert,awake  Eyes: Anicteric sclerae, moist conjunctivae, see below for CNs  Neck: trachea midline, FROM.  Cardiovascular: Regular rate and rhythm on the monitor.  Pulmonary: Anterior breath sounds clear bilaterally.  GI: Abdomen soft, non-distended, non-tender      Neurologic:  -Mental status: Awake, alert, oriented to person. Speech is fluent with intact naming, repetition, and comprehension, no dysarthria. Recent and remote memory intact. Follows commands. Attention/concentration intact. Fund of knowledge appropriate.  -Cranial nerves:   II: Visual fields are full to confrontation.  III, IV, VI: Extraocular movements are intact without nystagmus. Pupils equally round and reactive to light  V:  Facial sensation V1-V3 equal and intact   VII: ? Mild L NLFF+  VIII: Hearing is bilaterally intact to finger rub  IX, X: Uvula is midline and soft palate rises symmetrically  XI: Head turning and shoulder shrug are intact.  XII: Tongue protrudes midline  Motor: Normal bulk and tone. No pronator drift. Strength LUE : 4-/5, RUE : 4+/5 , B/L LE exam limited by effort, briefly antigravity.  Sensation: Unable to identify LLE being touched on DSST, impaired sensation LLE.  Coordination: No dysmetria on finger-to-nose and heel-to-shin bilaterally  Reflexes: Downgoing toes bilaterally       MEDICATIONS  (STANDING):  enoxaparin Injectable 50 milliGRAM(s) SubCutaneous every 12 hours  escitalopram 7.5 milliGRAM(s) Oral daily  ferrous    sulfate 325 milliGRAM(s) Oral <User Schedule>  levETIRAcetam 500 milliGRAM(s) Oral two times a day  LORazepam     Tablet 0.5 milliGRAM(s) Oral every 8 hours  polyethylene glycol 3350 17 Gram(s) Oral daily    MEDICATIONS  (PRN):  morphine  - Injectable 1 milliGRAM(s) IV Push every 4 hours PRN Moderate Pain (4 - 6)        Allergies  Benadryl (Other)  Compazine (Unknown)    Intolerances        LABS:                                                11.1   3.26  )-----------( 88       ( 08 Nov 2022 06:00 )             35.9     11-08    139  |  106  |  22  ----------------------------<  110<H>  4.5   |  26  |  1.44<H>    Ca    8.2<L>      08 Nov 2022 06:00  Mg     1.8     11-08          RADIOLOGY & ADDITIONAL TESTS:  Reviewed INTERVAL HPI/OVERNIGHT EVENTS:  Patient was seen and examined at bedside. Pt alert, awake, no distress. follows commands. No overnight events. Pt with better control of BP with nifedipine.        Vital Signs Last 24 Hrs  T(C): 36.7 (11-08-22 @ 14:29), Max: 36.9 (11-07-22 @ 16:36)  T(F): 98.1 (11-08-22 @ 14:29), Max: 98.5 (11-08-22 @ 01:30)  HR: 90 (11-08-22 @ 11:56) (78 - 98)  BP: 140/71 (11-08-22 @ 11:56) (111/57 - 176/81)  BP(mean): 98 (11-08-22 @ 11:56) (76 - 117)  RR: 18 (11-08-22 @ 11:56) (17 - 18)  SpO2: 94% (11-08-22 @ 11:56) (91% - 96%)        Physical exam:  General: Fragile anxious female in  No acute distress, alert,awake  Eyes: Anicteric sclerae, moist conjunctivae, see below for CNs  Neck: trachea midline, FROM.  Cardiovascular: Regular rate and rhythm on the monitor.  Pulmonary: Anterior breath sounds clear bilaterally.  GI: Abdomen soft, non-distended, non-tender      Neurologic:  -Mental status: Awake, alert, oriented to person. Speech is fluent with intact naming, repetition, and comprehension, no dysarthria. Recent and remote memory intact. Follows commands. Attention/concentration intact. Fund of knowledge appropriate.  -Cranial nerves:   II: Visual fields are full to confrontation.  III, IV, VI: Extraocular movements are intact without nystagmus. Pupils equally round and reactive to light  V:  Facial sensation V1-V3 equal and intact   VII: ? Mild L NLFF+  VIII: Hearing is bilaterally intact to finger rub  IX, X: Uvula is midline and soft palate rises symmetrically  XI: Head turning and shoulder shrug are intact.  XII: Tongue protrudes midline  Motor: Normal bulk and tone. No pronator drift. Strength LUE : 4-/5, RUE : 4+/5 , B/L LE exam limited by effort, RLE briefly antigravity, LLE: able to bend @ knee level.  Sensation: Unable to identify LLE being touched on DSST, impaired sensation LLE.  Coordination: No dysmetria on finger-to-nose and heel-to-shin bilaterally  Reflexes: Downgoing toes bilaterally       MEDICATIONS  (STANDING):  enoxaparin Injectable 50 milliGRAM(s) SubCutaneous every 12 hours  escitalopram 7.5 milliGRAM(s) Oral daily  ferrous    sulfate 325 milliGRAM(s) Oral <User Schedule>  levETIRAcetam 500 milliGRAM(s) Oral two times a day  LORazepam     Tablet 0.5 milliGRAM(s) Oral every 8 hours  polyethylene glycol 3350 17 Gram(s) Oral daily    MEDICATIONS  (PRN):  morphine  - Injectable 1 milliGRAM(s) IV Push every 4 hours PRN Moderate Pain (4 - 6)        Allergies  Benadryl (Other)  Compazine (Unknown)    Intolerances        LABS:                                                11.1   3.26  )-----------( 88       ( 08 Nov 2022 06:00 )             35.9     11-08    139  |  106  |  22  ----------------------------<  110<H>  4.5   |  26  |  1.44<H>    Ca    8.2<L>      08 Nov 2022 06:00  Mg     1.8     11-08          RADIOLOGY & ADDITIONAL TESTS:  Reviewed *******************************************Transfer sending notes**********************************************************      72y Female with PMHx of ?HTN, ovarian cancer on chemo (last treatment was 1 week ago), and recurrent anemia 2/2 chemotherapy, presented to the ER on 11/1 directly from oncologist for blood transfusion 2/2 Hgb 6.4, She received 2 units or PRBC's in the ER when she was trying to stand at time discharge (~0300) and noticed that her left leg was feeling numb and weak. HCT neg.  CTA head and neck negative for significant steno-occlusive disease but does demonstrate extensive pulmonary metastatic disease. CT perfusion negative. s/p 1 episode of GTC in ER that lasted ~1min, did not need ativan, ?withdrawal seizure (on home ativan). Palliative consulted for pain management i/s/o ovarian CA and reccs followed(morphine and ativan ordered).  Found with LLE femoral DVT. MRI with PRES, no infarct nor mets present. in setting of hypercoagulability due to cancer and sedentary lifestyle with no mets to brain on MRI, heme/onc team recommending PLT transfusion to increased PLT >55 and then starting therapeutic lovenox 1mg/kg BID. increased losartan 100mg daily, started nifedipine 30mg daily with uptitration to 90mg daily to achieve better BP control. Pt tearful, anxious and emotional about her LLE, states she wants to get better and expresses interest to work with PT to improve function in LLE. Pt with stable BP and stepdown to [facility] for further monitoring of BP, pending placement to [facility], referrals sent, pending acceptance. May consider rpt MRI @ some point for better prognostication of LLE weakness, but PRES can be associated with focal neurological deficit.    **************************************************************************************************************************************    INTERVAL HPI/OVERNIGHT EVENTS:  Patient was seen and examined at bedside. Pt alert, awake, no distress. follows commands. No overnight events. Pt with better control of BP with nifedipine.        Vital Signs Last 24 Hrs  T(C): 36.7 (11-08-22 @ 14:29), Max: 36.9 (11-07-22 @ 16:36)  T(F): 98.1 (11-08-22 @ 14:29), Max: 98.5 (11-08-22 @ 01:30)  HR: 90 (11-08-22 @ 11:56) (78 - 98)  BP: 140/71 (11-08-22 @ 11:56) (111/57 - 176/81)  BP(mean): 98 (11-08-22 @ 11:56) (76 - 117)  RR: 18 (11-08-22 @ 11:56) (17 - 18)  SpO2: 94% (11-08-22 @ 11:56) (91% - 96%)        Physical exam:  General: Fragile anxious female in  No acute distress, alert,awake  Eyes: Anicteric sclerae, moist conjunctivae, see below for CNs  Neck: trachea midline, FROM.  Cardiovascular: Regular rate and rhythm on the monitor.  Pulmonary: Anterior breath sounds clear bilaterally.  GI: Abdomen soft, non-distended, non-tender      Neurologic:  -Mental status: Awake, alert, oriented to person. Speech is fluent with intact naming, repetition, and comprehension, no dysarthria. Recent and remote memory intact. Follows commands. Attention/concentration intact. Fund of knowledge appropriate.  -Cranial nerves:   II: Visual fields are full to confrontation.  III, IV, VI: Extraocular movements are intact without nystagmus. Pupils equally round and reactive to light  V:  Facial sensation V1-V3 equal and intact   VII: ? Mild L NLFF+  VIII: Hearing is bilaterally intact to finger rub  IX, X: Uvula is midline and soft palate rises symmetrically  XI: Head turning and shoulder shrug are intact.  XII: Tongue protrudes midline  Motor: Normal bulk and tone. No pronator drift. Strength LUE : 4-/5, RUE : 4+/5 , B/L LE exam limited by effort, RLE briefly antigravity, LLE: able to bend @ knee level.  Sensation: Unable to identify LLE being touched on DSST, impaired sensation LLE.  Coordination: No dysmetria on finger-to-nose and heel-to-shin bilaterally  Reflexes: Downgoing toes bilaterally       MEDICATIONS  (STANDING):  enoxaparin Injectable 50 milliGRAM(s) SubCutaneous every 12 hours  escitalopram 7.5 milliGRAM(s) Oral daily  ferrous    sulfate 325 milliGRAM(s) Oral <User Schedule>  levETIRAcetam 500 milliGRAM(s) Oral two times a day  LORazepam     Tablet 0.5 milliGRAM(s) Oral every 8 hours  polyethylene glycol 3350 17 Gram(s) Oral daily    MEDICATIONS  (PRN):  morphine  - Injectable 1 milliGRAM(s) IV Push every 4 hours PRN Moderate Pain (4 - 6)        Allergies  Benadryl (Other)  Compazine (Unknown)    Intolerances        LABS:                                                11.1   3.26  )-----------( 88       ( 08 Nov 2022 06:00 )             35.9     11-08    139  |  106  |  22  ----------------------------<  110<H>  4.5   |  26  |  1.44<H>    Ca    8.2<L>      08 Nov 2022 06:00  Mg     1.8     11-08          RADIOLOGY & ADDITIONAL TESTS:  Reviewed

## 2022-11-08 NOTE — PROGRESS NOTE ADULT - PROBLEM SELECTOR PLAN 5
Follows w/ Dr. Hidalgo [phone]. Had chemo treatment a week prior to presentation, anemic secondary to chemo tx. Known mets to liver, lung and peritoneum with radiofrequency ablation of liver lesions as MSK. Chemotherapy treatment 1 week before hospital presentation  - Dr. Hidalgo following and will continue with chem tx outpatient

## 2022-11-08 NOTE — PROGRESS NOTE ADULT - PROBLEM SELECTOR PLAN 1
Initially admitted for r/o stroke due to left leg weakness. MRI head with PRES, no infarct nor mets present.  - Stroke Code HCT Results: Patchy area of hypodensity along the right central sulcus which may be artifactual versus areas of age-indeterminate infarction.  - Stroke Code CTA Results: negative for steno-occlusive disease. Demonstrates extensive pulmonary metastatic disease.  - MRI cervical - mild cervical disks degenerative changes without any cord compression.    Plan:  - C/W Therapeutic SQL i/s/o LLE DVT with active malignancy, tolerated well  - Maintain SBP <160; started on Nifadipine this admission and increased losartan to 100mg qd.

## 2022-11-08 NOTE — PROGRESS NOTE ADULT - ASSESSMENT
72y Female with PMHx of ?HTN, ovarian cancer on chemo (last treatment was 1 week ago), and recurrent anemia 2/2 chemotherapy, presented to the ER on 11/1 directly from oncologist for blood transfusion 2/2 Hgb 6.4 s/p 2 unite PRBC. Pt later developed left leg was numb and weak.  Pt had an MRI Brain which was consistent with PRES.       #PRES  #L leg numbness  - MRI showed PRES with edema, no infarction or hemorrhage  - continue BP control     #HTN  - On nifedipine and losartan, BP controlled well    #thrombocytopenia  - s/p 1plt transfusion  - Plt 88 today. No signs of bleeding. Monitor plt daily   - transfuse for platelets <50k while on anticoagulation per heme    #LLE DVT  - On lovenox full dose for AC  - Should continue lovenox upon DC per heme    #Anemia  - s/p 2U PRBC, stable at 10-11  - monitor CBC    #Seizure  - c/w keppra    # Anxiety  - c/w ativan    #CKD  - Cr stable around 1.40. Avoid nephrotoxins. monitor BMP    #Ovarian ca  - plan for further chemotherapy outpatient with Dr. Hidalgo per heme

## 2022-11-08 NOTE — PROGRESS NOTE ADULT - PROBLEM SELECTOR PLAN 2
S/p 1 episode of GTC in ER that lasted ~1min, did not need ativan. S/p keppra load. Possibly related to ?ativan withdrawal, but seized within half a day of admit, so would expect patient to be on high dose of ativan at home which she is not. Patient also did not respond to questions about dosing/frequency of ativan when discussing with palliative team.   - EEG 11/2-11/3 with generalized slowing, no seizure activity   - C/w home ativan 0.5mg q8hr

## 2022-11-08 NOTE — PROGRESS NOTE ADULT - PROBLEM SELECTOR PLAN 8
F: none  E: Replete as necessary K>4 Mg>2  N: regular diet     DVT Prophylaxis: Lovenox 50mg BID   GI prophylaxis: None   CODE STATUS: FULL

## 2022-11-08 NOTE — PROGRESS NOTE ADULT - PROBLEM SELECTOR PLAN 3
Unclear etiology of elevated pressures. Thought to be labile pressure in outpatient setting (however  and pt reports BP reading 130-140. Patient also received Ativan 1 month ago, associated with PRES  - goal normotension (110-160's prefer more towards 140's).  - Cont losartan 100mg daily and nifedipine uptitrated to 90mg daily to keep BP within goal (110-160's)  - TTE: no pfo, EF 60%

## 2022-11-08 NOTE — PROGRESS NOTE ADULT - SUBJECTIVE AND OBJECTIVE BOX
Patient is a 72y old  Female who presents with a chief complaint of left leg weakness and decreased sensation (08 Nov 2022 07:45)      INTERVAL HPI/OVERNIGHT EVENTS:  Pt states she is feeling ok. Pt still has L leg numbness. No weakness.      REVIEW OF SYSTEMS:  CONSTITUTIONAL: No fever, weight loss, or fatigue  EYES: No eye pain, visual disturbances, or discharge  ENMT:  No difficulty hearing, tinnitus, vertigo; No sinus or throat pain  NECK: No pain or stiffness  RESPIRATORY: No cough, wheezing, chills or hemoptysis; No shortness of breath  CARDIOVASCULAR: No chest pain, palpitations, dizziness, or leg swelling  GASTROINTESTINAL: No abdominal or epigastric pain. No nausea, vomiting, or hematemesis; No diarrhea or constipation. No melena or hematochezia.  GENITOURINARY: No dysuria, frequency, hematuria, or incontinence  NEUROLOGICAL: No headaches, memory loss, loss of strength, (+) numbness on L lower leg, no tremors  SKIN: No itching, burning, rashes, or lesions   LYMPH NODES: No enlarged glands  ENDOCRINE: No heat or cold intolerance; No hair loss  MUSCULOSKELETAL: No joint pain or swelling; No muscle, back, or extremity pain  HEME/LYMPH: No easy bruising, or bleeding gums  ALLERY AND IMMUNOLOGIC: No hives or eczema    FAMILY HISTORY:    Vital Signs Last 24 Hrs  T(C): 36.7 (08 Nov 2022 05:30), Max: 36.9 (07 Nov 2022 16:36)  T(F): 98.1 (08 Nov 2022 05:30), Max: 98.5 (08 Nov 2022 01:30)  HR: 90 (08 Nov 2022 11:56) (78 - 98)  BP: 140/71 (08 Nov 2022 11:56) (111/57 - 176/81)  BP(mean): 98 (08 Nov 2022 11:56) (76 - 117)  RR: 18 (08 Nov 2022 11:56) (17 - 18)  SpO2: 94% (08 Nov 2022 11:56) (91% - 96%)    Parameters below as of 08 Nov 2022 11:56  Patient On (Oxygen Delivery Method): room air        11-07-22 @ 07:01  -  11-08-22 @ 07:00  --------------------------------------------------------  IN: 0 mL / OUT: 350 mL / NET: -350 mL        PHYSICAL EXAM:  GENERAL: NAD, well-groomed, well-developed  HEAD:  Atraumatic, Normocephalic  EYES: EOMI, conjunctiva and sclera clear  ENMT: No tonsillar erythema, exudates, or enlargement; Moist mucous membranes  NECK: Supple, No JVD, Normal thyroid  NERVOUS SYSTEM:  Alert & awake, Good concentration; Moving all extremities (+) sensation L<R on lower leg  CHEST/LUNG: No rales, rhonchi, wheezing, or rubs  HEART: Regular rate and rhythm; No murmurs, rubs, or gallops  ABDOMEN: Soft, Nontender, Nondistended; Bowel sounds present  EXTREMITIES:  2+ Peripheral Pulses, No clubbing, cyanosis, or edema  LYMPH: No lymphadenopathy noted  SKIN: No rashes or lesions    Consultant(s) Notes Reviewed:  [x ] YES  [ ] NO  Care Discussed with Consultants/Other Providers [ x] YES  [ ] NO    LABS:        RADIOLOGY & ADDITIONAL TESTS:  < from: MR Head w/wo IV Cont (11.03.22 @ 22:41) >  FINDINGS:    There are extensive areas of vasogenic edema primarily located in the   bilateral occipital and parietal regions with extension into the frontal   lobes, findings suggestive of posterior reversible encephalopathy   syndrome (PRES). Please note, there is associated gyriform restricted   diffusion including in the right posterior parietal lobe, bilateral   sensorimotor cortex and right frontal lobe. There is also associated   hemosiderin deposition best visualized on GRE sequence in the right   posterior parietal lobe. There is prominent vasogenic edema in the left   cerebellar hemisphere without definite associated restricted diffusion,   but with concomitant associated leptomeningeal enhancement, findings   favored to represent additional site of PRES (given the supratentorial   findings), rather than metastatic disease.    There is no evidence of acute infarction, intracranial hemorrhage or   intraparenchymal mass.    There is no evidence of hydrocephalus. There are no extra-axial fluid   collections. The skull base flow voids are present.    The visualized intraorbital contents are normal. The imaged portions of   the paranasal sinuses are aerated. The mastoid air cells are clear. The   visualized soft tissues and osseous structures appear normal.    IMPRESSION:    Extensive areas of vasogenic edema primarily located in the bilateral   occipital and parietal regions with extension into the frontal lobes   consistent with posterior reversible encephalopathy syndrome (PRES).    Prominent vasogenic edema with associated leptomeningeal enhancement in   the left cerebellar hemisphere is favored to represent additional site of   PRES.    Follow-up dedicated MRI brain with and without contrast is recommended   upon resolution of findings to exclude underlying metastatic disease.    < end of copied text >      Imaging Personally Reviewed:  [ ] YES  [ ] NO  enoxaparin Injectable 50 milliGRAM(s) SubCutaneous every 12 hours  escitalopram 7.5 milliGRAM(s) Oral daily  ferrous    sulfate 325 milliGRAM(s) Oral <User Schedule>  levETIRAcetam 500 milliGRAM(s) Oral two times a day  LORazepam     Tablet 0.5 milliGRAM(s) Oral every 8 hours  morphine  - Injectable 1 milliGRAM(s) IV Push every 4 hours PRN  polyethylene glycol 3350 17 Gram(s) Oral daily  sodium chloride 0.9% Bolus 250 milliLiter(s) IV Bolus once      HEALTH ISSUES - PROBLEM Dx:  Bicytopenia    Ovarian cancer    Lactic acidosis    Leukocytosis    Hypokalemia    Debility    Hyponatremia    Encounter for palliative care    Seizure    Pain, unspecified    Elevated TSH    Essential hypertension    Left leg weakness    Anxiety    Seizure    DVT of lower limb, acute

## 2022-11-08 NOTE — PROGRESS NOTE ADULT - ASSESSMENT
72y Female with PMHx of HTN, ovarian cancer on chemo (last treatment was 1 week ago), and recurrent anemia 2/2 chemotherapy, presented to the ER on 11/1 directly from oncologist for blood transfusion 2/2 Hgb 6.4 s/p 2 unite PRBC. Course complicated with acute RLE loss of sensation and strength, stroke code negative, found to have PRES. Now pending AR placing.

## 2022-11-08 NOTE — PROGRESS NOTE ADULT - NS ATTEND AMEND GEN_ALL_CORE FT
The patient is a 72-year-old female with a history of metastatic ovarian cancer, on chemo, with recent severe anemia requiring transfusion and thrombocytopenia admitted with left leg numbness/weakness after receiving blood transfusion. MRI showed no acute ischemia but was concerning for PRES- possibly related to labile BP vs chemo. MRI C spine negative. She was also found to have an extensive left lower extremity DVT, likely be secondary to hypercoagulable state in the setting of active malignancy. Continue therapeutic Lovenox, monitoring anemia/thrombocytopenia. May benefit from repeat MRI brain to clarify reason for leg weakness but PRES can be associated with focal neurological deficits. As BP is better controlled, will step-down patient.

## 2022-11-08 NOTE — PROGRESS NOTE ADULT - SUBJECTIVE AND OBJECTIVE BOX
*** Acceptance Note From [facility] to [facility] ***    HPI:    SUBJECTIVE:  Patient seen and examined at bedside. Patient is very tearful about her diagnosis, unable to move her LLE, gets very emotional. Willing to work with acute rehab to gain as much function that she can. Patient denies h/n/v/d, fever, chills, cp, palpitations, sob, abd pain, leg swelling, rashes, dysuria, and changes in BM.     Vital Signs Last 12 Hrs  T(F): 97.3 (11-08-22 @ 17:45), Max: 98.1 (11-08-22 @ 14:29)  HR: 96 (11-08-22 @ 16:35) (78 - 96)  BP: 150/73 (11-08-22 @ 16:35) (125/65 - 150/73)  BP(mean): 103 (11-08-22 @ 16:35) (89 - 103)  RR: 15 (11-08-22 @ 16:35) (15 - 18)  SpO2: 96% (11-08-22 @ 16:35) (92% - 96%)  I&O's Summary    07 Nov 2022 07:01  -  08 Nov 2022 07:00  --------------------------------------------------------  IN: 0 mL / OUT: 350 mL / NET: -350 mL    08 Nov 2022 07:01  -  08 Nov 2022 17:47  --------------------------------------------------------  IN: 250 mL / OUT: 320 mL / NET: -70 mL    PHYSICAL EXAM:  Constitutional: Elderly fail woman, NAD, comfortable in bed.  HEENT: NC/AT, PERRLA, EOMI, no conjunctival pallor or scleral icterus, MMM  Neck: Supple, no JVD  Respiratory: CTA B/L. No w/r/r.   Cardiovascular: RRR, normal S1 and S2, no m/r/g.   Gastrointestinal: +BS, soft NTND, no guarding or rebound tenderness, no palpable masses   Extremities: wwp; no cyanosis, clubbing or edema.   Vascular: Pulses equal and strong throughout.   Neurological: AAOx3, no CN deficits, sensation intact throughout. Strength: LLE with 0/5, RLE 4/5, RUE 4+/5 >LUE 4-/5. left facial droop.  Skin: No gross skin abnormalities or rashes    LABS:                        11.1   3.26  )-----------( 88       ( 08 Nov 2022 06:00 )             35.9     11-08    139  |  106  |  22  ----------------------------<  110<H>  4.5   |  26  |  1.44<H>    Ca    8.2<L>      08 Nov 2022 06:00  Phos  2.7     11-07  Mg     1.8     11-08    MEDICATIONS  (STANDING):  enoxaparin Injectable 50 milliGRAM(s) SubCutaneous every 12 hours  escitalopram 7.5 milliGRAM(s) Oral daily  ferrous    sulfate 325 milliGRAM(s) Oral <User Schedule>  levETIRAcetam 500 milliGRAM(s) Oral two times a day  LORazepam     Tablet 0.5 milliGRAM(s) Oral every 8 hours  polyethylene glycol 3350 17 Gram(s) Oral daily    MEDICATIONS  (PRN):  morphine  - Injectable 1 milliGRAM(s) IV Push every 4 hours PRN Moderate Pain (4 - 6)   *** Acceptance Note From [facility] to [facility] ***    HPI:  72y Female with PMHx of ?HTN, ovarian cancer on chemo (last treatment was 1 week ago), and recurrent anemia 2/2 chemotherapy, presented to the ER on 11/1 directly from oncologist for blood transfusion 2/2 Hgb 6.4 s/p 2 unite PRBC. When she was trying to stand at time discharge (~0300) from ED, her left leg was numb and weak. NIHSS 3. CTH with patchy area of hypodensity along the right central sulcus which may be artifactual versus areas of age-indeterminate infarction.  CTA head and neck negative for significant steno-occlusive disease but does demonstrate extensive pulmonary metastatic disease. CT perfusion negative. Patient is not a tPA candidate 2/2 thrombocytopenia (repeat plt 36 after transfusion).    SUBJECTIVE:  Patient seen and examined at bedside. Patient is very tearful about her diagnosis, unable to move her LLE, gets very emotional. Willing to work with acute rehab to gain as much function that she can. Patient denies h/n/v/d, fever, chills, cp, palpitations, sob, abd pain, leg swelling, rashes, dysuria, and changes in BM.     Vital Signs Last 12 Hrs  T(F): 97.3 (11-08-22 @ 17:45), Max: 98.1 (11-08-22 @ 14:29)  HR: 96 (11-08-22 @ 16:35) (78 - 96)  BP: 150/73 (11-08-22 @ 16:35) (125/65 - 150/73)  BP(mean): 103 (11-08-22 @ 16:35) (89 - 103)  RR: 15 (11-08-22 @ 16:35) (15 - 18)  SpO2: 96% (11-08-22 @ 16:35) (92% - 96%)  I&O's Summary    07 Nov 2022 07:01  -  08 Nov 2022 07:00  --------------------------------------------------------  IN: 0 mL / OUT: 350 mL / NET: -350 mL    08 Nov 2022 07:01  -  08 Nov 2022 17:47  --------------------------------------------------------  IN: 250 mL / OUT: 320 mL / NET: -70 mL    PHYSICAL EXAM:  Constitutional: Elderly fail woman, NAD, comfortable in bed.  HEENT: NC/AT, PERRLA, EOMI, no conjunctival pallor or scleral icterus, MMM  Neck: Supple, no JVD  Respiratory: CTA B/L. No w/r/r.   Cardiovascular: RRR, normal S1 and S2, no m/r/g.   Gastrointestinal: +BS, soft NTND, no guarding or rebound tenderness, no palpable masses   Extremities: wwp; no cyanosis, clubbing or edema.   Vascular: Pulses equal and strong throughout.   Neurological: AAOx3, no CN deficits, sensation intact throughout. Strength: LLE with 0/5, RLE 4/5, RUE 4+/5 >LUE 4-/5. left facial droop.  Skin: No gross skin abnormalities or rashes    LABS:                        11.1   3.26  )-----------( 88       ( 08 Nov 2022 06:00 )             35.9     11-08    139  |  106  |  22  ----------------------------<  110<H>  4.5   |  26  |  1.44<H>    Ca    8.2<L>      08 Nov 2022 06:00  Phos  2.7     11-07  Mg     1.8     11-08    MEDICATIONS  (STANDING):  enoxaparin Injectable 50 milliGRAM(s) SubCutaneous every 12 hours  escitalopram 7.5 milliGRAM(s) Oral daily  ferrous    sulfate 325 milliGRAM(s) Oral <User Schedule>  levETIRAcetam 500 milliGRAM(s) Oral two times a day  LORazepam     Tablet 0.5 milliGRAM(s) Oral every 8 hours  polyethylene glycol 3350 17 Gram(s) Oral daily    MEDICATIONS  (PRN):  morphine  - Injectable 1 milliGRAM(s) IV Push every 4 hours PRN Moderate Pain (4 - 6)   *** Acceptance Note From [facility] to [facility] ***    HPI:  72y Female with PMHx of ?HTN, ovarian cancer on chemo (last treatment was 1 week ago), and recurrent anemia 2/2 chemotherapy, presented to the ER on 11/1 directly from oncologist for blood transfusion 2/2 Hgb 6.4, She received 2 units or PRBC's in the ER when she was trying to stand at time discharge (~0300) and noticed that her left leg was feeling numb and weak. HCT neg.  CTA head and neck negative for significant steno-occlusive disease but does demonstrate extensive pulmonary metastatic disease. CT perfusion negative. s/p 1 episode of GTC in ER that lasted ~1min, did not need ativan, ?withdrawal seizure (on home ativan). Palliative consulted for pain management i/s/o ovarian CA and reccs followed(morphine and ativan ordered).  Found with LLE femoral DVT. MRI with PRES, no infarct nor mets present. in setting of hypercoagulability due to cancer and sedentary lifestyle with no mets to brain on MRI, heme/onc team recommending PLT transfusion to increased PLT >55 and then starting therapeutic lovenox 1mg/kg BID. increased losartan 100mg daily, started nifedipine 30mg daily with uptitration to 90mg daily to achieve better BP control. Pt tearful, anxious and emotional about her LLE, states she wants to get better and expresses interest to work with PT to improve function in LLE. Pt with stable BP and stepdown to [facility] for further monitoring of BP, pending placement to [facility], referrals sent, pending acceptance. May consider rpt MRI @ some point for better prognostication of LLE weakness, but PRES can be associated with focal neurological deficit.    SUBJECTIVE:  Patient seen and examined at bedside. Patient is very tearful about her diagnosis, unable to move her LLE, gets very emotional. Willing to work with acute rehab to gain as much function that she can. Patient denies h/n/v/d, fever, chills, cp, palpitations, sob, abd pain, leg swelling, rashes, dysuria, and changes in BM.     Vital Signs Last 12 Hrs  T(F): 97.3 (11-08-22 @ 17:45), Max: 98.1 (11-08-22 @ 14:29)  HR: 96 (11-08-22 @ 16:35) (78 - 96)  BP: 150/73 (11-08-22 @ 16:35) (125/65 - 150/73)  BP(mean): 103 (11-08-22 @ 16:35) (89 - 103)  RR: 15 (11-08-22 @ 16:35) (15 - 18)  SpO2: 96% (11-08-22 @ 16:35) (92% - 96%)  I&O's Summary    07 Nov 2022 07:01  -  08 Nov 2022 07:00  --------------------------------------------------------  IN: 0 mL / OUT: 350 mL / NET: -350 mL    08 Nov 2022 07:01  -  08 Nov 2022 17:47  --------------------------------------------------------  IN: 250 mL / OUT: 320 mL / NET: -70 mL    PHYSICAL EXAM:  Constitutional: Elderly fail woman, NAD, comfortable in bed.  HEENT: NC/AT, PERRLA, EOMI, no conjunctival pallor or scleral icterus, MMM  Neck: Supple, no JVD  Respiratory: CTA B/L. No w/r/r.   Cardiovascular: RRR, normal S1 and S2, no m/r/g.   Gastrointestinal: +BS, soft NTND, no guarding or rebound tenderness, no palpable masses   Extremities: wwp; no cyanosis, clubbing or edema.   Vascular: Pulses equal and strong throughout.   Neurological: AAOx3, no CN deficits, sensation intact throughout. Strength: LLE with 0/5, RLE 4/5, RUE 4+/5 >LUE 4-/5. left facial droop.  Skin: No gross skin abnormalities or rashes    LABS:                        11.1   3.26  )-----------( 88       ( 08 Nov 2022 06:00 )             35.9     11-08    139  |  106  |  22  ----------------------------<  110<H>  4.5   |  26  |  1.44<H>    Ca    8.2<L>      08 Nov 2022 06:00  Phos  2.7     11-07  Mg     1.8     11-08    MEDICATIONS  (STANDING):  enoxaparin Injectable 50 milliGRAM(s) SubCutaneous every 12 hours  escitalopram 7.5 milliGRAM(s) Oral daily  ferrous    sulfate 325 milliGRAM(s) Oral <User Schedule>  levETIRAcetam 500 milliGRAM(s) Oral two times a day  LORazepam     Tablet 0.5 milliGRAM(s) Oral every 8 hours  polyethylene glycol 3350 17 Gram(s) Oral daily    MEDICATIONS  (PRN):  morphine  - Injectable 1 milliGRAM(s) IV Push every 4 hours PRN Moderate Pain (4 - 6)

## 2022-11-08 NOTE — PROGRESS NOTE ADULT - PROBLEM SELECTOR PLAN 4
Hgb 10-11, platelet approx 30k  s.p 2U PRBC and 1unit platelets in ER.  - trend hgb/platelets 10.2/32.6  - maintain active T&S  - transfuse if Hgb <7  - transfuse platelets if less than 50k or bleeding- @ 70K  - c/w iron supplements

## 2022-11-08 NOTE — PROGRESS NOTE ADULT - PROBLEM SELECTOR PLAN 7
Arterial LLE duplex negative  - vascular surgery consulted, not a candidate for IVC filter  - in setting of hypercoagulability due to cancer and sedentary lifestyle with no mets to brain on MRI, heme/onc team recommending PLT transfusion when PLT <50 while on AC

## 2022-11-08 NOTE — PROGRESS NOTE ADULT - ASSESSMENT
72y Female with PMHx of ?HTN, ovarian cancer on chemo (last treatment was 1 week ago), and recurrent anemia 2/2 chemotherapy, presented to the ER on 11/1 directly from oncologist for blood transfusion 2/2 Hgb 6.4 s/p 2 unite PRBC. When she was trying to stand at time discharge (~0300) from ED, her left leg was numb and weak. NIHSS 3. CTH with patchy area of hypodensity along the right central sulcus which may be artifactual versus areas of age-indeterminate infarction.  CTA head and neck negative for significant steno-occlusive disease but does demonstrate extensive pulmonary metastatic disease. CT perfusion negative. Patient is not a tPA candidate 2/2 thrombocytopenia (repeat plt 36 after transfusion).    Neuro  #PRES  Initially admitted for r/o stroke due to left leg weakness. MRI head with PRES, no infarct nor mets present.  - C/W Therapeutic SQL i/s/o LLE DVT with active malignancy, tolerated well, Plts @ 70(improved).  - q4hr  vitals  - F/U read MRI cervical w/ and w/o contrast- mild cervical disks degenerative changes without any cord compression.  - MRI Brain with and without contrast: PRES, no enhancement suggestive of mets. No acute infarcts nor hemorrhage.   - Stroke Code HCT Results: Patchy area of hypodensity along the right central sulcus which may be artifactual versus areas of age-indeterminate infarction.  - Rpt CTH W/WO : Limited evaluation due to the presence of intravenous contrast. Nonvisualization of the previously seen small acute to subacute infarctions in the high right mesial frontal and parietal lobes.  - Stroke Code CTA Results: negative for steno-occlusive disease. Demonstrates extensive pulmonary metastatic disease.  - Stroke education    #Seizure   s/p 1 episode of GTC in ER that lasted ~1min, did not need ativan. S/p keppra load. Possibly related to ?ativan withdrawal, but seized within half a day of admit, so would expect patient to be on high dose of ativan at home which she is not. Patient also did not respond to questions about dosing/frequency of ativan when discussing with palliative team.   - EEG 11/2-11/3 with generalized slowing, no seizure activity   - c/w home ativan 0.5mg q8hr    Vascular  #LLE DVT- venous doppler.  Arterial LLE duplex negative  - vascular surgery consulted, not a candidate for IVC filter  - in setting of hypercoagulability due to cancer and sedentary lifestyle with no mets to brain on MRI, heme/onc team recommending PLT transfusion when PLT <50 while on AC    Cards  #HTN/PRES  Unclear etiology of elevated pressures. Thought to be labile pressure in outpatient setting (however  and pt reports BP reading 130-140. Patient also received Ativan 1 month ago, associated with PRES  - goal normotension  - Cont losartan 100mg daily and nifedipine uptitrated to 90mg daily to keep BP within goal (110-160's)  - TTE: no pfo, EF 60%      Pulm  - call provider if SPO2 < 94%    GI  #Nutrition/Fluids/Electrolytes   - replete K<4 and Mg <2  - IVF: None for now, tolerating PO well, added ensure to diet.  - Diet: Regular w/ ensure TID      Infectious Disease  WBC fluctuating, afebrile. Collateral obtained from onc (see below)  - CXR with nodules. Unclear infiltrates  - BCx 11/3 NGTD  - currently downtrending with no other evidence of infection    Heme/Oncology  #Ovarian/uterine CA   Follows w/ Dr. Hidalgo [phone]. Had chemo treatment a week prior to presentation, anemic secondary to chemo tx. Known mets to liver, lung and peritoneum with radiofrequency ablation of liver lesions as MSK. Chemotherapy treatment 1 week before hospital presentation  - Dr. Hidalgo following and will continue with chem tx outpatient    #Bicytopenia (Hgb 10-11, platelet approx 30k)  s.p 2U PRBC and 1unit platelets in ER.  - trend hgb/platelets 10.2/32.6  - maintain active T&S  - transfuse if Hgb <7  - transfuse platelets if less than 50k or bleeding- @ 70K  - c/w iron supplements      Endocrine  - A1C results: 4.8  - TSH results: 6.7, FT4 1.4      Renal   #RUBIN- resolving, i/s/o poor PO intake and s/p MRI contrast  - encourage PO intake  - trend Cr-1.49-->1.41-->1.40--> 1.44.  - avoid nephrotoxic drugs, renally dose meds  - will consider obtaining urine lytes if not improving      DVT Prophylaxis  - therapeutic lovenox     Dispo: PT re eval done, reccs for [facility](referral sent, pending acceptance).     Discussed daily hospital plans and goals with patient and family at bedside.    Discussed with Neurology Attending Dr. Ann         72y Female with PMHx of ?HTN, ovarian cancer on chemo (last treatment was 1 week ago), and recurrent anemia 2/2 chemotherapy, presented to the ER on 11/1 directly from oncologist for blood transfusion 2/2 Hgb 6.4 s/p 2 unite PRBC. When she was trying to stand at time discharge (~0300) from ED, her left leg was numb and weak. NIHSS 3. CTH with patchy area of hypodensity along the right central sulcus which may be artifactual versus areas of age-indeterminate infarction.  CTA head and neck negative for significant steno-occlusive disease but does demonstrate extensive pulmonary metastatic disease. CT perfusion negative. Patient is not a tPA candidate 2/2 thrombocytopenia (repeat plt 36 after transfusion).    Neuro  #PRES  Initially admitted for r/o stroke due to left leg weakness. MRI head with PRES, no infarct nor mets present.  - C/W Therapeutic SQL i/s/o LLE DVT with active malignancy, tolerated well, Plts @ 70(improved).  - q4hr  vitals  - F/U read MRI cervical w/ and w/o contrast- mild cervical disks degenerative changes without any cord compression.  - MRI Brain with and without contrast: PRES, no enhancement suggestive of mets. No acute infarcts nor hemorrhage.   - Stroke Code HCT Results: Patchy area of hypodensity along the right central sulcus which may be artifactual versus areas of age-indeterminate infarction.  - Rpt CTH W/WO : Limited evaluation due to the presence of intravenous contrast. Nonvisualization of the previously seen small acute to subacute infarctions in the high right mesial frontal and parietal lobes.  - Stroke Code CTA Results: negative for steno-occlusive disease. Demonstrates extensive pulmonary metastatic disease.  - Stroke education    #Seizure   s/p 1 episode of GTC in ER that lasted ~1min, did not need ativan. S/p keppra load. Possibly related to ?ativan withdrawal, but seized within half a day of admit, so would expect patient to be on high dose of ativan at home which she is not. Patient also did not respond to questions about dosing/frequency of ativan when discussing with palliative team.   - EEG 11/2-11/3 with generalized slowing, no seizure activity   - c/w home ativan 0.5mg q8hr    Vascular  #LLE DVT- venous doppler.  Arterial LLE duplex negative  - vascular surgery consulted, not a candidate for IVC filter  - in setting of hypercoagulability due to cancer and sedentary lifestyle with no mets to brain on MRI, heme/onc team recommending PLT transfusion when PLT <50 while on AC    Cards  #HTN/PRES  Unclear etiology of elevated pressures. Thought to be labile pressure in outpatient setting (however  and pt reports BP reading 130-140. Patient also received Ativan 1 month ago, associated with PRES  - goal normotension (110-160's prefer more towards 140's).  - Cont losartan 100mg daily and nifedipine uptitrated to 90mg daily to keep BP within goal (110-160's)  - TTE: no pfo, EF 60%      Pulm  - call provider if SPO2 < 94%    GI  #Nutrition/Fluids/Electrolytes   - replete K<4 and Mg <2  - IVF: None for now, tolerating PO well, added ensure to diet.  - Diet: Regular w/ ensure TID      Infectious Disease  WBC fluctuating, afebrile. Collateral obtained from onc (see below)  - CXR with nodules. Unclear infiltrates  - BCx 11/3 NGTD  - currently downtrending with no other evidence of infection    Heme/Oncology  #Ovarian/uterine CA   Follows w/ Dr. Hidalgo [phone]. Had chemo treatment a week prior to presentation, anemic secondary to chemo tx. Known mets to liver, lung and peritoneum with radiofrequency ablation of liver lesions as MSK. Chemotherapy treatment 1 week before hospital presentation  - Dr. Hidalgo following and will continue with chem tx outpatient    #Bicytopenia (Hgb 10-11, platelet approx 30k)  s.p 2U PRBC and 1unit platelets in ER.  - trend hgb/platelets 10.2/32.6  - maintain active T&S  - transfuse if Hgb <7  - transfuse platelets if less than 50k or bleeding- @ 70K  - c/w iron supplements      Endocrine  - A1C results: 4.8  - TSH results: 6.7, FT4 1.4      Renal   #RUBIN- resolving, i/s/o poor PO intake and s/p MRI contrast  - encourage PO intake  - trend Cr-1.49-->1.41-->1.40--> 1.44.  - avoid nephrotoxic drugs, renally dose meds  - will consider obtaining urine lytes if not improving      DVT Prophylaxis  - therapeutic lovenox     Dispo: PT re eval done, reccs for [facility](referral sent, pending acceptance).     Discussed daily hospital plans and goals with patient and family at bedside.    Discussed with Neurology Attending Dr. Ann

## 2022-11-09 LAB
ALBUMIN SERPL ELPH-MCNC: 2.4 G/DL — LOW (ref 3.3–5)
ALP SERPL-CCNC: 451 U/L — HIGH (ref 40–120)
ALT FLD-CCNC: 15 U/L — SIGNIFICANT CHANGE UP (ref 10–45)
ANION GAP SERPL CALC-SCNC: 9 MMOL/L — SIGNIFICANT CHANGE UP (ref 5–17)
AST SERPL-CCNC: 33 U/L — SIGNIFICANT CHANGE UP (ref 10–40)
BILIRUB SERPL-MCNC: 0.5 MG/DL — SIGNIFICANT CHANGE UP (ref 0.2–1.2)
BUN SERPL-MCNC: 21 MG/DL — SIGNIFICANT CHANGE UP (ref 7–23)
CALCIUM SERPL-MCNC: 8.1 MG/DL — LOW (ref 8.4–10.5)
CHLORIDE SERPL-SCNC: 106 MMOL/L — SIGNIFICANT CHANGE UP (ref 96–108)
CO2 SERPL-SCNC: 26 MMOL/L — SIGNIFICANT CHANGE UP (ref 22–31)
CREAT SERPL-MCNC: 1.44 MG/DL — HIGH (ref 0.5–1.3)
EGFR: 38 ML/MIN/1.73M2 — LOW
GLUCOSE SERPL-MCNC: 103 MG/DL — HIGH (ref 70–99)
HCT VFR BLD CALC: 34.2 % — LOW (ref 34.5–45)
HGB BLD-MCNC: 10.2 G/DL — LOW (ref 11.5–15.5)
MAGNESIUM SERPL-MCNC: 1.8 MG/DL — SIGNIFICANT CHANGE UP (ref 1.6–2.6)
MCHC RBC-ENTMCNC: 29.8 GM/DL — LOW (ref 32–36)
MCHC RBC-ENTMCNC: 31.5 PG — SIGNIFICANT CHANGE UP (ref 27–34)
MCV RBC AUTO: 105.6 FL — HIGH (ref 80–100)
NRBC # BLD: 0 /100 WBCS — SIGNIFICANT CHANGE UP (ref 0–0)
PHOSPHATE SERPL-MCNC: 3.2 MG/DL — SIGNIFICANT CHANGE UP (ref 2.5–4.5)
PLATELET # BLD AUTO: 117 K/UL — LOW (ref 150–400)
POTASSIUM SERPL-MCNC: 4.2 MMOL/L — SIGNIFICANT CHANGE UP (ref 3.5–5.3)
POTASSIUM SERPL-SCNC: 4.2 MMOL/L — SIGNIFICANT CHANGE UP (ref 3.5–5.3)
PROT SERPL-MCNC: 5.2 G/DL — LOW (ref 6–8.3)
RBC # BLD: 3.24 M/UL — LOW (ref 3.8–5.2)
RBC # FLD: 22.2 % — HIGH (ref 10.3–14.5)
SODIUM SERPL-SCNC: 141 MMOL/L — SIGNIFICANT CHANGE UP (ref 135–145)
WBC # BLD: 4.5 K/UL — SIGNIFICANT CHANGE UP (ref 3.8–10.5)
WBC # FLD AUTO: 4.5 K/UL — SIGNIFICANT CHANGE UP (ref 3.8–10.5)

## 2022-11-09 PROCEDURE — 99233 SBSQ HOSP IP/OBS HIGH 50: CPT

## 2022-11-09 RX ORDER — OXYCODONE HYDROCHLORIDE 5 MG/1
2.5 TABLET ORAL EVERY 4 HOURS
Refills: 0 | Status: DISCONTINUED | OUTPATIENT
Start: 2022-11-09 | End: 2022-11-11

## 2022-11-09 RX ORDER — GABAPENTIN 400 MG/1
100 CAPSULE ORAL
Refills: 0 | Status: DISCONTINUED | OUTPATIENT
Start: 2022-11-09 | End: 2022-11-11

## 2022-11-09 RX ORDER — SENNA PLUS 8.6 MG/1
2 TABLET ORAL AT BEDTIME
Refills: 0 | Status: DISCONTINUED | OUTPATIENT
Start: 2022-11-09 | End: 2022-11-11

## 2022-11-09 RX ADMIN — LEVETIRACETAM 500 MILLIGRAM(S): 250 TABLET, FILM COATED ORAL at 18:47

## 2022-11-09 RX ADMIN — MORPHINE SULFATE 1 MILLIGRAM(S): 50 CAPSULE, EXTENDED RELEASE ORAL at 13:00

## 2022-11-09 RX ADMIN — LOSARTAN POTASSIUM 100 MILLIGRAM(S): 100 TABLET, FILM COATED ORAL at 06:53

## 2022-11-09 RX ADMIN — MORPHINE SULFATE 1 MILLIGRAM(S): 50 CAPSULE, EXTENDED RELEASE ORAL at 07:30

## 2022-11-09 RX ADMIN — OXYCODONE HYDROCHLORIDE 2.5 MILLIGRAM(S): 5 TABLET ORAL at 22:34

## 2022-11-09 RX ADMIN — ESCITALOPRAM OXALATE 7.5 MILLIGRAM(S): 10 TABLET, FILM COATED ORAL at 12:10

## 2022-11-09 RX ADMIN — ENOXAPARIN SODIUM 50 MILLIGRAM(S): 100 INJECTION SUBCUTANEOUS at 06:56

## 2022-11-09 RX ADMIN — Medication 0.5 MILLIGRAM(S): at 21:34

## 2022-11-09 RX ADMIN — LEVETIRACETAM 500 MILLIGRAM(S): 250 TABLET, FILM COATED ORAL at 06:56

## 2022-11-09 RX ADMIN — MORPHINE SULFATE 1 MILLIGRAM(S): 50 CAPSULE, EXTENDED RELEASE ORAL at 12:09

## 2022-11-09 RX ADMIN — GABAPENTIN 100 MILLIGRAM(S): 400 CAPSULE ORAL at 18:47

## 2022-11-09 RX ADMIN — POLYETHYLENE GLYCOL 3350 17 GRAM(S): 17 POWDER, FOR SOLUTION ORAL at 12:17

## 2022-11-09 RX ADMIN — Medication 0.5 MILLIGRAM(S): at 14:27

## 2022-11-09 RX ADMIN — ENOXAPARIN SODIUM 50 MILLIGRAM(S): 100 INJECTION SUBCUTANEOUS at 18:47

## 2022-11-09 RX ADMIN — MORPHINE SULFATE 1 MILLIGRAM(S): 50 CAPSULE, EXTENDED RELEASE ORAL at 07:45

## 2022-11-09 RX ADMIN — OXYCODONE HYDROCHLORIDE 2.5 MILLIGRAM(S): 5 TABLET ORAL at 17:02

## 2022-11-09 RX ADMIN — Medication 325 MILLIGRAM(S): at 06:57

## 2022-11-09 RX ADMIN — Medication 0.5 MILLIGRAM(S): at 06:57

## 2022-11-09 RX ADMIN — Medication 90 MILLIGRAM(S): at 01:02

## 2022-11-09 RX ADMIN — OXYCODONE HYDROCHLORIDE 2.5 MILLIGRAM(S): 5 TABLET ORAL at 21:34

## 2022-11-09 NOTE — PROGRESS NOTE ADULT - NS ATTEND AMEND GEN_ALL_CORE FT
The patient is a 72-year-old female with a history of metastatic ovarian cancer, on chemo, with recent severe anemia requiring transfusion and thrombocytopenia admitted with left leg numbness/weakness after receiving blood transfusion. MRI showed no acute ischemia but was concerning for PRES- possibly related to labile BP vs chemo. MRI C spine negative. She was also found to have an extensive left lower extremity DVT, likely secondary to hypercoagulable state in the setting of active malignancy. Continue therapeutic Lovenox, monitoring anemia/thrombocytopenia. Leg weakness is slowly improvement, likely due to PRES. BP goal: Normotension.  Medically stable for d/c. The patient is a 72-year-old female with a history of metastatic ovarian cancer, on chemo, with recent severe anemia requiring transfusion and thrombocytopenia admitted with left leg numbness/weakness after receiving blood transfusion. MRI showed no acute ischemia but was concerning for PRES- possibly related to labile BP vs chemo. MRI C spine negative. She was also found to have an extensive left lower extremity DVT, likely secondary to hypercoagulable state in the setting of active malignancy. Continue therapeutic Lovenox. Leg weakness is slowly improvement, likely due to PRES. Continue Keppra- if mood is persistently low/tearful, will consider transitioning to Vimpat. BP goal: Normotension.  Medically stable for d/c.

## 2022-11-09 NOTE — PROGRESS NOTE ADULT - PROBLEM SELECTOR PLAN 4
Hgb 10-11, platelet approx 30k  s.p 2U PRBC and 1unit platelets in ER.  - trend hgb/platelets 10.2/32.6  - maintain active T&S  - transfuse if Hgb <7  - transfuse platelets if less than 50k or bleeding- @ 70K  - c/w iron supplements Hgb 10-11, platelet approx 30k. s/p 2U PRBC and 1unit platelets in ER.    - trend hgb/platelets 10.2/32.6  - maintain active T&S  - transfuse if Hgb <7  - transfuse platelets if less than 50k or bleeding- @ 70K  - c/w iron supplements

## 2022-11-09 NOTE — PROGRESS NOTE ADULT - PROBLEM SELECTOR PLAN 2
S/p 1 episode of GTC in ER that lasted ~1min, did not need ativan. S/p keppra load. Possibly related to ?ativan withdrawal, but seized within half a day of admit, so would expect patient to be on high dose of ativan at home which she is not. Patient also did not respond to questions about dosing/frequency of ativan when discussing with palliative team.   - EEG 11/2-11/3 with generalized slowing, no seizure activity   - C/w home ativan 0.5mg q8hr S/p 1 episode of GTC in ER that lasted ~1min, did not need ativan. S/p keppra load. Possibly related to ?ativan withdrawal, but seized within half a day of admit, so would expect patient to be on high dose of ativan at home which she is not. Patient also did not respond to questions about dosing/frequency of ativan when discussing with palliative team.     - EEG 11/2-11/3 with generalized slowing, no seizure activity   - C/w home ativan 0.5mg q8hr

## 2022-11-09 NOTE — PROGRESS NOTE ADULT - SUBJECTIVE AND OBJECTIVE BOX
Patient is a 72y old  Female who presents with a chief complaint of left leg weakness and decreased sensation (09 Nov 2022 14:33)      INTERVAL HPI/OVERNIGHT EVENTS:  Pt states she had severe LLE pain. Otherwise no pain or complaint.      REVIEW OF SYSTEMS:  CONSTITUTIONAL: No fever, weight loss, or fatigue  EYES: No eye pain, visual disturbances, or discharge  ENMT:  No difficulty hearing, tinnitus, vertigo; No sinus or throat pain  NECK: No pain or stiffness  RESPIRATORY: No cough, wheezing, chills or hemoptysis; No shortness of breath  CARDIOVASCULAR: No chest pain, palpitations, dizziness, or leg swelling  GASTROINTESTINAL: No abdominal or epigastric pain. No nausea, vomiting, or hematemesis; No diarrhea or constipation. No melena or hematochezia.  GENITOURINARY: No dysuria, frequency, hematuria, or incontinence  NEUROLOGICAL: No headaches, memory loss, loss of strength, (+) pain on L lower leg, no tremors  SKIN: No itching, burning, rashes, or lesions   LYMPH NODES: No enlarged glands  ENDOCRINE: No heat or cold intolerance; No hair loss  MUSCULOSKELETAL: No joint pain or swelling; No muscle, back, or extremity pain  HEME/LYMPH: No easy bruising, or bleeding gums  ALLERY AND IMMUNOLOGIC: No hives or eczema    FAMILY HISTORY:      Vital Signs Last 24 Hrs  T(C): 36.3 (09 Nov 2022 11:15), Max: 36.9 (09 Nov 2022 01:00)  T(F): 97.3 (09 Nov 2022 11:15), Max: 98.4 (09 Nov 2022 01:00)  HR: 93 (09 Nov 2022 11:15) (93 - 99)  BP: 117/74 (09 Nov 2022 11:15) (117/74 - 150/73)  BP(mean): 103 (08 Nov 2022 16:35) (103 - 103)  RR: 18 (09 Nov 2022 11:15) (15 - 18)  SpO2: 99% (09 Nov 2022 11:15) (93% - 99%)    Parameters below as of 09 Nov 2022 11:15  Patient On (Oxygen Delivery Method): room air        11-08-22 @ 07:01  -  11-09-22 @ 07:00  --------------------------------------------------------  IN: 250 mL / OUT: 320 mL / NET: -70 mL        PHYSICAL EXAM:  GENERAL: NAD, well-groomed, well-developed  HEAD:  Atraumatic, Normocephalic  EYES: EOMI, conjunctiva and sclera clear  ENMT: No tonsillar erythema, exudates, or enlargement; Moist mucous membranes  NECK: Supple, No JVD, Normal thyroid  NERVOUS SYSTEM:  Alert & awake, Good concentration; Moving all extremities (+) loss of sensation L<R on lower leg  CHEST/LUNG: No rales, rhonchi, wheezing, or rubs  HEART: Regular rate and rhythm; No murmurs, rubs, or gallops  ABDOMEN: Soft, Nontender, Nondistended; Bowel sounds present  EXTREMITIES:  2+ Peripheral Pulses, No clubbing, cyanosis, or edema  LYMPH: No lymphadenopathy noted  SKIN: No rashes or lesions          Consultant(s) Notes Reviewed:  [x ] YES  [ ] NO  Care Discussed with Consultants/Other Providers [ x] YES  [ ] NO    LABS:        RADIOLOGY & ADDITIONAL TESTS:    Imaging Personally Reviewed:  [ ] YES  [ ] NO  enoxaparin Injectable 50 milliGRAM(s) SubCutaneous every 12 hours  escitalopram 7.5 milliGRAM(s) Oral daily  ferrous    sulfate 325 milliGRAM(s) Oral <User Schedule>  levETIRAcetam 500 milliGRAM(s) Oral two times a day  LORazepam     Tablet 0.5 milliGRAM(s) Oral every 8 hours  losartan 100 milliGRAM(s) Oral daily  morphine  - Injectable 1 milliGRAM(s) IV Push every 4 hours PRN  NIFEdipine XL 90 milliGRAM(s) Oral every 24 hours  polyethylene glycol 3350 17 Gram(s) Oral daily      HEALTH ISSUES - PROBLEM Dx:  Bicytopenia    Ovarian cancer    Lactic acidosis    Leukocytosis    Hypokalemia    Debility    Hyponatremia    Encounter for palliative care    Seizure    Pain, unspecified    Elevated TSH    Essential hypertension    Left leg weakness    Anxiety    Seizure    DVT of lower limb, acute    Posterior reversible encephalopathy syndrome    Prophylactic measure    HTN (hypertension)

## 2022-11-09 NOTE — PROGRESS NOTE ADULT - SUBJECTIVE AND OBJECTIVE BOX
Hematology Oncology Progress Note      HPI:   **STROKE HPI***    HPI: 72y Female with PMHx of ?HTN, ovarian cancer on chemo (last treatment was 1 week ago), and recurrent anemia 2/2 chemotherapy, presented to the ER on 11/1 directly from oncologist for blood transfusion 2/2 Hgb 6.4, She received 2 units or PRBC's in the ER when she was trying to stand at time discharge (~0300) and noticed that her left leg was feeling numb and weak. Stroke code was called, NIHSS 3. NCHCT negative for hemorrhage, however, there is a patchy area of hypodensity along the right central sulcus which may be artifactual versus areas of age-indeterminate infarction.  CTA head and neck negative for significant steno-occlusive disease but does demonstrate extensive pulmonary metastatic disease. CT perfusion negative. Patient is not a tPA candidate 2/2 thrombocytopenia (repeat plt 36 after transfusion).    (02 Nov 2022 04:34)      SUBJECTIVE: Patient seen and examined at bedside. Still difficulty moving leg. Overall anxious. Denies chest pain or SOB.    OBJECTIVE:    VITAL SIGNS:  ICU Vital Signs Last 24 Hrs  T(C): 36.3 (09 Nov 2022 11:15), Max: 36.9 (09 Nov 2022 01:00)  T(F): 97.3 (09 Nov 2022 11:15), Max: 98.4 (09 Nov 2022 01:00)  HR: 93 (09 Nov 2022 11:15) (93 - 99)  BP: 117/74 (09 Nov 2022 11:15) (117/74 - 150/73)  BP(mean): 103 (08 Nov 2022 16:35) (103 - 103)  ABP: --  ABP(mean): --  RR: 18 (09 Nov 2022 11:15) (15 - 18)  SpO2: 99% (09 Nov 2022 11:15) (93% - 99%)    O2 Parameters below as of 09 Nov 2022 11:15  Patient On (Oxygen Delivery Method): room air              11-08 @ 07:01  -  11-09 @ 07:00  --------------------------------------------------------  IN: 250 mL / OUT: 320 mL / NET: -70 mL      CAPILLARY BLOOD GLUCOSE          PHYSICAL EXAM:  General: NAD, answering questions, pleasantly conversant, chronically ill appearing  HEENT: NC/AT; PERRL, clear conjunctiva  Neck: supple  Respiratory: CTA b/l  Cardiovascular: +S1/S2; RRR  Abdomen: soft, NT/ND; +BS x4  Extremities: WWP, 2+ peripheral pulses b/l; no LE edema  Skin: normal color and turgor; no rash  Neurological: AAOx3, left lower extremity motor weakness, barely able to lift against gravity. Sensation intact.    MEDICATIONS:  MEDICATIONS  (STANDING):  enoxaparin Injectable 50 milliGRAM(s) SubCutaneous every 12 hours  escitalopram 7.5 milliGRAM(s) Oral daily  ferrous    sulfate 325 milliGRAM(s) Oral <User Schedule>  levETIRAcetam 500 milliGRAM(s) Oral two times a day  LORazepam     Tablet 0.5 milliGRAM(s) Oral every 8 hours  losartan 100 milliGRAM(s) Oral daily  NIFEdipine XL 90 milliGRAM(s) Oral every 24 hours  polyethylene glycol 3350 17 Gram(s) Oral daily    MEDICATIONS  (PRN):  morphine  - Injectable 1 milliGRAM(s) IV Push every 4 hours PRN Moderate Pain (4 - 6)      ALLERGIES:  Allergies    Benadryl (Other)  Compazine (Unknown)    Intolerances        LABS:                        10.2   4.50  )-----------( 117      ( 09 Nov 2022 05:30 )             34.2     11-09    141  |  106  |  21  ----------------------------<  103<H>  4.2   |  26  |  1.44<H>    Ca    8.1<L>      09 Nov 2022 05:30  Phos  3.2     11-09  Mg     1.8     11-09    TPro  5.2<L>  /  Alb  2.4<L>  /  TBili  0.5  /  DBili  x   /  AST  33  /  ALT  15  /  AlkPhos  451<H>  11-09                    RADIOLOGY & ADDITIONAL TESTS: Reviewed.

## 2022-11-09 NOTE — PROGRESS NOTE ADULT - SUBJECTIVE AND OBJECTIVE BOX
** INCOMPLETE **    OVERNIGHT EVENTS:     SUBJECTIVE:    VITAL SIGNS:  Vital Signs Last 24 Hrs  T(C): 36.8 (09 Nov 2022 06:23), Max: 36.9 (09 Nov 2022 01:00)  T(F): 98.3 (09 Nov 2022 06:23), Max: 98.4 (09 Nov 2022 01:00)  HR: 94 (09 Nov 2022 06:23) (80 - 99)  BP: 128/72 (09 Nov 2022 06:23) (122/72 - 150/73)  BP(mean): 103 (08 Nov 2022 16:35) (89 - 103)  RR: 16 (09 Nov 2022 06:23) (15 - 18)  SpO2: 95% (09 Nov 2022 06:23) (93% - 96%)    Parameters below as of 09 Nov 2022 06:23  Patient On (Oxygen Delivery Method): room air        PHYSICAL EXAM:  General: NAD; speaking in full sentences  HEENT: NC/AT; PERRL; EOMI; MMM  Neck: supple; no JVD  Cardiac: RRR; +S1/S2  Pulm: CTA B/L; no W/R/R  GI: soft, NT/ND, +BS  Extremities:  no edema, clubbing or cyanosis  Vasc: 2+ radial, DP pulses B/L  Neuro: AAOx3; no focal deficits    MEDICATIONS:  MEDICATIONS  (STANDING):  enoxaparin Injectable 50 milliGRAM(s) SubCutaneous every 12 hours  escitalopram 7.5 milliGRAM(s) Oral daily  ferrous    sulfate 325 milliGRAM(s) Oral <User Schedule>  levETIRAcetam 500 milliGRAM(s) Oral two times a day  LORazepam     Tablet 0.5 milliGRAM(s) Oral every 8 hours  losartan 100 milliGRAM(s) Oral daily  NIFEdipine XL 90 milliGRAM(s) Oral every 24 hours  polyethylene glycol 3350 17 Gram(s) Oral daily    MEDICATIONS  (PRN):  morphine  - Injectable 1 milliGRAM(s) IV Push every 4 hours PRN Moderate Pain (4 - 6)      ALLERGIES:  Allergies    Benadryl (Other)  Compazine (Unknown)    Intolerances        LABS:                        11.1   3.26  )-----------( 88       ( 08 Nov 2022 06:00 )             35.9     11-08    139  |  106  |  22  ----------------------------<  110<H>  4.5   |  26  |  1.44<H>    Ca    8.2<L>      08 Nov 2022 06:00  Mg     1.8     11-08          RADIOLOGY & ADDITIONAL TESTS: Reviewed. OVERNIGHT EVENTS:   No acute events overnight.    SUBJECTIVE:  Pt seen and examined at bedside. Pt feels anxious about her condition but otherwise feels okay. Continues to be unable to move her left leg but has intact sensation. No CP, dyspnea, abd pain, N/V/D.     VITAL SIGNS:  Vital Signs Last 24 Hrs  T(C): 36.8 (09 Nov 2022 06:23), Max: 36.9 (09 Nov 2022 01:00)  T(F): 98.3 (09 Nov 2022 06:23), Max: 98.4 (09 Nov 2022 01:00)  HR: 94 (09 Nov 2022 06:23) (80 - 99)  BP: 128/72 (09 Nov 2022 06:23) (122/72 - 150/73)  BP(mean): 103 (08 Nov 2022 16:35) (89 - 103)  RR: 16 (09 Nov 2022 06:23) (15 - 18)  SpO2: 95% (09 Nov 2022 06:23) (93% - 96%)    Parameters below as of 09 Nov 2022 06:23  Patient On (Oxygen Delivery Method): room air        PHYSICAL EXAM:  General: elderly thin female, NAD; speaking in full sentences  HEENT: NC/AT; PERRL; EOMI; MMM  Neck: supple; no JVD  Cardiac: RRR; +S1/S2  Pulm: CTA B/L; no W/R/R  GI: soft, NT/ND, +BS  Extremities:  no edema, clubbing or cyanosis  Vasc: 2+ radial, DP pulses B/L  Neuro: AAOx3; sensation intact throughout. Strength: LLE with 0/5, RLE 4/5, b/l 4/5.     MEDICATIONS:  MEDICATIONS  (STANDING):  enoxaparin Injectable 50 milliGRAM(s) SubCutaneous every 12 hours  escitalopram 7.5 milliGRAM(s) Oral daily  ferrous    sulfate 325 milliGRAM(s) Oral <User Schedule>  levETIRAcetam 500 milliGRAM(s) Oral two times a day  LORazepam     Tablet 0.5 milliGRAM(s) Oral every 8 hours  losartan 100 milliGRAM(s) Oral daily  NIFEdipine XL 90 milliGRAM(s) Oral every 24 hours  polyethylene glycol 3350 17 Gram(s) Oral daily    MEDICATIONS  (PRN):  morphine  - Injectable 1 milliGRAM(s) IV Push every 4 hours PRN Moderate Pain (4 - 6)      ALLERGIES:  Allergies    Benadryl (Other)  Compazine (Unknown)    Intolerances        LABS:                        11.1   3.26  )-----------( 88       ( 08 Nov 2022 06:00 )             35.9     11-08    139  |  106  |  22  ----------------------------<  110<H>  4.5   |  26  |  1.44<H>    Ca    8.2<L>      08 Nov 2022 06:00  Mg     1.8     11-08          RADIOLOGY & ADDITIONAL TESTS: Reviewed. OVERNIGHT EVENTS:   No acute events overnight.    SUBJECTIVE:  Pt seen and examined at bedside. Pt feels anxious about her condition but otherwise feels okay. Endorses left lower extremity pain. Continues to be unable to move her left leg but has intact sensation. No CP, dyspnea, abd pain, N/V/D.     VITAL SIGNS:  Vital Signs Last 24 Hrs  T(C): 36.8 (09 Nov 2022 06:23), Max: 36.9 (09 Nov 2022 01:00)  T(F): 98.3 (09 Nov 2022 06:23), Max: 98.4 (09 Nov 2022 01:00)  HR: 94 (09 Nov 2022 06:23) (80 - 99)  BP: 128/72 (09 Nov 2022 06:23) (122/72 - 150/73)  BP(mean): 103 (08 Nov 2022 16:35) (89 - 103)  RR: 16 (09 Nov 2022 06:23) (15 - 18)  SpO2: 95% (09 Nov 2022 06:23) (93% - 96%)    Parameters below as of 09 Nov 2022 06:23  Patient On (Oxygen Delivery Method): room air        PHYSICAL EXAM:  General: elderly thin female, NAD; speaking in full sentences  HEENT: NC/AT; PERRL; EOMI; MMM  Neck: supple; no JVD  Cardiac: RRR; +S1/S2  Pulm: CTA B/L; no W/R/R  GI: soft, NT/ND, +BS  Extremities:  no edema, clubbing or cyanosis  Vasc: 2+ radial, DP pulses B/L  Neuro: AAOx3; sensation intact throughout. Strength: LLE with 0/5, RLE 4/5, b/l 4/5.     MEDICATIONS:  MEDICATIONS  (STANDING):  enoxaparin Injectable 50 milliGRAM(s) SubCutaneous every 12 hours  escitalopram 7.5 milliGRAM(s) Oral daily  ferrous    sulfate 325 milliGRAM(s) Oral <User Schedule>  levETIRAcetam 500 milliGRAM(s) Oral two times a day  LORazepam     Tablet 0.5 milliGRAM(s) Oral every 8 hours  losartan 100 milliGRAM(s) Oral daily  NIFEdipine XL 90 milliGRAM(s) Oral every 24 hours  polyethylene glycol 3350 17 Gram(s) Oral daily    MEDICATIONS  (PRN):  morphine  - Injectable 1 milliGRAM(s) IV Push every 4 hours PRN Moderate Pain (4 - 6)      ALLERGIES:  Allergies    Benadryl (Other)  Compazine (Unknown)    Intolerances        LABS:                        11.1   3.26  )-----------( 88       ( 08 Nov 2022 06:00 )             35.9     11-08    139  |  106  |  22  ----------------------------<  110<H>  4.5   |  26  |  1.44<H>    Ca    8.2<L>      08 Nov 2022 06:00  Mg     1.8     11-08          RADIOLOGY & ADDITIONAL TESTS: Reviewed.

## 2022-11-09 NOTE — PROGRESS NOTE ADULT - ASSESSMENT
72y Female with PMHx of ?HTN, ovarian cancer on chemo (last treatment was 1 week ago), and recurrent anemia 2/2 chemotherapy, presented to the ER on 11/1 directly from oncologist for blood transfusion 2/2 Hgb 6.4 s/p 2 unite PRBC. Pt later developed left leg was numb and weak.  Pt had an MRI Brain which was consistent with PRES.       #PRES  #L leg numbness  - MRI showed PRES with edema, no infarction or hemorrhage  - continue BP control     #HTN  - On nifedipine and losartan, BP controlled well    #thrombocytopenia  - s/p 1plt transfusion  - Plt 117. No signs of bleeding. Monitor plt daily   - transfuse for platelets <50k while on anticoagulation per heme    #LLE DVT  - On lovenox full dose for AC  - Should continue lovenox SC upon DC per heme/onc    #Anemia  - s/p 2U PRBC, stable at 10-11  - monitor CBC    #Seizure  - c/w keppra    # Anxiety  - c/w ativan    #CKD  - Cr stable around 1.40. Avoid nephrotoxins. monitor BMP    #Ovarian ca  - plan for further chemotherapy outpatient with Dr. Hidalgo per heme    Plan to send pt to Acute rehab. Awaiting pt's choice and process 72y Female with PMHx of ?HTN, ovarian cancer on chemo (last treatment was 1 week ago), and recurrent anemia 2/2 chemotherapy, presented to the ER on 11/1 directly from oncologist for blood transfusion 2/2 Hgb 6.4 s/p 2 unite PRBC. Pt later developed left leg was numb and weak.  Pt had an MRI Brain which was consistent with PRES.       #PRES  #L leg numbness  - MRI showed PRES with edema, no infarction or hemorrhage  - continue BP control     #LLE pain  - On morphine IV PRN per palliative rec  - Recommend to start standing pain meds, can try tylenol PO q6h standing or gabapentin 100mg BID.  - Consider consult pain management    #HTN  - On nifedipine and losartan, BP controlled well    #thrombocytopenia  - s/p 1plt transfusion  - Plt 117. No signs of bleeding. Monitor plt daily   - transfuse for platelets <50k while on anticoagulation per heme    #LLE DVT  - On lovenox full dose for AC  - Should continue lovenox SC upon DC per heme/onc    #Anemia  - s/p 2U PRBC, stable at 10-11  - monitor CBC    #Seizure  - c/w keppra    # Anxiety  - c/w ativan    #CKD  - Cr stable around 1.40. Avoid nephrotoxins. monitor BMP    #Ovarian ca  - plan for further chemotherapy outpatient with Dr. Hidalgo per heme    Plan to send pt to Acute rehab. Awaiting pt's choice and process

## 2022-11-09 NOTE — PROGRESS NOTE ADULT - PROBLEM SELECTOR PLAN 3
Unclear etiology of elevated pressures. Thought to be labile pressure in outpatient setting (however  and pt reports BP reading 130-140. Patient also received Ativan 1 month ago, associated with PRES  - goal normotension (110-160's prefer more towards 140's).  - Cont losartan 100mg daily and nifedipine uptitrated to 90mg daily to keep BP within goal (110-160's)  - TTE: no pfo, EF 60% Unclear etiology of elevated pressures. Thought to be labile pressure in outpatient setting (however  and pt reports BP reading 130-140. Patient also received Ativan 1 month ago, associated with PRES    - goal normotension (110-160's prefer more towards 140's).  - Cont losartan 100mg daily and nifedipine uptitrated to 90mg daily to keep BP within goal (110-160's)  - TTE: no pfo, EF 60%

## 2022-11-09 NOTE — PROGRESS NOTE ADULT - PROBLEM SELECTOR PLAN 5
Follows w/ Dr. Hidalgo [phone]. Had chemo treatment a week prior to presentation, anemic secondary to chemo tx. Known mets to liver, lung and peritoneum with radiofrequency ablation of liver lesions as MSK. Chemotherapy treatment 1 week before hospital presentation  - Dr. Hidalgo following and will continue with chem tx outpatient Follows w/ Dr. Hidalgo [phone]. Had chemo treatment a week prior to presentation, anemic secondary to chemo tx. Known mets to liver, lung and peritoneum with radiofrequency ablation of liver lesions as MSK. Chemotherapy treatment 1 week before hospital presentation    - Dr. Hidalgo following and will continue with chem tx outpatient

## 2022-11-09 NOTE — PROGRESS NOTE ADULT - PROBLEM SELECTOR PLAN 1
Initially admitted for r/o stroke due to left leg weakness. MRI head with PRES, no infarct nor mets present.  - Stroke Code HCT Results: Patchy area of hypodensity along the right central sulcus which may be artifactual versus areas of age-indeterminate infarction.  - Stroke Code CTA Results: negative for steno-occlusive disease. Demonstrates extensive pulmonary metastatic disease.  - MRI cervical - mild cervical disks degenerative changes without any cord compression.    Plan:  - C/W Therapeutic SQL i/s/o LLE DVT with active malignancy, tolerated well  - Maintain SBP <160; started on Nifadipine this admission and increased losartan to 100mg qd. Initially admitted for r/o stroke due to left leg weakness. MRI head with PRES, no infarct nor mets present.  - Stroke Code HCT Results: Patchy area of hypodensity along the right central sulcus which may be artifactual versus areas of age-indeterminate infarction.  - Stroke Code CTA Results: negative for steno-occlusive disease. Demonstrates extensive pulmonary metastatic disease.  - MRI cervical - mild cervical disks degenerative changes without any cord compression.    Plan:  - C/W Therapeutic SQL i/s/o LLE DVT with active malignancy, tolerated well  - Maintain SBP <160; started on Nifedipine this admission and increased losartan to 100mg qd.

## 2022-11-10 ENCOUNTER — TRANSCRIPTION ENCOUNTER (OUTPATIENT)
Age: 72
End: 2022-11-10

## 2022-11-10 LAB
ANION GAP SERPL CALC-SCNC: 7 MMOL/L — SIGNIFICANT CHANGE UP (ref 5–17)
BUN SERPL-MCNC: 20 MG/DL — SIGNIFICANT CHANGE UP (ref 7–23)
CALCIUM SERPL-MCNC: 8.1 MG/DL — LOW (ref 8.4–10.5)
CHLORIDE SERPL-SCNC: 103 MMOL/L — SIGNIFICANT CHANGE UP (ref 96–108)
CO2 SERPL-SCNC: 25 MMOL/L — SIGNIFICANT CHANGE UP (ref 22–31)
CREAT SERPL-MCNC: 1.47 MG/DL — HIGH (ref 0.5–1.3)
EGFR: 37 ML/MIN/1.73M2 — LOW
GLUCOSE SERPL-MCNC: 116 MG/DL — HIGH (ref 70–99)
HCT VFR BLD CALC: 32.4 % — LOW (ref 34.5–45)
HGB BLD-MCNC: 10 G/DL — LOW (ref 11.5–15.5)
MAGNESIUM SERPL-MCNC: 1.9 MG/DL — SIGNIFICANT CHANGE UP (ref 1.6–2.6)
MCHC RBC-ENTMCNC: 30.9 GM/DL — LOW (ref 32–36)
MCHC RBC-ENTMCNC: 32.2 PG — SIGNIFICANT CHANGE UP (ref 27–34)
MCV RBC AUTO: 104.2 FL — HIGH (ref 80–100)
NRBC # BLD: 0 /100 WBCS — SIGNIFICANT CHANGE UP (ref 0–0)
PHOSPHATE SERPL-MCNC: 3.3 MG/DL — SIGNIFICANT CHANGE UP (ref 2.5–4.5)
PLATELET # BLD AUTO: 133 K/UL — LOW (ref 150–400)
POTASSIUM SERPL-MCNC: 4.1 MMOL/L — SIGNIFICANT CHANGE UP (ref 3.5–5.3)
POTASSIUM SERPL-SCNC: 4.1 MMOL/L — SIGNIFICANT CHANGE UP (ref 3.5–5.3)
RBC # BLD: 3.11 M/UL — LOW (ref 3.8–5.2)
RBC # FLD: 21.8 % — HIGH (ref 10.3–14.5)
SODIUM SERPL-SCNC: 135 MMOL/L — SIGNIFICANT CHANGE UP (ref 135–145)
WBC # BLD: 7.9 K/UL — SIGNIFICANT CHANGE UP (ref 3.8–10.5)
WBC # FLD AUTO: 7.9 K/UL — SIGNIFICANT CHANGE UP (ref 3.8–10.5)

## 2022-11-10 PROCEDURE — 99233 SBSQ HOSP IP/OBS HIGH 50: CPT

## 2022-11-10 RX ORDER — LOSARTAN POTASSIUM 100 MG/1
100 TABLET, FILM COATED ORAL EVERY 24 HOURS
Refills: 0 | Status: DISCONTINUED | OUTPATIENT
Start: 2022-11-11 | End: 2022-11-11

## 2022-11-10 RX ADMIN — Medication 0.5 MILLIGRAM(S): at 05:40

## 2022-11-10 RX ADMIN — LEVETIRACETAM 500 MILLIGRAM(S): 250 TABLET, FILM COATED ORAL at 05:39

## 2022-11-10 RX ADMIN — OXYCODONE HYDROCHLORIDE 2.5 MILLIGRAM(S): 5 TABLET ORAL at 23:30

## 2022-11-10 RX ADMIN — OXYCODONE HYDROCHLORIDE 2.5 MILLIGRAM(S): 5 TABLET ORAL at 22:53

## 2022-11-10 RX ADMIN — GABAPENTIN 100 MILLIGRAM(S): 400 CAPSULE ORAL at 05:39

## 2022-11-10 RX ADMIN — SENNA PLUS 2 TABLET(S): 8.6 TABLET ORAL at 22:25

## 2022-11-10 RX ADMIN — ESCITALOPRAM OXALATE 7.5 MILLIGRAM(S): 10 TABLET, FILM COATED ORAL at 12:09

## 2022-11-10 RX ADMIN — Medication 90 MILLIGRAM(S): at 00:57

## 2022-11-10 RX ADMIN — Medication 0.5 MILLIGRAM(S): at 22:26

## 2022-11-10 RX ADMIN — ENOXAPARIN SODIUM 50 MILLIGRAM(S): 100 INJECTION SUBCUTANEOUS at 05:39

## 2022-11-10 RX ADMIN — Medication 90 MILLIGRAM(S): at 22:25

## 2022-11-10 RX ADMIN — Medication 0.5 MILLIGRAM(S): at 13:49

## 2022-11-10 RX ADMIN — LOSARTAN POTASSIUM 100 MILLIGRAM(S): 100 TABLET, FILM COATED ORAL at 05:39

## 2022-11-10 RX ADMIN — GABAPENTIN 100 MILLIGRAM(S): 400 CAPSULE ORAL at 17:54

## 2022-11-10 RX ADMIN — LEVETIRACETAM 500 MILLIGRAM(S): 250 TABLET, FILM COATED ORAL at 17:54

## 2022-11-10 RX ADMIN — ENOXAPARIN SODIUM 50 MILLIGRAM(S): 100 INJECTION SUBCUTANEOUS at 17:54

## 2022-11-10 RX ADMIN — POLYETHYLENE GLYCOL 3350 17 GRAM(S): 17 POWDER, FOR SOLUTION ORAL at 12:10

## 2022-11-10 NOTE — PROGRESS NOTE ADULT - ASSESSMENT
72y Female with PMHx of ?HTN, ovarian cancer on chemo (last treatment was 1 week ago), and recurrent anemia 2/2 chemotherapy, presented to the ER on 11/1 directly from oncologist for blood transfusion 2/2 Hgb 6.4 s/p 2 unite PRBC. Pt later developed left leg was numb and weak.  Pt had an MRI Brain which was consistent with PRES.       #PRES  #L leg numbness and   - MRI showed PRES with edema, no infarction or hemorrhage  - continue BP control     #LLE pain  - Dcd morphine IV, started gabapentin 100mg BID. It's improving but pain is still there per pt.  - Recommend to increase gabapentin to 200mg BID. If pt requires PRN pain meds during the day, can change to 100mg TID.  - Monitor mental status, if pt has mental change, hold off gabapentin    #Constipation  - pt doesn't have BM for few days  - Try milk of magnesia and monitor BMs    #HTN  - On nifedipine and losartan, BP controlled well    #thrombocytopenia  - s/p 1plt transfusion  - Plt 133. No signs of bleeding. Monitor plt daily   - transfuse for platelets <50k while on anticoagulation per heme    #LLE DVT  - On lovenox full dose for AC  - Should continue lovenox SC upon DC per heme/onc    #Anemia  - s/p 2U PRBC, stable at 10-11  - monitor CBC    #Seizure  - c/w keppra    # Anxiety  - c/w ativan    #CKD  - Cr stable around 1.40. Avoid nephrotoxins. monitor BMP    #Ovarian ca  - plan for further chemotherapy outpatient with Dr. Hidalgo per heme    Plan to send pt to Acute rehab vs [facility]. Awaiting  process

## 2022-11-10 NOTE — DISCHARGE NOTE PROVIDER - CARE PROVIDER_API CALL
Melani Hidalgo  MEDICAL ONCOLOGY  [address] [place] [address]  Phone: [phone]  Fax: [phone]  Follow Up Time:    Melani Hidalgo  MEDICAL ONCOLOGY  [address] [place] [address]  Phone: [phone]  Fax: [phone]  Scheduled Appointment: 11/22/2022 11:30 AM

## 2022-11-10 NOTE — PROGRESS NOTE ADULT - SUBJECTIVE AND OBJECTIVE BOX
** INCOMPLETE **    OVERNIGHT EVENTS:     SUBJECTIVE:    VITAL SIGNS:  Vital Signs Last 24 Hrs  T(C): 37.1 (10 Nov 2022 05:24), Max: 37.1 (10 Nov 2022 05:24)  T(F): 98.8 (10 Nov 2022 05:24), Max: 98.8 (10 Nov 2022 05:24)  HR: 70 (10 Nov 2022 05:24) (67 - 102)  BP: 149/84 (10 Nov 2022 05:24) (117/74 - 166/91)  BP(mean): --  RR: 17 (10 Nov 2022 05:24) (17 - 18)  SpO2: 94% (10 Nov 2022 05:24) (94% - 99%)    Parameters below as of 10 Nov 2022 05:24  Patient On (Oxygen Delivery Method): room air        PHYSICAL EXAM:  General: NAD; speaking in full sentences  HEENT: NC/AT; PERRL; EOMI; MMM  Neck: supple; no JVD  Cardiac: RRR; +S1/S2  Pulm: CTA B/L; no W/R/R  GI: soft, NT/ND, +BS  Extremities:  no edema, clubbing or cyanosis  Vasc: 2+ radial, DP pulses B/L  Neuro: AAOx3; no focal deficits    MEDICATIONS:  MEDICATIONS  (STANDING):  enoxaparin Injectable 50 milliGRAM(s) SubCutaneous every 12 hours  escitalopram 7.5 milliGRAM(s) Oral daily  ferrous    sulfate 325 milliGRAM(s) Oral <User Schedule>  gabapentin 100 milliGRAM(s) Oral two times a day  levETIRAcetam 500 milliGRAM(s) Oral two times a day  LORazepam     Tablet 0.5 milliGRAM(s) Oral every 8 hours  losartan 100 milliGRAM(s) Oral daily  NIFEdipine XL 90 milliGRAM(s) Oral every 24 hours  polyethylene glycol 3350 17 Gram(s) Oral daily  senna 2 Tablet(s) Oral at bedtime    MEDICATIONS  (PRN):  oxyCODONE    IR 2.5 milliGRAM(s) Oral every 4 hours PRN Moderate Pain (4 - 6)      ALLERGIES:  Allergies    Benadryl (Other)  Compazine (Unknown)    Intolerances        LABS:                        x      7.90  )-----------( x        ( 10 Nov 2022 07:08 )             x        11-09    141  |  106  |  21  ----------------------------<  103<H>  4.2   |  26  |  1.44<H>    Ca    8.1<L>      09 Nov 2022 05:30  Phos  3.2     11-09  Mg     1.8     11-09    TPro  5.2<L>  /  Alb  2.4<L>  /  TBili  0.5  /  DBili  x   /  AST  33  /  ALT  15  /  AlkPhos  451<H>  11-09        RADIOLOGY & ADDITIONAL TESTS: Reviewed. OVERNIGHT EVENTS:   No acute events overnight.    SUBJECTIVE:  Pt seen and examined at bedside. Resting comfortably in bed in NAD. Pt has no complaints.     VITAL SIGNS:  Vital Signs Last 24 Hrs  T(C): 37.1 (10 Nov 2022 05:24), Max: 37.1 (10 Nov 2022 05:24)  T(F): 98.8 (10 Nov 2022 05:24), Max: 98.8 (10 Nov 2022 05:24)  HR: 70 (10 Nov 2022 05:24) (67 - 102)  BP: 149/84 (10 Nov 2022 05:24) (117/74 - 166/91)  BP(mean): --  RR: 17 (10 Nov 2022 05:24) (17 - 18)  SpO2: 94% (10 Nov 2022 05:24) (94% - 99%)    Parameters below as of 10 Nov 2022 05:24  Patient On (Oxygen Delivery Method): room air        PHYSICAL EXAM:  General: NAD; speaking in full sentences  HEENT: NC/AT; PERRL; EOMI; MMM  Neck: supple; no JVD  Cardiac: RRR; +S1/S2  Pulm: CTA B/L; no W/R/R  GI: soft, NT/ND, +BS  Extremities:  no edema, clubbing or cyanosis  Vasc: 2+ radial, DP pulses B/L  Neuro: AAOx3; sensation intact throughout; strength 0/5 LLE, strength 4/5 RLE     MEDICATIONS:  MEDICATIONS  (STANDING):  enoxaparin Injectable 50 milliGRAM(s) SubCutaneous every 12 hours  escitalopram 7.5 milliGRAM(s) Oral daily  ferrous    sulfate 325 milliGRAM(s) Oral <User Schedule>  gabapentin 100 milliGRAM(s) Oral two times a day  levETIRAcetam 500 milliGRAM(s) Oral two times a day  LORazepam     Tablet 0.5 milliGRAM(s) Oral every 8 hours  losartan 100 milliGRAM(s) Oral daily  NIFEdipine XL 90 milliGRAM(s) Oral every 24 hours  polyethylene glycol 3350 17 Gram(s) Oral daily  senna 2 Tablet(s) Oral at bedtime    MEDICATIONS  (PRN):  oxyCODONE    IR 2.5 milliGRAM(s) Oral every 4 hours PRN Moderate Pain (4 - 6)      ALLERGIES:  Allergies    Benadryl (Other)  Compazine (Unknown)    Intolerances        LABS:                        x      7.90  )-----------( x        ( 10 Nov 2022 07:08 )             x        11-09    141  |  106  |  21  ----------------------------<  103<H>  4.2   |  26  |  1.44<H>    Ca    8.1<L>      09 Nov 2022 05:30  Phos  3.2     11-09  Mg     1.8     11-09    TPro  5.2<L>  /  Alb  2.4<L>  /  TBili  0.5  /  DBili  x   /  AST  33  /  ALT  15  /  AlkPhos  451<H>  11-09        RADIOLOGY & ADDITIONAL TESTS: Reviewed. **INCOMPLETE  OVERNIGHT EVENTS:   No acute events overnight.    SUBJECTIVE:  Pt seen and examined at bedside. Resting comfortably in bed in NAD. Pt has no complaints.     VITAL SIGNS:  Vital Signs Last 24 Hrs  T(C): 37.1 (10 Nov 2022 05:24), Max: 37.1 (10 Nov 2022 05:24)  T(F): 98.8 (10 Nov 2022 05:24), Max: 98.8 (10 Nov 2022 05:24)  HR: 70 (10 Nov 2022 05:24) (67 - 102)  BP: 149/84 (10 Nov 2022 05:24) (117/74 - 166/91)  BP(mean): --  RR: 17 (10 Nov 2022 05:24) (17 - 18)  SpO2: 94% (10 Nov 2022 05:24) (94% - 99%)    Parameters below as of 10 Nov 2022 05:24  Patient On (Oxygen Delivery Method): room air        PHYSICAL EXAM:  General: NAD; speaking in full sentences  HEENT: NC/AT; PERRL; EOMI; MMM  Neck: supple; no JVD  Cardiac: RRR; +S1/S2  Pulm: CTA B/L; no W/R/R  GI: soft, NT/ND, +BS  Extremities:  no edema, clubbing or cyanosis  Vasc: 2+ radial, DP pulses B/L  Neuro: AAOx3;     MEDICATIONS:  MEDICATIONS  (STANDING):  enoxaparin Injectable 50 milliGRAM(s) SubCutaneous every 12 hours  escitalopram 7.5 milliGRAM(s) Oral daily  ferrous    sulfate 325 milliGRAM(s) Oral <User Schedule>  gabapentin 100 milliGRAM(s) Oral two times a day  levETIRAcetam 500 milliGRAM(s) Oral two times a day  LORazepam     Tablet 0.5 milliGRAM(s) Oral every 8 hours  losartan 100 milliGRAM(s) Oral daily  NIFEdipine XL 90 milliGRAM(s) Oral every 24 hours  polyethylene glycol 3350 17 Gram(s) Oral daily  senna 2 Tablet(s) Oral at bedtime    MEDICATIONS  (PRN):  oxyCODONE    IR 2.5 milliGRAM(s) Oral every 4 hours PRN Moderate Pain (4 - 6)      ALLERGIES:  Allergies    Benadryl (Other)  Compazine (Unknown)    Intolerances        LABS:                        x      7.90  )-----------( x        ( 10 Nov 2022 07:08 )             x        11-09    141  |  106  |  21  ----------------------------<  103<H>  4.2   |  26  |  1.44<H>    Ca    8.1<L>      09 Nov 2022 05:30  Phos  3.2     11-09  Mg     1.8     11-09    TPro  5.2<L>  /  Alb  2.4<L>  /  TBili  0.5  /  DBili  x   /  AST  33  /  ALT  15  /  AlkPhos  451<H>  11-09        RADIOLOGY & ADDITIONAL TESTS: Reviewed. **INCOMPLETE  OVERNIGHT EVENTS:   No acute events overnight.    SUBJECTIVE:  Pt seen and examined at bedside. Resting comfortably in bed in NAD. Pt has no pain to LLE currently; significantly improved from yesterday. Improved mood compared to yesterday. Pt has no complaints.     VITAL SIGNS:  Vital Signs Last 24 Hrs  T(C): 37.1 (10 Nov 2022 05:24), Max: 37.1 (10 Nov 2022 05:24)  T(F): 98.8 (10 Nov 2022 05:24), Max: 98.8 (10 Nov 2022 05:24)  HR: 70 (10 Nov 2022 05:24) (67 - 102)  BP: 149/84 (10 Nov 2022 05:24) (117/74 - 166/91)  BP(mean): --  RR: 17 (10 Nov 2022 05:24) (17 - 18)  SpO2: 94% (10 Nov 2022 05:24) (94% - 99%)    Parameters below as of 10 Nov 2022 05:24  Patient On (Oxygen Delivery Method): room air        PHYSICAL EXAM:  General: NAD; speaking in full sentences  HEENT: NC/AT; PERRL; EOMI; MMM  Neck: supple; no JVD  Cardiac: RRR; +S1/S2  Pulm: CTA B/L; no W/R/R  GI: soft, NT/ND, +BS  Extremities:  no edema, clubbing or cyanosis  Vasc: 2+ radial, DP pulses B/L  Neuro: AAOx3;     MEDICATIONS:  MEDICATIONS  (STANDING):  enoxaparin Injectable 50 milliGRAM(s) SubCutaneous every 12 hours  escitalopram 7.5 milliGRAM(s) Oral daily  ferrous    sulfate 325 milliGRAM(s) Oral <User Schedule>  gabapentin 100 milliGRAM(s) Oral two times a day  levETIRAcetam 500 milliGRAM(s) Oral two times a day  LORazepam     Tablet 0.5 milliGRAM(s) Oral every 8 hours  losartan 100 milliGRAM(s) Oral daily  NIFEdipine XL 90 milliGRAM(s) Oral every 24 hours  polyethylene glycol 3350 17 Gram(s) Oral daily  senna 2 Tablet(s) Oral at bedtime    MEDICATIONS  (PRN):  oxyCODONE    IR 2.5 milliGRAM(s) Oral every 4 hours PRN Moderate Pain (4 - 6)      ALLERGIES:  Allergies    Benadryl (Other)  Compazine (Unknown)    Intolerances        LABS:                        x      7.90  )-----------( x        ( 10 Nov 2022 07:08 )             x        11-09    141  |  106  |  21  ----------------------------<  103<H>  4.2   |  26  |  1.44<H>    Ca    8.1<L>      09 Nov 2022 05:30  Phos  3.2     11-09  Mg     1.8     11-09    TPro  5.2<L>  /  Alb  2.4<L>  /  TBili  0.5  /  DBili  x   /  AST  33  /  ALT  15  /  AlkPhos  451<H>  11-09        RADIOLOGY & ADDITIONAL TESTS: Reviewed.   OVERNIGHT EVENTS:   No acute events overnight.    SUBJECTIVE:  Pt seen and examined at bedside. Resting comfortably in bed in NAD. Pt has no pain to LLE currently; significantly improved from yesterday. Improved mood compared to yesterday. Pt has no complaints.     VITAL SIGNS:  Vital Signs Last 24 Hrs  T(C): 37.1 (10 Nov 2022 05:24), Max: 37.1 (10 Nov 2022 05:24)  T(F): 98.8 (10 Nov 2022 05:24), Max: 98.8 (10 Nov 2022 05:24)  HR: 70 (10 Nov 2022 05:24) (67 - 102)  BP: 149/84 (10 Nov 2022 05:24) (117/74 - 166/91)  BP(mean): --  RR: 17 (10 Nov 2022 05:24) (17 - 18)  SpO2: 94% (10 Nov 2022 05:24) (94% - 99%)    Parameters below as of 10 Nov 2022 05:24  Patient On (Oxygen Delivery Method): room air        PHYSICAL EXAM:  General: NAD; speaking in full sentences  HEENT: NC/AT; PERRL; EOMI; MMM  Neck: supple; no JVD  Cardiac: RRR; +S1/S2  Pulm: CTA B/L; no W/R/R  GI: soft, NT/ND, +BS  Extremities:  no edema, clubbing or cyanosis  Vasc: 2+ radial, DP pulses B/L  Neuro: AAOx3; no pain to b/l LEs; strength 4/5 RLE, 1/5 to LLE, 4/5 to b/l UEs    MEDICATIONS:  MEDICATIONS  (STANDING):  enoxaparin Injectable 50 milliGRAM(s) SubCutaneous every 12 hours  escitalopram 7.5 milliGRAM(s) Oral daily  ferrous    sulfate 325 milliGRAM(s) Oral <User Schedule>  gabapentin 100 milliGRAM(s) Oral two times a day  levETIRAcetam 500 milliGRAM(s) Oral two times a day  LORazepam     Tablet 0.5 milliGRAM(s) Oral every 8 hours  losartan 100 milliGRAM(s) Oral daily  NIFEdipine XL 90 milliGRAM(s) Oral every 24 hours  polyethylene glycol 3350 17 Gram(s) Oral daily  senna 2 Tablet(s) Oral at bedtime    MEDICATIONS  (PRN):  oxyCODONE    IR 2.5 milliGRAM(s) Oral every 4 hours PRN Moderate Pain (4 - 6)      ALLERGIES:  Allergies    Benadryl (Other)  Compazine (Unknown)    Intolerances        LABS:                        x      7.90  )-----------( x        ( 10 Nov 2022 07:08 )             x        11-09    141  |  106  |  21  ----------------------------<  103<H>  4.2   |  26  |  1.44<H>    Ca    8.1<L>      09 Nov 2022 05:30  Phos  3.2     11-09  Mg     1.8     11-09    TPro  5.2<L>  /  Alb  2.4<L>  /  TBili  0.5  /  DBili  x   /  AST  33  /  ALT  15  /  AlkPhos  451<H>  11-09        RADIOLOGY & ADDITIONAL TESTS: Reviewed.

## 2022-11-10 NOTE — PROGRESS NOTE ADULT - SUBJECTIVE AND OBJECTIVE BOX
Patient is a 72y old  Female who presents with a chief complaint of left leg weakness and decreased sensation (10 Nov 2022 07:25)      INTERVAL HPI/OVERNIGHT EVENTS:  pt states the left leg pain is better for the first time. While lying on the bed, pain is 0/10. (Before it was 8/10). Now she can move the leg. She wants to try milk of magnesia for the constipation.  at bedside. Discuss the plan.       REVIEW OF SYSTEMS:  CONSTITUTIONAL: No fever, weight loss, or fatigue  EYES: No eye pain, visual disturbances, or discharge  ENMT:  No difficulty hearing, tinnitus, vertigo; No sinus or throat pain  NECK: No pain or stiffness  RESPIRATORY: No cough, wheezing, chills or hemoptysis; No shortness of breath  CARDIOVASCULAR: No chest pain, palpitations, dizziness, or leg swelling  GASTROINTESTINAL: No abdominal or epigastric pain. No nausea, vomiting, or hematemesis; +constipation. No melena or hematochezia.  GENITOURINARY: No dysuria, frequency, hematuria, or incontinence  NEUROLOGICAL: No headaches, memory loss, loss of strength,  +pain on L lower leg when she moves, no tremors  SKIN: No itching, burning, rashes, or lesions   LYMPH NODES: No enlarged glands  ENDOCRINE: No heat or cold intolerance; No hair loss  MUSCULOSKELETAL: No joint pain or swelling; No muscle, back, or extremity pain  HEME/LYMPH: No easy bruising, or bleeding gums  ALLERY AND IMMUNOLOGIC: No hives or eczema    FAMILY HISTORY:    Vital Signs Last 24 Hrs  T(C): 37.1 (10 Nov 2022 05:24), Max: 37.1 (10 Nov 2022 05:24)  T(F): 98.8 (10 Nov 2022 05:24), Max: 98.8 (10 Nov 2022 05:24)  HR: 70 (10 Nov 2022 05:24) (67 - 102)  BP: 149/84 (10 Nov 2022 05:24) (149/84 - 166/91)  BP(mean): --  RR: 17 (10 Nov 2022 05:24) (17 - 17)  SpO2: 94% (10 Nov 2022 05:24) (94% - 94%)    Parameters below as of 10 Nov 2022 05:24  Patient On (Oxygen Delivery Method): room air        11-09-22 @ 07:01  -  11-10-22 @ 07:00  --------------------------------------------------------  IN: 350 mL / OUT: 550 mL / NET: -200 mL        PHYSICAL EXAM:  GENERAL: NAD, well-groomed, well-developed  HEAD:  Atraumatic, Normocephalic  EYES: EOMI, conjunctiva and sclera clear  ENMT: No tonsillar erythema, exudates, or enlargement; Moist mucous membranes  NECK: Supple, No JVD, Normal thyroid  NERVOUS SYSTEM:  Alert & awake, Good concentration; Moving all extremities + numbness L lower leg  CHEST/LUNG: No rales, rhonchi, wheezing, or rubs  HEART: Regular rate and rhythm; No murmurs, rubs, or gallops  ABDOMEN: Soft, Nontender, Nondistended; Bowel sounds present  EXTREMITIES:  2+ Peripheral Pulses, No clubbing, cyanosis, or edema  LYMPH: No lymphadenopathy noted  SKIN: No rashes or lesions      Consultant(s) Notes Reviewed:  [x ] YES  [ ] NO  Care Discussed with Consultants/Other Providers [ x] YES  [ ] NO    LABS:        RADIOLOGY & ADDITIONAL TESTS:    Imaging Personally Reviewed:  [ ] YES  [ ] NO  enoxaparin Injectable 50 milliGRAM(s) SubCutaneous every 12 hours  escitalopram 7.5 milliGRAM(s) Oral daily  ferrous    sulfate 325 milliGRAM(s) Oral <User Schedule>  gabapentin 100 milliGRAM(s) Oral two times a day  levETIRAcetam 500 milliGRAM(s) Oral two times a day  LORazepam     Tablet 0.5 milliGRAM(s) Oral every 8 hours  NIFEdipine XL 90 milliGRAM(s) Oral every 24 hours  oxyCODONE    IR 2.5 milliGRAM(s) Oral every 4 hours PRN  polyethylene glycol 3350 17 Gram(s) Oral daily  senna 2 Tablet(s) Oral at bedtime      HEALTH ISSUES - PROBLEM Dx:  Bicytopenia    Ovarian cancer    Lactic acidosis    Leukocytosis    Hypokalemia    Debility    Hyponatremia    Encounter for palliative care    Seizure    Pain, unspecified    Elevated TSH    Essential hypertension    Left leg weakness    Anxiety    Seizure    DVT of lower limb, acute    Posterior reversible encephalopathy syndrome    Prophylactic measure    HTN (hypertension)

## 2022-11-10 NOTE — DISCHARGE NOTE PROVIDER - NSDCFUADDAPPT_GEN_ALL_CORE_FT
Please bring your Insurance card, Photo ID and Discharge paperwork to the following appointment:    (1) Please follow up with your Medical Oncology Provider, Dr. Melani Hidalgo at [address] [place] [address] on 11/22/2022 at 11:30am.    Appointment was scheduled by Ms. BERTRAND Atwood, Referral Coordinator.

## 2022-11-10 NOTE — PROGRESS NOTE ADULT - ATTENDING COMMENTS
The patient is a 72-year-old female with a history of metastatic ovarian cancer, on chemo, with recent severe anemia requiring transfusion and thrombocytopenia admitted with left face/arm/leg (most prominent) numbness/weakness after receiving blood transfusion. MRI showed no acute ischemia but was concerning for PRES- possibly related to labile BP vs chemo. She was also found to have an extensive left lower extremity DVT, likely secondary to hypercoagulable state in the setting of active malignancy. Continue therapeutic Lovenox. Leg weakness is slowly improvement, likely due to PRES. May need repeat MRI in 1-2 weeks pending improvement. Continue Keppra- if mood is persistently low/tearful, will consider transitioning to Vimpat. BP goal: Normotension.  Medically stable for d/c.

## 2022-11-10 NOTE — PROGRESS NOTE ADULT - PROBLEM SELECTOR PLAN 4
Hgb 10-11, platelet approx 30k. s/p 2U PRBC and 1unit platelets in ER.    - trend hgb/platelets 10.2/32.6  - maintain active T&S  - transfuse if Hgb <7  - transfuse platelets if less than 50k or bleeding- @ 70K  - c/w iron supplements

## 2022-11-10 NOTE — PROGRESS NOTE ADULT - PROBLEM SELECTOR PLAN 5
Follows w/ Dr. Hidalgo [phone]. Had chemo treatment a week prior to presentation, anemic secondary to chemo tx. Known mets to liver, lung and peritoneum with radiofrequency ablation of liver lesions as MSK. Chemotherapy treatment 1 week before hospital presentation    - Dr. Hidalgo following and will continue with chem tx outpatient

## 2022-11-10 NOTE — DISCHARGE NOTE PROVIDER - HOSPITAL COURSE
72y Female with PMH HTN, ovarian and uterine cancer (last treatment 1 week prior to presentation) with known metastasis to liver, lung and peritoneum (followed by Dr. Hidalgo), recurrent anemia 2/2 chemotherapy, presented to ED 11/1 firectly from oncologist for blood transfusion (Hgb 6.4). She received 2 units PRBC and when she was about to stand to be discharged, pt c/o left leg numbness and weakness. Stroke code called. NIHSS 3. CTH with patchy area of hypodensity along right central sulcus, artifact vs age indeterminate infarct). CTA negative for steno-occlusive disease. Patient was not a candidate for tpa due to thrombocytopenia (PLT 36 after transfusion). She was admitted to stroke neurology for stroke work up. Antithrombotics were not started immediately due to concern for metastatic disease with increased risk of hemorrhage. Patient had a witnessed GTC seizure lasting for 1 minute while in the ED . LLE femoral DVT found on LE duplex. MRI revealed PRES, no infarct nor mets present. In setting of hypercoagulability due to cancer and sedentary lifestyle with no mets to brain on MRI, heme/onc team recommended PLT transfusion to increased PLT >55 and then starting therapeutic lovenox 1mg/kg BID. Patient tolerated AC well. Labs show continued recovery in platelets. Blood pressure controlled with antihypertensives.      During this hospital course, patient had PRES, no evidence of infarct nor metastatic disease on MRI.     Patient had the following workup done in house:  CT Head with and without contrast: Limited evaluation due to the presence of intravenous contrast. Nonvisualization of the previously seen small acute to subacute infarctions in the high right mesial frontal and parietal lobes.  MR Head wwo contrast: Extensive areas of vasogenic edema primarily located in the bilateral occipital and parietal regions with extension into the frontal lobes consistent with posterior reversible encephalopathy syndrome (PRES). Prominent vasogenic edema with associated leptomeningeal enhancement in the left cerebellar hemisphere is favored to represent additional site of PRES.  MR C-spine wwo contrast: Mild degenerative changes of the cervical disks and facet joints without cord compression or spinal cord signal abnormality.  CT Angio Head: No high-grade stenosis or occlusion.  CT Angio Neck: No hemodynamic significant stenosis at the right or left carotid   bifurcation  Echo:   1. Normal left and right ventricular size and systolic function.   2. Injection of agitated saline via a peripheral vein reveals no   evidence of a right-to-left shunt.   3. No significant valvular disease.   4. No evidence of pulmonary hypertension, pulmonary artery systolic   pressure is 29 mmHg.   5. Trivial pericardial effusion.   6. Left pleural effusion.    LLE venous duplex: Left common femoral vein and saphenofemoral junction deep venous thrombosis  LLE arterial duplex: No hemodynamically significant stenosis        Physical exam at discharge:      New medications on discharge: losartan 100mg daily, nifedipine 90mg daily   Labs to be followed up:  Imaging to be done as outpatient:  Further outpatient workup: Repeat MRI   72y Female with PMH HTN, ovarian and uterine cancer (last treatment 1 week prior to presentation) with known metastasis to liver, lung and peritoneum (followed by Dr. Hidalgo), recurrent anemia 2/2 chemotherapy, presented to ED 11/1 firectly from oncologist for blood transfusion (Hgb 6.4). She received 2 units PRBC and when she was about to stand to be discharged, pt c/o left leg numbness and weakness. Stroke code called. NIHSS 3. CTH with patchy area of hypodensity along right central sulcus, artifact vs age indeterminate infarct). CTA negative for steno-occlusive disease. Patient was not a candidate for tpa due to thrombocytopenia (PLT 36 after transfusion). She was admitted to stroke neurology for stroke work up. Antithrombotics were not started immediately due to concern for metastatic disease with increased risk of hemorrhage. Patient had a witnessed GTC seizure lasting for 1 minute while in the ED . LLE femoral DVT found on LE duplex. MRI revealed PRES, no infarct nor mets present. In setting of hypercoagulability due to cancer and sedentary lifestyle with no mets to brain on MRI, heme/onc team recommended PLT transfusion to increased PLT >55 and then starting therapeutic lovenox 1mg/kg BID. Patient tolerated AC well. Labs show continued recovery in platelets. Blood pressure controlled with antihypertensives.      During this hospital course, patient had PRES, no evidence of infarct nor metastatic disease on MRI.     Patient had the following workup done in house:  CT Head with and without contrast: Limited evaluation due to the presence of intravenous contrast. Nonvisualization of the previously seen small acute to subacute infarctions in the high right mesial frontal and parietal lobes.  MR Head wwo contrast: Extensive areas of vasogenic edema primarily located in the bilateral occipital and parietal regions with extension into the frontal lobes consistent with posterior reversible encephalopathy syndrome (PRES). Prominent vasogenic edema with associated leptomeningeal enhancement in the left cerebellar hemisphere is favored to represent additional site of PRES.  MR C-spine wwo contrast: Mild degenerative changes of the cervical disks and facet joints without cord compression or spinal cord signal abnormality.  CT Angio Head: No high-grade stenosis or occlusion.  CT Angio Neck: No hemodynamic significant stenosis at the right or left carotid   bifurcation  Echo:   1. Normal left and right ventricular size and systolic function.   2. Injection of agitated saline via a peripheral vein reveals no   evidence of a right-to-left shunt.   3. No significant valvular disease.   4. No evidence of pulmonary hypertension, pulmonary artery systolic   pressure is 29 mmHg.   5. Trivial pericardial effusion.   6. Left pleural effusion.    LLE venous duplex: Left common femoral vein and saphenofemoral junction deep venous thrombosis  LLE arterial duplex: No hemodynamically significant stenosis        Physical exam at discharge:      New medications on discharge: losartan 100mg daily, nifedipine 90mg daily   Labs to be followed up:  Imaging to be done as outpatient:  Further outpatient workup: Repeat MRI 72y Female with PMH HTN, ovarian and uterine cancer (last treatment 1 week prior to presentation) with known metastasis to liver, lung and peritoneum (followed by Dr. Hidalgo), recurrent anemia 2/2 chemotherapy, presented to ED 11/1 firectly from oncologist for blood transfusion (Hgb 6.4). She received 2 units PRBC and when she was about to stand to be discharged, pt c/o left leg numbness and weakness. Stroke code called. NIHSS 3. CTH with patchy area of hypodensity along right central sulcus, artifact vs age indeterminate infarct). CTA negative for steno-occlusive disease. Patient was not a candidate for tpa due to thrombocytopenia (PLT 36 after transfusion). She was admitted to stroke neurology for stroke work up. Antithrombotics were not started immediately due to concern for metastatic disease with increased risk of hemorrhage. Patient had a witnessed GTC seizure lasting for 1 minute while in the ED . LLE femoral DVT found on LE duplex. MRI revealed PRES, no infarct nor mets present. In setting of hypercoagulability due to cancer and sedentary lifestyle with no mets to brain on MRI, heme/onc team recommended PLT transfusion to increased PLT >55 and then starting therapeutic lovenox 1mg/kg BID. Patient tolerated AC well. Labs show continued recovery in platelets. Blood pressure controlled with antihypertensives.      During this hospital course, patient had PRES, no evidence of infarct nor metastatic disease on MRI.     Patient had the following workup done in house:  CT Head with and without contrast: Limited evaluation due to the presence of intravenous contrast. Nonvisualization of the previously seen small acute to subacute infarctions in the high right mesial frontal and parietal lobes.  MR Head wwo contrast: Extensive areas of vasogenic edema primarily located in the bilateral occipital and parietal regions with extension into the frontal lobes consistent with posterior reversible encephalopathy syndrome (PRES). Prominent vasogenic edema with associated leptomeningeal enhancement in the left cerebellar hemisphere is favored to represent additional site of PRES.  MR C-spine wwo contrast: Mild degenerative changes of the cervical disks and facet joints without cord compression or spinal cord signal abnormality.  CT Angio Head: No high-grade stenosis or occlusion.  CT Angio Neck: No hemodynamic significant stenosis at the right or left carotid   bifurcation  Echo:   1. Normal left and right ventricular size and systolic function.   2. Injection of agitated saline via a peripheral vein reveals no   evidence of a right-to-left shunt.   3. No significant valvular disease.   4. No evidence of pulmonary hypertension, pulmonary artery systolic   pressure is 29 mmHg.   5. Trivial pericardial effusion.   6. Left pleural effusion.    LLE venous duplex: Left common femoral vein and saphenofemoral junction deep venous thrombosis  LLE arterial duplex: No hemodynamically significant stenosis    Hospital course complicated by intermittent urinary retention, which was managed with intermittent urinary catheterization and pt was empirically treated for UTI (Bactrim 1 tab BID x3d 11/11-11/13). At rehab, please continue to perform bladder scans BID and perform straight catheterization if urinary retention >300cc.      Physical exam at discharge:      New medications on discharge: losartan 100mg daily, nifedipine 90mg daily   Labs to be followed up: CBC on 11/14/2022; bladder scans BID (please straight cath if retention >300cc)   Imaging to be done as outpatient:  Further outpatient workup: Repeat MRI 72y Female with PMH HTN, ovarian and uterine cancer (last treatment 1 week prior to presentation) with known metastasis to liver, lung and peritoneum (followed by Dr. Hidalgo), recurrent anemia 2/2 chemotherapy, presented to ED 11/1 firectly from oncologist for blood transfusion (Hgb 6.4). She received 2 units PRBC and when she was about to stand to be discharged, pt c/o left leg numbness and weakness. Stroke code called. NIHSS 3. CTH with patchy area of hypodensity along right central sulcus, artifact vs age indeterminate infarct). CTA negative for steno-occlusive disease. Patient was not a candidate for tpa due to thrombocytopenia (PLT 36 after transfusion). She was admitted to stroke neurology for stroke work up. Antithrombotics were not started immediately due to concern for metastatic disease with increased risk of hemorrhage. Patient had a witnessed GTC seizure lasting for 1 minute while in the ED . LLE femoral DVT found on LE duplex. MRI revealed PRES, no infarct nor mets present. In setting of hypercoagulability due to cancer and sedentary lifestyle with no mets to brain on MRI, heme/onc team recommended PLT transfusion to increased PLT >55 and then starting therapeutic lovenox 1mg/kg BID. Patient tolerated AC well. Labs show continued recovery in platelets. Blood pressure controlled with antihypertensives.      During this hospital course, patient had PRES, no evidence of infarct nor metastatic disease on MRI.     Patient had the following workup done in house:  CT Head with and without contrast: Limited evaluation due to the presence of intravenous contrast. Nonvisualization of the previously seen small acute to subacute infarctions in the high right mesial frontal and parietal lobes.  MR Head wwo contrast: Extensive areas of vasogenic edema primarily located in the bilateral occipital and parietal regions with extension into the frontal lobes consistent with posterior reversible encephalopathy syndrome (PRES). Prominent vasogenic edema with associated leptomeningeal enhancement in the left cerebellar hemisphere is favored to represent additional site of PRES.  MR C-spine wwo contrast: Mild degenerative changes of the cervical disks and facet joints without cord compression or spinal cord signal abnormality.  CT Angio Head: No high-grade stenosis or occlusion.  CT Angio Neck: No hemodynamic significant stenosis at the right or left carotid   bifurcation  Echo:   1. Normal left and right ventricular size and systolic function.   2. Injection of agitated saline via a peripheral vein reveals no   evidence of a right-to-left shunt.   3. No significant valvular disease.   4. No evidence of pulmonary hypertension, pulmonary artery systolic   pressure is 29 mmHg.   5. Trivial pericardial effusion.   6. Left pleural effusion.    LLE venous duplex: Left common femoral vein and saphenofemoral junction deep venous thrombosis  LLE arterial duplex: No hemodynamically significant stenosis    Pt found with LLE DVT, managed with Lovenox 50mg BID. Hospital course complicated by intermittent urinary retention, which was managed with intermittent urinary catheterization and pt was empirically treated for UTI (Bactrim 1 tab BID x3d 11/11-11/13). At rehab, please continue to perform bladder scans BID and perform straight catheterization if urinary retention >300cc.      Physical exam at discharge:      New medications on discharge: losartan 100mg daily, nifedipine 90mg daily, lovenox 50mg BID  Labs to be followed up: CBC on 11/14/2022; bladder scans BID (please straight cath if retention >300cc)   Imaging to be done as outpatient:  Further outpatient workup: Repeat MRI 72y Female with PMH HTN, ovarian and uterine cancer (last treatment 1 week prior to presentation) with known metastasis to liver, lung and peritoneum (followed by Dr. Hidalgo), recurrent anemia 2/2 chemotherapy, presented to ED 11/1 firectly from oncologist for blood transfusion (Hgb 6.4). She received 2 units PRBC and when she was about to stand to be discharged, pt c/o left leg numbness and weakness. Stroke code called. NIHSS 3. CTH with patchy area of hypodensity along right central sulcus, artifact vs age indeterminate infarct). CTA negative for steno-occlusive disease. Patient was not a candidate for tpa due to thrombocytopenia (PLT 36 after transfusion). She was admitted to stroke neurology for stroke work up. Antithrombotics were not started immediately due to concern for metastatic disease with increased risk of hemorrhage. Patient had a witnessed GTC seizure lasting for 1 minute while in the ED . LLE femoral DVT found on LE duplex. MRI revealed PRES, no infarct nor mets present. In setting of hypercoagulability due to cancer and sedentary lifestyle with no mets to brain on MRI, heme/onc team recommended PLT transfusion to increased PLT >55 and then starting therapeutic lovenox 1mg/kg BID. Patient tolerated AC well. Labs show continued recovery in platelets. Blood pressure controlled with antihypertensives.      During this hospital course, patient had PRES, no evidence of infarct nor metastatic disease on MRI.     Patient had the following workup done in house:  CT Head with and without contrast: Limited evaluation due to the presence of intravenous contrast. Nonvisualization of the previously seen small acute to subacute infarctions in the high right mesial frontal and parietal lobes.  MR Head wwo contrast: Extensive areas of vasogenic edema primarily located in the bilateral occipital and parietal regions with extension into the frontal lobes consistent with posterior reversible encephalopathy syndrome (PRES). Prominent vasogenic edema with associated leptomeningeal enhancement in the left cerebellar hemisphere is favored to represent additional site of PRES.  MR C-spine wwo contrast: Mild degenerative changes of the cervical disks and facet joints without cord compression or spinal cord signal abnormality.  CT Angio Head: No high-grade stenosis or occlusion.  CT Angio Neck: No hemodynamic significant stenosis at the right or left carotid   bifurcation  Echo:   1. Normal left and right ventricular size and systolic function.   2. Injection of agitated saline via a peripheral vein reveals no   evidence of a right-to-left shunt.   3. No significant valvular disease.   4. No evidence of pulmonary hypertension, pulmonary artery systolic   pressure is 29 mmHg.   5. Trivial pericardial effusion.   6. Left pleural effusion.    LLE venous duplex: Left common femoral vein and saphenofemoral junction deep venous thrombosis  LLE arterial duplex: No hemodynamically significant stenosis    Pt found with LLE DVT, managed with Lovenox 50mg BID. Hospital course complicated by intermittent urinary retention, which was managed with intermittent urinary catheterization and pt was empirically treated for UTI (Bactrim 1 tab BID x3d 11/11-11/13). At rehab, please continue to perform bladder scans BID and perform straight catheterization if urinary retention >300cc.      Physical exam at discharge:      NIHSS: 4  Neurologic:    New medications on discharge: losartan 100mg daily, nifedipine 90mg daily, lovenox 50mg BID  Labs to be followed up: CBC on 11/14/2022; bladder scans BID (please straight cath if retention >300cc)   Imaging to be done as outpatient: will require repeat MRI brain in 2-4 weeks during outpt follow up  Further outpatient workup: 72y Female with PMH HTN, ovarian and uterine cancer (last treatment 1 week prior to presentation) with known metastasis to liver, lung and peritoneum (followed by Dr. Hidalgo), recurrent anemia 2/2 chemotherapy, presented to ED 11/1 firectly from oncologist for blood transfusion (Hgb 6.4). She received 2 units PRBC and when she was about to stand to be discharged, pt c/o left leg numbness and weakness. Stroke code called. NIHSS 3. CTH with patchy area of hypodensity along right central sulcus, artifact vs age indeterminate infarct). CTA negative for steno-occlusive disease. Patient was not a candidate for tpa due to thrombocytopenia (PLT 36 after transfusion). She was admitted to stroke neurology for stroke work up. Antithrombotics were not started immediately due to concern for metastatic disease with increased risk of hemorrhage. Patient had a witnessed GTC seizure lasting for 1 minute while in the ED . LLE femoral DVT found on LE duplex. MRI revealed PRES, no infarct nor mets present. In setting of hypercoagulability due to cancer and sedentary lifestyle with no mets to brain on MRI, heme/onc team recommended PLT transfusion to increased PLT >55 and then starting therapeutic lovenox 1mg/kg BID. Patient tolerated AC well. Labs show continued recovery in platelets. Blood pressure controlled with antihypertensives. Hospital course complicated by intermittent urinary retention, which was managed with intermittent urinary catheterization and pt was empirically treated for UTI (Bactrim 1 tab BID x3d 11/11-11/13). At rehab, please continue to perform bladder scans BID and perform straight catheterization if urinary retention >300cc.     During this hospital course, patient had PRES, no evidence of infarct nor metastatic disease on MRI.     Patient had the following workup done in house:  CT Head with and without contrast: Limited evaluation due to the presence of intravenous contrast. Nonvisualization of the previously seen small acute to subacute infarctions in the high right mesial frontal and parietal lobes.  MR Head wwo contrast: Extensive areas of vasogenic edema primarily located in the bilateral occipital and parietal regions with extension into the frontal lobes consistent with posterior reversible encephalopathy syndrome (PRES). Prominent vasogenic edema with associated leptomeningeal enhancement in the left cerebellar hemisphere is favored to represent additional site of PRES.  MR C-spine wwo contrast: Mild degenerative changes of the cervical disks and facet joints without cord compression or spinal cord signal abnormality.  CT Angio Head: No high-grade stenosis or occlusion.  CT Angio Neck: No hemodynamic significant stenosis at the right or left carotid   bifurcation  Echo:   1. Normal left and right ventricular size and systolic function.   2. Injection of agitated saline via a peripheral vein reveals no   evidence of a right-to-left shunt.   3. No significant valvular disease.   4. No evidence of pulmonary hypertension, pulmonary artery systolic   pressure is 29 mmHg.   5. Trivial pericardial effusion.   6. Left pleural effusion.    LLE venous duplex: Left common femoral vein and saphenofemoral junction deep venous thrombosis  LLE arterial duplex: No hemodynamically significant stenosis        Physical exam at discharge:  General: thin elderly women in NAD; speaking in full sentences  HEENT: NC/AT; PERRL; EOMI; MMM  Neck: supple; no JVD  Cardiac: RRR; +S1/S2  Pulm: CTA B/L; no W/R/R  GI: soft, NT/ND, +BS  Extremities:  no edema, clubbing or cyanosis  Vasc: 2+ radial pulses B/L  Neuro: AAOx3; sensation intact throughout, strength 5/5 RLE, 0/5 LLE  Psych: anxious     NIHSS: 4    New medications on discharge: losartan 100mg daily, nifedipine 90mg daily, lovenox 50mg BID  Labs to be followed up: CBC on 11/14/2022; bladder scans BID (please straight cath if retention >300cc)   Imaging to be done as outpatient: will require repeat MRI brain in 2-4 weeks during outpt follow up   72y Female with PMH HTN, ovarian and uterine cancer (last treatment 1 week prior to presentation) with known metastasis to liver, lung and peritoneum (followed by Dr. Hidalgo), recurrent anemia 2/2 chemotherapy, presented to ED 11/1 firectly from oncologist for blood transfusion (Hgb 6.4). She received 2 units PRBC and when she was about to stand to be discharged, pt c/o left leg numbness and weakness. Stroke code called. NIHSS 3. CTH with patchy area of hypodensity along right central sulcus, artifact vs age indeterminate infarct). CTA negative for steno-occlusive disease. Patient was not a candidate for tpa due to thrombocytopenia (PLT 36 after transfusion). She was admitted to stroke neurology for stroke work up. Antithrombotics were not started immediately due to concern for metastatic disease with increased risk of hemorrhage. Patient had a witnessed GTC seizure lasting for 1 minute while in the ED . LLE femoral DVT found on LE duplex. MRI revealed PRES, no infarct nor mets present. In setting of hypercoagulability due to cancer and sedentary lifestyle with no mets to brain on MRI, heme/onc team recommended PLT transfusion to increased PLT >55 and then starting therapeutic lovenox 1mg/kg BID. Patient tolerated AC well. Labs show continued recovery in platelets. Blood pressure controlled with antihypertensives. Hospital course complicated by intermittent urinary retention, which was managed with intermittent urinary catheterization and pt was empirically treated for UTI (Bactrim 1 tab BID x3d 11/11-11/13). At rehab, please continue to perform bladder scans BID and perform straight catheterization if urinary retention >300cc.     During this hospital course, patient had PRES, no evidence of infarct nor metastatic disease on MRI.     Patient had the following workup done in house:  CT Head with and without contrast: Limited evaluation due to the presence of intravenous contrast. Nonvisualization of the previously seen small acute to subacute infarctions in the high right mesial frontal and parietal lobes.  MR Head wwo contrast: Extensive areas of vasogenic edema primarily located in the bilateral occipital and parietal regions with extension into the frontal lobes consistent with posterior reversible encephalopathy syndrome (PRES). Prominent vasogenic edema with associated leptomeningeal enhancement in the left cerebellar hemisphere is favored to represent additional site of PRES.  MR C-spine wwo contrast: Mild degenerative changes of the cervical disks and facet joints without cord compression or spinal cord signal abnormality.  CT Angio Head: No high-grade stenosis or occlusion.  CT Angio Neck: No hemodynamic significant stenosis at the right or left carotid   bifurcation  Echo:   1. Normal left and right ventricular size and systolic function.   2. Injection of agitated saline via a peripheral vein reveals no   evidence of a right-to-left shunt.   3. No significant valvular disease.   4. No evidence of pulmonary hypertension, pulmonary artery systolic   pressure is 29 mmHg.   5. Trivial pericardial effusion.   6. Left pleural effusion.    LLE venous duplex: Left common femoral vein and saphenofemoral junction deep venous thrombosis  LLE arterial duplex: No hemodynamically significant stenosis        Physical exam at discharge:  General: thin elderly women in NAD; speaking in full sentences  HEENT: NC/AT; PERRL; EOMI; MMM  Neck: supple; no JVD  Cardiac: RRR; +S1/S2  Pulm: CTA B/L; no W/R/R  GI: soft, NT/ND, +BS  Extremities:  no edema, clubbing or cyanosis  Vasc: 2+ radial pulses B/L  Neuro: AAOx3; sensation intact throughout, strength 5/5 RLE, 0/5 LLE  Psych: anxious     Neurologic:  -Mental status: Awake, alert, oriented to person. Speech is fluent with intact naming, repetition, and comprehension, no dysarthria. Recent and remote memory intact. Follows commands. Attention/concentration intact. Fund of knowledge appropriate.  -Cranial nerves:   II: Visual fields are full to confrontation.  III, IV, VI: Extraocular movements are intact without nystagmus. Pupils equally round and reactive to light  V:  Facial sensation V1-V3 equal and intact   VII: Mild L NLFF  VIII: Hearing is bilaterally intact to finger rub  IX, X: Uvula is midline and soft palate rises symmetrically  XI: Head turning and shoulder shrug are intact.  XII: Tongue protrudes midline  Motor: Normal bulk and tone. No pronator drift. Strength LUE : 4-/5, RUE : 5-/5 , B/L LE exam limited by effort, RLE antigravity ~5 seconds 5-/5, LLE when supporting knee able to extend distally 2/5, proximally 2/5  Sensation: Intact to light touch bilaterally  Coordination: No dysmetria on finger-to-nose and heel-to-shin bilaterally  Reflexes: Downgoing toes bilaterally       NIHSS: 5    New medications on discharge: losartan 100mg daily, nifedipine 90mg daily, lovenox 50mg BID  Labs to be followed up: CBC on 11/14/2022; bladder scans BID (please straight cath if retention >300cc)   Imaging to be done as outpatient: will require repeat MRI brain in 2-4 weeks during outpt follow up   72y Female with PMH HTN, ovarian and uterine cancer (last treatment 1 week prior to presentation) with known metastasis to liver, lung and peritoneum (followed by Dr. Hidalgo), recurrent anemia 2/2 chemotherapy, presented to ED 11/1 firectly from oncologist for blood transfusion (Hgb 6.4). She received 2 units PRBC and when she was about to stand to be discharged, pt c/o left leg numbness and weakness. Stroke code called. NIHSS 3. CTH with patchy area of hypodensity along right central sulcus, artifact vs age indeterminate infarct). CTA negative for steno-occlusive disease. Patient was not a candidate for tpa due to thrombocytopenia (PLT 36 after transfusion). She was admitted to stroke neurology for stroke work up. Antithrombotics were not started immediately due to concern for metastatic disease with increased risk of hemorrhage. Patient had a witnessed GTC seizure lasting for 1 minute while in the ED . LLE femoral DVT found on LE duplex. MRI revealed PRES, no infarct nor mets present. In setting of hypercoagulability due to cancer and sedentary lifestyle with no mets to brain on MRI, heme/onc team recommended PLT transfusion to increased PLT >55 and then starting therapeutic lovenox 1mg/kg BID. Patient tolerated AC well. Labs show continued recovery in platelets. Blood pressure controlled with antihypertensives. Hospital course complicated by intermittent urinary retention, which was managed with intermittent urinary catheterization and pt was empirically treated for UTI (Bactrim 1 tab BID x3d 11/11-11/13). At rehab, please continue to perform bladder scans BID and perform straight catheterization if urinary retention >300cc.     During this hospital course, patient had PRES, no evidence of infarct nor metastatic disease on MRI.     Patient had the following workup done in house:  CT Head with and without contrast: Limited evaluation due to the presence of intravenous contrast. Nonvisualization of the previously seen small acute to subacute infarctions in the high right mesial frontal and parietal lobes.  MR Head wwo contrast: Extensive areas of vasogenic edema primarily located in the bilateral occipital and parietal regions with extension into the frontal lobes consistent with posterior reversible encephalopathy syndrome (PRES). Prominent vasogenic edema with associated leptomeningeal enhancement in the left cerebellar hemisphere is favored to represent additional site of PRES.  MR C-spine wwo contrast: Mild degenerative changes of the cervical disks and facet joints without cord compression or spinal cord signal abnormality.  CT Angio Head: No high-grade stenosis or occlusion.  CT Angio Neck: No hemodynamic significant stenosis at the right or left carotid   bifurcation  Echo:   1. Normal left and right ventricular size and systolic function.   2. Injection of agitated saline via a peripheral vein reveals no   evidence of a right-to-left shunt.   3. No significant valvular disease.   4. No evidence of pulmonary hypertension, pulmonary artery systolic   pressure is 29 mmHg.   5. Trivial pericardial effusion.   6. Left pleural effusion.    LLE venous duplex: Left common femoral vein and saphenofemoral junction deep venous thrombosis  LLE arterial duplex: No hemodynamically significant stenosis        Physical exam at discharge:  General: thin elderly women in NAD; speaking in full sentences  HEENT: NC/AT; PERRL; EOMI; MMM  Neck: supple; no JVD  Cardiac: RRR; +S1/S2  Pulm: CTA B/L; no W/R/R  GI: soft, NT/ND, +BS  Extremities:  no edema, clubbing or cyanosis  Vasc: 2+ radial pulses B/L  Psych: anxious     Neurologic:  -Mental status: Awake, alert, oriented to person. Speech is fluent with intact naming, repetition, and comprehension, no dysarthria. Recent and remote memory intact. Follows commands. Attention/concentration intact. Fund of knowledge appropriate.  -Cranial nerves:   II: Visual fields are full to confrontation.  III, IV, VI: Extraocular movements are intact without nystagmus. Pupils equally round and reactive to light  V:  Facial sensation V1-V3 equal and intact   VII: Mild L NLFF  VIII: Hearing is bilaterally intact to finger rub  IX, X: Uvula is midline and soft palate rises symmetrically  XI: Head turning and shoulder shrug are intact.  XII: Tongue protrudes midline  Motor: Normal bulk and tone. No pronator drift. Strength LUE : 4-/5, RUE : 5-/5 , B/L LE exam limited by effort, RLE antigravity ~5 seconds 5-/5, LLE when supporting knee able to extend distally 2/5, proximally 2/5  Sensation: Intact to light touch bilaterally  Coordination: No dysmetria on finger-to-nose and heel-to-shin bilaterally  Reflexes: Downgoing toes bilaterally       NIHSS: 5    New medications on discharge: losartan 100mg daily, nifedipine 90mg daily, lovenox 50mg BID  Labs to be followed up: CBC on 11/14/2022; bladder scans BID (please straight cath if retention >300cc)   Imaging to be done as outpatient: will require repeat MRI brain in 2-4 weeks during outpt follow up

## 2022-11-10 NOTE — PROGRESS NOTE ADULT - PROBLEM SELECTOR PLAN 1
Initially admitted for r/o stroke due to left leg weakness. MRI head with PRES, no infarct nor mets present.  - Stroke Code HCT Results: Patchy area of hypodensity along the right central sulcus which may be artifactual versus areas of age-indeterminate infarction.  - Stroke Code CTA Results: negative for steno-occlusive disease. Demonstrates extensive pulmonary metastatic disease.  - MRI cervical - mild cervical disks degenerative changes without any cord compression.    Plan:  - C/W Therapeutic SQL i/s/o LLE DVT with active malignancy, tolerated well  - Maintain SBP <160; started on Nifedipine this admission and increased losartan to 100mg qd.

## 2022-11-10 NOTE — DISCHARGE NOTE PROVIDER - NSDCMRMEDTOKEN_GEN_ALL_CORE_FT
APREPITANT  80 &amp; 125 MG CAPS:   ESCITALOPRAM 5MG TABLETS: 1.5 each orally once a day  FLUCONAZOLE 150MG TABLETS: TAKE 1 TABLET BY MOUTH ONCE  LORAZEPAM  0.5 MG TABS:   LOSARTAN 25MG TABLETS: TAKE 2 TABLETS BY MOUTH DAILY   APREPITANT  80 &amp; 125 MG CAPS:   Cozaar 100 mg oral tablet: 1 tab(s) orally every 24 hours  ESCITALOPRAM 5MG TABLETS: 1.5 each orally once a day  ferrous sulfate 325 mg (65 mg elemental iron) oral tablet: 1 tab(s) orally every other day (at bedtime)  FLUCONAZOLE 150MG TABLETS: TAKE 1 TABLET BY MOUTH ONCE  gabapentin 100 mg oral capsule: 1 cap(s) orally 2 times a day  levETIRAcetam 500 mg oral tablet: 1 tab(s) orally 2 times a day  LORAZEPAM  0.5 MG TABS: 1 tab(s) orally every 8 hours, As Needed  NIFEdipine 90 mg oral tablet, extended release: 1 tab(s) orally every 24 hours  polyethylene glycol 3350 oral powder for reconstitution: 17 gram(s) orally once a day  senna leaf extract oral tablet: 2 tab(s) orally once a day (at bedtime)  sulfamethoxazole-trimethoprim 800 mg-160 mg oral tablet: 1 tab(s) orally 2 times a day   Cozaar 100 mg oral tablet: 1 tab(s) orally every 24 hours  ESCITALOPRAM 5MG TABLETS: 1.5 each orally once a day  ferrous sulfate 325 mg (65 mg elemental iron) oral tablet: 1 tab(s) orally every other day (at bedtime)  gabapentin 100 mg oral capsule: 1 cap(s) orally 2 times a day  levETIRAcetam 500 mg oral tablet: 1 tab(s) orally 2 times a day  LORAZEPAM  0.5 MG TABS: 1 tab(s) orally every 8 hours, As Needed  NIFEdipine 90 mg oral tablet, extended release: 1 tab(s) orally every 24 hours  polyethylene glycol 3350 oral powder for reconstitution: 17 gram(s) orally once a day  senna leaf extract oral tablet: 2 tab(s) orally once a day (at bedtime)  sulfamethoxazole-trimethoprim 800 mg-160 mg oral tablet: 1 tab(s) orally 2 times a day

## 2022-11-10 NOTE — DISCHARGE NOTE PROVIDER - NSDCCPCAREPLAN_GEN_ALL_CORE_FT
PRINCIPAL DISCHARGE DIAGNOSIS  Diagnosis: PRES (posterior reversible encephalopathy syndrome)  Assessment and Plan of Treatment: MRI showed PRES (posterior reversible encephalopathy syndrome), a condition that can effect your brain, causing headaches, confusion, visual symptoms, and seizures. High blood pressure is the most commone cause, so it is important to take your blood pressure medication every day.      SECONDARY DISCHARGE DIAGNOSES  Diagnosis: DVT, lower extremity  Assessment and Plan of Treatment: Deep vein thrombosis (DVT) is a blood clot that forms in a deep vein of the body.  It is important to go to all follow-up appointments and to take blood thinners (lovenox injections) everyday. Try to walk as much as you can or change  positions often when sitting to prevent DVTs from occuring. Please follow up with your oncologist/hematologist for continued management.     PRINCIPAL DISCHARGE DIAGNOSIS  Diagnosis: PRES (posterior reversible encephalopathy syndrome)  Assessment and Plan of Treatment: MRI showed PRES (posterior reversible encephalopathy syndrome), a condition that can effect your brain, causing headaches, confusion, visual symptoms, and seizures. High blood pressure is the most commone cause, so it is important to take your blood pressure medication every day.        SECONDARY DISCHARGE DIAGNOSES  Diagnosis: DVT, lower extremity  Assessment and Plan of Treatment: Deep vein thrombosis (DVT) is a blood clot that forms in a deep vein of the body.  It is important to go to all follow-up appointments and to take blood thinners (lovenox injections) everyday. Try to walk as much as you can or change  positions often when sitting to prevent DVTs from occuring. Please follow up with your oncologist/hematologist for continued management.     PRINCIPAL DISCHARGE DIAGNOSIS  Diagnosis: PRES (posterior reversible encephalopathy syndrome)  Assessment and Plan of Treatment: MRI showed PRES (posterior reversible encephalopathy syndrome), a condition that can effect your brain, causing headaches, confusion, visual symptoms, and seizures. High blood pressure is the most commone cause, so it is important to take your blood pressure medication every day.        SECONDARY DISCHARGE DIAGNOSES  Diagnosis: DVT, lower extremity  Assessment and Plan of Treatment: Deep vein thrombosis (DVT) is a blood clot that forms in a deep vein of the body.  It is important to go to all follow-up appointments and to take blood thinners (lovenox injections) everyday. Try to walk as much as you can or change  positions often when sitting to prevent DVTs from occuring. Please follow up with your oncologist/hematologist for continued management.    Diagnosis: Acute UTI  Assessment and Plan of Treatment: Urinary tract infections are more common in women. They usually occur in the bladder or urethra, but more serious infections involve the kidney.  A bladder infection may cause pelvic pain, increased urge to urinate, pain with urination, and blood in the urine. A kidney infection may cause back pain, nausea, vomiting, and fever. Common treatment is with antibiotics.   __________________  You had urinary symptoms so we took samples of your urine and also started you on antibiotics (Bactrim for 3 days). Please continue to take Bactrim as directed while you are at the rehab facility. Please follow up with your primary care doctor to ensure resolution of your urinary tract infection. Please follow up for a repeat complete blood count (CBC) in 1 week.     PRINCIPAL DISCHARGE DIAGNOSIS  Diagnosis: PRES (posterior reversible encephalopathy syndrome)  Assessment and Plan of Treatment: MRI showed PRES (posterior reversible encephalopathy syndrome), a condition that can effect your brain, causing headaches, confusion, visual symptoms, and seizures. High blood pressure is the most commone cause, so it is important to take your blood pressure medication every day.        SECONDARY DISCHARGE DIAGNOSES  Diagnosis: DVT, lower extremity  Assessment and Plan of Treatment: Deep vein thrombosis (DVT) is a blood clot that forms in a deep vein of the body.  It is important to go to all follow-up appointments and to take blood thinners (lovenox injections) everyday. Try to walk as much as you can or change  positions often when sitting to prevent DVTs from occuring. Please follow up with your oncologist/hematologist for continued management.    Diagnosis: Acute UTI  Assessment and Plan of Treatment: Urinary tract infections are more common in women. They usually occur in the bladder or urethra, but more serious infections involve the kidney.  A bladder infection may cause pelvic pain, increased urge to urinate, pain with urination, and blood in the urine. A kidney infection may cause back pain, nausea, vomiting, and fever. Common treatment is with antibiotics.   __________________  You had urinary symptoms so we took samples of your urine and also started you on antibiotics (Bactrim for 3 days). Please continue to take Bactrim as directed while you are at the rehab facility.   You had intermittent urinary retention while you were in the hospital, so you received intermittent catheterization. At rehab, you should continue to get bladder scans twice a day.    Please get a repeat complete blood count (CBC) on Monday 11/14/2022 to ensure improvement of your elevation in white blood cell count.     PRINCIPAL DISCHARGE DIAGNOSIS  Diagnosis: PRES (posterior reversible encephalopathy syndrome)  Assessment and Plan of Treatment: MRI showed PRES (posterior reversible encephalopathy syndrome), a condition that can effect your brain, causing headaches, confusion, visual symptoms, and seizures. High blood pressure is the most commone cause, so it is important to take your blood pressure medication every day.        SECONDARY DISCHARGE DIAGNOSES  Diagnosis: DVT, lower extremity  Assessment and Plan of Treatment: Deep vein thrombosis (DVT) is a blood clot that forms in a deep vein of the body.  It is important to go to all follow-up appointments and to take blood thinners (lovenox injections) everyday. Try to walk as much as you can or change  positions often when sitting to prevent DVTs from occuring. Please follow up with your oncologist/hematologist for continued management.    Diagnosis: Acute UTI  Assessment and Plan of Treatment: Urinary tract infections are more common in women. They usually occur in the bladder or urethra, but more serious infections involve the kidney.  A bladder infection may cause pelvic pain, increased urge to urinate, pain with urination, and blood in the urine. A kidney infection may cause back pain, nausea, vomiting, and fever. Common treatment is with antibiotics.   __________________  You had urinary symptoms so we took samples of your urine and also started you on antibiotics (Bactrim for 3 days). Please continue to take Bactrim as directed while you are at the rehab facility.   You had intermittent urinary retention while you were in the hospital, so you received intermittent catheterization. At rehab, you should continue to get bladder scans twice a day.    Please get a repeat complete blood count (CBC) on Monday 11/14/2022 to ensure improvement of your white blood cell count.

## 2022-11-11 ENCOUNTER — TRANSCRIPTION ENCOUNTER (OUTPATIENT)
Age: 72
End: 2022-11-11

## 2022-11-11 VITALS
SYSTOLIC BLOOD PRESSURE: 120 MMHG | OXYGEN SATURATION: 97 % | TEMPERATURE: 98 F | RESPIRATION RATE: 18 BRPM | DIASTOLIC BLOOD PRESSURE: 72 MMHG | HEART RATE: 108 BPM

## 2022-11-11 DIAGNOSIS — N39.0 URINARY TRACT INFECTION, SITE NOT SPECIFIED: ICD-10-CM

## 2022-11-11 LAB
ANION GAP SERPL CALC-SCNC: 11 MMOL/L — SIGNIFICANT CHANGE UP (ref 5–17)
ANISOCYTOSIS BLD QL: SIGNIFICANT CHANGE UP
APPEARANCE UR: CLEAR — SIGNIFICANT CHANGE UP
BACTERIA # UR AUTO: SIGNIFICANT CHANGE UP /HPF
BASOPHILS # BLD AUTO: 0.12 K/UL — SIGNIFICANT CHANGE UP (ref 0–0.2)
BASOPHILS NFR BLD AUTO: 0.9 % — SIGNIFICANT CHANGE UP (ref 0–2)
BILIRUB UR-MCNC: NEGATIVE — SIGNIFICANT CHANGE UP
BLD GP AB SCN SERPL QL: NEGATIVE — SIGNIFICANT CHANGE UP
BUN SERPL-MCNC: 21 MG/DL — SIGNIFICANT CHANGE UP (ref 7–23)
CALCIUM SERPL-MCNC: 8.4 MG/DL — SIGNIFICANT CHANGE UP (ref 8.4–10.5)
CHLORIDE SERPL-SCNC: 102 MMOL/L — SIGNIFICANT CHANGE UP (ref 96–108)
CO2 SERPL-SCNC: 25 MMOL/L — SIGNIFICANT CHANGE UP (ref 22–31)
COLOR SPEC: YELLOW — SIGNIFICANT CHANGE UP
CREAT SERPL-MCNC: 1.54 MG/DL — HIGH (ref 0.5–1.3)
DACRYOCYTES BLD QL SMEAR: SLIGHT — SIGNIFICANT CHANGE UP
DIFF PNL FLD: ABNORMAL
EGFR: 35 ML/MIN/1.73M2 — LOW
EOSINOPHIL # BLD AUTO: 0.12 K/UL — SIGNIFICANT CHANGE UP (ref 0–0.5)
EOSINOPHIL NFR BLD AUTO: 0.9 % — SIGNIFICANT CHANGE UP (ref 0–6)
EPI CELLS # UR: SIGNIFICANT CHANGE UP /HPF (ref 0–5)
GIANT PLATELETS BLD QL SMEAR: PRESENT — SIGNIFICANT CHANGE UP
GLUCOSE BLDC GLUCOMTR-MCNC: 116 MG/DL — HIGH (ref 70–99)
GLUCOSE SERPL-MCNC: 119 MG/DL — HIGH (ref 70–99)
GLUCOSE UR QL: 250
HCT VFR BLD CALC: 30.3 % — LOW (ref 34.5–45)
HCT VFR BLD CALC: 33.1 % — LOW (ref 34.5–45)
HGB BLD-MCNC: 10.3 G/DL — LOW (ref 11.5–15.5)
HGB BLD-MCNC: 9.4 G/DL — LOW (ref 11.5–15.5)
HYALINE CASTS # UR AUTO: ABNORMAL /LPF (ref 0–2)
KETONES UR-MCNC: NEGATIVE — SIGNIFICANT CHANGE UP
LEUKOCYTE ESTERASE UR-ACNC: NEGATIVE — SIGNIFICANT CHANGE UP
LYMPHOCYTES # BLD AUTO: 0.8 K/UL — LOW (ref 1–3.3)
LYMPHOCYTES # BLD AUTO: 6 % — LOW (ref 13–44)
MACROCYTES BLD QL: SLIGHT — SIGNIFICANT CHANGE UP
MAGNESIUM SERPL-MCNC: 1.8 MG/DL — SIGNIFICANT CHANGE UP (ref 1.6–2.6)
MANUAL SMEAR VERIFICATION: SIGNIFICANT CHANGE UP
MCHC RBC-ENTMCNC: 31 GM/DL — LOW (ref 32–36)
MCHC RBC-ENTMCNC: 31.1 GM/DL — LOW (ref 32–36)
MCHC RBC-ENTMCNC: 31.8 PG — SIGNIFICANT CHANGE UP (ref 27–34)
MCHC RBC-ENTMCNC: 32 PG — SIGNIFICANT CHANGE UP (ref 27–34)
MCV RBC AUTO: 102.2 FL — HIGH (ref 80–100)
MCV RBC AUTO: 103.1 FL — HIGH (ref 80–100)
MICROCYTES BLD QL: SLIGHT — SIGNIFICANT CHANGE UP
MONOCYTES # BLD AUTO: 0.35 K/UL — SIGNIFICANT CHANGE UP (ref 0–0.9)
MONOCYTES NFR BLD AUTO: 2.6 % — SIGNIFICANT CHANGE UP (ref 2–14)
NEUTROPHILS # BLD AUTO: 11.9 K/UL — HIGH (ref 1.8–7.4)
NEUTROPHILS NFR BLD AUTO: 89.6 % — HIGH (ref 43–77)
NITRITE UR-MCNC: NEGATIVE — SIGNIFICANT CHANGE UP
NRBC # BLD: 0 /100 WBCS — SIGNIFICANT CHANGE UP (ref 0–0)
OVALOCYTES BLD QL SMEAR: SLIGHT — SIGNIFICANT CHANGE UP
PH UR: 6.5 — SIGNIFICANT CHANGE UP (ref 5–8)
PHOSPHATE SERPL-MCNC: 2.9 MG/DL — SIGNIFICANT CHANGE UP (ref 2.5–4.5)
PLAT MORPH BLD: NORMAL — SIGNIFICANT CHANGE UP
PLATELET # BLD AUTO: 134 K/UL — LOW (ref 150–400)
PLATELET # BLD AUTO: 157 K/UL — SIGNIFICANT CHANGE UP (ref 150–400)
POLYCHROMASIA BLD QL SMEAR: SLIGHT — SIGNIFICANT CHANGE UP
POTASSIUM SERPL-MCNC: 3.8 MMOL/L — SIGNIFICANT CHANGE UP (ref 3.5–5.3)
POTASSIUM SERPL-SCNC: 3.8 MMOL/L — SIGNIFICANT CHANGE UP (ref 3.5–5.3)
PROT UR-MCNC: >=300 MG/DL
RBC # BLD: 2.94 M/UL — LOW (ref 3.8–5.2)
RBC # BLD: 3.24 M/UL — LOW (ref 3.8–5.2)
RBC # FLD: 21.9 % — HIGH (ref 10.3–14.5)
RBC # FLD: 22 % — HIGH (ref 10.3–14.5)
RBC BLD AUTO: ABNORMAL
RBC CASTS # UR COMP ASSIST: ABNORMAL /HPF
RH IG SCN BLD-IMP: POSITIVE — SIGNIFICANT CHANGE UP
SODIUM SERPL-SCNC: 138 MMOL/L — SIGNIFICANT CHANGE UP (ref 135–145)
SP GR SPEC: 1.02 — SIGNIFICANT CHANGE UP (ref 1–1.03)
SPHEROCYTES BLD QL SMEAR: SLIGHT — SIGNIFICANT CHANGE UP
UROBILINOGEN FLD QL: 1 E.U./DL — SIGNIFICANT CHANGE UP
WBC # BLD: 12.84 K/UL — HIGH (ref 3.8–10.5)
WBC # BLD: 13.28 K/UL — HIGH (ref 3.8–10.5)
WBC # FLD AUTO: 12.84 K/UL — HIGH (ref 3.8–10.5)
WBC # FLD AUTO: 13.28 K/UL — HIGH (ref 3.8–10.5)
WBC UR QL: < 5 /HPF — SIGNIFICANT CHANGE UP

## 2022-11-11 PROCEDURE — 85027 COMPLETE CBC AUTOMATED: CPT

## 2022-11-11 PROCEDURE — P9040: CPT

## 2022-11-11 PROCEDURE — 83735 ASSAY OF MAGNESIUM: CPT

## 2022-11-11 PROCEDURE — P9100: CPT

## 2022-11-11 PROCEDURE — 87635 SARS-COV-2 COVID-19 AMP PRB: CPT

## 2022-11-11 PROCEDURE — 80076 HEPATIC FUNCTION PANEL: CPT

## 2022-11-11 PROCEDURE — 87040 BLOOD CULTURE FOR BACTERIA: CPT

## 2022-11-11 PROCEDURE — 70496 CT ANGIOGRAPHY HEAD: CPT | Mod: MA

## 2022-11-11 PROCEDURE — 97161 PT EVAL LOW COMPLEX 20 MIN: CPT

## 2022-11-11 PROCEDURE — 85045 AUTOMATED RETICULOCYTE COUNT: CPT

## 2022-11-11 PROCEDURE — 82728 ASSAY OF FERRITIN: CPT

## 2022-11-11 PROCEDURE — 84443 ASSAY THYROID STIM HORMONE: CPT

## 2022-11-11 PROCEDURE — A9585: CPT

## 2022-11-11 PROCEDURE — 86900 BLOOD TYPING SEROLOGIC ABO: CPT

## 2022-11-11 PROCEDURE — 71045 X-RAY EXAM CHEST 1 VIEW: CPT

## 2022-11-11 PROCEDURE — 93306 TTE W/DOPPLER COMPLETE: CPT

## 2022-11-11 PROCEDURE — 99285 EMERGENCY DEPT VISIT HI MDM: CPT

## 2022-11-11 PROCEDURE — 85384 FIBRINOGEN ACTIVITY: CPT

## 2022-11-11 PROCEDURE — 80048 BASIC METABOLIC PNL TOTAL CA: CPT

## 2022-11-11 PROCEDURE — 73590 X-RAY EXAM OF LOWER LEG: CPT

## 2022-11-11 PROCEDURE — 83010 ASSAY OF HAPTOGLOBIN QUANT: CPT

## 2022-11-11 PROCEDURE — 36430 TRANSFUSION BLD/BLD COMPNT: CPT

## 2022-11-11 PROCEDURE — 82607 VITAMIN B-12: CPT

## 2022-11-11 PROCEDURE — 83605 ASSAY OF LACTIC ACID: CPT

## 2022-11-11 PROCEDURE — 82550 ASSAY OF CK (CPK): CPT

## 2022-11-11 PROCEDURE — 73560 X-RAY EXAM OF KNEE 1 OR 2: CPT

## 2022-11-11 PROCEDURE — 84439 ASSAY OF FREE THYROXINE: CPT

## 2022-11-11 PROCEDURE — 81001 URINALYSIS AUTO W/SCOPE: CPT

## 2022-11-11 PROCEDURE — 86901 BLOOD TYPING SEROLOGIC RH(D): CPT

## 2022-11-11 PROCEDURE — 95716 VEEG EA 12-26HR CONT MNTR: CPT

## 2022-11-11 PROCEDURE — 93926 LOWER EXTREMITY STUDY: CPT

## 2022-11-11 PROCEDURE — 84100 ASSAY OF PHOSPHORUS: CPT

## 2022-11-11 PROCEDURE — 83615 LACTATE (LD) (LDH) ENZYME: CPT

## 2022-11-11 PROCEDURE — U0003: CPT

## 2022-11-11 PROCEDURE — 93971 EXTREMITY STUDY: CPT

## 2022-11-11 PROCEDURE — 83550 IRON BINDING TEST: CPT

## 2022-11-11 PROCEDURE — 70450 CT HEAD/BRAIN W/O DYE: CPT | Mod: MA

## 2022-11-11 PROCEDURE — 86850 RBC ANTIBODY SCREEN: CPT

## 2022-11-11 PROCEDURE — 82746 ASSAY OF FOLIC ACID SERUM: CPT

## 2022-11-11 PROCEDURE — 70498 CT ANGIOGRAPHY NECK: CPT | Mod: MA

## 2022-11-11 PROCEDURE — 0042T: CPT

## 2022-11-11 PROCEDURE — 84466 ASSAY OF TRANSFERRIN: CPT

## 2022-11-11 PROCEDURE — 83540 ASSAY OF IRON: CPT

## 2022-11-11 PROCEDURE — U0005: CPT

## 2022-11-11 PROCEDURE — 86880 COOMBS TEST DIRECT: CPT

## 2022-11-11 PROCEDURE — 99233 SBSQ HOSP IP/OBS HIGH 50: CPT

## 2022-11-11 PROCEDURE — 70470 CT HEAD/BRAIN W/O & W/DYE: CPT

## 2022-11-11 PROCEDURE — 83036 HEMOGLOBIN GLYCOSYLATED A1C: CPT

## 2022-11-11 PROCEDURE — P9037: CPT

## 2022-11-11 PROCEDURE — 85730 THROMBOPLASTIN TIME PARTIAL: CPT

## 2022-11-11 PROCEDURE — 95700 EEG CONT REC W/VID EEG TECH: CPT

## 2022-11-11 PROCEDURE — 85025 COMPLETE CBC W/AUTO DIFF WBC: CPT

## 2022-11-11 PROCEDURE — 72156 MRI NECK SPINE W/O & W/DYE: CPT

## 2022-11-11 PROCEDURE — 99239 HOSP IP/OBS DSCHRG MGMT >30: CPT

## 2022-11-11 PROCEDURE — 80061 LIPID PANEL: CPT

## 2022-11-11 PROCEDURE — 97530 THERAPEUTIC ACTIVITIES: CPT

## 2022-11-11 PROCEDURE — 80053 COMPREHEN METABOLIC PANEL: CPT

## 2022-11-11 PROCEDURE — 82803 BLOOD GASES ANY COMBINATION: CPT

## 2022-11-11 PROCEDURE — 36415 COLL VENOUS BLD VENIPUNCTURE: CPT

## 2022-11-11 PROCEDURE — 70553 MRI BRAIN STEM W/O & W/DYE: CPT

## 2022-11-11 PROCEDURE — 97535 SELF CARE MNGMENT TRAINING: CPT

## 2022-11-11 PROCEDURE — 82962 GLUCOSE BLOOD TEST: CPT

## 2022-11-11 PROCEDURE — 86923 COMPATIBILITY TEST ELECTRIC: CPT

## 2022-11-11 PROCEDURE — 85610 PROTHROMBIN TIME: CPT

## 2022-11-11 RX ORDER — POLYETHYLENE GLYCOL 3350 17 G/17G
17 POWDER, FOR SOLUTION ORAL
Qty: 0 | Refills: 0 | DISCHARGE
Start: 2022-11-11

## 2022-11-11 RX ORDER — FLUCONAZOLE 150 MG/1
0 TABLET ORAL
Qty: 0 | Refills: 0 | DISCHARGE

## 2022-11-11 RX ORDER — LEVETIRACETAM 250 MG/1
1 TABLET, FILM COATED ORAL
Qty: 0 | Refills: 0 | DISCHARGE
Start: 2022-11-11

## 2022-11-11 RX ORDER — LOSARTAN POTASSIUM 100 MG/1
1 TABLET, FILM COATED ORAL
Qty: 0 | Refills: 0 | DISCHARGE
Start: 2022-11-11

## 2022-11-11 RX ORDER — LOSARTAN POTASSIUM 100 MG/1
0 TABLET, FILM COATED ORAL
Qty: 0 | Refills: 1 | DISCHARGE

## 2022-11-11 RX ORDER — FERROUS SULFATE 325(65) MG
1 TABLET ORAL
Qty: 0 | Refills: 0 | DISCHARGE
Start: 2022-11-11

## 2022-11-11 RX ORDER — SENNA PLUS 8.6 MG/1
2 TABLET ORAL
Qty: 0 | Refills: 0 | DISCHARGE
Start: 2022-11-11

## 2022-11-11 RX ORDER — GABAPENTIN 400 MG/1
1 CAPSULE ORAL
Qty: 0 | Refills: 0 | DISCHARGE
Start: 2022-11-11

## 2022-11-11 RX ORDER — NIFEDIPINE 30 MG
1 TABLET, EXTENDED RELEASE 24 HR ORAL
Qty: 0 | Refills: 0 | DISCHARGE
Start: 2022-11-11

## 2022-11-11 RX ORDER — APREPITANT 80 MG/1
0 CAPSULE ORAL
Qty: 0 | Refills: 0 | DISCHARGE

## 2022-11-11 RX ADMIN — ESCITALOPRAM OXALATE 7.5 MILLIGRAM(S): 10 TABLET, FILM COATED ORAL at 10:50

## 2022-11-11 RX ADMIN — Medication 1 TABLET(S): at 11:47

## 2022-11-11 RX ADMIN — POLYETHYLENE GLYCOL 3350 17 GRAM(S): 17 POWDER, FOR SOLUTION ORAL at 10:49

## 2022-11-11 RX ADMIN — Medication 0.5 MILLIGRAM(S): at 13:11

## 2022-11-11 RX ADMIN — GABAPENTIN 100 MILLIGRAM(S): 400 CAPSULE ORAL at 06:46

## 2022-11-11 RX ADMIN — LEVETIRACETAM 500 MILLIGRAM(S): 250 TABLET, FILM COATED ORAL at 06:46

## 2022-11-11 RX ADMIN — ENOXAPARIN SODIUM 50 MILLIGRAM(S): 100 INJECTION SUBCUTANEOUS at 06:45

## 2022-11-11 RX ADMIN — Medication 0.5 MILLIGRAM(S): at 06:47

## 2022-11-11 RX ADMIN — Medication 325 MILLIGRAM(S): at 06:46

## 2022-11-11 RX ADMIN — LOSARTAN POTASSIUM 100 MILLIGRAM(S): 100 TABLET, FILM COATED ORAL at 07:59

## 2022-11-11 NOTE — PROGRESS NOTE ADULT - PROBLEM SELECTOR PLAN 4
Hgb 10-11, platelet approx 30k. s/p 2U PRBC and 1unit platelets in ER.    - trend hgb/platelets 10.2/32.6  - maintain active T&S  - transfuse if Hgb <7  - transfuse platelets if less than 50k or bleeding- @ 70K  - c/w iron supplements Hgb 10-11, platelet approx 30k. s/p 2U PRBC and 1unit platelets in ER.    - trend hgb/platelets 9.4/134  - maintain active T&S  - transfuse if Hgb <7  - transfuse platelets if less than 50k or bleeding- @ 134k  - c/w iron supplements

## 2022-11-11 NOTE — PROGRESS NOTE ADULT - PROBLEM SELECTOR PLAN 6
Patient with hx of depression and severe anxiety, on home Lexapro 7.5mg qd and ativan 0.5 TID.  - c/w home medications Follows w/ Dr. Hidalgo [phone]. Had chemo treatment a week prior to presentation, anemic secondary to chemo tx. Known mets to liver, lung and peritoneum with radiofrequency ablation of liver lesions as MSK. Chemotherapy treatment 1 week before hospital presentation    - Dr. Hidalgo following and will continue with chem tx outpatient

## 2022-11-11 NOTE — DISCHARGE NOTE NURSING/CASE MANAGEMENT/SOCIAL WORK - PATIENT PORTAL LINK FT
You can access the FollowMyHealth Patient Portal offered by [facility] by registering at the following website: [email or web address]. By joining [employer]’s FollowMyHealth portal, you will also be able to view your health information using other applications (apps) compatible with our system.

## 2022-11-11 NOTE — PROGRESS NOTE ADULT - NUTRITIONAL ASSESSMENT
Recommendations:  Transfuse with platelets per hematology recs  Full AC for LLE DVT  Vascular surgery will continue to follow
This patient has been assessed with a concern for Malnutrition and has been determined to have a diagnosis/diagnoses of Severe protein-calorie malnutrition.    This patient is being managed with:   Diet Regular-  Supplement Feeding Modality:  Oral  Ensure Clear Cans or Servings Per Day:  3       Frequency:  Three Times a day  Entered: Nov 7 2022  4:43PM    

## 2022-11-11 NOTE — PROGRESS NOTE ADULT - PROBLEM SELECTOR PLAN 8
F: none  E: Replete as necessary K>4 Mg>2  N: regular diet     DVT Prophylaxis: Lovenox 50mg BID   GI prophylaxis: None   CODE STATUS: FULL Arterial LLE duplex negative  - vascular surgery consulted, not a candidate for IVC filter  - in setting of hypercoagulability due to cancer and sedentary lifestyle with no mets to brain on MRI, heme/onc team recommending PLT transfusion when PLT <50 while on AC

## 2022-11-11 NOTE — PROGRESS NOTE ADULT - PROBLEM SELECTOR PLAN 7
Arterial LLE duplex negative  - vascular surgery consulted, not a candidate for IVC filter  - in setting of hypercoagulability due to cancer and sedentary lifestyle with no mets to brain on MRI, heme/onc team recommending PLT transfusion when PLT <50 while on AC Patient with hx of depression and severe anxiety, on home Lexapro 7.5mg qd and ativan 0.5 TID.  - c/w home medications

## 2022-11-11 NOTE — PROGRESS NOTE ADULT - PROBLEM SELECTOR PLAN 5
Follows w/ Dr. Hidalgo [phone]. Had chemo treatment a week prior to presentation, anemic secondary to chemo tx. Known mets to liver, lung and peritoneum with radiofrequency ablation of liver lesions as MSK. Chemotherapy treatment 1 week before hospital presentation    - Dr. Hidalgo following and will continue with chem tx outpatient 11/11 WBC 7.9 > 12.8, afebrile, VSS. Pt with "funny sensation" with urination.     - UA with reflex UCx  - Bactrim x3d   - F/u 12pm CBC to trend WBC

## 2022-11-11 NOTE — PROGRESS NOTE ADULT - SUBJECTIVE AND OBJECTIVE BOX
Patient is a 72y old  Female who presents with a chief complaint of left leg weakness and decreased sensation (2022 07:18)    INTERVAL EVENTS:    - describes some dsyuria with urination . Some sensation of difficulty initiating urine stream, sensation of bladder distension, suprapubic discomfort. . Bladder scan this morning showed post void residual of 500ml. Straight cath with drainage of 500ml of urine ---- patient reports feeling much more comfortable after straight cath.   - no cough, no sore throat, no dyspnea. No new rashes. Saturating well on room air. No change in left leg exam. No localizing signs of infection. After straight cath, patient feels 'better' overall than when I evaluated her on .     - discussed recommendations for repeat bladder scans at rehab, rechecking CBC at rehab with patient, close friend/partner Yahs Bergman, and friend Sho  who were all at bedside. Discussed comorbids, risk if acute decompensation given known DVT (risk of PE); Risk of new infection (including pneumonia) given hospitalization and        SUBJECTIVE:  Patient was seen and examined at bedside.    Review of systems: No fever, chills, dizziness, HA, Changes in vision, CP, dyspnea, nausea or vomiting, dysuria, changes in bowel movements, LE edema. Rest of 12 point Review of systems negative unless otherwise documented elsewhere in note.         MEDICATIONS:  MEDICATIONS  (STANDING):    MEDICATIONS  (PRN):      Allergies    Benadryl (Other)  Compazine (Unknown)    Intolerances        OBJECTIVE:  Vital Signs Last 24 Hrs  T(C): 36.7 (2022 11:10), Max: 36.7 (2022 11:10)  T(F): 98 (2022 11:10), Max: 98 (2022 11:10)  HR: 108 (2022 11:10) (108 - 110)  BP: 120/72 (2022 11:10) (112/71 - 120/72)  BP(mean): --  RR: 18 (2022 11:10) (18 - 18)  SpO2: 97% (2022 11:10) (92% - 97%)    Parameters below as of 2022 11:10  Patient On (Oxygen Delivery Method): room air      I&O's Summary    2022 07:01  -  2022 07:00  --------------------------------------------------------  IN: 0 mL / OUT: 500 mL / NET: -500 mL        PHYSICAL EXAM:  Gen: Reclining in bed at time of exam, appears stated age  HEENT: NCAT, MMM, clear OP  Neck: supple, trachea at midline  CV: RRR, +S1/S2  Pulm: adequate respiratory effort, no increase in work of breathing  Abd: soft, NTND  Skin: warm and dry,   Ext: WWP, no LE edema  Neuro: AOx3, speaking in full sentences  Psych: affect and behavior appropriate, pleasant at time of interview    LABS:                        10.3   13.28 )-----------( 157      ( 2022 11:49 )             33.1     -    138  |  102  |  21  ----------------------------<  119<H>  3.8   |  25  |  1.54<H>    Ca    8.4      2022 05:30  Phos  2.9     11-11  Mg     1.8               CAPILLARY BLOOD GLUCOSE      POCT Blood Glucose.: 116 mg/dL (2022 08:36)    Urinalysis Basic - ( 2022 12:40 )    Color: Yellow / Appearance: Clear / S.025 / pH: x  Gluc: x / Ketone: NEGATIVE  / Bili: Negative / Urobili: 1.0 E.U./dL   Blood: x / Protein: >=300 mg/dL / Nitrite: NEGATIVE   Leuk Esterase: NEGATIVE / RBC: 5-10 /HPF / WBC < 5 /HPF   Sq Epi: x / Non Sq Epi: 0-5 /HPF / Bacteria: None /HPF        MICRODATA:    Urinalysis with Rflx Culture (collected 2022 12:40)        RADIOLOGY/OTHER STUDIES:   Patient is a 72y old  Female who presents with a chief complaint of left leg weakness and decreased sensation (2022 07:18)    INTERVAL EVENTS:    - describes some dsyuria with urination . Some sensation of difficulty initiating urine stream, sensation of bladder distension, suprapubic discomfort. . Bladder scan this morning showed post void residual of 500ml. Straight cath with drainage of 500ml of urine ---- patient reports feeling much more comfortable after straight cath.   - no cough, no sore throat, no dyspnea. No new rashes. Saturating well on room air. No change in left leg exam. No localizing signs of infection. After straight cath, patient feels 'better' overall than when I evaluated her on .   - discussed recommendations for repeat bladder scans at rehab, rechecking CBC at rehab with patient, close friend/partner Yash Bergman, and friend Sho  who were all at bedside. Discussed comorbids, risk of acute decompensation given known DVT (risk of PE); Risk of new infection (including pneumonia) given hospitalization.      SUBJECTIVE:  Patient was seen and examined at bedside.  Review of systems: No fever, chills, CP, dyspnea, nausea or vomiting,  + left  LE edema ; + dysuria as above ; Rest of 12 point Review of systems negative unless otherwise documented elsewhere in note.     MEDICATIONS:  MEDICATIONS  (STANDING):    MEDICATIONS  (PRN):      Allergies    Benadryl (Other)  Compazine (Unknown)    Intolerances    OBJECTIVE:  Vital Signs Last 24 Hrs  T(C): 36.7 (2022 11:10), Max: 36.7 (2022 11:10)  T(F): 98 (2022 11:10), Max: 98 (2022 11:10)  HR: 108 (2022 11:10) (108 - 110)  BP: 120/72 (2022 11:10) (112/71 - 120/72)  BP(mean): --  RR: 18 (2022 11:10) (18 - 18)  SpO2: 97% (2022 11:10) (92% - 97%)    Parameters below as of 2022 11:10  Patient On (Oxygen Delivery Method): room air    I&O's Summary    2022 07:01  -  2022 07:00  --------------------------------------------------------  IN: 0 mL / OUT: 500 mL / NET: -500 mL    PHYSICAL EXAM:  Gen: Reclining in bed at time of exam, appears stated age  HEENT: MMM, clear OP  Neck: supple, trachea at midline  CV: RRR, +S1/S2  Pulm: adequate respiratory effort, no increase in work of breathing  Abd: soft, NTND ; mild suprapubic tenderness before straight cath   Skin: warm and dry,   Ext: WWP, + left LE edema   Neuro: AOx3, speaking in full sentences  Psych: affect and behavior appropriate, pleasant at time of interview    LABS:                        10.3   13.28 )-----------( 157      ( 2022 11:49 )             33.1     -    138  |  102  |  21  ----------------------------<  119<H>  3.8   |  25  |  1.54<H>    Ca    8.4      2022 05:30  Phos  2.9     11-11  Mg     1.8               CAPILLARY BLOOD GLUCOSE      POCT Blood Glucose.: 116 mg/dL (2022 08:36)    Urinalysis Basic - ( 2022 12:40 )    Color: Yellow / Appearance: Clear / S.025 / pH: x  Gluc: x / Ketone: NEGATIVE  / Bili: Negative / Urobili: 1.0 E.U./dL   Blood: x / Protein: >=300 mg/dL / Nitrite: NEGATIVE   Leuk Esterase: NEGATIVE / RBC: 5-10 /HPF / WBC < 5 /HPF   Sq Epi: x / Non Sq Epi: 0-5 /HPF / Bacteria: None /HPF        MICRODATA:    Urinalysis with Rflx Culture (collected 2022 12:40)        RADIOLOGY/OTHER STUDIES:

## 2022-11-11 NOTE — PROGRESS NOTE ADULT - REASON FOR ADMISSION
left leg weakness and decreased sensation

## 2022-11-11 NOTE — PROGRESS NOTE ADULT - TIME BILLING
review of chart documentation and data; interview/examination of patient; discussion of assessment/plan with patient, family, and multidisciplinary teams.
Bedside exam and interview   Discussed patient's plan of care with housestaff   Discussed discharge recommendations with patient, and her friends who were at bedside.
review of patient information including recent vital signs, labs, imaging, and notes; assessing, examining patient; updating patient/family; discussion and coordination of care with multidisciplinary team.
review of chart documentation and data; interview/examination of patient; discussion of assessment/plan with patient, family, and multidisciplinary teams.
d/w Stroke ACP  will follow w/ you
review of patient information including recent vital signs, labs, imaging, and notes; assessing, examining patient; updating patient/family; discussion and coordination of care with multidisciplinary team.

## 2022-11-11 NOTE — PROGRESS NOTE ADULT - ASSESSMENT
72y Female with PMHx of ?HTN, ovarian cancer on chemo (last treatment was 1 week ago), and recurrent anemia 2/2 chemotherapy, presented to the ER on 11/1 directly from oncologist for blood transfusion 2/2 Hgb 6.4 s/p 2 units of PRBC. Pt later developed left leg numbness and weakness. Pt had an MRI Brain which was consistent with PRES. Hospital course complicated by LLE femoral DVT -- started on treatment dose lovenox. Hospital course also complicated by intermittent urinary retention.     # Leukocytosis   WBCs mildly elevated at ~13 today.   - describes some dsyuria with urination . Some sensation of difficulty initiating urine stream, sensation of bladder distension, suprapubic discomfort. . Bladder scan this morning showed post void residual of 500ml. Straight cath with drainage of 500ml of urine ---- patient reports feeling much more comfortable after straight cath.   - no cough, no sore throat, no dyspnea. No new rashes. Saturating well on room air. No change in left leg exam. No localizing signs of infection. No new complaints today. After straight cath, patient feels 'better' overall than when I evaluated her on Nov 6th.   - Differential for leukocytosis is broad, including recovery of bone marrow after chemo, breakdown of VTE, urinary tract infection, urinary retention. Given that patient has no other complaints today besides urinary retention and dysuria, currently favor urinary retention as explanation for leukocytosis. Will treat empirically for UTI. Send UA/UCx, f/u UA/UCx results, with the understanding that white count should be rechecked after resolution of urinary retention.   - discussed recommendations for repeat bladder scans at rehab, rechecking CBC at rehab , discharge on full dose AC until follow up with oncologist-------discussed these recommendations with patient, her close friend/partner Yash Bergman, and friend Sho  who were all at bedside. Discussed comorbids, risk of acute decompensation given known DVT (risk of extension of VTE, risk of PE); Risk of new infection (including pneumonia) given hospitalization. Advised paying attention to any new symptoms, and alerting rehab should she have any new symptoms or worsening of existing symptoms.     # LLE DVT - treatment dose lovenox per hemonc recs   # PRES ,- BP control   # Ovarian cancer , f/u outpatient with oncologist for further mgmt

## 2022-11-11 NOTE — PROGRESS NOTE ADULT - PROBLEM SELECTOR PROBLEM 1
Posterior reversible encephalopathy syndrome
Debility
Posterior reversible encephalopathy syndrome
Posterior reversible encephalopathy syndrome
Left leg weakness
Debility
Left leg weakness
Posterior reversible encephalopathy syndrome
Left leg weakness
Left leg weakness

## 2022-11-11 NOTE — PROGRESS NOTE ADULT - PROVIDER SPECIALTY LIST ADULT
Heme/Onc
Heme/Onc
Hospitalist
Neurology
Heme/Onc
Neurology
Hospitalist
Neurology
Hospitalist
Internal Medicine
Neurology
Vascular Surgery
Hospitalist
Internal Medicine
Internal Medicine
Palliative Care
Palliative Care
Neurology

## 2022-11-11 NOTE — DISCHARGE NOTE NURSING/CASE MANAGEMENT/SOCIAL WORK - NSDCPEFALRISK_GEN_ALL_CORE
For information on Fall & Injury Prevention, visit: [email or web address] OR  [email or web address] OR  https://www.cdc.gov/steadi/patient.html

## 2022-11-11 NOTE — PROGRESS NOTE ADULT - SUBJECTIVE AND OBJECTIVE BOX
** INCOMPLETE **    OVERNIGHT EVENTS:     SUBJECTIVE:    VITAL SIGNS:  Vital Signs Last 24 Hrs  T(C): 37.1 (11 Nov 2022 06:26), Max: 37.1 (10 Nov 2022 20:40)  T(F): 98.7 (11 Nov 2022 06:26), Max: 98.7 (10 Nov 2022 20:40)  HR: 92 (11 Nov 2022 06:26) (92 - 109)  BP: 129/78 (11 Nov 2022 06:26) (120/79 - 150/80)  BP(mean): --  RR: 16 (11 Nov 2022 06:26) (16 - 18)  SpO2: 94% (11 Nov 2022 06:26) (94% - 98%)    Parameters below as of 11 Nov 2022 06:26  Patient On (Oxygen Delivery Method): room air        PHYSICAL EXAM:  General: NAD; speaking in full sentences  HEENT: NC/AT; PERRL; EOMI; MMM  Neck: supple; no JVD  Cardiac: RRR; +S1/S2  Pulm: CTA B/L; no W/R/R  GI: soft, NT/ND, +BS  Extremities:  no edema, clubbing or cyanosis  Vasc: 2+ radial, DP pulses B/L  Neuro: AAOx3; no focal deficits    MEDICATIONS:  MEDICATIONS  (STANDING):  enoxaparin Injectable 50 milliGRAM(s) SubCutaneous every 12 hours  escitalopram 7.5 milliGRAM(s) Oral daily  ferrous    sulfate 325 milliGRAM(s) Oral <User Schedule>  gabapentin 100 milliGRAM(s) Oral two times a day  levETIRAcetam 500 milliGRAM(s) Oral two times a day  LORazepam     Tablet 0.5 milliGRAM(s) Oral every 8 hours  losartan 100 milliGRAM(s) Oral every 24 hours  NIFEdipine XL 90 milliGRAM(s) Oral every 24 hours  polyethylene glycol 3350 17 Gram(s) Oral daily  senna 2 Tablet(s) Oral at bedtime    MEDICATIONS  (PRN):  oxyCODONE    IR 2.5 milliGRAM(s) Oral every 4 hours PRN Moderate Pain (4 - 6)      ALLERGIES:  Allergies    Benadryl (Other)  Compazine (Unknown)    Intolerances        LABS:                        10.0   7.90  )-----------( 133      ( 10 Nov 2022 07:08 )             32.4     11-10    135  |  103  |  20  ----------------------------<  116<H>  4.1   |  25  |  1.47<H>    Ca    8.1<L>      10 Nov 2022 07:08  Phos  3.3     11-10  Mg     1.9     11-10          RADIOLOGY & ADDITIONAL TESTS: Reviewed. OVERNIGHT EVENTS:   No acute events overnight.    SUBJECTIVE:  Pt seen and examined at bedside. Feels okay currently and has no complaints. Does not have left lower extremity pain currently but still unable to move her left leg. Had a BM yesterday; however, still feels constipated. No fevers, CP, N/V.     VITAL SIGNS:  Vital Signs Last 24 Hrs  T(C): 37.1 (11 Nov 2022 06:26), Max: 37.1 (10 Nov 2022 20:40)  T(F): 98.7 (11 Nov 2022 06:26), Max: 98.7 (10 Nov 2022 20:40)  HR: 92 (11 Nov 2022 06:26) (92 - 109)  BP: 129/78 (11 Nov 2022 06:26) (120/79 - 150/80)  BP(mean): --  RR: 16 (11 Nov 2022 06:26) (16 - 18)  SpO2: 94% (11 Nov 2022 06:26) (94% - 98%)    Parameters below as of 11 Nov 2022 06:26  Patient On (Oxygen Delivery Method): room air        PHYSICAL EXAM:  General: thin elderly women in NAD; speaking in full sentences  HEENT: NC/AT; PERRL; EOMI; MMM  Neck: supple; no JVD  Cardiac: RRR; +S1/S2  Pulm: CTA B/L; no W/R/R  GI: soft, NT/ND, +BS  Extremities:  no edema, clubbing or cyanosis  Vasc: 2+ radial, DP pulses B/L  Neuro: AAOx3; sensation intact throughout, strength 5/5 RLE, 0/5 LLE  Psych: anxious     MEDICATIONS:  MEDICATIONS  (STANDING):  enoxaparin Injectable 50 milliGRAM(s) SubCutaneous every 12 hours  escitalopram 7.5 milliGRAM(s) Oral daily  ferrous    sulfate 325 milliGRAM(s) Oral <User Schedule>  gabapentin 100 milliGRAM(s) Oral two times a day  levETIRAcetam 500 milliGRAM(s) Oral two times a day  LORazepam     Tablet 0.5 milliGRAM(s) Oral every 8 hours  losartan 100 milliGRAM(s) Oral every 24 hours  NIFEdipine XL 90 milliGRAM(s) Oral every 24 hours  polyethylene glycol 3350 17 Gram(s) Oral daily  senna 2 Tablet(s) Oral at bedtime    MEDICATIONS  (PRN):  oxyCODONE    IR 2.5 milliGRAM(s) Oral every 4 hours PRN Moderate Pain (4 - 6)      ALLERGIES:  Allergies    Benadryl (Other)  Compazine (Unknown)    Intolerances        LABS:                        10.0   7.90  )-----------( 133      ( 10 Nov 2022 07:08 )             32.4     11-10    135  |  103  |  20  ----------------------------<  116<H>  4.1   |  25  |  1.47<H>    Ca    8.1<L>      10 Nov 2022 07:08  Phos  3.3     11-10  Mg     1.9     11-10          RADIOLOGY & ADDITIONAL TESTS: Reviewed. OVERNIGHT EVENTS:   No acute events overnight.    SUBJECTIVE:  Pt seen and examined at bedside. Feels okay currently. Does not have left lower extremity pain currently but still unable to move her left leg. Had a BM yesterday; however, still feels constipated. Also reports a "funny sensation" with urination and feels like she is retaining urine. No fevers, CP, N/V.     VITAL SIGNS:  Vital Signs Last 24 Hrs  T(C): 37.1 (11 Nov 2022 06:26), Max: 37.1 (10 Nov 2022 20:40)  T(F): 98.7 (11 Nov 2022 06:26), Max: 98.7 (10 Nov 2022 20:40)  HR: 92 (11 Nov 2022 06:26) (92 - 109)  BP: 129/78 (11 Nov 2022 06:26) (120/79 - 150/80)  BP(mean): --  RR: 16 (11 Nov 2022 06:26) (16 - 18)  SpO2: 94% (11 Nov 2022 06:26) (94% - 98%)    Parameters below as of 11 Nov 2022 06:26  Patient On (Oxygen Delivery Method): room air        PHYSICAL EXAM:  General: thin elderly women in NAD; speaking in full sentences  HEENT: NC/AT; PERRL; EOMI; MMM  Neck: supple; no JVD  Cardiac: RRR; +S1/S2  Pulm: CTA B/L; no W/R/R  GI: soft, NT/ND, +BS  Extremities:  no edema, clubbing or cyanosis  Vasc: 2+ radial, DP pulses B/L  Neuro: AAOx3; sensation intact throughout, strength 5/5 RLE, 0/5 LLE  Psych: anxious     MEDICATIONS:  MEDICATIONS  (STANDING):  enoxaparin Injectable 50 milliGRAM(s) SubCutaneous every 12 hours  escitalopram 7.5 milliGRAM(s) Oral daily  ferrous    sulfate 325 milliGRAM(s) Oral <User Schedule>  gabapentin 100 milliGRAM(s) Oral two times a day  levETIRAcetam 500 milliGRAM(s) Oral two times a day  LORazepam     Tablet 0.5 milliGRAM(s) Oral every 8 hours  losartan 100 milliGRAM(s) Oral every 24 hours  NIFEdipine XL 90 milliGRAM(s) Oral every 24 hours  polyethylene glycol 3350 17 Gram(s) Oral daily  senna 2 Tablet(s) Oral at bedtime    MEDICATIONS  (PRN):  oxyCODONE    IR 2.5 milliGRAM(s) Oral every 4 hours PRN Moderate Pain (4 - 6)      ALLERGIES:  Allergies    Benadryl (Other)  Compazine (Unknown)    Intolerances        LABS:                        10.0   7.90  )-----------( 133      ( 10 Nov 2022 07:08 )             32.4     11-10    135  |  103  |  20  ----------------------------<  116<H>  4.1   |  25  |  1.47<H>    Ca    8.1<L>      10 Nov 2022 07:08  Phos  3.3     11-10  Mg     1.9     11-10          RADIOLOGY & ADDITIONAL TESTS: Reviewed.

## 2022-11-11 NOTE — PROGRESS NOTE ADULT - PROBLEM SELECTOR PLAN 9
# acceptable for age; FT4 WNL; no need for further work-up
# acceptable for age; FT4 WNL; no need for further work-up
F: none  E: Replete as necessary K>4 Mg>2  N: regular diet     DVT Prophylaxis: Lovenox 50mg BID   GI prophylaxis: None   CODE STATUS: FULL
# acceptable for age; FT4 WNL; no need for further work-up
# acceptable for age; FT4 WNL; no need for further work-up

## 2022-11-11 NOTE — PROGRESS NOTE ADULT - ATTENDING COMMENTS
The patient is a 72-year-old female with a history of metastatic ovarian cancer, on chemo, with recent severe anemia requiring transfusion and thrombocytopenia admitted with left face/arm/leg (most prominent) numbness/weakness after receiving blood transfusion. Course complicated by seizure. MRI showed no acute ischemia but was concerning for PRES- possibly related to labile BP vs chemo. She was also found to have an extensive left lower extremity DVT, likely secondary to hypercoagulable state in the setting of active malignancy. Continue therapeutic Lovenox. Leg weakness is slowly improving, likely due to PRES. Will need repeat MRI in 2 weeks pending improvement. Continue Keppra- if mood is persistently low/tearful, consider transitioning to Vimpat. BP goal: Normotension.  Medically stable for d/c today - mildly elevated WBC- will treat UTI w Bactrim

## 2022-11-11 NOTE — DISCHARGE NOTE NURSING/CASE MANAGEMENT/SOCIAL WORK - NSDCFUADDAPPT_GEN_ALL_CORE_FT
Please bring your Insurance card, Photo ID and Discharge paperwork to the following appointment:    (1) Please follow up with your Medical Oncology Provider, Dr. Melani Hidalog at [address] [place] [address] on 11/22/2022 at 11:30am.    Appointment was scheduled by Ms. BERTRAND Atwood, Referral Coordinator.

## 2022-11-12 NOTE — CHART NOTE - NSCHARTNOTESELECT_GEN_ALL_CORE
Palliative Care/Event Note
Hematology Oncology Fellow Note/Event Note
Palliative Care Attending
Telephone note/Event Note

## 2022-11-12 NOTE — CHART NOTE - NSCHARTNOTEFT_GEN_A_CORE
Called [facility] rehab - spoke with Bandar , confirmed with [facility] that patient has getting full dose AC with enoxaparin BID (weight based dose) at rehab Called [facility] rehab - spoke with Bandar , confirmed with [facility] that patient has been getting full dose AC with enoxaparin BID (weight based dose) at rehab    Also spoke with on-call physician Dr. Colón, confirmed that patient has been getting full dose AC. conveyed hemonc's recommendation for lovenox on dc until follow up with patient's hematologist /oncologist.

## 2022-11-15 VITALS
SYSTOLIC BLOOD PRESSURE: 107 MMHG | RESPIRATION RATE: 14 BRPM | OXYGEN SATURATION: 86 % | TEMPERATURE: 97 F | WEIGHT: 111.99 LBS | DIASTOLIC BLOOD PRESSURE: 74 MMHG | HEIGHT: 61 IN | HEART RATE: 94 BPM

## 2022-11-15 LAB
ALBUMIN SERPL ELPH-MCNC: 1.3 G/DL — LOW (ref 3.4–5)
ALP SERPL-CCNC: 749 U/L — HIGH (ref 40–120)
ALT FLD-CCNC: 37 U/L — SIGNIFICANT CHANGE UP (ref 12–42)
ANION GAP SERPL CALC-SCNC: 14 MMOL/L — SIGNIFICANT CHANGE UP (ref 9–16)
ANION GAP SERPL CALC-SCNC: 8 MMOL/L — LOW (ref 9–16)
APPEARANCE UR: CLEAR — SIGNIFICANT CHANGE UP
APTT BLD: 44.7 SEC — HIGH (ref 27.5–35.5)
AST SERPL-CCNC: 80 U/L — HIGH (ref 15–37)
BACTERIA # UR AUTO: PRESENT /HPF
BASOPHILS # BLD AUTO: 0.04 K/UL — SIGNIFICANT CHANGE UP (ref 0–0.2)
BASOPHILS NFR BLD AUTO: 0.3 % — SIGNIFICANT CHANGE UP (ref 0–2)
BILIRUB SERPL-MCNC: 0.8 MG/DL — SIGNIFICANT CHANGE UP (ref 0.2–1.2)
BILIRUB UR-MCNC: ABNORMAL
BUN SERPL-MCNC: 36 MG/DL — HIGH (ref 7–23)
BUN SERPL-MCNC: 54 MG/DL — HIGH (ref 7–23)
CALCIUM SERPL-MCNC: 7.2 MG/DL — LOW (ref 8.5–10.5)
CALCIUM SERPL-MCNC: 8.5 MG/DL — SIGNIFICANT CHANGE UP (ref 8.5–10.5)
CHLORIDE SERPL-SCNC: 96 MMOL/L — SIGNIFICANT CHANGE UP (ref 96–108)
CHLORIDE SERPL-SCNC: 99 MMOL/L — SIGNIFICANT CHANGE UP (ref 96–108)
CO2 SERPL-SCNC: 18 MMOL/L — LOW (ref 22–31)
CO2 SERPL-SCNC: 25 MMOL/L — SIGNIFICANT CHANGE UP (ref 22–31)
COLOR SPEC: YELLOW — SIGNIFICANT CHANGE UP
COMMENT - URINE: SIGNIFICANT CHANGE UP
CREAT SERPL-MCNC: 2.16 MG/DL — HIGH (ref 0.5–1.3)
CREAT SERPL-MCNC: 3.5 MG/DL — HIGH (ref 0.5–1.3)
D DIMER BLD IA.RAPID-MCNC: 1042 NG/ML DDU — HIGH
DACRYOCYTES BLD QL SMEAR: SLIGHT — SIGNIFICANT CHANGE UP
DIFF PNL FLD: ABNORMAL
EGFR: 13 ML/MIN/1.73M2 — LOW
EGFR: 24 ML/MIN/1.73M2 — LOW
EOSINOPHIL # BLD AUTO: 0.02 K/UL — SIGNIFICANT CHANGE UP (ref 0–0.5)
EOSINOPHIL NFR BLD AUTO: 0.2 % — SIGNIFICANT CHANGE UP (ref 0–6)
EPI CELLS # UR: SIGNIFICANT CHANGE UP /HPF (ref 0–5)
GLUCOSE SERPL-MCNC: 151 MG/DL — HIGH (ref 70–99)
GLUCOSE SERPL-MCNC: 94 MG/DL — SIGNIFICANT CHANGE UP (ref 70–99)
GLUCOSE UR QL: 100
GRAN CASTS # UR COMP ASSIST: ABNORMAL /LPF
HCT VFR BLD CALC: 26.8 % — LOW (ref 34.5–45)
HGB BLD-MCNC: 8.4 G/DL — LOW (ref 11.5–15.5)
HYALINE CASTS # UR AUTO: SIGNIFICANT CHANGE UP /LPF (ref 0–2)
IMM GRANULOCYTES NFR BLD AUTO: 0.6 % — SIGNIFICANT CHANGE UP (ref 0–0.9)
INR BLD: 1.12 — SIGNIFICANT CHANGE UP (ref 0.88–1.16)
KETONES UR-MCNC: NEGATIVE — SIGNIFICANT CHANGE UP
LACTATE SERPL-SCNC: 2.3 MMOL/L — HIGH (ref 0.4–2)
LEUKOCYTE ESTERASE UR-ACNC: ABNORMAL
LYMPHOCYTES # BLD AUTO: 0.33 K/UL — LOW (ref 1–3.3)
LYMPHOCYTES # BLD AUTO: 2.6 % — LOW (ref 13–44)
MACROCYTES BLD QL: SLIGHT — SIGNIFICANT CHANGE UP
MANUAL SMEAR VERIFICATION: SIGNIFICANT CHANGE UP
MCHC RBC-ENTMCNC: 31.3 GM/DL — LOW (ref 32–36)
MCHC RBC-ENTMCNC: 32.1 PG — SIGNIFICANT CHANGE UP (ref 27–34)
MCV RBC AUTO: 102.3 FL — HIGH (ref 80–100)
MICROCYTES BLD QL: SLIGHT — SIGNIFICANT CHANGE UP
MONOCYTES # BLD AUTO: 1.46 K/UL — HIGH (ref 0–0.9)
MONOCYTES NFR BLD AUTO: 11.6 % — SIGNIFICANT CHANGE UP (ref 2–14)
NEUTROPHILS # BLD AUTO: 10.66 K/UL — HIGH (ref 1.8–7.4)
NEUTROPHILS NFR BLD AUTO: 84.7 % — HIGH (ref 43–77)
NITRITE UR-MCNC: NEGATIVE — SIGNIFICANT CHANGE UP
NRBC # BLD: 0 /100 WBCS — SIGNIFICANT CHANGE UP (ref 0–0)
NT-PROBNP SERPL-SCNC: 3037 PG/ML — HIGH
OVALOCYTES BLD QL SMEAR: SLIGHT — SIGNIFICANT CHANGE UP
PCO2 BLDV: 35 MMHG — LOW (ref 39–42)
PH BLDV: 7.44 — HIGH (ref 7.32–7.43)
PH UR: 5 — SIGNIFICANT CHANGE UP (ref 5–8)
PLAT MORPH BLD: NORMAL — SIGNIFICANT CHANGE UP
PLATELET # BLD AUTO: 178 K/UL — SIGNIFICANT CHANGE UP (ref 150–400)
PO2 BLDV: 44 MMHG — SIGNIFICANT CHANGE UP (ref 25–45)
POTASSIUM SERPL-MCNC: 4.1 MMOL/L — SIGNIFICANT CHANGE UP (ref 3.5–5.3)
POTASSIUM SERPL-MCNC: 4.9 MMOL/L — SIGNIFICANT CHANGE UP (ref 3.5–5.3)
POTASSIUM SERPL-SCNC: 4.1 MMOL/L — SIGNIFICANT CHANGE UP (ref 3.5–5.3)
POTASSIUM SERPL-SCNC: 4.9 MMOL/L — SIGNIFICANT CHANGE UP (ref 3.5–5.3)
PROT SERPL-MCNC: 6.2 G/DL — LOW (ref 6.4–8.2)
PROT UR-MCNC: 100 MG/DL
PROTHROM AB SERPL-ACNC: 13 SEC — SIGNIFICANT CHANGE UP (ref 10.5–13.4)
RBC # BLD: 2.62 M/UL — LOW (ref 3.8–5.2)
RBC # FLD: 21.8 % — HIGH (ref 10.3–14.5)
RBC BLD AUTO: ABNORMAL
RBC CASTS # UR COMP ASSIST: ABNORMAL /HPF
SAO2 % BLDV: 74.8 % — SIGNIFICANT CHANGE UP (ref 67–88)
SARS-COV-2 RNA SPEC QL NAA+PROBE: SIGNIFICANT CHANGE UP
SODIUM SERPL-SCNC: 129 MMOL/L — LOW (ref 132–145)
SODIUM SERPL-SCNC: 131 MMOL/L — LOW (ref 132–145)
SP GR SPEC: >=1.03 — SIGNIFICANT CHANGE UP (ref 1–1.03)
TROPONIN I, HIGH SENSITIVITY RESULT: 13.4 NG/L — SIGNIFICANT CHANGE UP
UROBILINOGEN FLD QL: 1 E.U./DL — SIGNIFICANT CHANGE UP
WBC # BLD: 12.59 K/UL — HIGH (ref 3.8–10.5)
WBC # FLD AUTO: 12.59 K/UL — HIGH (ref 3.8–10.5)
WBC UR QL: ABNORMAL /HPF

## 2022-11-15 PROCEDURE — 99291 CRITICAL CARE FIRST HOUR: CPT | Mod: FS

## 2022-11-15 PROCEDURE — 71045 X-RAY EXAM CHEST 1 VIEW: CPT | Mod: 26

## 2022-11-15 PROCEDURE — 93010 ELECTROCARDIOGRAM REPORT: CPT

## 2022-11-15 RX ORDER — SODIUM CHLORIDE 9 MG/ML
1000 INJECTION, SOLUTION INTRAVENOUS ONCE
Refills: 0 | Status: COMPLETED | OUTPATIENT
Start: 2022-11-15 | End: 2022-11-15

## 2022-11-15 RX ADMIN — SODIUM CHLORIDE 1000 MILLILITER(S): 9 INJECTION, SOLUTION INTRAVENOUS at 21:35

## 2022-11-15 RX ADMIN — SODIUM CHLORIDE 1000 MILLILITER(S): 9 INJECTION, SOLUTION INTRAVENOUS at 23:41

## 2022-11-15 NOTE — ED ADULT NURSE NOTE - OBJECTIVE STATEMENT
Pt presents to ER for hypoxia. Pt is in no acute distress. Pt seen and examined by ER provider. Labs collected and sent. Pending urine. Continuous cardiac monitoring maintained. Fall and safety are in place. Will continue with the plan of care.

## 2022-11-15 NOTE — ED ADULT NURSE NOTE - CHIEF COMPLAINT QUOTE
Pt BIBA from [facility] c/o hypoxia. Recently dx with DVT to [facility], notes had episodes of epistaxis today as well. Per transfer note, pt was sat 88% on RA which impr to 95-96% on 2L NC. Pt denies cp, sob, fevers, chills.

## 2022-11-15 NOTE — ED PROVIDER NOTE - PROGRESS NOTE DETAILS
Pt has a h/o DVT here with hypoxia 88% on room air, pt initial Cr is 3.50->2.16 with IV fluids additionally pt elevated BUN of 54->36 likely prerenal secondary to dehydration due to decreased PO intake will continue to provide IV fluids and obtain CTA since pt had hypoxia and hypotension in the setting of recent hospital stay and diagnosis of DVT, risks outweigh the benefits especially if pt has a large PE with increased risk for mortality. Pt O2 not improving with minimal improvement in Cr despite fluid resuscitation, and worsening anemia will admit pt hospital  Creatinine 1.54-3.5->2.16->3.0  Hgb 10.3->8.4->7.7

## 2022-11-15 NOTE — ED ADULT TRIAGE NOTE - CHIEF COMPLAINT QUOTE
Pt BIBA from [facility] c/o hypoxia. Recently dx with DVT to [facility], notes had episodes of epistaxis today as well. Per transfer note, pt was sat 88% on RA which impr to 95-96% on 2L NC. Pt denies cp, sob, fevers, chills.

## 2022-11-15 NOTE — ED PROVIDER NOTE - CARE PLAN
Principal Discharge DX:	RUBIN (acute kidney injury)  Secondary Diagnosis:	Anemia  Secondary Diagnosis:	Acute respiratory failure with hypoxia   1

## 2022-11-15 NOTE — ED PROVIDER NOTE - OBJECTIVE STATEMENT
73 yo f HTN, ovarian and uterine cancer (last treatment 3week prior to presentation) with known metastasis to liver, lung and peritoneum (followed by Dr. Hidalgo), recurrent anemia 2/2 chemotherapy recently admitted for PRES found to have a L common femoral DVT on admission (11/3) pw hypoxia 88% on room air and hypotension noted by SNF facility with no cp, no sob, no in respiratory distress speaking full sentences and no tachycardia. Pt unsure why she was brought to the ED, as per SNF notes pt had persistent hypoxia to 88% with BP of 76/47 improves with PO hydration.     I have reviewed available current nursing and previous documentation of past medical, surgical, family, and/or social history.

## 2022-11-15 NOTE — ED ADULT NURSE NOTE - NSSEPSISSUSPECTED_ED_A_ED
Spoke with patients mom and she said the patient actually had a bowel movement last night. Encouraged them to continue with the stool softener 2x daily and if she stops being able to use the restroom to let us know. Patient Mom also mentioned they only have 8 more tablets of the percocet, we will send more in for them so they do not run out over the weekend. They attend 1st PT appointment today and they will have the PT help with brace adjustments & taking off the dressings.     Lizabeth Mahan   Clinical Assistant to Dr. Dwight Mott    No

## 2022-11-15 NOTE — ED PROVIDER NOTE - CRITICAL CARE ATTENDING CONTRIBUTION TO CARE
The patient was seen immediately upon arrival due to a high probability of imminent or life-threatening deterioration secondary to hypoxia/hypotension, occult GIB, which required my direct attention, intervention, and personal management at the bedside. I have personally provided critical care time exclusive of time spent on separately billable procedures. Time includes review of laboratory data, radiology results, discussion with consultants, discussion with the patient's family, and monitoring for potential decompensation.     Patient here with hypoxia and low BP from SNF. She has metastatic CA on chemo and had a recent DVT dx'd at [facility] and is on AC.D/c'd to SNF 4 d ago. DNR. VS- hypoxia corrects on NC, brown stool on exam. Patient got NS 2L, noted to have new ARF with elevated BUN and new anemia. Strongly OB -ve, likely occult GIB. Admitted to [facility]. CT chest wnl. The patient was seen immediately upon arrival due to a high probability of imminent or life-threatening deterioration secondary to hypoxia/hypotension, occult GIB, which required my direct attention, intervention, and personal management at the bedside. I have personally provided critical care time exclusive of time spent on separately billable procedures. Time includes review of laboratory data, radiology results, discussion with consultants, discussion with the patient's family, and monitoring for potential decompensation.     Patient here with hypoxia and low BP from SNF. She has metastatic CA on chemo and had a recent DVT dx'd at [facility] and is on AC.D/c'd to SNF 4 d ago. DNR. VS- hypoxia corrects on NC, brown stool on exam. Patient got NS 2L, noted to have new ARF with elevated BUN and new anemia. Strongly OB -ve, likely occult GIB. Admitted to [facility]. CT chest is similar to prior (extensive metastatic disease and L sided effusion).

## 2022-11-15 NOTE — ED PROVIDER NOTE - CLINICAL SUMMARY MEDICAL DECISION MAKING FREE TEXT BOX
73 yo f HTN, ovarian and uterine cancer (last treatment 1 week prior to presentation) with known metastasis to liver, lung and peritoneum (followed by Dr. Hidalgo), recurrent anemia 2/2 chemotherapy recently admitted for PRES found to have a L common femoral DVT on admission (11/3) pw hypoxia 88% on room air and hypotension noted by SNF facility with no cp, no sob, no in respiratory distress speaking full sentences and no tachycardia. Pt unsure why she was brought to the ED, as per SNF notes pt had persistent hypoxia to 88% with BP of 76/47 improves with PO hydration. will r/o PE, acs, bp, hf, plan: cbc, cmp, trop, bmp, vbg, lac, cta chest

## 2022-11-16 ENCOUNTER — INPATIENT (INPATIENT)
Facility: HOSPITAL | Age: 72
LOS: 7 days | Discharge: NURSING, REHAB OR LONG-TERM CARE | DRG: 180 | End: 2022-11-24
Attending: INTERNAL MEDICINE | Admitting: STUDENT IN AN ORGANIZED HEALTH CARE EDUCATION/TRAINING PROGRAM
Payer: MEDICARE

## 2022-11-16 DIAGNOSIS — Z00.00 ENCOUNTER FOR GENERAL ADULT MEDICAL EXAMINATION WITHOUT ABNORMAL FINDINGS: ICD-10-CM

## 2022-11-16 DIAGNOSIS — I82.409 ACUTE EMBOLISM AND THROMBOSIS OF UNSPECIFIED DEEP VEINS OF UNSPECIFIED LOWER EXTREMITY: ICD-10-CM

## 2022-11-16 DIAGNOSIS — J96.01 ACUTE RESPIRATORY FAILURE WITH HYPOXIA: ICD-10-CM

## 2022-11-16 DIAGNOSIS — J18.9 PNEUMONIA, UNSPECIFIED ORGANISM: ICD-10-CM

## 2022-11-16 DIAGNOSIS — I10 ESSENTIAL (PRIMARY) HYPERTENSION: ICD-10-CM

## 2022-11-16 DIAGNOSIS — C56.9 MALIGNANT NEOPLASM OF UNSPECIFIED OVARY: ICD-10-CM

## 2022-11-16 DIAGNOSIS — N17.9 ACUTE KIDNEY FAILURE, UNSPECIFIED: ICD-10-CM

## 2022-11-16 DIAGNOSIS — D64.9 ANEMIA, UNSPECIFIED: ICD-10-CM

## 2022-11-16 DIAGNOSIS — R33.9 RETENTION OF URINE, UNSPECIFIED: ICD-10-CM

## 2022-11-16 DIAGNOSIS — J90 PLEURAL EFFUSION, NOT ELSEWHERE CLASSIFIED: ICD-10-CM

## 2022-11-16 LAB
ALBUMIN SERPL ELPH-MCNC: 1.8 G/DL — LOW (ref 3.3–5)
ALP SERPL-CCNC: 710 U/L — HIGH (ref 40–120)
ALT FLD-CCNC: 30 U/L — SIGNIFICANT CHANGE UP (ref 10–45)
ANION GAP SERPL CALC-SCNC: 12 MMOL/L — SIGNIFICANT CHANGE UP (ref 5–17)
ANION GAP SERPL CALC-SCNC: 9 MMOL/L — SIGNIFICANT CHANGE UP (ref 9–16)
ANISOCYTOSIS BLD QL: SLIGHT — SIGNIFICANT CHANGE UP
APPEARANCE UR: ABNORMAL
APTT BLD: 90 SEC — HIGH (ref 27.5–35.5)
AST SERPL-CCNC: 56 U/L — HIGH (ref 10–40)
BACTERIA # UR AUTO: SIGNIFICANT CHANGE UP /HPF
BASOPHILS # BLD AUTO: 0 K/UL — SIGNIFICANT CHANGE UP (ref 0–0.2)
BASOPHILS # BLD AUTO: 0.22 K/UL — HIGH (ref 0–0.2)
BASOPHILS NFR BLD AUTO: 0 % — SIGNIFICANT CHANGE UP (ref 0–2)
BASOPHILS NFR BLD AUTO: 2 % — SIGNIFICANT CHANGE UP (ref 0–2)
BILIRUB SERPL-MCNC: 0.8 MG/DL — SIGNIFICANT CHANGE UP (ref 0.2–1.2)
BILIRUB UR-MCNC: ABNORMAL
BLD GP AB SCN SERPL QL: NEGATIVE — SIGNIFICANT CHANGE UP
BUN SERPL-MCNC: 44 MG/DL — HIGH (ref 7–23)
BUN SERPL-MCNC: 46 MG/DL — HIGH (ref 7–23)
CALCIUM SERPL-MCNC: 8 MG/DL — LOW (ref 8.4–10.5)
CALCIUM SERPL-MCNC: 8.1 MG/DL — LOW (ref 8.5–10.5)
CHLORIDE SERPL-SCNC: 97 MMOL/L — SIGNIFICANT CHANGE UP (ref 96–108)
CHLORIDE SERPL-SCNC: 99 MMOL/L — SIGNIFICANT CHANGE UP (ref 96–108)
CO2 SERPL-SCNC: 22 MMOL/L — SIGNIFICANT CHANGE UP (ref 22–31)
CO2 SERPL-SCNC: 24 MMOL/L — SIGNIFICANT CHANGE UP (ref 22–31)
COLOR SPEC: YELLOW — SIGNIFICANT CHANGE UP
COMMENT - URINE: SIGNIFICANT CHANGE UP
CREAT ?TM UR-MCNC: 88 MG/DL — SIGNIFICANT CHANGE UP
CREAT SERPL-MCNC: 2.99 MG/DL — HIGH (ref 0.5–1.3)
CREAT SERPL-MCNC: 3 MG/DL — HIGH (ref 0.5–1.3)
DIFF PNL FLD: ABNORMAL
EGFR: 16 ML/MIN/1.73M2 — LOW
EGFR: 16 ML/MIN/1.73M2 — LOW
EOSINOPHIL # BLD AUTO: 0 K/UL — SIGNIFICANT CHANGE UP (ref 0–0.5)
EOSINOPHIL # BLD AUTO: 0.11 K/UL — SIGNIFICANT CHANGE UP (ref 0–0.5)
EOSINOPHIL NFR BLD AUTO: 0 % — SIGNIFICANT CHANGE UP (ref 0–6)
EOSINOPHIL NFR BLD AUTO: 1 % — SIGNIFICANT CHANGE UP (ref 0–6)
EPI CELLS # UR: SIGNIFICANT CHANGE UP /HPF (ref 0–5)
GIANT PLATELETS BLD QL SMEAR: PRESENT — SIGNIFICANT CHANGE UP
GLUCOSE BLDC GLUCOMTR-MCNC: 119 MG/DL — HIGH (ref 70–99)
GLUCOSE SERPL-MCNC: 109 MG/DL — HIGH (ref 70–99)
GLUCOSE SERPL-MCNC: 118 MG/DL — HIGH (ref 70–99)
GLUCOSE UR QL: NEGATIVE — SIGNIFICANT CHANGE UP
GRAN CASTS # UR COMP ASSIST: ABNORMAL /LPF
HCT VFR BLD CALC: 25.2 % — LOW (ref 34.5–45)
HCT VFR BLD CALC: 25.3 % — LOW (ref 34.5–45)
HGB BLD-MCNC: 7.5 G/DL — LOW (ref 11.5–15.5)
HGB BLD-MCNC: 7.7 G/DL — LOW (ref 11.5–15.5)
HYALINE CASTS # UR AUTO: SIGNIFICANT CHANGE UP /LPF (ref 0–2)
KETONES UR-MCNC: NEGATIVE — SIGNIFICANT CHANGE UP
LACTATE SERPL-SCNC: 1.8 MMOL/L — SIGNIFICANT CHANGE UP (ref 0.4–2)
LEUKOCYTE ESTERASE UR-ACNC: NEGATIVE — SIGNIFICANT CHANGE UP
LG PLATELETS BLD QL AUTO: SLIGHT — SIGNIFICANT CHANGE UP
LYMPHOCYTES # BLD AUTO: 0.22 K/UL — LOW (ref 1–3.3)
LYMPHOCYTES # BLD AUTO: 0.39 K/UL — LOW (ref 1–3.3)
LYMPHOCYTES # BLD AUTO: 2 % — LOW (ref 13–44)
LYMPHOCYTES # BLD AUTO: 3.5 % — LOW (ref 13–44)
MACROCYTES BLD QL: SLIGHT — SIGNIFICANT CHANGE UP
MACROCYTES BLD QL: SLIGHT — SIGNIFICANT CHANGE UP
MAGNESIUM SERPL-MCNC: 2.1 MG/DL — SIGNIFICANT CHANGE UP (ref 1.6–2.6)
MAGNESIUM SERPL-MCNC: 2.2 MG/DL — SIGNIFICANT CHANGE UP (ref 1.6–2.6)
MANUAL SMEAR VERIFICATION: SIGNIFICANT CHANGE UP
MANUAL SMEAR VERIFICATION: SIGNIFICANT CHANGE UP
MCHC RBC-ENTMCNC: 29.6 GM/DL — LOW (ref 32–36)
MCHC RBC-ENTMCNC: 30.6 GM/DL — LOW (ref 32–36)
MCHC RBC-ENTMCNC: 31.4 PG — SIGNIFICANT CHANGE UP (ref 27–34)
MCHC RBC-ENTMCNC: 31.6 PG — SIGNIFICANT CHANGE UP (ref 27–34)
MCV RBC AUTO: 102.9 FL — HIGH (ref 80–100)
MCV RBC AUTO: 106.8 FL — HIGH (ref 80–100)
MICROCYTES BLD QL: SLIGHT — SIGNIFICANT CHANGE UP
MONOCYTES # BLD AUTO: 0.58 K/UL — SIGNIFICANT CHANGE UP (ref 0–0.9)
MONOCYTES # BLD AUTO: 1 K/UL — HIGH (ref 0–0.9)
MONOCYTES NFR BLD AUTO: 5.2 % — SIGNIFICANT CHANGE UP (ref 2–14)
MONOCYTES NFR BLD AUTO: 9 % — SIGNIFICANT CHANGE UP (ref 2–14)
MRSA PCR RESULT.: NEGATIVE — SIGNIFICANT CHANGE UP
NEUTROPHILS # BLD AUTO: 10.11 K/UL — HIGH (ref 1.8–7.4)
NEUTROPHILS # BLD AUTO: 9.59 K/UL — HIGH (ref 1.8–7.4)
NEUTROPHILS NFR BLD AUTO: 86 % — HIGH (ref 43–77)
NEUTROPHILS NFR BLD AUTO: 91.3 % — HIGH (ref 43–77)
NITRITE UR-MCNC: NEGATIVE — SIGNIFICANT CHANGE UP
NRBC # BLD: 0 /100 — SIGNIFICANT CHANGE UP (ref 0–0)
NRBC # BLD: SIGNIFICANT CHANGE UP /100 WBCS (ref 0–0)
OB PNL STL: POSITIVE
OSMOLALITY SERPL: 289 MOSM/KG — SIGNIFICANT CHANGE UP (ref 280–301)
OSMOLALITY UR: 333 MOSM/KG — SIGNIFICANT CHANGE UP (ref 300–900)
OVALOCYTES BLD QL SMEAR: SLIGHT — SIGNIFICANT CHANGE UP
OVALOCYTES BLD QL SMEAR: SLIGHT — SIGNIFICANT CHANGE UP
PH UR: 6 — SIGNIFICANT CHANGE UP (ref 5–8)
PHOSPHATE SERPL-MCNC: 4.2 MG/DL — SIGNIFICANT CHANGE UP (ref 2.5–4.5)
PLAT MORPH BLD: ABNORMAL
PLAT MORPH BLD: NORMAL — SIGNIFICANT CHANGE UP
PLATELET # BLD AUTO: 165 K/UL — SIGNIFICANT CHANGE UP (ref 150–400)
PLATELET # BLD AUTO: 186 K/UL — SIGNIFICANT CHANGE UP (ref 150–400)
POIKILOCYTOSIS BLD QL AUTO: SLIGHT — SIGNIFICANT CHANGE UP
POTASSIUM SERPL-MCNC: 4.6 MMOL/L — SIGNIFICANT CHANGE UP (ref 3.5–5.3)
POTASSIUM SERPL-MCNC: 4.6 MMOL/L — SIGNIFICANT CHANGE UP (ref 3.5–5.3)
POTASSIUM SERPL-SCNC: 4.6 MMOL/L — SIGNIFICANT CHANGE UP (ref 3.5–5.3)
POTASSIUM SERPL-SCNC: 4.6 MMOL/L — SIGNIFICANT CHANGE UP (ref 3.5–5.3)
POTASSIUM UR-SCNC: 36 MMOL/L — SIGNIFICANT CHANGE UP
PROCALCITONIN SERPL-MCNC: 2.59 NG/ML — HIGH (ref 0.02–0.1)
PROT ?TM UR-MCNC: 161 MG/DL — HIGH (ref 0–12)
PROT SERPL-MCNC: 5.6 G/DL — LOW (ref 6–8.3)
PROT UR-MCNC: 100 MG/DL
PROT/CREAT UR-RTO: 1.8 RATIO — HIGH (ref 0–0.2)
RBC # BLD: 2.37 M/UL — LOW (ref 3.8–5.2)
RBC # BLD: 2.45 M/UL — LOW (ref 3.8–5.2)
RBC # FLD: 21.8 % — HIGH (ref 10.3–14.5)
RBC # FLD: 21.8 % — HIGH (ref 10.3–14.5)
RBC BLD AUTO: ABNORMAL
RBC BLD AUTO: ABNORMAL
RBC CASTS # UR COMP ASSIST: > 10 /HPF
RETICS #: 50.3 K/UL — SIGNIFICANT CHANGE UP (ref 25–125)
RETICS/RBC NFR: 2.1 % — SIGNIFICANT CHANGE UP (ref 0.5–2.5)
RH IG SCN BLD-IMP: POSITIVE — SIGNIFICANT CHANGE UP
S AUREUS DNA NOSE QL NAA+PROBE: NEGATIVE — SIGNIFICANT CHANGE UP
SODIUM SERPL-SCNC: 131 MMOL/L — LOW (ref 135–145)
SODIUM SERPL-SCNC: 132 MMOL/L — SIGNIFICANT CHANGE UP (ref 132–145)
SODIUM UR-SCNC: <20 MMOL/L — SIGNIFICANT CHANGE UP
SP GR SPEC: 1.02 — SIGNIFICANT CHANGE UP (ref 1–1.03)
UROBILINOGEN FLD QL: 1 E.U./DL — SIGNIFICANT CHANGE UP
UUN UR-MCNC: 441 MG/DL — SIGNIFICANT CHANGE UP
WBC # BLD: 11.07 K/UL — HIGH (ref 3.8–10.5)
WBC # BLD: 11.15 K/UL — HIGH (ref 3.8–10.5)
WBC # FLD AUTO: 11.07 K/UL — HIGH (ref 3.8–10.5)
WBC # FLD AUTO: 11.15 K/UL — HIGH (ref 3.8–10.5)
WBC UR QL: ABNORMAL /HPF

## 2022-11-16 PROCEDURE — 99223 1ST HOSP IP/OBS HIGH 75: CPT | Mod: GC

## 2022-11-16 PROCEDURE — 76604 US EXAM CHEST: CPT | Mod: 26,GC

## 2022-11-16 PROCEDURE — 71275 CT ANGIOGRAPHY CHEST: CPT | Mod: 26,MH

## 2022-11-16 PROCEDURE — 99223 1ST HOSP IP/OBS HIGH 75: CPT | Mod: GC,25

## 2022-11-16 PROCEDURE — 76770 US EXAM ABDO BACK WALL COMP: CPT | Mod: 26

## 2022-11-16 RX ORDER — FOLIC ACID 0.8 MG
1 TABLET ORAL DAILY
Refills: 0 | Status: DISCONTINUED | OUTPATIENT
Start: 2022-11-16 | End: 2022-11-24

## 2022-11-16 RX ORDER — VANCOMYCIN HCL 1 G
750 VIAL (EA) INTRAVENOUS ONCE
Refills: 0 | Status: COMPLETED | OUTPATIENT
Start: 2022-11-16 | End: 2022-11-16

## 2022-11-16 RX ORDER — ACETAMINOPHEN 500 MG
650 TABLET ORAL EVERY 6 HOURS
Refills: 0 | Status: DISCONTINUED | OUTPATIENT
Start: 2022-11-16 | End: 2022-11-24

## 2022-11-16 RX ORDER — GABAPENTIN 400 MG/1
100 CAPSULE ORAL
Refills: 0 | Status: DISCONTINUED | OUTPATIENT
Start: 2022-11-16 | End: 2022-11-24

## 2022-11-16 RX ORDER — PANTOPRAZOLE SODIUM 20 MG/1
40 TABLET, DELAYED RELEASE ORAL EVERY 12 HOURS
Refills: 0 | Status: DISCONTINUED | OUTPATIENT
Start: 2022-11-16 | End: 2022-11-16

## 2022-11-16 RX ORDER — SODIUM CHLORIDE 9 MG/ML
1000 INJECTION INTRAMUSCULAR; INTRAVENOUS; SUBCUTANEOUS
Refills: 0 | Status: DISCONTINUED | OUTPATIENT
Start: 2022-11-16 | End: 2022-11-16

## 2022-11-16 RX ORDER — NIFEDIPINE 30 MG
60 TABLET, EXTENDED RELEASE 24 HR ORAL DAILY
Refills: 0 | Status: DISCONTINUED | OUTPATIENT
Start: 2022-11-17 | End: 2022-11-24

## 2022-11-16 RX ORDER — ESCITALOPRAM OXALATE 10 MG/1
7.5 TABLET, FILM COATED ORAL DAILY
Refills: 0 | Status: DISCONTINUED | OUTPATIENT
Start: 2022-11-17 | End: 2022-11-24

## 2022-11-16 RX ORDER — POLYETHYLENE GLYCOL 3350 17 G/17G
17 POWDER, FOR SOLUTION ORAL DAILY
Refills: 0 | Status: DISCONTINUED | OUTPATIENT
Start: 2022-11-16 | End: 2022-11-24

## 2022-11-16 RX ORDER — PIPERACILLIN AND TAZOBACTAM 4; .5 G/20ML; G/20ML
3.38 INJECTION, POWDER, LYOPHILIZED, FOR SOLUTION INTRAVENOUS EVERY 12 HOURS
Refills: 0 | Status: DISCONTINUED | OUTPATIENT
Start: 2022-11-17 | End: 2022-11-23

## 2022-11-16 RX ORDER — HEPARIN SODIUM 5000 [USP'U]/ML
INJECTION INTRAVENOUS; SUBCUTANEOUS
Qty: 25000 | Refills: 0 | Status: DISCONTINUED | OUTPATIENT
Start: 2022-11-16 | End: 2022-11-18

## 2022-11-16 RX ORDER — ESCITALOPRAM OXALATE 10 MG/1
1.5 TABLET, FILM COATED ORAL
Qty: 0 | Refills: 0 | DISCHARGE

## 2022-11-16 RX ORDER — ENOXAPARIN SODIUM 100 MG/ML
50 INJECTION SUBCUTANEOUS ONCE
Refills: 0 | Status: COMPLETED | OUTPATIENT
Start: 2022-11-16 | End: 2022-11-16

## 2022-11-16 RX ORDER — PIPERACILLIN AND TAZOBACTAM 4; .5 G/20ML; G/20ML
3.38 INJECTION, POWDER, LYOPHILIZED, FOR SOLUTION INTRAVENOUS ONCE
Refills: 0 | Status: COMPLETED | OUTPATIENT
Start: 2022-11-16 | End: 2022-11-16

## 2022-11-16 RX ORDER — SENNA PLUS 8.6 MG/1
2 TABLET ORAL AT BEDTIME
Refills: 0 | Status: DISCONTINUED | OUTPATIENT
Start: 2022-11-16 | End: 2022-11-24

## 2022-11-16 RX ORDER — LEVETIRACETAM 250 MG/1
500 TABLET, FILM COATED ORAL EVERY 12 HOURS
Refills: 0 | Status: DISCONTINUED | OUTPATIENT
Start: 2022-11-16 | End: 2022-11-22

## 2022-11-16 RX ORDER — ESCITALOPRAM OXALATE 10 MG/1
5 TABLET, FILM COATED ORAL DAILY
Refills: 0 | Status: DISCONTINUED | OUTPATIENT
Start: 2022-11-16 | End: 2022-11-16

## 2022-11-16 RX ORDER — OXYCODONE AND ACETAMINOPHEN 5; 325 MG/1; MG/1
1 TABLET ORAL ONCE
Refills: 0 | Status: DISCONTINUED | OUTPATIENT
Start: 2022-11-16 | End: 2022-11-16

## 2022-11-16 RX ORDER — PIPERACILLIN AND TAZOBACTAM 4; .5 G/20ML; G/20ML
3.38 INJECTION, POWDER, LYOPHILIZED, FOR SOLUTION INTRAVENOUS ONCE
Refills: 0 | Status: COMPLETED | OUTPATIENT
Start: 2022-11-17 | End: 2022-11-16

## 2022-11-16 RX ORDER — ONDANSETRON 8 MG/1
4 TABLET, FILM COATED ORAL EVERY 8 HOURS
Refills: 0 | Status: DISCONTINUED | OUTPATIENT
Start: 2022-11-16 | End: 2022-11-24

## 2022-11-16 RX ORDER — LANOLIN ALCOHOL/MO/W.PET/CERES
3 CREAM (GRAM) TOPICAL AT BEDTIME
Refills: 0 | Status: DISCONTINUED | OUTPATIENT
Start: 2022-11-16 | End: 2022-11-24

## 2022-11-16 RX ORDER — OXYBUTYNIN CHLORIDE 5 MG
2.5 TABLET ORAL
Refills: 0 | Status: DISCONTINUED | OUTPATIENT
Start: 2022-11-16 | End: 2022-11-18

## 2022-11-16 RX ADMIN — SODIUM CHLORIDE 1000 MILLILITER(S): 9 INJECTION, SOLUTION INTRAVENOUS at 02:26

## 2022-11-16 RX ADMIN — PIPERACILLIN AND TAZOBACTAM 200 GRAM(S): 4; .5 INJECTION, POWDER, LYOPHILIZED, FOR SOLUTION INTRAVENOUS at 17:18

## 2022-11-16 RX ADMIN — ESCITALOPRAM OXALATE 5 MILLIGRAM(S): 10 TABLET, FILM COATED ORAL at 17:18

## 2022-11-16 RX ADMIN — SODIUM CHLORIDE 75 MILLILITER(S): 9 INJECTION INTRAMUSCULAR; INTRAVENOUS; SUBCUTANEOUS at 13:37

## 2022-11-16 RX ADMIN — Medication 650 MILLIGRAM(S): at 21:38

## 2022-11-16 RX ADMIN — ENOXAPARIN SODIUM 50 MILLIGRAM(S): 100 INJECTION SUBCUTANEOUS at 00:47

## 2022-11-16 RX ADMIN — LEVETIRACETAM 400 MILLIGRAM(S): 250 TABLET, FILM COATED ORAL at 18:19

## 2022-11-16 RX ADMIN — Medication 1 MILLIGRAM(S): at 17:18

## 2022-11-16 RX ADMIN — SENNA PLUS 2 TABLET(S): 8.6 TABLET ORAL at 21:39

## 2022-11-16 RX ADMIN — Medication 650 MILLIGRAM(S): at 12:16

## 2022-11-16 RX ADMIN — Medication 0.5 MILLIGRAM(S): at 18:43

## 2022-11-16 RX ADMIN — PIPERACILLIN AND TAZOBACTAM 25 GRAM(S): 4; .5 INJECTION, POWDER, LYOPHILIZED, FOR SOLUTION INTRAVENOUS at 23:37

## 2022-11-16 RX ADMIN — Medication 650 MILLIGRAM(S): at 11:13

## 2022-11-16 RX ADMIN — OXYCODONE AND ACETAMINOPHEN 1 TABLET(S): 5; 325 TABLET ORAL at 02:29

## 2022-11-16 RX ADMIN — Medication 3 MILLIGRAM(S): at 21:39

## 2022-11-16 RX ADMIN — Medication 650 MILLIGRAM(S): at 22:27

## 2022-11-16 RX ADMIN — HEPARIN SODIUM 1000 UNIT(S)/HR: 5000 INJECTION INTRAVENOUS; SUBCUTANEOUS at 13:15

## 2022-11-16 RX ADMIN — GABAPENTIN 100 MILLIGRAM(S): 400 CAPSULE ORAL at 17:18

## 2022-11-16 RX ADMIN — Medication 250 MILLIGRAM(S): at 18:21

## 2022-11-16 NOTE — H&P ADULT - PROBLEM SELECTOR PLAN 2
History of metastatic ovarian/uterine cancer actively on chemotherapy with Dr. Hidalgo. Diagnosed in 2015 w/ metastasis to liver, lungs, and peritoneum in 2018/2019. Last treatment approximately 3 week ago. Past regimen includes avastin use, current regimen on keytruda, dose reduced gemzar, cytoxan, 5-FU continual infusion, docetaxel, and carboplatin. Presented with DVT on last admission likely 2/2 hypercoagulable state, placed on full anticoagulation. Heme Onc consulted  Plan:  - f/u heme onc recs  - resume anticoagulation once bleed ruled out in setting of anemia vs IVC filter 2/2 worsening cancer vs medication induced pneumonitis In setting of chemotherapy. Pt on keytruda which can be associated w/ immune mediated pneumonitis. Plan as above

## 2022-11-16 NOTE — H&P ADULT - PROBLEM SELECTOR PLAN 4
Home meds of Cozaar 100mg and Nifedipine 90mg which were prescribed upon dc last week. Pt states she is compliant with meds. Baseline before last admission was -180 but now baseline is 100s.   Plan:  - holding meds currently as pt reported to be hypotensive on admission and r/o bleed - currently normotensive possibly 2/2 folate deficiency vs bleed w/ possible concurrent CORBIN vs ACD. Pt presents w/ Hgb of 8.4 (baseline 10 from prior admission). Drop to Hgb 7.7 likely dilutional s/p 2L fluid in ED. FOBT positive but pt denies hematemesis or black stool, hasn't had a bowel movement in 2 days. Rectal exam unremarkable for blood. Iron panel from last admission showing normal total iron  Plan:  - start on PPI  - f/u reticulocyte count  - monitor H&H  - active type and screen  - transfuse Hgb <7  - folate supplementation   - f/u folate as pt on 5-FU chemo  -

## 2022-11-16 NOTE — ED ADULT NURSE REASSESSMENT NOTE - NS ED NURSE REASSESS COMMENT FT1
Pt is laying on the stretcher, awake, alert and following command. Pt is in no acute pain or respiratory distress. vital signs stable. Pt reports no other complaints to RN. Repeat lab collected and sent. Will continue to with the care plan.

## 2022-11-16 NOTE — H&P ADULT - PROBLEM SELECTOR PLAN 1
likely 2/2 progressing metastatic ovarian cancer vs PE vs HF. Presented with hypoxia to 86%, placed on 2L NC with improvement to 95%. Denies any fever, chills, cough, or dyspnea. BNP elevated to 3039 but in setting of CKD and unilateral effusion w/ EF of 60-65% on TTE last admission, less likely HF. CTA done showing no abnormal dilatation of the main pulmonary artery, and no pulmonary embolism. Multifocal small to large in parenchymal and pleural masses bilaterally as noted on prior studies. Small left pleural effusion. Bibasilar atelectasis.   Plan:  - c/w ventilation and wean as tolerated likely 2/2 progressing metastatic ovarian cancer vs PE vs HF. Presented with hypoxia to 86%, placed on 2L NC with improvement to 95%. Denies any fever, chills, cough, or dyspnea. BNP elevated to 3039 but in setting of CKD and unilateral effusion w/ EF of 60-65% on TTE last admission, less likely HF. CTA done showing no abnormal dilatation of the main pulmonary artery, and no pulmonary embolism. Multifocal small to large in parenchymal and pleural masses bilaterally as noted on prior studies. Small left pleural effusion. Bibasilar atelectasis. Pulm consult  Plan:  - c/w ventilation and wean as tolerated  - consider tap 2/2 left pleural effusion likely 2/2 progressing metastatic ovarian cancer vs HF. Presented with hypoxia to 86%, placed on 2L NC with improvement to 95%. Denies any fever, chills, cough, or dyspnea. BNP elevated to 3039 but in setting of CKD and unilateral effusion w/ EF of 60-65% on TTE last admission, less likely HF. CTA done showing no abnormal dilatation of the main pulmonary artery, and no pulmonary embolism. Multifocal small to large in parenchymal and pleural masses bilaterally as noted on prior studies. Small left pleural effusion. Bibasilar atelectasis. Pulm consulted  Plan:  - c/w ventilation and wean as tolerated  - f/u pulm recs - consider tap

## 2022-11-16 NOTE — DIETITIAN INITIAL EVALUATION ADULT - OTHER CALCULATIONS
5'1"  pounds   pounds %MCB=098  Pt within % IBW thus actual body weight used for all calculations. Needs adjusted for advanced age, CA, malnutrition.

## 2022-11-16 NOTE — CONSULT NOTE ADULT - ATTENDING COMMENTS
metastatic ovarian cancer admitted with hypoxic respiratory failure likely 2/2 pneumonia. start abx. obtain old images.

## 2022-11-16 NOTE — DIETITIAN INITIAL EVALUATION ADULT - PERTINENT LABORATORY DATA
no difficulties 11-16    131<L>  |  97  |  44<H>  ----------------------------<  109<H>  4.6   |  22  |  2.99<H>    Ca    8.0<L>      16 Nov 2022 10:40  Phos  4.2     11-16  Mg     2.2     11-16    TPro  5.6<L>  /  Alb  1.8<L>  /  TBili  0.8  /  DBili  x   /  AST  56<H>  /  ALT  30  /  AlkPhos  710<H>  11-16  POCT Blood Glucose.: 119 mg/dL (11-16-22 @ 02:34)  A1C with Estimated Average Glucose Result: 4.8 % (11-03-22 @ 05:30)

## 2022-11-16 NOTE — DIETITIAN INITIAL EVALUATION ADULT - PERTINENT MEDS FT
MEDICATIONS  (STANDING):  escitalopram 5 milliGRAM(s) Oral daily  folic acid 1 milliGRAM(s) Oral daily  gabapentin 100 milliGRAM(s) Oral two times a day  heparin  Infusion.  Unit(s)/Hr (10 mL/Hr) IV Continuous <Continuous>  levETIRAcetam  IVPB 500 milliGRAM(s) IV Intermittent every 12 hours  pantoprazole  Injectable 40 milliGRAM(s) IV Push every 12 hours  polyethylene glycol 3350 17 Gram(s) Oral daily  senna 2 Tablet(s) Oral at bedtime  sodium chloride 0.9%. 1000 milliLiter(s) (75 mL/Hr) IV Continuous <Continuous>    MEDICATIONS  (PRN):  acetaminophen     Tablet .. 650 milliGRAM(s) Oral every 6 hours PRN Temp greater or equal to 38C (100.4F), Mild Pain (1 - 3)  aluminum hydroxide/magnesium hydroxide/simethicone Suspension 30 milliLiter(s) Oral every 4 hours PRN Dyspepsia  melatonin 3 milliGRAM(s) Oral at bedtime PRN Insomnia  ondansetron Injectable 4 milliGRAM(s) IV Push every 8 hours PRN Nausea and/or Vomiting

## 2022-11-16 NOTE — H&P ADULT - PROBLEM SELECTOR PLAN 5
likely obstructive in setting of urinary retention vs pre-renal. Baseline Cr of 1.5 from chart, presents w/ Cr 3.5. s/p 2L LR w/ drop to 2.16 but uptrending again. Pt reports she does not urinate without straight cath, last done 2 days ago. Bladder scan - 280cc. UA w/ WBC 5-10 and trace leuk esterase but no suprapubic tenderness or complaints of dysuria or frequency/urgency  Plan:  - c/w bladder scan q6h  - f/u urine studies  - strict I&Os  - avoid nephrotoxic drugs likely obstructive in setting of urinary retention vs pre-renal. Baseline Cr of 1.5 from chart, presents w/ Cr 3.5. s/p 2L LR w/ drop to 2.16 but uptrending again. Pt reports she does not urinate without straight cath, last done 2 days ago. Bladder scan - 280cc. UA w/ WBC 5-10 and trace leuk esterase but no suprapubic tenderness or complaints of dysuria or frequency/urgency  Plan:  - c/w bladder scan q6h  - f/u urine studies  - strict I&Os  - f/u retroperitoneal US  - avoid nephrotoxic drugs Home meds of Cozaar 100mg and Nifedipine 90mg which were prescribed upon dc last week. Pt states she is compliant with meds. Baseline before last admission was -180 but now baseline is 100s.   Plan:  - hold losartan in setting of RUBIN, start Nifedipine 60mg in AM

## 2022-11-16 NOTE — PATIENT PROFILE ADULT - FALL HARM RISK - HARM RISK INTERVENTIONS

## 2022-11-16 NOTE — H&P ADULT - PROBLEM SELECTOR PLAN 3
possibly 2/2 myelosuppression vs bleed w/ possible concurrent CORBIN vs ACD. Pt presents w/ Hgb of 8.4 (baseline 10 from prior admission). Drop to Hgb 7.7 likely dilutional s/p 2L fluid in ED. FOBT positive but pt denies hematemesis or black stool, hasn't had a bowel movement in 2 days. Rectal exam unremarkable for blood.  Plan:  - monitor H&H  - active type and screen  - transfuse Hgb <7  - iron panel from last admission showing normal total iron possibly 2/2 myelosuppression vs bleed w/ possible concurrent CORBIN vs ACD. Pt presents w/ Hgb of 8.4 (baseline 10 from prior admission). Drop to Hgb 7.7 likely dilutional s/p 2L fluid in ED. FOBT positive but pt denies hematemesis or black stool, hasn't had a bowel movement in 2 days. Rectal exam unremarkable for blood.  Plan:  - start on PPI  - monitor H&H  - active type and screen  - transfuse Hgb <7  - iron panel from last admission showing normal total iron History of metastatic ovarian/uterine cancer actively on chemotherapy with Dr. Hidalgo. Diagnosed in 2015 w/ metastasis to liver, lungs, and peritoneum in 2018/2019. Last treatment approximately 3 week ago. Past regimen includes avastin use, current regimen on keytruda, dose reduced gemzar, cytoxan, 5-FU continual infusion, docetaxel, and carboplatin. Presented with DVT on last admission likely 2/2 hypercoagulable state, placed on full anticoagulation.   Plan:  - c/w subq heparin

## 2022-11-16 NOTE — H&P ADULT - ATTENDING COMMENTS
71 yo female pmhx of HTN, Ovarian and uterine cancer w/ spread to the liver, lungs, and peritoneum (on chemo, last tx 3 weeks ago), anemia, urinary retention, and scoliosis who also had a recent admission for PRES and new onset LLE DVT (11/3) that currently presents with acute resp failure w/ hypoxia 2/2 pleural effusion likely 2/2 worsening cancer    Labs and imaging reviewed    PRoblem List  #Acute Hypoxic Resp Failure  #PNA vs IRAE Pneumonitis - patient without acute/overt signs of infection but with infiltrative process would have concern for more inflammatory event in this setting but defer to pulm   #Metastatic ovarian/uterine cancer    Rest of problems as above    Plan:  -Broad Spectrum Abx, eval for improvement in O2  -Palliative Consult  -F/U with Onc regarding further possible treatment  -Reduce BP meds by holding losartan can resume if BP>150 but at lower dose

## 2022-11-16 NOTE — DIETITIAN INITIAL EVALUATION ADULT - OTHER INFO
71 yo female pmhx of HTN, Ovarian and uterine cancer w/ spread to the liver, lungs, and peritoneum (on chemo, last tx 3 weeks ago), anemia, urinary retention, and scoliosis who also had a recent admission for PRES and new onset LLE DVT (11/3) that currently presents with hypoxia 88% and reported hypotension at [facility]. Pt reports she felt fine and denied any shortness of breath or chest pain but because of recent DVT diagnosis and hypoxia was forced to come to the hospital. Pt was discharged on full anticoagulation after her last admission (Lovenox 50mg BID) which she states she continued to take consistently and last dose taken last night. Patient denies fever/chills, nausea, vomiting, chest pain, palpitations, dyspnea, cough or sputum production. Otherwise, pt mentions continued weakness and inability to move L leg after diagnosis of DVT on last admission. Also reports she has not been straight cath' d in 2 days and does not urinate otherwise.     Pt seen on 7WO on NC for initial assessment at bedside. Initial assessment limited d/t pt's current state (emotional, lethargic). Pt NPO at time of assessment, now currently ordered for Regular Diet. Pt endorses being hungry and having an appetite this morning. Denies n/v/d/c. Last BM 11/13 per chart. Pt endorses recent unintentional weight loss. Pt reports  pounds x 1 month ago and CBW of 112 pounds. Weight history per EMR suggests downward trend: 132 pounds (01/2022), 118 pounds (02/2022), 115 pounds (11/7/2022). Dosing weight per EMR 11/16 122 pounds; change in weight lilkely attrubuted to fluid accumulation. Edema 1+ b/l elbows, edema 2+ b/l hips, legs, feet. Pt observed with clavicular, temporal, and orbital wasting. Per ASPEN guidelines, patient meets criteria for severe protein-calorie malnutrition in the setting of chronic illness. NKFA documented. No difficulties chewing/swallowing per chart. Discussed importance of adequate protein/energy intake when diet order resumes; discussed eating protein first in case of early satiety. Pt expressed interest in Ensure trial to help meet needs. Labs reviewed 11/16 noted with elevated BUN/CR, elevated liver enzymes, and slightly elevated TG. No pressure injuries noted. Willi Carlin. Please see nutrition recommendations below.

## 2022-11-16 NOTE — CONSULT NOTE ADULT - ASSESSMENT
73 yo female PMHx of HTN, metastatic ovarian cancer (mets to liver, lungs, peritoneum), s/p multiple rounds of chemotherapy and most recently on Keytruda, last dose 3 weeks ago, anemia, urinary retention, and LLE DVT (11/3) that currently presents with hypoxia 88% for which pulmonary has been consulted.    #Hypoxemic respiratory failure  #RLL pneumonia  #Ovarian cancer with metastatic disease to the lung    Patient with 7 years of ovarian cancer and many rounds of chemotherapy, most recently Keytruda with last dose 3 weeks ago, presents from rehab for acute hypoxemic respiratory failure with RLL consolidation. Unclear if this was present on old imaging and whether there is extension of her disease into this lobe- will need to compare with prior imaging. POCUS with large consolidation, concern for pneumonia. Would treat this immunocompromised patient for pneumonia and cover Pseudomonas and MRSA (f/u MRSA swab). Would send urine legionella and strep and procalcitonin. Sputum if she is producing. Less likely pneumonitis as the CT chest is not consistent with this diagnosis. Known to have lung mets which is seen also on CT.    Recommend:  - Start broad spectrum antibiotics for RLL pneumonia  - Check urine legionella and strep  - Check RVP and MRSA swab  - Check procalcitonin  - Given elevated BNP and LE swelling would consider care with fluids - Please ask her  to bring in her prior CT chest imaging as we cannot access her outside imaging and would be useful to compare  - Will discuss the case with Dr. Hidalgo, her oncologist    Patient s/e/d with attending Dr. Feng.

## 2022-11-16 NOTE — H&P ADULT - ASSESSMENT
73 yo female pmhx of HTN, Ovarian and uterine cancer w/ spread to the liver, lungs, and peritoneum (on chemo, last tx 3 weeks ago), anemia, urinary retention, and scoliosis who also had a recent admission for PRES and new onset LLE DVT (11/3) that currently presents with hypoxia and hypotension likely 2/2 worsening pleural effusions in setting of metastatic cancer [[id number]] 73 yo female pmhx of HTN, Ovarian and uterine cancer w/ spread to the liver, lungs, and peritoneum (on chemo, last tx 3 weeks ago), anemia, urinary retention, and scoliosis who also had a recent admission for PRES and new onset LLE DVT (11/3) that currently presents with hypoxia likely 2/2 worsening pleural effusions in setting of metastatic cancer 71 yo female pmhx of HTN, Ovarian and uterine cancer w/ spread to the liver, lungs, and peritoneum (on chemo, last tx 3 weeks ago), anemia, urinary retention, and scoliosis who also had a recent admission for PRES and new onset LLE DVT (11/3) that currently presents with acute resp failure w/ hypoxia 2/2 pleural effusion likely 2/2 worsening cancer

## 2022-11-16 NOTE — H&P ADULT - PROBLEM SELECTOR PLAN 8
F: s/p 1L LR x 2  E: replete as needed, K>4 and Mg>2  N: Clear liquid    Holding DVT prophylaxis in setting of r/o bleed in setting of metastatic cancer. Diagnosed w/ LLE DVT on last admission. Doppler then found Left common femoral vein and saphenofemoral junction deep venous thrombosis. Presents w/ LE edema, L>R, w/ weakness in left leg as well. Was started on Lovenox 50mg BID and pt reports compliance, last taken last night.  Plan:  - c/w subq heparin

## 2022-11-16 NOTE — H&P ADULT - PROBLEM SELECTOR PLAN 6
Pt reports requiring straight cath as she cant urinate on her own, last done 2 days ago. Bladder scan showing 280cc urine.  Plan:  - c/w bladder scan q6h, likely to require arevalo Pt reports requiring straight cath as she cant urinate on her own, last done 2 days ago. Bladder scan showing 280cc urine.  Plan:  - c/w bladder scan q6h, straight cath >30cc (likely to require arevalo) possibly 2/2 obstructive in setting of urinary retention vs pre-renal vs ATN in setting of drop in BP. Baseline Cr of 1.5 from chart, presents w/ Cr 3.5. s/p 2L LR w/ drop to 2.16 but uptrending again. Pt reports she does not urinate without straight cath, last done 2 days ago. Bladder scan - 280cc. UA w/ WBC 5-10 and trace leuk esterase but no suprapubic tenderness or complaints of dysuria or frequency/urgency  Plan:  - c/w bladder scan q6h  - f/u urine studies  - strict I&Os  - f/u retroperitoneal US  - avoid nephrotoxic drugs

## 2022-11-16 NOTE — H&P ADULT - NSHPREVIEWOFSYSTEMS_GEN_ALL_CORE
Patient denies fever/chills, nausea, vomiting, headache, chest pain, palpitations, dyspnea, cough, diarrhea, abdominal pain, dizziness.

## 2022-11-16 NOTE — DIETITIAN INITIAL EVALUATION ADULT - ADD RECOMMEND
- Continue Regular Diet order; recommend adding Ensure Enlive 2x/day (700 kcal, 40 g protein, 360 mL free H2O)   - Monitor %PO intake and diet tolerance  - Monitor weights, labs, skin, GI distress  - Pain and bowel regimen per team  - RD to provide diet education reinforcement PRN  - Continue Regular Diet order; recommend adding Ensure Enlive 2x/day (700 kcal, 40 g protein, 360 mL free H2O)   - Encourage PO intake as able  - Monitor %PO intake and diet tolerance  - Monitor weights, labs, skin, GI distress  - Pain and bowel regimen per team  - RD to provide diet education reinforcement PRN   - Malnutrition notice placed

## 2022-11-16 NOTE — H&P ADULT - PROBLEM SELECTOR PLAN 7
in setting of metastatic cancer. Diagnosed w/ LLE DVT on last admission. Doppler then found Left common femoral vein and saphenofemoral junction deep venous thrombosis. Presents w/ LE edema, L>R, w/ weakness in left leg as well. Was started on Lovenox 50mg BID and pt reports compliance, last taken last night.  Plan:  - r/o bleed and start on subq heparin in setting of RUBIN, consideration for IVC if anticoagulation cant be started in setting of metastatic cancer. Diagnosed w/ LLE DVT on last admission. Doppler then found Left common femoral vein and saphenofemoral junction deep venous thrombosis. Presents w/ LE edema, L>R, w/ weakness in left leg as well. Was started on Lovenox 50mg BID and pt reports compliance, last taken last night.  Plan:  - r/o bleed and start on subq heparin in setting of RUBIN, consideration for IR consult and IVC if anticoagulation cant be started Pt reports requiring straight cath as she cant urinate on her own, last done 2 days ago. Possibly 2/2 metastatic cancer and obstructive mass. Bladder scan showing 280cc urine.  Plan:  - c/w bladder scan q6h, straight cath >30cc (likely to require arevalo)

## 2022-11-16 NOTE — H&P ADULT - NSHPLABSRESULTS_GEN_ALL_CORE
LABS:                        7.7    11.15 )-----------( 165      ( 16 Nov 2022 02:30 )             25.2     11-16    132  |  99  |  46<H>  ----------------------------<  118<H>  4.6   |  24  |  3.00<H>    Ca    8.1<L>      16 Nov 2022 00:57  Mg     2.1     11-16    TPro  6.2<L>  /  Alb  1.3<L>  /  TBili  0.8  /  DBili  x   /  AST  80<H>  /  ALT  37  /  AlkPhos  749<H>  11-15    LIVER FUNCTIONS - ( 15 Nov 2022 20:32 )  Alb: 1.3 g/dL / Pro: 6.2 g/dL / ALK PHOS: 749 U/L / ALT: 37 U/L / AST: 80 U/L / GGT: x           PT/INR - ( 15 Nov 2022 20:32 )   PT: 13.0 sec;   INR: 1.12          PTT - ( 15 Nov 2022 20:32 )  PTT:44.7 sec  Urinalysis Basic - ( 15 Nov 2022 20:32 )    Color: Yellow / Appearance: Clear / SG: >=1.030 / pH: x  Gluc: x / Ketone: NEGATIVE  / Bili: Moderate / Urobili: 1.0 E.U./dL   Blood: x / Protein: 100 mg/dL / Nitrite: NEGATIVE   Leuk Esterase: Trace / RBC: 5-10 /HPF / WBC 5-10 /HPF   Sq Epi: x / Non Sq Epi: 0-5 /HPF / Bacteria: Present /HPF      CAPILLARY BLOOD GLUCOSE      POCT Blood Glucose.: 119 mg/dL (16 Nov 2022 02:34)      COVID-19 PCR: NotDetec (15 Nov 2022 20:32)  COVID-19 PCR: NotDetec (08 Nov 2022 05:30)  COVID-19 PCR: Negative (02 Nov 2022 05:40)      RADIOLOGY & ADDITIONAL TESTS:     CTA done showing: No abnormal dilatation of the main pulmonary artery, and no pulmonary embolism. Multifocal small to large in parenchymal and pleural masses bilaterally as noted on prior studies. Small left pleural effusion. Bibasilar atelectasis. No pericardial effusion.

## 2022-11-16 NOTE — DIETITIAN NUTRITION RISK NOTIFICATION - ADDITIONAL COMMENTS/DIETITIAN RECOMMENDATIONS
- Continue Regular Diet order; recommend adding Ensure Enlive 2x/day (700 kcal, 40 g protein, 360 mL free H2O)   - Encourage PO intake as able  - Monitor %PO intake and diet tolerance  - Monitor weights, labs, skin, GI distress  - Pain and bowel regimen per team  - RD to provide diet education reinforcement PRN

## 2022-11-16 NOTE — DIETITIAN INITIAL EVALUATION ADULT - PERSON TAUGHT/METHOD
Discussed importance of adequate energy and protein intake for pt's increased needs./verbal instruction/patient instructed

## 2022-11-16 NOTE — H&P ADULT - NSHPPHYSICALEXAM_GEN_ALL_CORE
PHYSICAL EXAM:  Constitutional: resting comfortably in bed; NAD  HEENT: NC/AT, PER, anicteric sclera, no nasal discharge; MMM  Neck: supple; no JVD or thyromegaly  Respiratory: rhonchi bilaterally, normal resp effort on 2L NC  Cardiac: +S1/S2; RRR; no M/R/G  Gastrointestinal: soft, NT/ND; no rebound or guarding  Back: scoliosis with some midline back pain, no CVAT B/L  Extremities: BL LE swelling, 2+ in LLE extending up to thigh and 1+ in RLE  Vascular: 2+ radial  Neurologic: AAOx3; CNII-XII grossly intact; no focal deficits  Psychiatric: affect and characteristics of appearance, verbalizations, behaviors are appropriate   Skin: Chemo port left chest

## 2022-11-16 NOTE — H&P ADULT - NSHPSOCIALHISTORY_GEN_ALL_CORE
Normally lives alone but was dc'd to [facility] after her last admission  Denies tobacco use since her 20s  Denies any alcohol use  Denies recreational drug use

## 2022-11-16 NOTE — H&P ADULT - HISTORY OF PRESENT ILLNESS
71 yo female pmhx of HTN, Ovarian and uterine cancer w/ spread to the liver, lungs, and peritoneum (on chemo, last tx 3 weeks ago), anemia, urinary retention, and scoliosis who also had a recent admission for PRES and new onset LLE DVT (11/3) that currently presents with hypoxia 88% and reported hypotension at [facility]. Pt reports she felt fine and denied any shortness of breath or chest pain but because of recent DVT diagnosis and hypoxia was forced to come to the hospital. Pt was discharged on full anticoagulation after her last admission (Lovenox 50mg BID) which she states she continued to take consistently and last dose taken last night. Patient denies fever/chills, nausea, vomiting, chest pain, palpitations, dyspnea, cough or sputum production. Otherwise, pt mentions continued weakness and inability to move L leg after diagnosis of DVT on last admission. Also reports she has not been straight cath' d in 2 days and does not urinate otherwise.     Pt presented to the ED w/ vitals of: T 97.4, HR 94, /74, and RR 14 sat 86% on RA  Labs in ED: WBC 12.59, HgB 8.4 (baseline 10-11 from chart), D-dimer 1042, Lactate 2.3, Cr 3.50 (bl 1.5 from chart), Alk 749 and AST 80, BNP 3037    CTA done showing: No abnormal dilatation of the main pulmonary artery, and no pulmonary embolism. Multifocal small to large in parenchymal and pleural masses bilaterally as noted on prior studies. Small left pleural effusion. Bibasilar atelectasis. No pericardial effusion.    Pt was placed on 2L of NC and sat went up to 95%, s/p 2L LR, currently as baseline BP per pt. Received dose of Lovenox 50mg as well.

## 2022-11-17 DIAGNOSIS — F41.9 ANXIETY DISORDER, UNSPECIFIED: ICD-10-CM

## 2022-11-17 DIAGNOSIS — C78.00 SECONDARY MALIGNANT NEOPLASM OF UNSPECIFIED LUNG: ICD-10-CM

## 2022-11-17 DIAGNOSIS — M50.30 OTHER CERVICAL DISC DEGENERATION, UNSPECIFIED CERVICAL REGION: ICD-10-CM

## 2022-11-17 DIAGNOSIS — E78.5 HYPERLIPIDEMIA, UNSPECIFIED: ICD-10-CM

## 2022-11-17 DIAGNOSIS — R33.9 RETENTION OF URINE, UNSPECIFIED: ICD-10-CM

## 2022-11-17 DIAGNOSIS — E87.6 HYPOKALEMIA: ICD-10-CM

## 2022-11-17 DIAGNOSIS — E87.20 ACIDOSIS, UNSPECIFIED: ICD-10-CM

## 2022-11-17 DIAGNOSIS — D69.6 THROMBOCYTOPENIA, UNSPECIFIED: ICD-10-CM

## 2022-11-17 DIAGNOSIS — Z88.8 ALLERGY STATUS TO OTHER DRUGS, MEDICAMENTS AND BIOLOGICAL SUBSTANCES STATUS: ICD-10-CM

## 2022-11-17 DIAGNOSIS — C55 MALIGNANT NEOPLASM OF UTERUS, PART UNSPECIFIED: ICD-10-CM

## 2022-11-17 DIAGNOSIS — C78.7 SECONDARY MALIGNANT NEOPLASM OF LIVER AND INTRAHEPATIC BILE DUCT: ICD-10-CM

## 2022-11-17 DIAGNOSIS — E87.1 HYPO-OSMOLALITY AND HYPONATREMIA: ICD-10-CM

## 2022-11-17 DIAGNOSIS — N39.0 URINARY TRACT INFECTION, SITE NOT SPECIFIED: ICD-10-CM

## 2022-11-17 DIAGNOSIS — C56.9 MALIGNANT NEOPLASM OF UNSPECIFIED OVARY: ICD-10-CM

## 2022-11-17 DIAGNOSIS — D64.9 ANEMIA, UNSPECIFIED: ICD-10-CM

## 2022-11-17 DIAGNOSIS — D64.81 ANEMIA DUE TO ANTINEOPLASTIC CHEMOTHERAPY: ICD-10-CM

## 2022-11-17 DIAGNOSIS — E43 UNSPECIFIED SEVERE PROTEIN-CALORIE MALNUTRITION: ICD-10-CM

## 2022-11-17 DIAGNOSIS — I67.83 POSTERIOR REVERSIBLE ENCEPHALOPATHY SYNDROME: ICD-10-CM

## 2022-11-17 DIAGNOSIS — R56.9 UNSPECIFIED CONVULSIONS: ICD-10-CM

## 2022-11-17 DIAGNOSIS — G93.6 CEREBRAL EDEMA: ICD-10-CM

## 2022-11-17 DIAGNOSIS — N18.9 CHRONIC KIDNEY DISEASE, UNSPECIFIED: ICD-10-CM

## 2022-11-17 DIAGNOSIS — D70.1 AGRANULOCYTOSIS SECONDARY TO CANCER CHEMOTHERAPY: ICD-10-CM

## 2022-11-17 DIAGNOSIS — I82.412 ACUTE EMBOLISM AND THROMBOSIS OF LEFT FEMORAL VEIN: ICD-10-CM

## 2022-11-17 DIAGNOSIS — C78.6 SECONDARY MALIGNANT NEOPLASM OF RETROPERITONEUM AND PERITONEUM: ICD-10-CM

## 2022-11-17 DIAGNOSIS — I12.9 HYPERTENSIVE CHRONIC KIDNEY DISEASE WITH STAGE 1 THROUGH STAGE 4 CHRONIC KIDNEY DISEASE, OR UNSPECIFIED CHRONIC KIDNEY DISEASE: ICD-10-CM

## 2022-11-17 LAB
ALBUMIN SERPL ELPH-MCNC: 1.2 G/DL — LOW (ref 3.3–5)
ALP SERPL-CCNC: 583 U/L — HIGH (ref 40–120)
ALT FLD-CCNC: 23 U/L — SIGNIFICANT CHANGE UP (ref 10–45)
ANION GAP SERPL CALC-SCNC: 11 MMOL/L — SIGNIFICANT CHANGE UP (ref 5–17)
ANISOCYTOSIS BLD QL: SIGNIFICANT CHANGE UP
APTT BLD: 39.7 SEC — HIGH (ref 27.5–35.5)
APTT BLD: 71.8 SEC — HIGH (ref 27.5–35.5)
APTT BLD: 92.7 SEC — HIGH (ref 27.5–35.5)
AST SERPL-CCNC: 35 U/L — SIGNIFICANT CHANGE UP (ref 10–40)
BASOPHILS # BLD AUTO: 0.07 K/UL — SIGNIFICANT CHANGE UP (ref 0–0.2)
BASOPHILS # BLD AUTO: 0.1 K/UL — SIGNIFICANT CHANGE UP (ref 0–0.2)
BASOPHILS NFR BLD AUTO: 0.6 % — SIGNIFICANT CHANGE UP (ref 0–2)
BASOPHILS NFR BLD AUTO: 1 % — SIGNIFICANT CHANGE UP (ref 0–2)
BILIRUB SERPL-MCNC: 0.6 MG/DL — SIGNIFICANT CHANGE UP (ref 0.2–1.2)
BUN SERPL-MCNC: 46 MG/DL — HIGH (ref 7–23)
CALCIUM SERPL-MCNC: 7.8 MG/DL — LOW (ref 8.4–10.5)
CHLORIDE SERPL-SCNC: 100 MMOL/L — SIGNIFICANT CHANGE UP (ref 96–108)
CO2 SERPL-SCNC: 20 MMOL/L — LOW (ref 22–31)
CREAT SERPL-MCNC: 2.75 MG/DL — HIGH (ref 0.5–1.3)
EGFR: 18 ML/MIN/1.73M2 — LOW
EOSINOPHIL # BLD AUTO: 0.09 K/UL — SIGNIFICANT CHANGE UP (ref 0–0.5)
EOSINOPHIL # BLD AUTO: 0.3 K/UL — SIGNIFICANT CHANGE UP (ref 0–0.5)
EOSINOPHIL NFR BLD AUTO: 0.8 % — SIGNIFICANT CHANGE UP (ref 0–6)
EOSINOPHIL NFR BLD AUTO: 3 % — SIGNIFICANT CHANGE UP (ref 0–6)
GLUCOSE SERPL-MCNC: 87 MG/DL — SIGNIFICANT CHANGE UP (ref 70–99)
HCT VFR BLD CALC: 21.4 % — LOW (ref 34.5–45)
HCT VFR BLD CALC: 26.8 % — LOW (ref 34.5–45)
HCV AB S/CO SERPL IA: 0.03 S/CO — SIGNIFICANT CHANGE UP
HCV AB SERPL-IMP: SIGNIFICANT CHANGE UP
HGB BLD-MCNC: 6.6 G/DL — CRITICAL LOW (ref 11.5–15.5)
HGB BLD-MCNC: 8.5 G/DL — LOW (ref 11.5–15.5)
HYPOCHROMIA BLD QL: SIGNIFICANT CHANGE UP
IMM GRANULOCYTES NFR BLD AUTO: 1.5 % — HIGH (ref 0–0.9)
LEGIONELLA AG UR QL: NEGATIVE — SIGNIFICANT CHANGE UP
LYMPHOCYTES # BLD AUTO: 0.36 K/UL — LOW (ref 1–3.3)
LYMPHOCYTES # BLD AUTO: 0.4 K/UL — LOW (ref 1–3.3)
LYMPHOCYTES # BLD AUTO: 3.1 % — LOW (ref 13–44)
LYMPHOCYTES # BLD AUTO: 4 % — LOW (ref 13–44)
MACROCYTES BLD QL: SLIGHT — SIGNIFICANT CHANGE UP
MAGNESIUM SERPL-MCNC: 2.1 MG/DL — SIGNIFICANT CHANGE UP (ref 1.6–2.6)
MANUAL SMEAR VERIFICATION: SIGNIFICANT CHANGE UP
MCHC RBC-ENTMCNC: 30.8 GM/DL — LOW (ref 32–36)
MCHC RBC-ENTMCNC: 31.4 PG — SIGNIFICANT CHANGE UP (ref 27–34)
MCHC RBC-ENTMCNC: 31.7 GM/DL — LOW (ref 32–36)
MCHC RBC-ENTMCNC: 31.7 PG — SIGNIFICANT CHANGE UP (ref 27–34)
MCV RBC AUTO: 100 FL — SIGNIFICANT CHANGE UP (ref 80–100)
MCV RBC AUTO: 101.9 FL — HIGH (ref 80–100)
MICROCYTES BLD QL: SLIGHT — SIGNIFICANT CHANGE UP
MONOCYTES # BLD AUTO: 0.5 K/UL — SIGNIFICANT CHANGE UP (ref 0–0.9)
MONOCYTES # BLD AUTO: 1.26 K/UL — HIGH (ref 0–0.9)
MONOCYTES NFR BLD AUTO: 10.8 % — SIGNIFICANT CHANGE UP (ref 2–14)
MONOCYTES NFR BLD AUTO: 5 % — SIGNIFICANT CHANGE UP (ref 2–14)
NEUTROPHILS # BLD AUTO: 8.69 K/UL — HIGH (ref 1.8–7.4)
NEUTROPHILS # BLD AUTO: 9.7 K/UL — HIGH (ref 1.8–7.4)
NEUTROPHILS NFR BLD AUTO: 83.2 % — HIGH (ref 43–77)
NEUTROPHILS NFR BLD AUTO: 87 % — HIGH (ref 43–77)
NRBC # BLD: 0 /100 WBCS — SIGNIFICANT CHANGE UP (ref 0–0)
NRBC # BLD: 0 /100 — SIGNIFICANT CHANGE UP (ref 0–0)
NRBC # BLD: SIGNIFICANT CHANGE UP /100 WBCS (ref 0–0)
OVALOCYTES BLD QL SMEAR: SLIGHT — SIGNIFICANT CHANGE UP
PHOSPHATE SERPL-MCNC: 4.4 MG/DL — SIGNIFICANT CHANGE UP (ref 2.5–4.5)
PLAT MORPH BLD: ABNORMAL
PLATELET # BLD AUTO: 162 K/UL — SIGNIFICANT CHANGE UP (ref 150–400)
PLATELET # BLD AUTO: 179 K/UL — SIGNIFICANT CHANGE UP (ref 150–400)
POLYCHROMASIA BLD QL SMEAR: SLIGHT — SIGNIFICANT CHANGE UP
POTASSIUM SERPL-MCNC: 4.6 MMOL/L — SIGNIFICANT CHANGE UP (ref 3.5–5.3)
POTASSIUM SERPL-SCNC: 4.6 MMOL/L — SIGNIFICANT CHANGE UP (ref 3.5–5.3)
PROT SERPL-MCNC: 4.7 G/DL — LOW (ref 6–8.3)
RAPID RVP RESULT: SIGNIFICANT CHANGE UP
RBC # BLD: 2.1 M/UL — LOW (ref 3.8–5.2)
RBC # BLD: 2.68 M/UL — LOW (ref 3.8–5.2)
RBC # FLD: 21.1 % — HIGH (ref 10.3–14.5)
RBC # FLD: 21.9 % — HIGH (ref 10.3–14.5)
RBC BLD AUTO: ABNORMAL
S PNEUM AG UR QL: NEGATIVE — SIGNIFICANT CHANGE UP
SARS-COV-2 RNA SPEC QL NAA+PROBE: SIGNIFICANT CHANGE UP
SCHISTOCYTES BLD QL AUTO: SLIGHT — SIGNIFICANT CHANGE UP
SODIUM SERPL-SCNC: 131 MMOL/L — LOW (ref 135–145)
SPHEROCYTES BLD QL SMEAR: SLIGHT — SIGNIFICANT CHANGE UP
VANCOMYCIN FLD-MCNC: 8.2 UG/ML — SIGNIFICANT CHANGE UP
VIT B12 SERPL-MCNC: 1020 PG/ML — SIGNIFICANT CHANGE UP (ref 232–1245)
WBC # BLD: 11.66 K/UL — HIGH (ref 3.8–10.5)
WBC # BLD: 9.99 K/UL — SIGNIFICANT CHANGE UP (ref 3.8–10.5)
WBC # FLD AUTO: 11.66 K/UL — HIGH (ref 3.8–10.5)
WBC # FLD AUTO: 9.99 K/UL — SIGNIFICANT CHANGE UP (ref 3.8–10.5)

## 2022-11-17 PROCEDURE — 99233 SBSQ HOSP IP/OBS HIGH 50: CPT | Mod: GC

## 2022-11-17 PROCEDURE — 99358 PROLONG SERVICE W/O CONTACT: CPT | Mod: NC

## 2022-11-17 PROCEDURE — 76604 US EXAM CHEST: CPT | Mod: 26,GC

## 2022-11-17 PROCEDURE — 99232 SBSQ HOSP IP/OBS MODERATE 35: CPT | Mod: GC

## 2022-11-17 RX ADMIN — LEVETIRACETAM 400 MILLIGRAM(S): 250 TABLET, FILM COATED ORAL at 17:55

## 2022-11-17 RX ADMIN — Medication 1 MILLIGRAM(S): at 11:40

## 2022-11-17 RX ADMIN — POLYETHYLENE GLYCOL 3350 17 GRAM(S): 17 POWDER, FOR SOLUTION ORAL at 11:40

## 2022-11-17 RX ADMIN — Medication 650 MILLIGRAM(S): at 13:46

## 2022-11-17 RX ADMIN — GABAPENTIN 100 MILLIGRAM(S): 400 CAPSULE ORAL at 17:55

## 2022-11-17 RX ADMIN — LEVETIRACETAM 400 MILLIGRAM(S): 250 TABLET, FILM COATED ORAL at 06:59

## 2022-11-17 RX ADMIN — PIPERACILLIN AND TAZOBACTAM 25 GRAM(S): 4; .5 INJECTION, POWDER, LYOPHILIZED, FOR SOLUTION INTRAVENOUS at 14:27

## 2022-11-17 RX ADMIN — Medication 2.5 MILLIGRAM(S): at 07:40

## 2022-11-17 RX ADMIN — Medication 2.5 MILLIGRAM(S): at 11:40

## 2022-11-17 RX ADMIN — ESCITALOPRAM OXALATE 7.5 MILLIGRAM(S): 10 TABLET, FILM COATED ORAL at 11:40

## 2022-11-17 RX ADMIN — Medication 60 MILLIGRAM(S): at 10:09

## 2022-11-17 RX ADMIN — Medication 2.5 MILLIGRAM(S): at 17:56

## 2022-11-17 RX ADMIN — Medication 2.5 MILLIGRAM(S): at 00:00

## 2022-11-17 RX ADMIN — GABAPENTIN 100 MILLIGRAM(S): 400 CAPSULE ORAL at 06:59

## 2022-11-17 RX ADMIN — Medication 650 MILLIGRAM(S): at 12:46

## 2022-11-17 NOTE — PROGRESS NOTE ADULT - PROBLEM SELECTOR PLAN 1
2/2 left pleural effusion likely 2/2 progressing metastatic ovarian cancer vs HF. Presented with hypoxia to 86%, placed on 2L NC with improvement to 95%. Denies any fever, chills, cough, or dyspnea. BNP elevated to 3039 but in setting of CKD and unilateral effusion w/ EF of 60-65% on TTE last admission, less likely HF. CTA done showing no abnormal dilatation of the main pulmonary artery, and no pulmonary embolism. Multifocal small to large in parenchymal and pleural masses bilaterally as noted on prior studies. Small left pleural effusion. Bibasilar atelectasis.   Plan:  - c/w ventilation and wean as tolerated  - f/u urine legionella and strep  - f/u RVP panel  - c/w zosyn 3.375g q12 for possible RLL PNA  - f/u sputum culture   -Pulm consulted, appreciate further recs

## 2022-11-17 NOTE — PROGRESS NOTE ADULT - SUBJECTIVE AND OBJECTIVE BOX
OVERNIGHT EVENTS:    SUBJECTIVE / INTERVAL HPI: Patient seen and examined at bedside.     VITAL SIGNS:  Vital Signs Last 24 Hrs  T(C): 36.4 (2022 05:28), Max: 36.9 (2022 07:59)  T(F): 97.6 (2022 05:28), Max: 98.4 (2022 07:59)  HR: 98 (:) (86 - 98)  BP: 111/71 (:) (103/60 - 111/71)  BP(mean): --  RR: 17 (2022 05:) (16 - 20)  SpO2: 95% (:) (94% - 96%)    Parameters below as of :  Patient On (Oxygen Delivery Method): nasal cannula  O2 Flow (L/min): 3      PHYSICAL EXAM:    General: WDWN  HEENT: NCAT; PERRL, anicteric sclera; MMM  Neck: supple, trachea midline  Cardiovascular: S1, S2 normal; RRR, no M/G/R  Respiratory: CTABL; no W/R/R  Gastrointestinal: soft, nontender, nondistended. bowel sounds present.  Skin: no ulcerations or visible rashes appreciated  Extremities: WWP; no edema, clubbing or cyanosis  Vascular: 2+ radial, DP/PT pulses B/L  Neurological: AAOx3; CN II-XII grossly intact; no focal deficits    MEDICATIONS:  MEDICATIONS  (STANDING):  escitalopram 7.5 milliGRAM(s) Oral daily  folic acid 1 milliGRAM(s) Oral daily  gabapentin 100 milliGRAM(s) Oral two times a day  heparin  Infusion.  Unit(s)/Hr (10 mL/Hr) IV Continuous <Continuous>  levETIRAcetam  IVPB 500 milliGRAM(s) IV Intermittent every 12 hours  NIFEdipine XL 60 milliGRAM(s) Oral daily  oxybutynin 2.5 milliGRAM(s) Oral four times a day  piperacillin/tazobactam IVPB.. 3.375 Gram(s) IV Intermittent every 12 hours  polyethylene glycol 3350 17 Gram(s) Oral daily  senna 2 Tablet(s) Oral at bedtime    MEDICATIONS  (PRN):  acetaminophen     Tablet .. 650 milliGRAM(s) Oral every 6 hours PRN Temp greater or equal to 38C (100.4F), Mild Pain (1 - 3)  aluminum hydroxide/magnesium hydroxide/simethicone Suspension 30 milliLiter(s) Oral every 4 hours PRN Dyspepsia  LORazepam     Tablet 0.5 milliGRAM(s) Oral every 8 hours PRN Nausea and/or Vomiting  melatonin 3 milliGRAM(s) Oral at bedtime PRN Insomnia  ondansetron Injectable 4 milliGRAM(s) IV Push every 8 hours PRN Nausea and/or Vomiting      ALLERGIES:  Allergies    Benadryl (Other)  Compazine (Unknown)    Intolerances        LABS:                        7.5    11.07 )-----------( 186      ( 2022 10:40 )             25.3     11-16    131<L>  |  97  |  44<H>  ----------------------------<  109<H>  4.6   |  22  |  2.99<H>    Ca    8.0<L>      2022 10:40  Phos  4.2     -16  Mg     2.2     -16    TPro  5.6<L>  /  Alb  1.8<L>  /  TBili  0.8  /  DBili  x   /  AST  56<H>  /  ALT  30  /  AlkPhos  710<H>  11-16    PT/INR - ( 15 Nov 2022 20:32 )   PT: 13.0 sec;   INR: 1.12          PTT - ( 2022 00:33 )  PTT:71.8 sec  Urinalysis Basic - ( 2022 14:23 )    Color: Yellow / Appearance: Hazy / S.020 / pH: x  Gluc: x / Ketone: NEGATIVE  / Bili: Small / Urobili: 1.0 E.U./dL   Blood: x / Protein: 100 mg/dL / Nitrite: NEGATIVE   Leuk Esterase: NEGATIVE / RBC: > 10 /HPF / WBC 5-10 /HPF   Sq Epi: x / Non Sq Epi: 0-5 /HPF / Bacteria: None /HPF      CAPILLARY BLOOD GLUCOSE      POCT Blood Glucose.: 119 mg/dL (2022 02:34)      RADIOLOGY & ADDITIONAL TESTS: Reviewed. OVERNIGHT EVENTS:  NICA ON    SUBJECTIVE / INTERVAL HPI: Patient seen and examined at bedside.     CONSTITUTIONAL: No weakness, fevers or chills  EYES/ENT: No visual changes;  No vertigo or throat pain   NECK: No pain or stiffness  RESPIRATORY: No cough, wheezing, hemoptysis; No shortness of breath  CARDIOVASCULAR: No chest pain or palpitations  GASTROINTESTINAL: No abdominal or epigastric pain. No nausea, vomiting, or hematemesis; No diarrhea or constipation. No melena or hematochezia.  GENITOURINARY: No dysuria, frequency or hematuria  NEUROLOGICAL: No numbness or weakness  SKIN: No itching, burning, rashes, or lesions   All other review of systems is negative unless indicated above.    VITAL SIGNS:  Vital Signs Last 24 Hrs  T(C): 36.4 (2022 05:28), Max: 36.9 (2022 07:59)  T(F): 97.6 (2022 05:28), Max: 98.4 (2022 07:59)  HR: 98 (2022 05:28) (86 - 98)  BP: 111/71 (2022 05:28) (103/60 - 111/71)  BP(mean): --  RR: 17 (2022 05:28) (16 - 20)  SpO2: 95% (:28) (94% - 96%)    Parameters below as of 2022 05:28  Patient On (Oxygen Delivery Method): nasal cannula  O2 Flow (L/min): 3      PHYSICAL EXAM:    General: NAD, lying in bed comfortably on 3L O2, Michaels in place draining dark-colored urine, talkative and cooperative   HEENT: NCAT; PERRL, anicteric sclera; MMM  Neck: supple, trachea midline  Cardiovascular: S1, S2 normal; RRR, no M/G/R  Respiratory: CTABL; no W/R/R  Gastrointestinal: soft, nontender, nondistended. bowel sounds present.  Skin: no ulcerations or visible rashes appreciated  Extremities: WWP; no edema, clubbing or cyanosis, LLE tenderness and 2+ edema, RLE 1+ edema, no erythema in LLE  Vascular: 2+ radial, DP/PT pulses B/L  Neurological: AAOx3; CN II-XII grossly intact; no focal deficits    MEDICATIONS:  MEDICATIONS  (STANDING):  escitalopram 7.5 milliGRAM(s) Oral daily  folic acid 1 milliGRAM(s) Oral daily  gabapentin 100 milliGRAM(s) Oral two times a day  heparin  Infusion.  Unit(s)/Hr (10 mL/Hr) IV Continuous <Continuous>  levETIRAcetam  IVPB 500 milliGRAM(s) IV Intermittent every 12 hours  NIFEdipine XL 60 milliGRAM(s) Oral daily  oxybutynin 2.5 milliGRAM(s) Oral four times a day  piperacillin/tazobactam IVPB.. 3.375 Gram(s) IV Intermittent every 12 hours  polyethylene glycol 3350 17 Gram(s) Oral daily  senna 2 Tablet(s) Oral at bedtime    MEDICATIONS  (PRN):  acetaminophen     Tablet .. 650 milliGRAM(s) Oral every 6 hours PRN Temp greater or equal to 38C (100.4F), Mild Pain (1 - 3)  aluminum hydroxide/magnesium hydroxide/simethicone Suspension 30 milliLiter(s) Oral every 4 hours PRN Dyspepsia  LORazepam     Tablet 0.5 milliGRAM(s) Oral every 8 hours PRN Nausea and/or Vomiting  melatonin 3 milliGRAM(s) Oral at bedtime PRN Insomnia  ondansetron Injectable 4 milliGRAM(s) IV Push every 8 hours PRN Nausea and/or Vomiting      ALLERGIES:  Allergies    Benadryl (Other)  Compazine (Unknown)    Intolerances        LABS:                        7.5    11.07 )-----------( 186      ( 2022 10:40 )             25.3     11-16    131<L>  |  97  |  44<H>  ----------------------------<  109<H>  4.6   |  22  |  2.99<H>    Ca    8.0<L>      2022 10:40  Phos  4.2     11-16  Mg     2.2     -16    TPro  5.6<L>  /  Alb  1.8<L>  /  TBili  0.8  /  DBili  x   /  AST  56<H>  /  ALT  30  /  AlkPhos  710<H>  11-16    PT/INR - ( 15 Nov 2022 20:32 )   PT: 13.0 sec;   INR: 1.12          PTT - ( 2022 00:33 )  PTT:71.8 sec  Urinalysis Basic - ( 2022 14:23 )    Color: Yellow / Appearance: Hazy / S.020 / pH: x  Gluc: x / Ketone: NEGATIVE  / Bili: Small / Urobili: 1.0 E.U./dL   Blood: x / Protein: 100 mg/dL / Nitrite: NEGATIVE   Leuk Esterase: NEGATIVE / RBC: > 10 /HPF / WBC 5-10 /HPF   Sq Epi: x / Non Sq Epi: 0-5 /HPF / Bacteria: None /HPF      CAPILLARY BLOOD GLUCOSE      POCT Blood Glucose.: 119 mg/dL (2022 02:34)      RADIOLOGY & ADDITIONAL TESTS: Reviewed.

## 2022-11-17 NOTE — PROGRESS NOTE ADULT - ATTENDING COMMENTS
metastatic ovarian cancer admitted with rigth sided pneumonia and hypoxia. c/w zosyn, pending cultures. would complete a 7 day course.

## 2022-11-17 NOTE — PROGRESS NOTE ADULT - PROBLEM SELECTOR PLAN 6
possibly 2/2 obstructive in setting of urinary retention vs pre-renal vs ATN in setting of drop in BP. Baseline Cr of 1.5 from chart, presents w/ Cr 3.5. s/p 2L LR w/ drop to 2.16 but uptrending again. Pt reports she does not urinate without straight cath, last done 2 days ago. Bladder scan - 280cc. UA w/ WBC 5-10 and trace leuk esterase but no suprapubic tenderness or complaints of dysuria or frequency/urgency  Plan:  - c/w bladder scan q6h  - f/u urine studies  - strict I&Os  - retroperitoneal US showed 2 stones in L kidney , moderate hydronephrosis with urinary obstruction  - will consult urology  - avoid nephrotoxic drugs

## 2022-11-17 NOTE — PROGRESS NOTE ADULT - SUBJECTIVE AND OBJECTIVE BOX
PULMONARY CONSULT SERVICE FOLLOW-UP NOTE    INTERVAL HPI:  Reviewed chart and overnight events; patient seen and examined at bedside. ROS unchanged. No SOB. Tired.    MEDICATIONS:    Antimicrobials:  piperacillin/tazobactam IVPB.. 3.375 Gram(s) IV Intermittent every 12 hours    Anticoagulants:  heparin  Infusion.  Unit(s)/Hr IV Continuous <Continuous>    Cardiac:  NIFEdipine XL 60 milliGRAM(s) Oral daily      Allergies  Benadryl (Other)  Compazine (Unknown)    Vital Signs Last 24 Hrs  T(C): 36.4 (2022 05:28), Max: 36.9 (2022 07:59)  T(F): 97.6 (2022 05:28), Max: 98.4 (2022 07:59)  HR: 98 (2022 05:28) (88 - 98)  BP: 111/71 (2022 05:28) (104/68 - 111/71)  RR: 17 (2022 05:28) (16 - 18)  SpO2: 95% (2022 05:28) (94% - 96%)    PHYSICAL EXAM:  Constitutional: WD, thin woman  Head: NC/AT  EENT: anicteric sclera; oropharynx clear, MMM  Neck: supple, no JVD  Respiratory: dec breath sounds at bases; no W/R/R  Cardiovascular: +S1/S2, RRR  Gastrointestinal: soft, NT/ND  Extremities: WWP; no clubbing or cyanosis; 2+ pitting edema to mid calves  Vascular: 2+ radial and pedal pulses  Neurological: alert, oriented    LABS:  CBC Full  -  ( 2022 10:40 )  WBC Count : 11.07 K/uL  RBC Count : 2.37 M/uL  Hemoglobin : 7.5 g/dL  Hematocrit : 25.3 %  Platelet Count - Automated : 186 K/uL  Mean Cell Volume : 106.8 fl  Mean Cell Hemoglobin : 31.6 pg  Mean Cell Hemoglobin Concentration : 29.6 gm/dL  Auto Neutrophil # : 10.11 K/uL  Auto Lymphocyte # : 0.39 K/uL  Auto Monocyte # : 0.58 K/uL  Auto Eosinophil # : 0.00 K/uL  Auto Basophil # : 0.00 K/uL  Auto Neutrophil % : 91.3 %  Auto Lymphocyte % : 3.5 %  Auto Monocyte % : 5.2 %  Auto Eosinophil % : 0.0 %  Auto Basophil % : 0.0 %        131<L>  |  97  |  44<H>  ----------------------------<  109<H>  4.6   |  22  |  2.99<H>    Ca    8.0<L>      2022 10:40  Phos  4.2       Mg     2.2         TPro  5.6<L>  /  Alb  1.8<L>  /  TBili  0.8  /  DBili  x   /  AST  56<H>  /  ALT  30  /  AlkPhos  710<H>      PT/INR - ( 15 Nov 2022 20:32 )   PT: 13.0 sec;   INR: 1.12          PTT - ( 2022 00:33 )  PTT:71.8 sec      Urinalysis Basic - ( 2022 14:23 )    Color: Yellow / Appearance: Hazy / S.020 / pH: x  Gluc: x / Ketone: NEGATIVE  / Bili: Small / Urobili: 1.0 E.U./dL   Blood: x / Protein: 100 mg/dL / Nitrite: NEGATIVE   Leuk Esterase: NEGATIVE / RBC: > 10 /HPF / WBC 5-10 /HPF   Sq Epi: x / Non Sq Epi: 0-5 /HPF / Bacteria: None /HPF    RADIOLOGY & ADDITIONAL STUDIES: Reviewed.

## 2022-11-17 NOTE — PROGRESS NOTE ADULT - PROBLEM SELECTOR PLAN 8
in setting of metastatic cancer. Diagnosed w/ LLE DVT on last admission. Doppler then found Left common femoral vein and saphenofemoral junction deep venous thrombosis. Presents w/ LE edema, L>R, w/ weakness in left leg as well. Was started on Lovenox 50mg BID and pt reports compliance, last taken last night.  Plan:  - c/w subq heparin

## 2022-11-17 NOTE — CONSULT NOTE ADULT - ASSESSMENT
71 yo female pmhx of HTN, Ovarian and uterine cancer w/ spread to the liver, lungs, and peritoneum (on chemo, last tx 3 weeks ago), anemia, urinary retention, and scoliosis who also had a recent admission for PRES and new onset LLE DVT (11/3) admitted for hypoxia 88% and reported hypotension.  consulted for RP ultrasound results:  Two left renal calculi (1.8 and 1.5cm) with moderate left hydronephrosis and   nonvisualization of the left urinary jet, consistent with urinary   obstruction.    Pt states she had seen Urologist Dr. Kiran Silverman? 6 weeks ago regarding same stones and they decided against intervention since they are not causing pain. Denies abd pain, flank pain, nausea, vomiting, fevers, chills. Denies hx kidney stones before this episode. Has urinary retention at baseline, states she has been catheterizes intermittently at rehab facility but does not know for how long. Michaels was placed yesterday which drained 375cc per chart review. Discussed possibility of intervention with patient regarding stones and she states she is not interested in any kind of surgery. Discussed risks associated with untreated obstructing kidney stone including renal failure, sepsis, and death.    Plan:  -if pt is amenable to intervention, would recommend CT stone protocol to for better visualization  -discussed with  attending

## 2022-11-17 NOTE — CONSULT NOTE ADULT - SUBJECTIVE AND OBJECTIVE BOX
HPI:  71 yo female pmhx of HTN, Ovarian and uterine cancer w/ spread to the liver, lungs, and peritoneum (on chemo, last tx 3 weeks ago), anemia, urinary retention, and scoliosis who also had a recent admission for PRES and new onset LLE DVT (11/3) that currently presents with hypoxia 88% and reported hypotension at [facility]. Pt reports she felt fine and denied any shortness of breath or chest pain but because of recent DVT diagnosis and hypoxia was forced to come to the hospital. Pt was discharged on full anticoagulation after her last admission (Lovenox 50mg BID) which she states she continued to take consistently and last dose taken last night. Patient denies fever/chills, nausea, vomiting, chest pain, palpitations, dyspnea, cough or sputum production. Otherwise, pt mentions continued weakness and inability to move L leg after diagnosis of DVT on last admission. Also reports she has not been straight cath' d in 2 days and does not urinate otherwise.     Pt presented to the ED w/ vitals of: T 97.4, HR 94, /74, and RR 14 sat 86% on RA  Labs in ED: WBC 12.59, HgB 8.4 (baseline 10-11 from chart), D-dimer 1042, Lactate 2.3, Cr 3.50 (bl 1.5 from chart), Alk 749 and AST 80, BNP 3037    CTA done showing: No abnormal dilatation of the main pulmonary artery, and no pulmonary embolism. Multifocal small to large in parenchymal and pleural masses bilaterally as noted on prior studies. Small left pleural effusion. Bibasilar atelectasis. No pericardial effusion.    Pt was placed on 2L of NC and sat went up to 95%, s/p 2L LR, currently as baseline BP per pt. Received dose of Lovenox 50mg as well.  (16 Nov 2022 09:05)     consulted for RP ultrasound results:    Two left renal calculi (1.8 and 1.5cm) with moderate left hydronephrosis and   nonvisualization of the left urinary jet, consistent with urinary   obstruction.    Pt states she had seen Urologist Dr. Kiran Silverman? 6 weeks ago regarding same stones and they decided against intervention since they are not causing pain. Denies abd pain, flank pain, nausea, vomiting, fevers, chills. Denies hx kidney stones before this episode. Has urinary retention at baseline, states she has been catheterizes intermittently at rehab facility but does not know for how long. Michaels was placed yesterday which drained 375cc per chart review.       Vital Signs Last 24 Hrs  T(C): 36.7 (17 Nov 2022 12:05), Max: 36.7 (17 Nov 2022 12:05)  T(F): 98 (17 Nov 2022 12:05), Max: 98 (17 Nov 2022 12:05)  HR: 80 (17 Nov 2022 12:05) (80 - 98)  BP: 110/71 (17 Nov 2022 12:05) (99/64 - 111/71)  BP(mean): --  RR: 16 (17 Nov 2022 12:05) (16 - 18)  SpO2: 94% (17 Nov 2022 12:05) (94% - 96%)    Parameters below as of 17 Nov 2022 12:05  Patient On (Oxygen Delivery Method): nasal cannula w/ humidification  O2 Flow (L/min): 3    I&O's Summary    16 Nov 2022 07:01  -  17 Nov 2022 07:00  --------------------------------------------------------  IN: 160 mL / OUT: 375 mL / NET: -215 mL        PE:  Gen: awake and alert  Abd: nontender, nondistended  : no suprapubic/CVAT      LABS:                        6.6    9.99  )-----------( 162      ( 17 Nov 2022 05:30 )             21.4     11-17    131<L>  |  100  |  46<H>  ----------------------------<  87  4.6   |  20<L>  |  2.75<H>    Ca    7.8<L>      17 Nov 2022 05:30  Phos  4.4     11-17  Mg     2.1     11-17    TPro  4.7<L>  /  Alb  1.2<L>  /  TBili  0.6  /  DBili  x   /  AST  35  /  ALT  23  /  AlkPhos  583<H>  11-17    PT/INR - ( 15 Nov 2022 20:32 )   PT: 13.0 sec;   INR: 1.12          PTT - ( 17 Nov 2022 12:45 )  PTT:39.7 sec  Cultures  Culture Results:   No growth to date. (11-15 @ 20:32)  Culture Results:   No growth to date. (11-15 @ 20:32)      A/P
PULMONARY SERVICE INITIAL CONSULT NOTE    HPI:  73 yo female PMHx of HTN, metastatic ovarian cancer (mets to liver, lungs, peritoneum), s/p multiple rounds of chemotherapy and most recently on Keytruda, last dose 3 weeks ago, anemia, urinary retention, and LLE DVT (11/3) that currently presents with hypoxia 88% and reported hypotension at [facility]. Patient was asymptomatic at that time. Has been consistently treated with lovenox for her DVT. Continues to have fatigue and weakness and poor appetite. Overall weakness is her primary concern. She has no pulmonary complaints. Patient underwent CT angio which was negative for PE but found to have multiple cannonball lesions bilateral as well as a RLL consolidation.     REVIEW OF SYSTEMS:  Constitutional: No fever, weight loss or fatigue  Eyes: No eye pain, visual disturbances, or discharge  ENMT:  No difficulty hearing, tinnitus, vertigo; No sinus or throat pain  Neck: No pain, stiffness or neck swelling  Respiratory: see HPI  Cardiovascular: No chest pain, palpitations, dizziness or leg swelling  Gastrointestinal: No abdominal or epigastric pain. No nausea, vomiting or hematemesis; No diarrhea or constipation. No melena or hematochezia.  Genitourinary: No dysuria, frequency, hematuria or incontinence  Neurological: No headaches, memory loss, loss of strength, numbness or tremors  Skin: No itching, burning, rashes or lesions   Lymph Nodes: No enlarged glands  Endocrine: No heat or cold intolerance; No hair loss  Musculoskeletal: No joint pain or swelling; No muscle, back or extremity pain  Psychiatric: No depression, anxiety, mood swings or difficulty sleeping  Heme/Lymph: No easy bruising or bleeding gums  Allergy and Immunologic: No hives or eczema    PAST MEDICAL & SURGICAL HISTORY:  Ovarian cancer  Ovarian ca  HTN (hypertension)  Anemia  No significant past surgical history    FAMILY HISTORY: Noncontributory    SOCIAL HISTORY:  Smoking Status: [ ] Current, [ ] Former, [X] Never    MEDICATIONS:  Anticoagulants:  heparin  Infusion.  Unit(s)/Hr IV Continuous <Continuous>    GI/:  aluminum hydroxide/magnesium hydroxide/simethicone Suspension 30 milliLiter(s) Oral every 4 hours PRN  pantoprazole  Injectable 40 milliGRAM(s) IV Push every 12 hours  polyethylene glycol 3350 17 Gram(s) Oral daily  senna 2 Tablet(s) Oral at bedtime    Other Medications:  acetaminophen     Tablet .. 650 milliGRAM(s) Oral every 6 hours PRN  escitalopram 5 milliGRAM(s) Oral daily  folic acid 1 milliGRAM(s) Oral daily  gabapentin 100 milliGRAM(s) Oral two times a day  levETIRAcetam  IVPB 500 milliGRAM(s) IV Intermittent every 12 hours  melatonin 3 milliGRAM(s) Oral at bedtime PRN  ondansetron Injectable 4 milliGRAM(s) IV Push every 8 hours PRN  sodium chloride 0.9%. 1000 milliLiter(s) IV Continuous <Continuous>    Allergies  Benadryl (Other)  Compazine (Unknown)    Vital Signs Last 24 Hrs  T(C): 36.2 (2022 11:46), Max: 36.9 (2022 07:59)  T(F): 97.1 (2022 11:46), Max: 98.4 (2022 07:59)  HR: 88 (2022 11:46) (82 - 94)  BP: 108/72 (2022 11:46) (101/59 - 109/60)  RR: 18 (2022 11:46) (14 - 20)  SpO2: 96% (2022 11:46) (86% - 98%)    PHYSICAL EXAM:  Constitutional: WD, thin woman  Head: NC/AT  EENT: anicteric sclera; oropharynx clear, MMM  Neck: supple, no JVD  Respiratory: dec breath sounds at bases; no W/R/R  Cardiovascular: +S1/S2, RRR  Gastrointestinal: soft, NT/ND  Extremities: WWP; no clubbing or cyanosis; 2+ pitting edema to mid calves  Vascular: 2+ radial and pedal pulses  Neurological: alert, oriented    LABS:  CBC Full  -  ( 2022 10:40 )  WBC Count : 11.07 K/uL  RBC Count : 2.37 M/uL  Hemoglobin : 7.5 g/dL  Hematocrit : 25.3 %  Platelet Count - Automated : 186 K/uL  Mean Cell Volume : 106.8 fl  Mean Cell Hemoglobin : 31.6 pg  Mean Cell Hemoglobin Concentration : 29.6 gm/dL  Auto Neutrophil # : 10.11 K/uL  Auto Lymphocyte # : 0.39 K/uL  Auto Monocyte # : 0.58 K/uL  Auto Eosinophil # : 0.00 K/uL  Auto Basophil # : 0.00 K/uL  Auto Neutrophil % : 91.3 %  Auto Lymphocyte % : 3.5 %  Auto Monocyte % : 5.2 %  Auto Eosinophil % : 0.0 %  Auto Basophil % : 0.0 %        131<L>  |  97  |  44<H>  ----------------------------<  109<H>  4.6   |  22  |  2.99<H>    Ca    8.0<L>      2022 10:40  Phos  4.2       Mg     2.2         TPro  5.6<L>  /  Alb  1.8<L>  /  TBili  0.8  /  DBili  x   /  AST  56<H>  /  ALT  30  /  AlkPhos  710<H>      PT/INR - ( 15 Nov 2022 20:32 )   PT: 13.0 sec;   INR: 1.12          PTT - ( 15 Nov 2022 20:32 )  PTT:44.7 sec      Urinalysis Basic - ( 2022 14:23 )    Color: Yellow / Appearance: Hazy / S.020 / pH: x  Gluc: x / Ketone: NEGATIVE  / Bili: Small / Urobili: 1.0 E.U./dL   Blood: x / Protein: 100 mg/dL / Nitrite: NEGATIVE   Leuk Esterase: NEGATIVE / RBC: > 10 /HPF / WBC 5-10 /HPF   Sq Epi: x / Non Sq Epi: 0-5 /HPF / Bacteria: None /HPF    RADIOLOGY & ADDITIONAL STUDIES: Personally reviewed.  < from: CT Angio Chest PE Protocol w/ IV Cont (22 @ 00:44) >    IMPRESSION:  1.  No pulmonary embolism.  2.  Numerous lung masses/nodules bilaterally consistent with metastatic   disease. Bilateral lower lobe atelectasis.  3.  Spiculated groundglass region in bronchovascular distribution central   right upper lobe, possibly posttreatment related   inflammatory/hypersensitivity pneumonitis or alveolar damage/hemorrhage.  4.  Solid peritoneal metastatic implants and upper abdominal ascites   compatible with carcinomatosis.  5.  Moderate left hydronephrosis with associated delayed nephrogram   suggesting renal dysfunction. Correlate with any available outside   imaging, probable distal ureteral obstruction from known gynecologic   malignancy.  6.  Small left pleural effusion, likely malignant.  7.  Cholelithiasis.    < end of copied text >  
    Patient is a 72y old  Female who presents with a chief complaint of Hypoxemia (2022 07:52)        HPI:  71 yo female pmhx of HTN, Ovarian and uterine cancer w/ spread to the liver, lungs, and peritoneum (on chemo, last tx 3 weeks ago), anemia, urinary retention, and scoliosis who also had a recent admission for PRES and new onset LLE DVT (11/3) that currently presents with hypoxia 88% and reported hypotension at [facility]. Pt reports she felt fine and denied any shortness of breath or chest pain but because of recent DVT diagnosis and hypoxia was forced to come to the hospital. Pt was discharged on full anticoagulation after her last admission (Lovenox 50mg BID) which she states she continued to take consistently and last dose taken last night. Patient denies fever/chills, nausea, vomiting, chest pain, palpitations, dyspnea, cough or sputum production. Otherwise, pt mentions continued weakness and inability to move L leg after diagnosis of DVT on last admission. Also reports she has not been straight cath' d in 2 days and does not urinate otherwise.     Pt presented to the ED w/ vitals of: T 97.4, HR 94, /74, and RR 14 sat 86% on RA  Labs in ED: WBC 12.59, HgB 8.4 (baseline 10-11 from chart), D-dimer 1042, Lactate 2.3, Cr 3.50 (bl 1.5 from chart), Alk 749 and AST 80, BNP 3037    CTA done showing: No abnormal dilatation of the main pulmonary artery, and no pulmonary embolism. Multifocal small to large in parenchymal and pleural masses bilaterally as noted on prior studies. Small left pleural effusion. Bibasilar atelectasis. No pericardial effusion.    Pt was placed on 2L of NC and sat went up to 95%, s/p 2L LR, currently as baseline BP per pt. Received dose of Lovenox 50mg as well.  (2022 09:05)      PAST MEDICAL & SURGICAL HISTORY:  Ovarian cancer      Ovarian ca      HTN (hypertension)      Anemia      No significant past surgical history          MEDICATIONS  (STANDING):  escitalopram 7.5 milliGRAM(s) Oral daily  folic acid 1 milliGRAM(s) Oral daily  gabapentin 100 milliGRAM(s) Oral two times a day  heparin  Infusion.  Unit(s)/Hr (10 mL/Hr) IV Continuous <Continuous>  levETIRAcetam  IVPB 500 milliGRAM(s) IV Intermittent every 12 hours  NIFEdipine XL 60 milliGRAM(s) Oral daily  oxybutynin 2.5 milliGRAM(s) Oral four times a day  piperacillin/tazobactam IVPB.. 3.375 Gram(s) IV Intermittent every 12 hours  polyethylene glycol 3350 17 Gram(s) Oral daily  senna 2 Tablet(s) Oral at bedtime    MEDICATIONS  (PRN):  acetaminophen     Tablet .. 650 milliGRAM(s) Oral every 6 hours PRN Temp greater or equal to 38C (100.4F), Mild Pain (1 - 3)  aluminum hydroxide/magnesium hydroxide/simethicone Suspension 30 milliLiter(s) Oral every 4 hours PRN Dyspepsia  LORazepam     Tablet 0.5 milliGRAM(s) Oral every 8 hours PRN Nausea and/or Vomiting  melatonin 3 milliGRAM(s) Oral at bedtime PRN Insomnia  ondansetron Injectable 4 milliGRAM(s) IV Push every 8 hours PRN Nausea and/or Vomiting          FAMILY HISTORY:    CBC Full  -  ( 2022 05:30 )  WBC Count : 9.99 K/uL  RBC Count : 2.10 M/uL  Hemoglobin : 6.6 g/dL  Hematocrit : 21.4 %  Platelet Count - Automated : 162 K/uL  Mean Cell Volume : 101.9 fl  Mean Cell Hemoglobin : 31.4 pg  Mean Cell Hemoglobin Concentration : 30.8 gm/dL  Auto Neutrophil # : 8.69 K/uL  Auto Lymphocyte # : 0.40 K/uL  Auto Monocyte # : 0.50 K/uL  Auto Eosinophil # : 0.30 K/uL  Auto Basophil # : 0.10 K/uL  Auto Neutrophil % : 87.0 %  Auto Lymphocyte % : 4.0 %  Auto Monocyte % : 5.0 %  Auto Eosinophil % : 3.0 %  Auto Basophil % : 1.0 %          131<L>  |  100  |  46<H>  ----------------------------<  87  4.6   |  20<L>  |  2.75<H>    Ca    7.8<L>      2022 05:30  Phos  4.4     -  Mg     2.1         TPro  4.7<L>  /  Alb  1.2<L>  /  TBili  0.6  /  DBili  x   /  AST  35  /  ALT  23  /  AlkPhos  583<H>  11-17      Urinalysis Basic - ( 2022 14:23 )    Color: Yellow / Appearance: Hazy / S.020 / pH: x  Gluc: x / Ketone: NEGATIVE  / Bili: Small / Urobili: 1.0 E.U./dL   Blood: x / Protein: 100 mg/dL / Nitrite: NEGATIVE   Leuk Esterase: NEGATIVE / RBC: > 10 /HPF / WBC 5-10 /HPF   Sq Epi: x / Non Sq Epi: 0-5 /HPF / Bacteria: None /HPF          Radiology :       < from: CT Angio Chest PE Protocol w/ IV Cont (22 @ 00:44) >  ACC: [id number] EXAM:  CT ANGIO CHEST PULM ART [facility]                          PROCEDURE DATE:  2022          INTERPRETATION:  Initial report created on 2022 2:12:01 AM EST    CLINICAL INFORMATION: Hypoxia. Rule out pulmonary embolus. History of   ovarian and uterine cancer, on treatment with metastasis to liver, lung,   and peritoneum. Left common femoral DVT.    COMPARISON: None available.    CONTRAST/COMPLICATIONS:  IV Contrast: Omnipaque 350  70 cc administered   30 cc discarded  Oral Contrast: NONE  Complications: None reported at time of study completion    PROCEDURE:  CT Angiography of the Chest.  Sagittal and coronal reformats were performed as well as 3D (MIP)   reconstructions.    FINDINGS:    LUNGS AND AIRWAYS: Patent central airways.  Numerous bilateral lung   masses, largest in right lower lobe 8.5 x 7.0 cm (2:74) and left lower   lobe 4.9 x 4.1 cm (2:78). Bilateral lower lobe partial posterior   atelectasis. Spiculated groundglass opacity in central right upper lobe   4.8 x 4.6 cm, nonspecific, possibly posttreatment related inflammatory   pneumonitis, less likely lymphangitic carcinomatosis although could   appear similar.  PLEURA: Small left pleural effusion.  MEDIASTINUM AND JAKE: No lymphadenopathy.  VESSELS: No pulmonary embolism. No aortic aneurysm or dissection.  HEART: Heart size is normal. No pericardial effusion.  CHEST WALL AND LOWER NECK: Left upper chest port in situ with catheter   tip in superior vena cava.  VISUALIZED UPPER ABDOMEN: Moderate left hydronephrosis with asymmetric   delayed nephrogram. Dependent hyperdense material in gallbladder may   represent sludge and/or small layering stones. Small free fluid in left   upper quadrant and cheryl hepatis. Solid peritoneal metastatic implants in   anterior left upper quadrant 1 x 1 cm (2:127) 1.5 x 1.2 cm (2:142). Known   hepatic metastatic disease not well defined due to early contrast timing   for evaluation of pulmonary vasculature.  BONES: No acute appearing fractures. No suspicious lesion. Degenerative   changes. Mild superior T4 endplate compression deformity, likely   degenerative basis.    IMPRESSION:  1.  No pulmonary embolism.  2.  Numerous lung masses/nodules bilaterally consistent with metastatic   disease. Bilateral lower lobe atelectasis.  3.  Spiculated groundglass region in bronchovascular distribution central   right upper lobe, possibly posttreatment related   inflammatory/hypersensitivity pneumonitis or alveolar damage/hemorrhage.  4.  Solid peritoneal metastatic implants and upper abdominal ascites   compatible with carcinomatosis.  5.  Moderate left hydronephrosis with associated delayed nephrogram   suggesting renal dysfunction. Correlate with any available outside   imaging, probable distal ureteral obstruction from known gynecologic   malignancy.  6.  Small left pleural effusion, likely malignant.  7.  Cholelithiasis.            Vital Signs Last 24 Hrs  T(C): 36.7 (2022 12:05), Max: 36.7 (2022 12:05)< end of copied text >  T(F): 98 (2022 12:05), Max: 98 (2022 12:05)  HR: 80 (2022 12:05) (80 - 98)  BP: 110/71 (2022 12:05) (99/64 - 111/71)  BP(mean): --  RR: 16 (2022 12:05) (16 - 18)  SpO2: 94% (2022 12:05) (94% - 96%)    Parameters below as of 2022 12:05  Patient On (Oxygen Delivery Method): nasal cannula w/ humidification  O2 Flow (L/min): 3          REVIEW OF SYSTEMS:       CONSTITUTIONAL: No fever, weight loss, or fatigue  EYES: No eye pain, visual disturbances, or discharge  ENMT:  No difficulty hearing, tinnitus, vertigo; No sinus or throat pain  NECK: No pain or stiffness  BREASTS: No pain, masses, or nipple discharge  RESPIRATORY:  per hpi   CARDIOVASCULAR: No chest pain, palpitations, dizziness, or leg swelling  GASTROINTESTINAL: No abdominal or epigastric pain. No nausea, vomiting, or hematemesis; No diarrhea or constipation. No melena or hematochezia.  GENITOURINARY: No dysuria, frequency, hematuria, or incontinence  NEUROLOGICAL: No headaches, memory loss, loss of strength, numbness, or tremors  SKIN: No itching, burning, rashes, or lesions   LYMPH NODES: No enlarged glands  ENDOCRINE: No heat or cold intolerance; No hair loss  MUSCULOSKELETAL: No joint pain or swelling; No muscle, back, or extremity pain  PSYCHIATRIC: No depression, anxiety, mood swings, or difficulty sleeping  HEME/LYMPH: No easy bruising, or bleeding gums  ALLERGY AND IMMUNOLOGIC: No hives or eczema  VASCULAR: no swelling , erythema        Physical Exam: frail 72 y o woman lying comfortably in semi Cameron's position , awake , alert , feels weak     Head : normocephalic , atraumatic    Eyes : PERRLA , EOMI , no nystagmus , sclera anicteric    ENT : nasal discharge , uvula midline , no oropharyngeal erythema / exudate    Neck : supple , negative JVD , negative carotid bruits , no thyromegaly    Chest :  bilateral rhonchi     Cardiovascular: regular rate and rhythm , neg murmurs / rubs / gallops    Abdomen : soft , non distended , non tender to palpation in all 4 quadrants , negative rebound / guarding , normal bowel sounds    Extremities :  1-2 + LE edema     Neurologic Exam:    Alert and oriented  x 3     Motor Exam:          Right UE:               no focal weakness ,  > 3+/5 throughout                                 Left UE:                 no focal weakness,  > 3+/5 throughout  , no drift          Right LE:                no focal weakness,  > 3+/5 throughout       Left LE:                  no focal weakness,  > 3+/5 throughout            Sensation:         intact to light touch x 4 extremities                         DTR :                     biceps/brachioradialis : equal                                              patella/ankle : equal       Gait :  not tested        PM&R Impression :     1) deconditioned    2) no focal weakness      Recommendations / Plan :     1) Physical / Occupational therapy focusing on therapeutic exercises , equipment evaluation , bed mobility/transfer out of bed evaluation , progressive ambulation with assistive devices prn .    2) Disposition Plan / Recs  :  pending functional progress

## 2022-11-17 NOTE — PHYSICAL THERAPY INITIAL EVALUATION ADULT - PERTINENT HX OF CURRENT PROBLEM, REHAB EVAL
73 yo female pmhx of HTN, Ovarian and uterine cancer w/ spread to the liver, lungs, and peritoneum (on chemo, last tx 3 weeks ago), anemia, urinary retention, and scoliosis who also had a recent admission for PRES and new onset LLE DVT (11/3) that currently presents with hypoxia 88% and reported hypotension at [facility]. Pt reports she felt fine and denied any shortness of breath or chest pain but because of recent DVT diagnosis and hypoxia was forced to come to the hospital. Pt was discharged on full anticoagulation after her last admission (Lovenox 50mg BID) which she states she continued to take consistently and last dose taken last night. Patient denies fever/chills, nausea, vomiting, chest pain, palpitations, dyspnea, cough or sputum production. Otherwise, pt mentions continued weakness and inability to move L leg after diagnosis of DVT on last admission. Also reports she has not been straight cath' d in 2 days and does not urinate otherwise.

## 2022-11-17 NOTE — PROGRESS NOTE ADULT - ATTENDING COMMENTS
procal 2.59  RUBIN improving    in setting of increasing o2 requirements, will treat for CAP with zosyn x 7 days   heparin drip for possible Left thoracentesis -- if not planned switch to full dose lovenox  urology reccs appreciated -- f/u CT A/P    plan for midline placement for 7 day course of zosyn, continue weaning o2, obtain PT c/s and obtain o2 sat on room air in 24-48h.   if cleared from urology standpoint post-intervention of renal stones, likely can be dc to [facility] early next week

## 2022-11-17 NOTE — CONSULT NOTE ADULT - NS PANP COMMENT GEN_ALL_CORE FT
Agree with above note and plan.  If patient is amenable to intervention for hydronephrosis then recommend CT scan to better assess stone burden.  However, if patient is against any intervention, given her general medical state and prognosis from her cancer diagnosis, then there is no need for any further evaluation.

## 2022-11-17 NOTE — PHYSICAL THERAPY INITIAL EVALUATION ADULT - ADDITIONAL COMMENTS
Pt is admitted from Rehab and has been working with therapy there, pt reports needing assist and using a RW for mobility at rehab. Prior to hospital admission. Pt lives alone in an apartment.

## 2022-11-17 NOTE — PROGRESS NOTE ADULT - PROBLEM SELECTOR PLAN 3
History of metastatic ovarian/uterine cancer actively on chemotherapy with Dr. Hidalgo. Diagnosed in 2015 w/ metastasis to liver, lungs, and peritoneum in 2018/2019. Last treatment approximately 3 week ago. Past regimen includes avastin use, current regimen on keytruda, dose reduced gemzar, cytoxan, 5-FU continual infusion, docetaxel, and carboplatin. Presented with DVT on last admission likely 2/2 hypercoagulable state, placed on full anticoagulation.   Plan:  - c/w subq heparin

## 2022-11-17 NOTE — CONSULT NOTE ADULT - ASSESSMENT
per Internal Medicine    72 y o female pmhx of HTN, Ovarian and uterine cancer w/ spread to the liver, lungs, and peritoneum (on chemo, last tx 3 weeks ago), anemia, urinary retention, and scoliosis who also had a recent admission for PRES and new onset LLE DVT (11/3) that currently presents with acute resp failure w/ hypoxia 2/2 pleural effusion likely 2/2 worsening cancer    Problem/Plan - 1:  ·  Problem: Acute respiratory failure with hypoxia.   ·  Plan: 2/2 left pleural effusion likely 2/2 progressing metastatic ovarian cancer vs HF. Presented with hypoxia to 86%, placed on 2L NC with improvement to 95%. Denies any fever, chills, cough, or dyspnea. BNP elevated to 3039 but in setting of CKD and unilateral effusion w/ EF of 60-65% on TTE last admission, less likely HF. CTA done showing no abnormal dilatation of the main pulmonary artery, and no pulmonary embolism. Multifocal small to large in parenchymal and pleural masses bilaterally as noted on prior studies. Small left pleural effusion. Bibasilar atelectasis. Pulm consulted  Plan:  - c/w ventilation and wean as tolerated  - f/u pulm recs - consider tap.    Problem/Plan - 2:  ·  Problem: Pleural effusion.   ·  Plan: 2/2 worsening cancer vs medication induced pneumonitis In setting of chemotherapy. Pt on keytruda which can be associated w/ immune mediated pneumonitis. Plan as above.    Problem/Plan - 3:  ·  Problem: Ovarian cancer.   ·  Plan: History of metastatic ovarian/uterine cancer actively on chemotherapy with Dr. Hidalgo. Diagnosed in 2015 w/ metastasis to liver, lungs, and peritoneum in 2018/2019. Last treatment approximately 3 week ago. Past regimen includes avastin use, current regimen on keytruda, dose reduced gemzar, cytoxan, 5-FU continual infusion, docetaxel, and carboplatin. Presented with DVT on last admission likely 2/2 hypercoagulable state, placed on full anticoagulation.   Plan:  - c/w subq heparin.    Problem/Plan - 4:  ·  Problem: Anemia.   ·  Plan: possibly 2/2 folate deficiency vs bleed w/ possible concurrent CORBIN vs ACD. Pt presents w/ Hgb of 8.4 (baseline 10 from prior admission). Drop to Hgb 7.7 likely dilutional s/p 2L fluid in ED. FOBT positive but pt denies hematemesis or black stool, hasn't had a bowel movement in 2 days. Rectal exam unremarkable for blood. Iron panel from last admission showing normal total iron  Plan:  - start on PPI  - f/u reticulocyte count  - monitor H&H  - active type and screen  - transfuse Hgb <7  - folate supplementation   - f/u folate as pt on 5-FU chemo  -.    Problem/Plan - 5:  ·  Problem: Hypertension.   ·  Plan: Home meds of Cozaar 100mg and Nifedipine 90mg which were prescribed upon dc last week. Pt states she is compliant with meds. Baseline before last admission was -180 but now baseline is 100s.   Plan:  - hold losartan in setting of RUBIN, start Nifedipine 60mg in AM.    Problem/Plan - 6:  ·  Problem: Acute kidney injury superimposed on CKD.   ·  Plan: possibly 2/2 obstructive in setting of urinary retention vs pre-renal vs ATN in setting of drop in BP. Baseline Cr of 1.5 from chart, presents w/ Cr 3.5. s/p 2L LR w/ drop to 2.16 but uptrending again. Pt reports she does not urinate without straight cath, last done 2 days ago. Bladder scan - 280cc. UA w/ WBC 5-10 and trace leuk esterase but no suprapubic tenderness or complaints of dysuria or frequency/urgency  Plan:  - c/w bladder scan q6h  - f/u urine studies  - strict I&Os  - f/u retroperitoneal US  - avoid nephrotoxic drugs.    Problem/Plan - 7:  ·  Problem: Urinary retention.   ·  Plan: Pt reports requiring straight cath as she cant urinate on her own, last done 2 days ago. Possibly 2/2 metastatic cancer and obstructive mass. Bladder scan showing 280cc urine.  Plan:  - c/w bladder scan q6h, straight cath >30cc (likely to require arevalo).    Problem/Plan - 8:  ·  Problem: DVT, lower extremity.   ·  Plan: in setting of metastatic cancer. Diagnosed w/ LLE DVT on last admission. Doppler then found Left common femoral vein and saphenofemoral junction deep venous thrombosis. Presents w/ LE edema, L>R, w/ weakness in left leg as well. Was started on Lovenox 50mg BID and pt reports compliance, last taken last night.  Plan:  - c/w subq heparin.    Problem/Plan - 9:  ·  Problem: Healthcare maintenance.   ·  Plan: F: s/p 1L LR x 2  E: replete as needed, K>4 and Mg>2  N: Clear liquid    DVT: subq heparin.

## 2022-11-18 DIAGNOSIS — Z86.59 PERSONAL HISTORY OF OTHER MENTAL AND BEHAVIORAL DISORDERS: ICD-10-CM

## 2022-11-18 LAB
ANION GAP SERPL CALC-SCNC: 10 MMOL/L — SIGNIFICANT CHANGE UP (ref 5–17)
APTT BLD: 105.8 SEC — HIGH (ref 27.5–35.5)
APTT BLD: 97.1 SEC — HIGH (ref 27.5–35.5)
APTT BLD: 99.5 SEC — HIGH (ref 27.5–35.5)
BASOPHILS # BLD AUTO: 0.07 K/UL — SIGNIFICANT CHANGE UP (ref 0–0.2)
BASOPHILS NFR BLD AUTO: 0.6 % — SIGNIFICANT CHANGE UP (ref 0–2)
BUN SERPL-MCNC: 49 MG/DL — HIGH (ref 7–23)
CALCIUM SERPL-MCNC: 8.2 MG/DL — LOW (ref 8.4–10.5)
CHLORIDE SERPL-SCNC: 101 MMOL/L — SIGNIFICANT CHANGE UP (ref 96–108)
CO2 SERPL-SCNC: 23 MMOL/L — SIGNIFICANT CHANGE UP (ref 22–31)
CREAT SERPL-MCNC: 2.7 MG/DL — HIGH (ref 0.5–1.3)
EGFR: 18 ML/MIN/1.73M2 — LOW
EOSINOPHIL # BLD AUTO: 0.14 K/UL — SIGNIFICANT CHANGE UP (ref 0–0.5)
EOSINOPHIL NFR BLD AUTO: 1.3 % — SIGNIFICANT CHANGE UP (ref 0–6)
GLUCOSE SERPL-MCNC: 89 MG/DL — SIGNIFICANT CHANGE UP (ref 70–99)
HCT VFR BLD CALC: 26.3 % — LOW (ref 34.5–45)
HGB BLD-MCNC: 8.3 G/DL — LOW (ref 11.5–15.5)
IMM GRANULOCYTES NFR BLD AUTO: 1.2 % — HIGH (ref 0–0.9)
LYMPHOCYTES # BLD AUTO: 0.4 K/UL — LOW (ref 1–3.3)
LYMPHOCYTES # BLD AUTO: 3.7 % — LOW (ref 13–44)
MAGNESIUM SERPL-MCNC: 2.2 MG/DL — SIGNIFICANT CHANGE UP (ref 1.6–2.6)
MCHC RBC-ENTMCNC: 31.6 GM/DL — LOW (ref 32–36)
MCHC RBC-ENTMCNC: 31.6 PG — SIGNIFICANT CHANGE UP (ref 27–34)
MCV RBC AUTO: 100 FL — SIGNIFICANT CHANGE UP (ref 80–100)
MONOCYTES # BLD AUTO: 1.16 K/UL — HIGH (ref 0–0.9)
MONOCYTES NFR BLD AUTO: 10.8 % — SIGNIFICANT CHANGE UP (ref 2–14)
NEUTROPHILS # BLD AUTO: 8.87 K/UL — HIGH (ref 1.8–7.4)
NEUTROPHILS NFR BLD AUTO: 82.4 % — HIGH (ref 43–77)
NRBC # BLD: 0 /100 WBCS — SIGNIFICANT CHANGE UP (ref 0–0)
PHOSPHATE SERPL-MCNC: 4.6 MG/DL — HIGH (ref 2.5–4.5)
PLATELET # BLD AUTO: 187 K/UL — SIGNIFICANT CHANGE UP (ref 150–400)
POTASSIUM SERPL-MCNC: 4.3 MMOL/L — SIGNIFICANT CHANGE UP (ref 3.5–5.3)
POTASSIUM SERPL-SCNC: 4.3 MMOL/L — SIGNIFICANT CHANGE UP (ref 3.5–5.3)
RBC # BLD: 2.63 M/UL — LOW (ref 3.8–5.2)
RBC # FLD: 21.5 % — HIGH (ref 10.3–14.5)
SODIUM SERPL-SCNC: 134 MMOL/L — LOW (ref 135–145)
WBC # BLD: 10.77 K/UL — HIGH (ref 3.8–10.5)
WBC # FLD AUTO: 10.77 K/UL — HIGH (ref 3.8–10.5)

## 2022-11-18 PROCEDURE — 99233 SBSQ HOSP IP/OBS HIGH 50: CPT

## 2022-11-18 PROCEDURE — 99497 ADVNCD CARE PLAN 30 MIN: CPT | Mod: 25

## 2022-11-18 PROCEDURE — 99223 1ST HOSP IP/OBS HIGH 75: CPT

## 2022-11-18 PROCEDURE — 99231 SBSQ HOSP IP/OBS SF/LOW 25: CPT | Mod: GC

## 2022-11-18 PROCEDURE — 74176 CT ABD & PELVIS W/O CONTRAST: CPT | Mod: 26

## 2022-11-18 PROCEDURE — 99498 ADVNCD CARE PLAN ADDL 30 MIN: CPT | Mod: 25

## 2022-11-18 RX ORDER — HEPARIN SODIUM 5000 [USP'U]/ML
900 INJECTION INTRAVENOUS; SUBCUTANEOUS
Qty: 25000 | Refills: 0 | Status: DISCONTINUED | OUTPATIENT
Start: 2022-11-18 | End: 2022-11-18

## 2022-11-18 RX ORDER — HEPARIN SODIUM 5000 [USP'U]/ML
9 INJECTION INTRAVENOUS; SUBCUTANEOUS
Qty: 25000 | Refills: 0 | Status: DISCONTINUED | OUTPATIENT
Start: 2022-11-18 | End: 2022-11-18

## 2022-11-18 RX ORDER — ENOXAPARIN SODIUM 100 MG/ML
60 INJECTION SUBCUTANEOUS EVERY 24 HOURS
Refills: 0 | Status: DISCONTINUED | OUTPATIENT
Start: 2022-11-18 | End: 2022-11-21

## 2022-11-18 RX ADMIN — GABAPENTIN 100 MILLIGRAM(S): 400 CAPSULE ORAL at 17:23

## 2022-11-18 RX ADMIN — HEPARIN SODIUM 9 UNIT(S)/HR: 5000 INJECTION INTRAVENOUS; SUBCUTANEOUS at 02:16

## 2022-11-18 RX ADMIN — Medication 2.5 MILLIGRAM(S): at 11:25

## 2022-11-18 RX ADMIN — ESCITALOPRAM OXALATE 7.5 MILLIGRAM(S): 10 TABLET, FILM COATED ORAL at 11:26

## 2022-11-18 RX ADMIN — Medication 0.5 MILLIGRAM(S): at 17:31

## 2022-11-18 RX ADMIN — LEVETIRACETAM 400 MILLIGRAM(S): 250 TABLET, FILM COATED ORAL at 17:23

## 2022-11-18 RX ADMIN — PIPERACILLIN AND TAZOBACTAM 25 GRAM(S): 4; .5 INJECTION, POWDER, LYOPHILIZED, FOR SOLUTION INTRAVENOUS at 14:31

## 2022-11-18 RX ADMIN — LEVETIRACETAM 400 MILLIGRAM(S): 250 TABLET, FILM COATED ORAL at 06:43

## 2022-11-18 RX ADMIN — Medication 1 MILLIGRAM(S): at 11:25

## 2022-11-18 RX ADMIN — PIPERACILLIN AND TAZOBACTAM 25 GRAM(S): 4; .5 INJECTION, POWDER, LYOPHILIZED, FOR SOLUTION INTRAVENOUS at 03:44

## 2022-11-18 RX ADMIN — Medication 2.5 MILLIGRAM(S): at 06:44

## 2022-11-18 RX ADMIN — ENOXAPARIN SODIUM 60 MILLIGRAM(S): 100 INJECTION SUBCUTANEOUS at 14:32

## 2022-11-18 RX ADMIN — Medication 2.5 MILLIGRAM(S): at 01:44

## 2022-11-18 RX ADMIN — Medication 650 MILLIGRAM(S): at 14:28

## 2022-11-18 RX ADMIN — ONDANSETRON 4 MILLIGRAM(S): 8 TABLET, FILM COATED ORAL at 18:43

## 2022-11-18 RX ADMIN — Medication 60 MILLIGRAM(S): at 09:27

## 2022-11-18 RX ADMIN — GABAPENTIN 100 MILLIGRAM(S): 400 CAPSULE ORAL at 06:43

## 2022-11-18 RX ADMIN — Medication 650 MILLIGRAM(S): at 13:28

## 2022-11-18 NOTE — PROGRESS NOTE ADULT - PROBLEM SELECTOR PLAN 3
History of metastatic ovarian/uterine cancer actively on chemotherapy with Dr. Hidalgo. Diagnosed in 2015 w/ metastasis to liver, lungs, and peritoneum in 2018/2019. Last treatment approximately 3 week ago. Past regimen includes avastin use, current regimen on keytruda, dose reduced gemzar, cytoxan, 5-FU continual infusion, docetaxel, and carboplatin. Presented with DVT on last admission likely 2/2 hypercoagulable state, placed on full anticoagulation.   Plan:  -pt on heparin infusion  - trend PTT History of metastatic ovarian/uterine cancer actively on chemotherapy with Dr. Hidalgo. Diagnosed in 2015 w/ metastasis to liver, lungs, and peritoneum in 2018/2019. Last treatment approximately 3 week ago. Past regimen includes avastin use, current regimen on keytruda, dose reduced gemzar, cytoxan, 5-FU continual infusion, docetaxel, and carboplatin. Presented with DVT on last admission likely 2/2 hypercoagulable state, placed on full anticoagulation.   Plan:  - dc'd heparin infusion, started lovenox  - trend PTT

## 2022-11-18 NOTE — BH CONSULTATION LIAISON ASSESSMENT NOTE - NSICDXBHTERTIARYDX_PSY_ALL_CORE
R/O Generalized anxiety disorder   F41.1  R/O Major depressive disorder, recurrent episode, mild degree   F33.0

## 2022-11-18 NOTE — BH CONSULTATION LIAISON ASSESSMENT NOTE - NSBHCHARTREVIEWVS_PSY_A_CORE FT
Vital Signs Last 24 Hrs  T(C): 36.8 (18 Nov 2022 05:58), Max: 36.8 (18 Nov 2022 05:58)  T(F): 98.3 (18 Nov 2022 05:58), Max: 98.3 (18 Nov 2022 05:58)  HR: 73 (18 Nov 2022 09:35) (73 - 84)  BP: 108/67 (18 Nov 2022 09:35) (96/61 - 110/71)  BP(mean): --  RR: 16 (18 Nov 2022 09:35) (16 - 16)  SpO2: 93% (18 Nov 2022 09:35) (93% - 94%)    Parameters below as of 18 Nov 2022 09:35  Patient On (Oxygen Delivery Method): nasal cannula w/ humidification  O2 Flow (L/min): 3

## 2022-11-18 NOTE — BH CONSULTATION LIAISON ASSESSMENT NOTE - NSBHCONSULTFOLLOWAFTERCARE_PSY_A_CORE FT
Refer to outpatient psychiatric services, if patient is no longer receiving external medication management or psychotherapy services. If patient is interested, consider referring to the psycho-oncology program within [facility].  Followup with one of the following within 1 week of discharge:  •	[facility]  o	[email or web address]  o	[address] [place] [address] [place] [address]   [phone]  o	Medicare, Medicaid, most private insurances    •	[facility])  o	www.[email or web address]  o	[address] [place]  [address]  [phone]  o	Medicare, Medicaid, AARP, Aetna, Affinity, AmeriChoice, [facility] [employer], Center[employer], Cigna, [employer], BCBS, [place], GHI, HealthFirst, HIP, Optum, [place], United, ValueOptions    •	[facility]  o	[email or web address]  o	[address] [place] [address] [phone]  o	Medicare, Medicaid, Aetna, Affinity, [employer], BCBS, [employer], [place], GHI, Healthfirst, HealthPlus, MetroPlus, Wellcare

## 2022-11-18 NOTE — PROGRESS NOTE ADULT - PROBLEM SELECTOR PLAN 1
2/2 left pleural effusion likely 2/2 progressing metastatic ovarian cancer vs HF. Presented with hypoxia to 86%, placed on 2L NC with improvement to 95%. Denies any fever, chills, cough, or dyspnea. BNP elevated to 3039 but in setting of CKD and unilateral effusion w/ EF of 60-65% on TTE last admission, less likely HF. CTA done showing no abnormal dilatation of the main pulmonary artery, and no pulmonary embolism. Multifocal small to large in parenchymal and pleural masses bilaterally as noted on prior studies. Small left pleural effusion. Bibasilar atelectasis.   Plan:  - weaned down to 2L from 3L  - urine legionella and strep negative  -  RVP panel negative  - c/w zosyn 3.375g q12 for possible RLL PNA  - Pulm consulted, appreciate further recs

## 2022-11-18 NOTE — PROGRESS NOTE ADULT - NS ATTEST RISK PROBLEM GEN_ALL_CORE FT
Identified pt with two pt identifiers(name and ).     Chief Complaint   Patient presents with    Labs     Thyroid    Follow-up        Health Maintenance Due   Topic    HPV AGE 9Y-26Y (1 of 3 - Female 3 Dose Series)       Wt Readings from Last 3 Encounters:   17 182 lb (82.6 kg)   17 179 lb (81.2 kg)   16 175 lb (79.4 kg)     Temp Readings from Last 3 Encounters:   17 98 °F (36.7 °C) (Oral)   17 97.3 °F (36.3 °C) (Oral)   16 97.8 °F (36.6 °C)     BP Readings from Last 3 Encounters:   17 108/66   17 115/75   16 102/55     Pulse Readings from Last 3 Encounters:   17 71   17 91   16 79         Learning Assessment:  :     Learning Assessment 2015 2014 3/21/2014   PRIMARY LEARNER Patient Patient Patient   HIGHEST LEVEL OF EDUCATION - PRIMARY LEARNER  GRADUATED HIGH SCHOOL OR GED GRADUATED HIGH SCHOOL OR GED GRADUATED HIGH SCHOOL OR GED   BARRIERS PRIMARY LEARNER NONE NONE NONE   CO-LEARNER CAREGIVER No No -   PRIMARY LANGUAGE ENGLISH ENGLISH ENGLISH   LEARNER PREFERENCE PRIMARY READING DEMONSTRATION DEMONSTRATION   ANSWERED BY Patient patient patient   RELATIONSHIP SELF SELF SELF       Depression Screening:  :     PHQ 2 / 9, over the last two weeks 10/27/2016   Little interest or pleasure in doing things Not at all   Feeling down, depressed or hopeless Not at all   Total Score PHQ 2 0   Trouble falling or staying asleep, or sleeping too much -   Feeling tired or having little energy -   Poor appetite or overeating -   Feeling bad about yourself - or that you are a failure or have let yourself or your family down -   Trouble concentrating on things such as school, work, reading or watching TV -   Moving or speaking so slowly that other people could have noticed; or the opposite being so fidgety that others notice -   Thoughts of being better off dead, or hurting yourself in some way -   PHQ 9 Score -   How difficult have these problems made it for you to do your work, take care of your home and get along with others -       Fall Risk Assessment:  :     No flowsheet data found. Abuse Screening:  :     Abuse Screening Questionnaire 10/27/2016 5/22/2014   Do you ever feel afraid of your partner? N N   Are you in a relationship with someone who physically or mentally threatens you? N N   Is it safe for you to go home? Y Y       Coordination of Care Questionnaire:  :     1) Have you been to an emergency room, urgent care clinic since your last visit? no   Hospitalized since your last visit? no             2) Have you seen or consulted any other health care providers outside of [facility] since your last visit? no  (Include any pap smears or colon screenings in this section.)    3) Do you have an Advance Directive on file? no  Are you interested in receiving information about Advance Directives? no    Reviewed record in preparation for visit and have obtained necessary documentation. Medication reconciliation up to date and corrected with patient at this time. #Acute resp failure w/ hypoxia 2/2 pleural effusion  #Pneumonia  #Urinary retention  #Hydronephrosis  #HTN  #Ovarian and uterine cancer w/ mets to the liver, lungs, and peritoneum #Acute resp failure w/ hypoxia 2/2 pleural effusion  #Pneumonia  #Urinary retention  #Hydronephrosis  #HTN  #Ovarian and uterine cancer w/ mets to the liver, lungs, and peritoneum  #LLE DVT

## 2022-11-18 NOTE — BH CONSULTATION LIAISON ASSESSMENT NOTE - HPI (INCLUDE ILLNESS QUALITY, SEVERITY, DURATION, TIMING, CONTEXT, MODIFYING FACTORS, ASSOCIATED SIGNS AND SYMPTOMS)
The patient reported having a history of anxiety and depression, which has been treated with Lexapro and Ativan. She has also been in individual psychotherapy prior to her current hospitalization; however, it is unclear what the current status of her treatment is. The patient expressed some anxiety during the interview and some challenges coping; however, overall appears to be managing significant stressors quite well. Involved in medical treatment. Hopeful. Has positive spousal support. Adequate appetite and sleep, though states she experiences some sleep restlessness. Patient denied any history of suicidal ideation, suicide attempts, psychiatric hospitalization, or auditory hallucinations. No symptoms of psychosis, maxine, PTSD, OCD noted or reported. Patient denied experiencing confusion.

## 2022-11-18 NOTE — BH CONSULTATION LIAISON ASSESSMENT NOTE - OTHER PAST PSYCHIATRIC HISTORY (INCLUDE DETAILS REGARDING ONSET, COURSE OF ILLNESS, INPATIENT/OUTPATIENT TREATMENT)
The patient reported a history of anxiety and depressed mood, which has been treated with Lexapro and Ativan.

## 2022-11-18 NOTE — PROGRESS NOTE ADULT - PROBLEM SELECTOR PLAN 6
possibly 2/2 obstructive in setting of urinary retention vs pre-renal vs ATN in setting of drop in BP. Baseline Cr of 1.5 from chart, presents w/ Cr 3.5. s/p 2L LR w/ drop to 2.16 but uptrending again. Pt reports she does not urinate without straight cath, last done 2 days ago. Bladder scan - 280cc. UA w/ WBC 5-10 and trace leuk esterase but no suprapubic tenderness or complaints of dysuria or frequency/urgency  Plan:  - c/w bladder scan q6h  - f/u urine studies  - strict I&Os  - retroperitoneal US showed 2 stones in L kidney , moderate hydronephrosis with urinary obstruction  - urology consulted, recommends CT renal stones for further management  - avoid nephrotoxic drugs possibly 2/2 obstructive in setting of urinary retention vs pre-renal vs ATN in setting of drop in BP. Baseline Cr of 1.5 from chart, presents w/ Cr 3.5. s/p 2L LR w/ drop to 2.16 but uptrending again. Pt reports she does not urinate without straight cath, last done 2 days ago. Bladder scan - 280cc. UA w/ WBC 5-10 and trace leuk esterase but no suprapubic tenderness or complaints of dysuria or frequency/urgency  Plan:  - c/w bladder scan q6h  - f/u urine studies  - strict I&Os  - dc'd oxybutynin  - retroperitoneal US showed 2 stones in L kidney , moderate hydronephrosis with urinary obstruction  - urology consulted, recommends CT renal stones for further management  - avoid nephrotoxic drugs

## 2022-11-18 NOTE — BH CONSULTATION LIAISON ASSESSMENT NOTE - LEVEL OF CONSCIOUSNESS
Pt was inconsistent with some of the answers provided. call was made to daughter Mana but there was no answer to inquire about home environment and previous level of function. Daughter is to come in later today, will attempt to obtain more information at this time.   Pt did report use of a walker, stairs at home and ambulating short distances into the community (i.e. [facility]). Alert

## 2022-11-18 NOTE — PROGRESS NOTE ADULT - SUBJECTIVE AND OBJECTIVE BOX
OVERNIGHT EVENTS:    SUBJECTIVE / INTERVAL HPI: Patient seen and examined at bedside.     VITAL SIGNS:  Vital Signs Last 24 Hrs  T(C): 36.8 (2022 05:58), Max: 36.8 (2022 05:58)  T(F): 98.3 (2022 05:58), Max: 98.3 (2022 05:58)  HR: 84 (2022 05:58) (80 - 85)  BP: 109/60 (2022 05:58) (96/61 - 110/71)  BP(mean): --  RR: 16 (2022 05:58) (16 - 16)  SpO2: 94% (2022 05:58) (93% - 94%)    Parameters below as of 2022 05:58  Patient On (Oxygen Delivery Method): nasal cannula w/ humidification  O2 Flow (L/min): 3      PHYSICAL EXAM:    General: WDWN  HEENT: NCAT; PERRL, anicteric sclera; MMM  Neck: supple, trachea midline  Cardiovascular: S1, S2 normal; RRR, no M/G/R  Respiratory: CTABL; no W/R/R  Gastrointestinal: soft, nontender, nondistended. bowel sounds present.  Skin: no ulcerations or visible rashes appreciated  Extremities: WWP; no edema, clubbing or cyanosis  Vascular: 2+ radial, DP/PT pulses B/L  Neurological: AAOx3; CN II-XII grossly intact; no focal deficits    MEDICATIONS:  MEDICATIONS  (STANDING):  escitalopram 7.5 milliGRAM(s) Oral daily  folic acid 1 milliGRAM(s) Oral daily  gabapentin 100 milliGRAM(s) Oral two times a day  heparin  Infusion 900 Unit(s)/Hr (9 mL/Hr) IV Continuous <Continuous>  levETIRAcetam  IVPB 500 milliGRAM(s) IV Intermittent every 12 hours  NIFEdipine XL 60 milliGRAM(s) Oral daily  oxybutynin 2.5 milliGRAM(s) Oral four times a day  piperacillin/tazobactam IVPB.. 3.375 Gram(s) IV Intermittent every 12 hours  polyethylene glycol 3350 17 Gram(s) Oral daily  senna 2 Tablet(s) Oral at bedtime    MEDICATIONS  (PRN):  acetaminophen     Tablet .. 650 milliGRAM(s) Oral every 6 hours PRN Temp greater or equal to 38C (100.4F), Mild Pain (1 - 3)  aluminum hydroxide/magnesium hydroxide/simethicone Suspension 30 milliLiter(s) Oral every 4 hours PRN Dyspepsia  LORazepam     Tablet 0.5 milliGRAM(s) Oral every 8 hours PRN Nausea and/or Vomiting  melatonin 3 milliGRAM(s) Oral at bedtime PRN Insomnia  ondansetron Injectable 4 milliGRAM(s) IV Push every 8 hours PRN Nausea and/or Vomiting      ALLERGIES:  Allergies    Benadryl (Other)  Compazine (Unknown)    Intolerances        LABS:                        8.3    10.77 )-----------( 187      ( 2022 07:49 )             26.3     11-18    134<L>  |  101  |  49<H>  ----------------------------<  89  4.3   |  23  |  2.70<H>    Ca    8.2<L>      2022 07:49  Phos  4.6     -  Mg     2.2     -18    TPro  4.7<L>  /  Alb  1.2<L>  /  TBili  0.6  /  DBili  x   /  AST  35  /  ALT  23  /  AlkPhos  583<H>  11-17    PTT - ( 2022 07:49 )  PTT:97.1 sec  Urinalysis Basic - ( 2022 14:23 )    Color: Yellow / Appearance: Hazy / S.020 / pH: x  Gluc: x / Ketone: NEGATIVE  / Bili: Small / Urobili: 1.0 E.U./dL   Blood: x / Protein: 100 mg/dL / Nitrite: NEGATIVE   Leuk Esterase: NEGATIVE / RBC: > 10 /HPF / WBC 5-10 /HPF   Sq Epi: x / Non Sq Epi: 0-5 /HPF / Bacteria: None /HPF      CAPILLARY BLOOD GLUCOSE          RADIOLOGY & ADDITIONAL TESTS: Reviewed. OVERNIGHT EVENTS:  NICA    SUBJECTIVE / INTERVAL HPI: Patient seen and examined at bedside.     CONSTITUTIONAL: No weakness, fevers or chills  EYES/ENT: No visual changes;  No vertigo or throat pain   NECK: No pain or stiffness  RESPIRATORY: No cough, wheezing, hemoptysis; No shortness of breath  CARDIOVASCULAR: No chest pain or palpitations  GASTROINTESTINAL: No abdominal or epigastric pain. No nausea, vomiting, or hematemesis; No diarrhea or constipation. No melena or hematochezia.  GENITOURINARY: No dysuria, frequency or hematuria  NEUROLOGICAL: No numbness or weakness  SKIN: No itching, burning, rashes, or lesions   All other review of systems is negative unless indicated above.      VITAL SIGNS:  Vital Signs Last 24 Hrs  T(C): 36.8 (2022 05:58), Max: 36.8 (2022 05:58)  T(F): 98.3 (2022 05:58), Max: 98.3 (2022 05:58)  HR: 84 (2022 05:58) (80 - 85)  BP: 109/60 (2022 05:58) (96/61 - 110/71)  BP(mean): --  RR: 16 (2022 05:58) (16 - 16)  SpO2: 94% (2022 05:58) (93% - 94%)    Parameters below as of 2022 05:58  Patient On (Oxygen Delivery Method): nasal cannula w/ humidification  O2 Flow (L/min): 3      PHYSICAL EXAM:    General: NAD, lying in bed comfortably, talkative and cooperative, arevalo in place, satting 93-94% on 2L  HEENT: NCAT; PERRL, anicteric sclera; MMM  Neck: supple, trachea midline  Cardiovascular: S1, S2 normal; RRR, no M/G/R  Respiratory: CTABL; no W/R/R  Gastrointestinal: soft, nontender, nondistended. bowel sounds present.  Skin: no ulcerations or visible rashes appreciated  Extremities: WWP; no edema, clubbing or cyanosis  Vascular: 2+ radial, DP/PT pulses B/L  Neurological: AAOx3; CN II-XII grossly intact; no focal deficits    MEDICATIONS:  MEDICATIONS  (STANDING):  escitalopram 7.5 milliGRAM(s) Oral daily  folic acid 1 milliGRAM(s) Oral daily  gabapentin 100 milliGRAM(s) Oral two times a day  heparin  Infusion 900 Unit(s)/Hr (9 mL/Hr) IV Continuous <Continuous>  levETIRAcetam  IVPB 500 milliGRAM(s) IV Intermittent every 12 hours  NIFEdipine XL 60 milliGRAM(s) Oral daily  oxybutynin 2.5 milliGRAM(s) Oral four times a day  piperacillin/tazobactam IVPB.. 3.375 Gram(s) IV Intermittent every 12 hours  polyethylene glycol 3350 17 Gram(s) Oral daily  senna 2 Tablet(s) Oral at bedtime    MEDICATIONS  (PRN):  acetaminophen     Tablet .. 650 milliGRAM(s) Oral every 6 hours PRN Temp greater or equal to 38C (100.4F), Mild Pain (1 - 3)  aluminum hydroxide/magnesium hydroxide/simethicone Suspension 30 milliLiter(s) Oral every 4 hours PRN Dyspepsia  LORazepam     Tablet 0.5 milliGRAM(s) Oral every 8 hours PRN Nausea and/or Vomiting  melatonin 3 milliGRAM(s) Oral at bedtime PRN Insomnia  ondansetron Injectable 4 milliGRAM(s) IV Push every 8 hours PRN Nausea and/or Vomiting      ALLERGIES:  Allergies    Benadryl (Other)  Compazine (Unknown)    Intolerances        LABS:                        8.3    10.77 )-----------( 187      ( 2022 07:49 )             26.3     11-18    134<L>  |  101  |  49<H>  ----------------------------<  89  4.3   |  23  |  2.70<H>    Ca    8.2<L>      2022 07:49  Phos  4.6     11-18  Mg     2.2     11-18    TPro  4.7<L>  /  Alb  1.2<L>  /  TBili  0.6  /  DBili  x   /  AST  35  /  ALT  23  /  AlkPhos  583<H>  11-17    PTT - ( 2022 07:49 )  PTT:97.1 sec  Urinalysis Basic - ( 2022 14:23 )    Color: Yellow / Appearance: Hazy / S.020 / pH: x  Gluc: x / Ketone: NEGATIVE  / Bili: Small / Urobili: 1.0 E.U./dL   Blood: x / Protein: 100 mg/dL / Nitrite: NEGATIVE   Leuk Esterase: NEGATIVE / RBC: > 10 /HPF / WBC 5-10 /HPF   Sq Epi: x / Non Sq Epi: 0-5 /HPF / Bacteria: None /HPF      CAPILLARY BLOOD GLUCOSE          RADIOLOGY & ADDITIONAL TESTS: Reviewed.

## 2022-11-18 NOTE — PROGRESS NOTE ADULT - SUBJECTIVE AND OBJECTIVE BOX
UROLOGY PROGRESS NOTE    SUBJECTIVE: Patient seen and examined bedside. On NC, feeling okay just tired.    heparin  Infusion 900 Unit(s)/Hr IV Continuous <Continuous>  NIFEdipine XL 60 milliGRAM(s) Oral daily  piperacillin/tazobactam IVPB.. 3.375 Gram(s) IV Intermittent every 12 hours      Vital Signs Last 24 Hrs  T(C): 36.8 (2022 05:58), Max: 36.8 (2022 05:58)  T(F): 98.3 (2022 05:58), Max: 98.3 (2022 05:58)  HR: 73 (2022 09:35) (73 - 84)  BP: 108/67 (2022 09:35) (96/61 - 110/71)  BP(mean): --  RR: 16 (2022 09:35) (16 - 16)  SpO2: 93% (2022 09:35) (93% - 94%)    Parameters below as of 2022 09:35  Patient On (Oxygen Delivery Method): nasal cannula w/ humidification  O2 Flow (L/min): 3    I&O's Detail    2022 07:01  -  2022 07:00  --------------------------------------------------------  IN:  Total IN: 0 mL    OUT:    Indwelling Catheter - Urethral (mL): 700 mL  Total OUT: 700 mL    Total NET: -700 mL          PHYSICAL EXAM    General: NAD, resting comfortably in bed  C/V: NSR  Pulm: Nonlabored breathing, no respiratory distress on NC  Abd: soft, ND/NT,        LABS:                        8.3    10.77 )-----------( 187      ( 2022 07:49 )             26.3     11-18    134<L>  |  101  |  49<H>  ----------------------------<  89  4.3   |  23  |  2.70<H>    Ca    8.2<L>      2022 07:49  Phos  4.6       Mg     2.2         TPro  4.7<L>  /  Alb  1.2<L>  /  TBili  0.6  /  DBili  x   /  AST  35  /  ALT  23  /  AlkPhos  583<H>      PTT - ( 2022 07:49 )  PTT:97.1 sec  Urinalysis Basic - ( 2022 14:23 )    Color: Yellow / Appearance: Hazy / S.020 / pH: x  Gluc: x / Ketone: NEGATIVE  / Bili: Small / Urobili: 1.0 E.U./dL   Blood: x / Protein: 100 mg/dL / Nitrite: NEGATIVE   Leuk Esterase: NEGATIVE / RBC: > 10 /HPF / WBC 5-10 /HPF   Sq Epi: x / Non Sq Epi: 0-5 /HPF / Bacteria: None /HPF        CULTURES:      Culture - Blood (collected 11-15-22 @ 20:32)  Source: .Blood Blood-Peripheral  Preliminary Report (22 @ 03:01):    No growth to date.    Culture - Blood (collected 11-15-22 @ 20:32)  Source: .Blood Blood-Peripheral  Preliminary Report (22 @ 03:01):    No growth to date.          RADIOLOGY & ADDITIONAL STUDIES:

## 2022-11-18 NOTE — BH CONSULTATION LIAISON ASSESSMENT NOTE - NSBHATTESTCOMMENTATTENDFT_PSY_A_CORE
73yo F hx of HTN, Ovarian and uterine cancer w/ spread to the liver, lungs, and peritoneum (on chemo, last tx 3 weeks ago), anemia, urinary retention, and scoliosis who also had a recent admission for PRES and new onset LLE DVT (11/3) was readmitted with acute resp failure w/ hypoxia 2/2 pleural effusion likely 2/2 worsening cancer, per primary team.  Psychiatry consulted for anxiety, no SI agitation or acute safety concerns.  Pt had hosp 11/2-11/11 notable for anemia, had stroke code, transferred to stroke neurology, had PRES, DVT, witnessed GTC while in the ED, urinary retention and caths, empiric UTI tx, was discharged 11/11 to [facility].  She was readmitted 11/16.    Pt assessed with psychology fellow.  Pt alert, attentive, described medical issues and recent hospital course with sophistication, appreciative of the consult.  Pt describes hx depression and anxiety, never any SI/SA/hosps, currently managed with Lexapro by PMD and has been in therapy that ended when the practitioner left.  Pt described hopefulness, future orientation, stable mood, optimism and desire for continued care.  No dangerous behaviors sx suggestive of high acute risk.  No substance use, subjective confusion, or psychotic sx.  Pt reported taking Ativan BID for anxiety, longstanding, though this had been reduced due to concern for sedation given polypharmacy.  Anxiety is controlled, worsening somewhat overall but pt described stability.  Pt amenable to reestablishing outpt therapy upon discharge.  No SE with Lexapro.  Pt received PRBC yesterday, is on gabapentin 100mg BID (for pain), Keppra (hx seizure).  Pt currently on Ativan 0.5mg PO TID PRN nausea/vomiting, received one dose on 11/16 (upon discharge 11/11 she was getting Ativan 0.5mg PO TID while in hospital, made PRN thereafter).  CT and MRI imaging reviewed, labs reviewed, QTc 472, lytes OK.  +arevalo    -pt is psychiatrically cleared for discharge  -no significant sx of benzodiazepine withdrawal, continue to monitor  -no indication for psychiatric admission  -sx controlled, recommend psychotherapy upon discharge, pt benefitted from session with psychologist today  -can continue Lexapro as tolerated

## 2022-11-18 NOTE — BH CONSULTATION LIAISON ASSESSMENT NOTE - SUMMARY
71yo F hx of HTN, Ovarian and uterine cancer w/ spread to the liver, lungs, and peritoneum (on chemo, last tx 3 weeks ago), anemia, urinary retention, and scoliosis who also had a recent admission for PRES and new onset LLE DVT (11/3) was readmitted with acute resp failure w/ hypoxia 2/2 pleural effusion likely 2/2 worsening cancer, per primary team.  Psychaitry consulted for anxiety which is controlled; there is no SI or acute safety concern.  Pt responsive to individual therapy session.  See attestation for specific reccs.

## 2022-11-18 NOTE — BH CONSULTATION LIAISON ASSESSMENT NOTE - NSBHCHARTREVIEWINVESTIGATE_PSY_A_CORE FT
qtc 472 on 11/16  ACC: [id number] EXAM:  CT BRAIN [facility]                          PROCEDURE DATE:  11/02/2022          INTERPRETATION:  Weakness.    Technique: CT head was performed utilizing axial images from the base of   the skull through the vertex without the administration of intravenous   contrast. Images were reviewed in the bone, brain and subdural windows.    Findings: There comparison is made to a prior CT of the brain performed   on 11/2/2022.    There has been interval appearance of hyperdensity overlying the   bilateral tentorium and mildly within the interhemispheric fissure as   well as hyperdensity seen within the [place] of [place]; findings are   consistent with prior contrast injection. The previously seen small   patchy areas of low density within the high right mesial frontal and   parietal lobes that extend to the cortex are not visualized on this study   and due to the presence of contrast. The ventricles are normal in size   and caliber.  There is no evidence of mass-effect or midline shift. There is no   evidence of an intra or extra-axial fluid collection.    Visualized paranasal sinuses and bilateral mastoid air cells are clear.   Osteopenia.    Impression: Limited evaluation due to the presence of intravenous   contrast. Nonvisualization of the previously seen small acute to subacute   infarctions in the high right mesial frontal and parietal lobes.    --- End of Report ---            HIMANSHU KNOWLES MD; Attending Radiologist  This document has been electronically signed. Nov 2 2022 10:50AM    ACC: [id number] EXAM:  MR BRAIN [facility]                          PROCEDURE DATE:  11/03/2022          INTERPRETATION:  CLINICAL INDICATION: Left leg weakness. Ovarian cancer   on chemotherapy. Rule out stroke and metastases.    TECHNIQUE: Multi-planar multi-sequential MR imaging of the brain was   performed before and after the intravenous administration of 7 ml of   Gadavist.    COMPARISON: CT head 11/2/2022.    FINDINGS:    There are extensive areas of vasogenic edema primarily located in the   bilateral occipital and parietal regions with extension into the frontal   lobes, findings suggestive of posterior reversible encephalopathy   syndrome (PRES). Please note, there is associated gyriform restricted   diffusion including in the right posterior parietal lobe, bilateral   sensorimotor cortex and right frontal lobe. There is also associated   hemosiderin deposition best visualized on GRE sequence in the right   posterior parietal lobe. There is prominent vasogenic edema in the left   cerebellar hemisphere without definite associated restricted diffusion,   but with concomitant associated leptomeningeal enhancement, findings   favored to represent additional site of PRES (given the supratentorial   findings), rather than metastatic disease.    There is no evidence of acute infarction, intracranial hemorrhage or   intraparenchymal mass.    There is no evidence of hydrocephalus. There are no extra-axial fluid   collections. The skull base flow voids are present.    The visualized intraorbital contents are normal. The imaged portions of   the paranasal sinuses are aerated. The mastoid air cells are clear. The   visualized soft tissues and osseous structures appear normal.    IMPRESSION:    Extensive areas of vasogenic edema primarily located in the bilateral   occipital and parietal regions with extension into the frontal lobes   consistent with posterior reversible encephalopathy syndrome (PRES).    Prominent vasogenic edema with associated leptomeningeal enhancement in   the left cerebellar hemisphere is favored to represent additional site of   PRES.    Follow-up dedicated MRI brain with and without contrast is recommended   upon resolution of findings to exclude underlying metastatic disease.    --- End of Report ---          MART LYON MD; Resident Radiologist  This document has been electronically signed.  CRAIG ALBARRAN MD; Attending Radiologist  This document has been electronically signed. Nov 4 2022  3:22PM

## 2022-11-18 NOTE — BH CONSULTATION LIAISON ASSESSMENT NOTE - RISK ASSESSMENT
low acute suicide risk pt without any SI history, her TC is future oriented and health-promoting, she is in treatment, and sx are controlled.  Her diagnoses and demographics increase her risks though

## 2022-11-18 NOTE — BH CONSULTATION LIAISON ASSESSMENT NOTE - DESCRIPTION
The patient lives in [facility] with her  who she has been  to for 14 years. The patient describes him as being very supportive, though states that her illness has been challenging for him.

## 2022-11-18 NOTE — BH CONSULTATION LIAISON ASSESSMENT NOTE - CURRENT MEDICATION
MEDICATIONS  (STANDING):  escitalopram 7.5 milliGRAM(s) Oral daily  folic acid 1 milliGRAM(s) Oral daily  gabapentin 100 milliGRAM(s) Oral two times a day  heparin  Infusion 900 Unit(s)/Hr (9 mL/Hr) IV Continuous <Continuous>  levETIRAcetam  IVPB 500 milliGRAM(s) IV Intermittent every 12 hours  NIFEdipine XL 60 milliGRAM(s) Oral daily  oxybutynin 2.5 milliGRAM(s) Oral four times a day  piperacillin/tazobactam IVPB.. 3.375 Gram(s) IV Intermittent every 12 hours  polyethylene glycol 3350 17 Gram(s) Oral daily  senna 2 Tablet(s) Oral at bedtime    MEDICATIONS  (PRN):  acetaminophen     Tablet .. 650 milliGRAM(s) Oral every 6 hours PRN Temp greater or equal to 38C (100.4F), Mild Pain (1 - 3)  aluminum hydroxide/magnesium hydroxide/simethicone Suspension 30 milliLiter(s) Oral every 4 hours PRN Dyspepsia  LORazepam     Tablet 0.5 milliGRAM(s) Oral every 8 hours PRN Nausea and/or Vomiting  melatonin 3 milliGRAM(s) Oral at bedtime PRN Insomnia  ondansetron Injectable 4 milliGRAM(s) IV Push every 8 hours PRN Nausea and/or Vomiting

## 2022-11-18 NOTE — PROGRESS NOTE ADULT - ATTENDING COMMENTS
Agree with above note and plan.  Patient will decide if she wishes to pursue any further evaluation.  Risks and benefits of all options were discussed.

## 2022-11-18 NOTE — BH CONSULTATION LIAISON ASSESSMENT NOTE - NSBHCHARTREVIEWLAB_PSY_A_CORE FT
8.3    10.77 )-----------( 187      ( 18 Nov 2022 07:49 )             26.3   11-18    134<L>  |  101  |  49<H>  ----------------------------<  89  4.3   |  23  |  2.70<H>    Ca    8.2<L>      18 Nov 2022 07:49  Phos  4.6     11-18  Mg     2.2     11-18    TPro  4.7<L>  /  Alb  1.2<L>  /  TBili  0.6  /  DBili  x   /  AST  35  /  ALT  23  /  AlkPhos  583<H>  11-17

## 2022-11-18 NOTE — PROGRESS NOTE ADULT - ATTENDING COMMENTS
Patient was seen and examined at bedside. Case discuss with resident. Pt with some right sided flank discomfort this morning.     OBJECTIVE:  Vital Signs Last 24 Hrs  T(C): 36.7 (18 Nov 2022 12:00), Max: 36.8 (18 Nov 2022 05:58)  T(F): 98.1 (18 Nov 2022 12:00), Max: 98.3 (18 Nov 2022 05:58)  HR: 78 (18 Nov 2022 12:00) (73 - 84)  BP: 122/70 (18 Nov 2022 12:00) (96/61 - 122/70)  BP(mean): --  RR: 16 (18 Nov 2022 12:00) (16 - 16)  SpO2: 93% (18 Nov 2022 12:00) (93% - 94%)    Parameters below as of 18 Nov 2022 12:00  Patient On (Oxygen Delivery Method): nasal cannula w/ humidification  O2 Flow (L/min): 3    PE:NAD laying in bed; spouse at bedside  CTA b/l no wheezing   Nl S1,S2   + right flank    As per resident note documented above     LABS:                        8.3    10.77 )-----------( 187      ( 18 Nov 2022 07:49 )             26.3     11-18    134<L>  |  101  |  49<H>  ----------------------------<  89  4.3   |  23  |  2.70<H>    Ca    8.2<L>      18 Nov 2022 07:49  Phos  4.6     11-18  Mg     2.2     11-18    TPro  4.7<L>  /  Alb  1.2<L>  /  TBili  0.6  /  DBili  x   /  AST  35  /  ALT  23  /  AlkPhos  583<H>  11-17    LIVER FUNCTIONS - ( 17 Nov 2022 05:30 )  Alb: 1.2 g/dL / Pro: 4.7 g/dL / ALK PHOS: 583 U/L / ALT: 23 U/L / AST: 35 U/L / GGT: x           PTT - ( 18 Nov 2022 07:49 )  PTT:97.1 sec  CAPILLARY BLOOD GLUCOSE    MEDICATIONS  (STANDING):  escitalopram 7.5 milliGRAM(s) Oral daily  folic acid 1 milliGRAM(s) Oral daily  gabapentin 100 milliGRAM(s) Oral two times a day  heparin  Infusion 900 Unit(s)/Hr (9 mL/Hr) IV Continuous <Continuous>  levETIRAcetam  IVPB 500 milliGRAM(s) IV Intermittent every 12 hours  NIFEdipine XL 60 milliGRAM(s) Oral daily  oxybutynin 2.5 milliGRAM(s) Oral four times a day  piperacillin/tazobactam IVPB.. 3.375 Gram(s) IV Intermittent every 12 hours  polyethylene glycol 3350 17 Gram(s) Oral daily  senna 2 Tablet(s) Oral at bedtime    A/P: 71 yo female pmhx of HTN, Ovarian and uterine cancer w/ mets to the liver, lungs, and peritoneum (on chemo, last tx 3 weeks ago), anemia, urinary retention, and scoliosis who also had a recent admission for PRES and new onset LLE DVT (11/3) that currently presents with acute resp failure w/ hypoxia 2/2 pleural effusion as well as PNA.  In addition the pt was found to have left renal stone and hydronephrosis, s/p arevalo and pending intervention with urology after CT A/P completed.     #Acute resp failure w/ hypoxia 2/2 pleural effusion  #Pneumonia  #Urinary retention  #Hydronephrosis  #HTN  #Ovarian and uterine cancer w/ mets to the liver, lungs, and peritoneum  - Urology f/u appreciated and the pt is for a CT A/P non con for stone evaluation  -Per urology the pt is not for any  interventions at this time  -Pulmonary following   -Will continue Zosyn for PNA  treat for 7 days total (11/16-11/22)  -Will check urine legionella and strep, RVPand  procalcitonin  - Will check sputum culture   -Psych Onc consult appreciated   -Will d/c back to rehab once medically stable Patient was seen and examined at bedside. Case discuss with resident. Pt with some right sided flank discomfort this morning.     OBJECTIVE:  Vital Signs Last 24 Hrs  T(C): 36.7 (18 Nov 2022 12:00), Max: 36.8 (18 Nov 2022 05:58)  T(F): 98.1 (18 Nov 2022 12:00), Max: 98.3 (18 Nov 2022 05:58)  HR: 78 (18 Nov 2022 12:00) (73 - 84)  BP: 122/70 (18 Nov 2022 12:00) (96/61 - 122/70)  BP(mean): --  RR: 16 (18 Nov 2022 12:00) (16 - 16)  SpO2: 93% (18 Nov 2022 12:00) (93% - 94%)    Parameters below as of 18 Nov 2022 12:00  Patient On (Oxygen Delivery Method): nasal cannula w/ humidification  O2 Flow (L/min): 3    PE:NAD laying in bed; spouse at bedside  CTA b/l no wheezing   Nl S1,S2   + right flank    As per resident note documented above     LABS:                        8.3    10.77 )-----------( 187      ( 18 Nov 2022 07:49 )             26.3     11-18    134<L>  |  101  |  49<H>  ----------------------------<  89  4.3   |  23  |  2.70<H>    Ca    8.2<L>      18 Nov 2022 07:49  Phos  4.6     11-18  Mg     2.2     11-18    TPro  4.7<L>  /  Alb  1.2<L>  /  TBili  0.6  /  DBili  x   /  AST  35  /  ALT  23  /  AlkPhos  583<H>  11-17    LIVER FUNCTIONS - ( 17 Nov 2022 05:30 )  Alb: 1.2 g/dL / Pro: 4.7 g/dL / ALK PHOS: 583 U/L / ALT: 23 U/L / AST: 35 U/L / GGT: x           PTT - ( 18 Nov 2022 07:49 )  PTT:97.1 sec  CAPILLARY BLOOD GLUCOSE    MEDICATIONS  (STANDING):  escitalopram 7.5 milliGRAM(s) Oral daily  folic acid 1 milliGRAM(s) Oral daily  gabapentin 100 milliGRAM(s) Oral two times a day  heparin  Infusion 900 Unit(s)/Hr (9 mL/Hr) IV Continuous <Continuous>  levETIRAcetam  IVPB 500 milliGRAM(s) IV Intermittent every 12 hours  NIFEdipine XL 60 milliGRAM(s) Oral daily  oxybutynin 2.5 milliGRAM(s) Oral four times a day  piperacillin/tazobactam IVPB.. 3.375 Gram(s) IV Intermittent every 12 hours  polyethylene glycol 3350 17 Gram(s) Oral daily  senna 2 Tablet(s) Oral at bedtime    A/P: 71 yo female pmhx of HTN, Ovarian and uterine cancer w/ mets to the liver, lungs, and peritoneum (on chemo, last tx 3 weeks ago), anemia, urinary retention, and scoliosis who also had a recent admission for PRES and new onset LLE DVT (11/3) that currently presents with acute resp failure w/ hypoxia 2/2 pleural effusion as well as PNA.  In addition the pt was found to have left renal stone and hydronephrosis, s/p arevalo and pending intervention with urology after CT A/P completed.     #Acute resp failure w/ hypoxia 2/2 pleural effusion  #Pneumonia  #Urinary retention  #Hydronephrosis  #HTN  #Ovarian and uterine cancer w/ mets to the liver, lungs, and peritoneum  #DVT   - Urology f/u appreciated and the pt is for a CT A/P non con for stone evaluation  -Per urology the pt is not for any  interventions at this time  -Pulmonary following   -Will continue Zosyn for PNA  treat for 7 days total (11/16-11/22)  -Will change Heparin drip to Lovenox   -Will check urine legionella and strep, RVPand  procalcitonin  - Will check sputum culture   -Psych Onc consult appreciated   -Will d/c back to rehab once medically stable Patient was seen and examined at bedside. Case discuss with resident. Pt with some right sided flank discomfort this morning.     OBJECTIVE:  Vital Signs Last 24 Hrs  T(C): 36.7 (18 Nov 2022 12:00), Max: 36.8 (18 Nov 2022 05:58)  T(F): 98.1 (18 Nov 2022 12:00), Max: 98.3 (18 Nov 2022 05:58)  HR: 78 (18 Nov 2022 12:00) (73 - 84)  BP: 122/70 (18 Nov 2022 12:00) (96/61 - 122/70)  BP(mean): --  RR: 16 (18 Nov 2022 12:00) (16 - 16)  SpO2: 93% (18 Nov 2022 12:00) (93% - 94%)    Parameters below as of 18 Nov 2022 12:00  Patient On (Oxygen Delivery Method): nasal cannula w/ humidification  O2 Flow (L/min): 3    PE:NAD laying in bed; spouse at bedside  CTA b/l no wheezing   Nl S1,S2   + right flank    As per resident note documented above     LABS:                        8.3    10.77 )-----------( 187      ( 18 Nov 2022 07:49 )             26.3     11-18    134<L>  |  101  |  49<H>  ----------------------------<  89  4.3   |  23  |  2.70<H>    Ca    8.2<L>      18 Nov 2022 07:49  Phos  4.6     11-18  Mg     2.2     11-18    TPro  4.7<L>  /  Alb  1.2<L>  /  TBili  0.6  /  DBili  x   /  AST  35  /  ALT  23  /  AlkPhos  583<H>  11-17    LIVER FUNCTIONS - ( 17 Nov 2022 05:30 )  Alb: 1.2 g/dL / Pro: 4.7 g/dL / ALK PHOS: 583 U/L / ALT: 23 U/L / AST: 35 U/L / GGT: x           PTT - ( 18 Nov 2022 07:49 )  PTT:97.1 sec  CAPILLARY BLOOD GLUCOSE    MEDICATIONS  (STANDING):  escitalopram 7.5 milliGRAM(s) Oral daily  folic acid 1 milliGRAM(s) Oral daily  gabapentin 100 milliGRAM(s) Oral two times a day  heparin  Infusion 900 Unit(s)/Hr (9 mL/Hr) IV Continuous <Continuous>  levETIRAcetam  IVPB 500 milliGRAM(s) IV Intermittent every 12 hours  NIFEdipine XL 60 milliGRAM(s) Oral daily  oxybutynin 2.5 milliGRAM(s) Oral four times a day  piperacillin/tazobactam IVPB.. 3.375 Gram(s) IV Intermittent every 12 hours  polyethylene glycol 3350 17 Gram(s) Oral daily  senna 2 Tablet(s) Oral at bedtime    A/P: 73 yo female pmhx of HTN, Ovarian and uterine cancer w/ mets to the liver, lungs, and peritoneum (on chemo, last tx 3 weeks ago), anemia, urinary retention, and scoliosis who also had a recent admission for PRES and new onset LLE DVT (11/3) that currently presents with acute resp failure w/ hypoxia 2/2 pleural effusion as well as PNA.  In addition the pt was found to have left renal stone and hydronephrosis, s/p arevalo and pending intervention with urology after CT A/P completed.     #Acute resp failure w/ hypoxia 2/2 pleural effusion  #Pneumonia  #Urinary retention  #Hydronephrosis  #HTN  #Ovarian and uterine cancer w/ mets to the liver, lungs, and peritoneum  #LLE DVT   - Urology f/u appreciated and the pt is for a CT A/P non con for stone evaluation  -Per urology the pt is not for any  interventions at this time  -Pulmonary following   -Will continue Zosyn for PNA  treat for 7 days total (11/16-11/22)  -Will change Heparin drip to Lovenox   -Will check urine legionella and strep, RVPand  procalcitonin  - Will check sputum culture   -Psych Onc consult appreciated   -Will d/c back to rehab once medically stable  -DNR/DNI

## 2022-11-19 LAB
ALBUMIN SERPL ELPH-MCNC: 1.5 G/DL — LOW (ref 3.3–5)
ALP SERPL-CCNC: 638 U/L — HIGH (ref 40–120)
ALT FLD-CCNC: 21 U/L — SIGNIFICANT CHANGE UP (ref 10–45)
ANION GAP SERPL CALC-SCNC: 11 MMOL/L — SIGNIFICANT CHANGE UP (ref 5–17)
ANISOCYTOSIS BLD QL: SIGNIFICANT CHANGE UP
APTT BLD: 35.9 SEC — HIGH (ref 27.5–35.5)
AST SERPL-CCNC: 35 U/L — SIGNIFICANT CHANGE UP (ref 10–40)
BASOPHILS # BLD AUTO: 0 K/UL — SIGNIFICANT CHANGE UP (ref 0–0.2)
BASOPHILS NFR BLD AUTO: 0 % — SIGNIFICANT CHANGE UP (ref 0–2)
BILIRUB SERPL-MCNC: 0.8 MG/DL — SIGNIFICANT CHANGE UP (ref 0.2–1.2)
BUN SERPL-MCNC: 45 MG/DL — HIGH (ref 7–23)
CALCIUM SERPL-MCNC: 8.1 MG/DL — LOW (ref 8.4–10.5)
CHLORIDE SERPL-SCNC: 101 MMOL/L — SIGNIFICANT CHANGE UP (ref 96–108)
CO2 SERPL-SCNC: 22 MMOL/L — SIGNIFICANT CHANGE UP (ref 22–31)
CREAT SERPL-MCNC: 2.46 MG/DL — HIGH (ref 0.5–1.3)
DACRYOCYTES BLD QL SMEAR: SLIGHT — SIGNIFICANT CHANGE UP
EGFR: 20 ML/MIN/1.73M2 — LOW
EOSINOPHIL # BLD AUTO: 0.25 K/UL — SIGNIFICANT CHANGE UP (ref 0–0.5)
EOSINOPHIL NFR BLD AUTO: 2.6 % — SIGNIFICANT CHANGE UP (ref 0–6)
GIANT PLATELETS BLD QL SMEAR: PRESENT — SIGNIFICANT CHANGE UP
GLUCOSE SERPL-MCNC: 98 MG/DL — SIGNIFICANT CHANGE UP (ref 70–99)
HCT VFR BLD CALC: 25.8 % — LOW (ref 34.5–45)
HGB BLD-MCNC: 8 G/DL — LOW (ref 11.5–15.5)
HYPOCHROMIA BLD QL: SIGNIFICANT CHANGE UP
LYMPHOCYTES # BLD AUTO: 0.83 K/UL — LOW (ref 1–3.3)
LYMPHOCYTES # BLD AUTO: 8.8 % — LOW (ref 13–44)
MACROCYTES BLD QL: SIGNIFICANT CHANGE UP
MAGNESIUM SERPL-MCNC: 2.3 MG/DL — SIGNIFICANT CHANGE UP (ref 1.6–2.6)
MANUAL SMEAR VERIFICATION: SIGNIFICANT CHANGE UP
MCHC RBC-ENTMCNC: 31 GM/DL — LOW (ref 32–36)
MCHC RBC-ENTMCNC: 32.1 PG — SIGNIFICANT CHANGE UP (ref 27–34)
MCV RBC AUTO: 103.6 FL — HIGH (ref 80–100)
MICROCYTES BLD QL: SLIGHT — SIGNIFICANT CHANGE UP
MONOCYTES # BLD AUTO: 0.74 K/UL — SIGNIFICANT CHANGE UP (ref 0–0.9)
MONOCYTES NFR BLD AUTO: 7.9 % — SIGNIFICANT CHANGE UP (ref 2–14)
NEUTROPHILS # BLD AUTO: 7.61 K/UL — HIGH (ref 1.8–7.4)
NEUTROPHILS NFR BLD AUTO: 80.7 % — HIGH (ref 43–77)
OVALOCYTES BLD QL SMEAR: SLIGHT — SIGNIFICANT CHANGE UP
PHOSPHATE SERPL-MCNC: 4.6 MG/DL — HIGH (ref 2.5–4.5)
PLAT MORPH BLD: ABNORMAL
PLATELET # BLD AUTO: 187 K/UL — SIGNIFICANT CHANGE UP (ref 150–400)
POIKILOCYTOSIS BLD QL AUTO: SLIGHT — SIGNIFICANT CHANGE UP
POLYCHROMASIA BLD QL SMEAR: SLIGHT — SIGNIFICANT CHANGE UP
POTASSIUM SERPL-MCNC: 4.3 MMOL/L — SIGNIFICANT CHANGE UP (ref 3.5–5.3)
POTASSIUM SERPL-SCNC: 4.3 MMOL/L — SIGNIFICANT CHANGE UP (ref 3.5–5.3)
PROT SERPL-MCNC: 5.1 G/DL — LOW (ref 6–8.3)
RBC # BLD: 2.49 M/UL — LOW (ref 3.8–5.2)
RBC # FLD: 21.6 % — HIGH (ref 10.3–14.5)
RBC BLD AUTO: ABNORMAL
SODIUM SERPL-SCNC: 134 MMOL/L — LOW (ref 135–145)
WBC # BLD: 9.43 K/UL — SIGNIFICANT CHANGE UP (ref 3.8–10.5)
WBC # FLD AUTO: 9.43 K/UL — SIGNIFICANT CHANGE UP (ref 3.8–10.5)

## 2022-11-19 PROCEDURE — 71045 X-RAY EXAM CHEST 1 VIEW: CPT | Mod: 26

## 2022-11-19 PROCEDURE — 99233 SBSQ HOSP IP/OBS HIGH 50: CPT | Mod: GC

## 2022-11-19 RX ORDER — SODIUM CHLORIDE 0.65 %
1 AEROSOL, SPRAY (ML) NASAL ONCE
Refills: 0 | Status: COMPLETED | OUTPATIENT
Start: 2022-11-19 | End: 2022-11-19

## 2022-11-19 RX ADMIN — LEVETIRACETAM 400 MILLIGRAM(S): 250 TABLET, FILM COATED ORAL at 17:23

## 2022-11-19 RX ADMIN — ENOXAPARIN SODIUM 60 MILLIGRAM(S): 100 INJECTION SUBCUTANEOUS at 13:31

## 2022-11-19 RX ADMIN — Medication 650 MILLIGRAM(S): at 00:39

## 2022-11-19 RX ADMIN — PIPERACILLIN AND TAZOBACTAM 25 GRAM(S): 4; .5 INJECTION, POWDER, LYOPHILIZED, FOR SOLUTION INTRAVENOUS at 14:03

## 2022-11-19 RX ADMIN — PIPERACILLIN AND TAZOBACTAM 25 GRAM(S): 4; .5 INJECTION, POWDER, LYOPHILIZED, FOR SOLUTION INTRAVENOUS at 03:34

## 2022-11-19 RX ADMIN — GABAPENTIN 100 MILLIGRAM(S): 400 CAPSULE ORAL at 17:23

## 2022-11-19 RX ADMIN — Medication 650 MILLIGRAM(S): at 01:30

## 2022-11-19 RX ADMIN — LEVETIRACETAM 400 MILLIGRAM(S): 250 TABLET, FILM COATED ORAL at 06:47

## 2022-11-19 RX ADMIN — Medication 1 MILLIGRAM(S): at 09:04

## 2022-11-19 RX ADMIN — Medication 60 MILLIGRAM(S): at 09:04

## 2022-11-19 RX ADMIN — Medication 1 SPRAY(S): at 02:06

## 2022-11-19 RX ADMIN — ESCITALOPRAM OXALATE 7.5 MILLIGRAM(S): 10 TABLET, FILM COATED ORAL at 09:04

## 2022-11-19 RX ADMIN — GABAPENTIN 100 MILLIGRAM(S): 400 CAPSULE ORAL at 06:47

## 2022-11-19 RX ADMIN — POLYETHYLENE GLYCOL 3350 17 GRAM(S): 17 POWDER, FOR SOLUTION ORAL at 09:04

## 2022-11-19 RX ADMIN — Medication 0.5 MILLIGRAM(S): at 17:33

## 2022-11-19 NOTE — PROGRESS NOTE ADULT - SUBJECTIVE AND OBJECTIVE BOX
UROLOGY PROGRESS NOTE    SUBJECTIVE: Patient seen and examined bedside. Had extensive conversation about CT scan    enoxaparin Injectable 60 milliGRAM(s) SubCutaneous every 24 hours  NIFEdipine XL 60 milliGRAM(s) Oral daily  piperacillin/tazobactam IVPB.. 3.375 Gram(s) IV Intermittent every 12 hours      Vital Signs Last 24 Hrs  T(C): 36.4 (19 Nov 2022 09:10), Max: 36.9 (19 Nov 2022 05:36)  T(F): 97.6 (19 Nov 2022 09:10), Max: 98.4 (19 Nov 2022 05:36)  HR: 71 (19 Nov 2022 09:10) (71 - 88)  BP: 125/70 (19 Nov 2022 09:10) (113/67 - 125/70)  BP(mean): --  RR: 18 (19 Nov 2022 13:37) (16 - 18)  SpO2: 95% (19 Nov 2022 13:37) (91% - 95%)    Parameters below as of 19 Nov 2022 13:37  Patient On (Oxygen Delivery Method): nasal cannula w/ humidification  O2 Flow (L/min): 4    I&O's Detail      PHYSICAL EXAM    General: NAD, resting comfortably in bed  C/V: NSR  Pulm: Nonlabored breathing, no respiratory distress   Abd: soft, ND/NT  : arevalo draining clear yellow urine.        LABS:                        8.0    9.43  )-----------( 187      ( 19 Nov 2022 05:30 )             25.8     11-19    134<L>  |  101  |  45<H>  ----------------------------<  98  4.3   |  22  |  2.46<H>    Ca    8.1<L>      19 Nov 2022 05:30  Phos  4.6     11-19  Mg     2.3     11-19    TPro  5.1<L>  /  Alb  1.5<L>  /  TBili  0.8  /  DBili  x   /  AST  35  /  ALT  21  /  AlkPhos  638<H>  11-19    PTT - ( 19 Nov 2022 05:30 )  PTT:35.9 sec      CULTURES:      Culture - Blood (collected 11-15-22 @ 20:32)  Source: .Blood Blood-Peripheral  Preliminary Report (11-17-22 @ 03:01):    No growth to date.    Culture - Blood (collected 11-15-22 @ 20:32)  Source: .Blood Blood-Peripheral  Preliminary Report (11-17-22 @ 03:01):    No growth to date.          RADIOLOGY & ADDITIONAL STUDIES:

## 2022-11-19 NOTE — PROGRESS NOTE ADULT - SUBJECTIVE AND OBJECTIVE BOX
Patient was seen and examined at bedside. Case discuss with resident. Pt had an episode overnight in which she desaturated to 82% on 4L. Pt's nasal cannula was increased to 6L and pt saturation improved.     OBJECTIVE:  Vital Signs Last 24 Hrs  T(C): 36.4 (19 Nov 2022 09:10), Max: 36.9 (19 Nov 2022 05:36)  T(F): 97.6 (19 Nov 2022 09:10), Max: 98.4 (19 Nov 2022 05:36)  HR: 71 (19 Nov 2022 09:10) (71 - 88)  BP: 125/70 (19 Nov 2022 09:10) (113/67 - 125/70)  BP(mean): --  RR: 18 (19 Nov 2022 09:10) (16 - 18)  SpO2: 95% (19 Nov 2022 09:10) (91% - 95%)    Parameters below as of 19 Nov 2022 09:10  Patient On (Oxygen Delivery Method): nasal cannula w/ humidification  O2 Flow (L/min): 6    PHYSICAL EXAM:  Gen: NAD laying in bed; Pt on nasal cannula; Pt appears comfortable no accessory muscle use   CV: RRR, +S1/S2, no mumur  Pulm: CTA b/l no wheezing or crackles   Abd: soft, NTND + BS no rebound or guarding       LABS:                        8.0    9.43  )-----------( 187      ( 19 Nov 2022 05:30 )             25.8     11-19    134<L>  |  101  |  45<H>  ----------------------------<  98  4.3   |  22  |  2.46<H>    Ca    8.1<L>      19 Nov 2022 05:30  Phos  4.6     11-19  Mg     2.3     11-19    TPro  5.1<L>  /  Alb  1.5<L>  /  TBili  0.8  /  DBili  x   /  AST  35  /  ALT  21  /  AlkPhos  638<H>  11-19    LIVER FUNCTIONS - ( 19 Nov 2022 05:30 )  Alb: 1.5 g/dL / Pro: 5.1 g/dL / ALK PHOS: 638 U/L / ALT: 21 U/L / AST: 35 U/L / GGT: x           PTT - ( 19 Nov 2022 05:30 )  PTT:35.9 sec    CT abd/pelvis: .  Uterine soft tissue mass encases and severely narrows distal left   ureter with associated moderate left hydronephrosis.  2.  Cystic lesions in left adnexa and between left common iliac artery   and mid ureter, may represent cystic ovarian neoplasm or implants versus   loculated ascites.  3.  Metastasis to lungs and liver with peritoneal carcinomatosis.  4.  Enlarged left para-aortic lymph node, likely metastatic adenopathy.  5.  Interval increased bilateral pleural effusions and adjacent   atelectasis.    MEDICATIONS  (STANDING):  enoxaparin Injectable 60 milliGRAM(s) SubCutaneous every 24 hours  escitalopram 7.5 milliGRAM(s) Oral daily  folic acid 1 milliGRAM(s) Oral daily  gabapentin 100 milliGRAM(s) Oral two times a day  levETIRAcetam  IVPB 500 milliGRAM(s) IV Intermittent every 12 hours  NIFEdipine XL 60 milliGRAM(s) Oral daily  piperacillin/tazobactam IVPB.. 3.375 Gram(s) IV Intermittent every 12 hours  polyethylene glycol 3350 17 Gram(s) Oral daily  senna 2 Tablet(s) Oral at bedtime    A/P: 71 yo female pmhx of HTN, Ovarian and uterine cancer w/ mets to the liver, lungs, and peritoneum (on chemo, last tx 3 weeks ago), anemia, urinary retention, and scoliosis who also had a recent admission for PRES and new onset LLE DVT (11/3) that currently presents with acute resp failure w/ hypoxia 2/2 pleural effusion as well as PNA.  In addition the pt was found to have left renal stone and hydronephrosis, s/p arevalo and pending intervention with urology after CT A/P completed.     #Acute resp failure w/ hypoxia 2/2 pleural effusion  #Pneumonia  -Pulmonary following   -Will continue Zosyn for PNA  treat for 7 days total (11/16-11/22)  -Will check urine legionella and strep, RVPand  procalcitonin  - Will check sputum culture  -Will repeat CXR given episode of desaturation overnight; Will attempt to titrate nasal cannula back to 4L     #Urinary retention  #Hydronephrosis  - Will f/u with Urology regarding the CT findings documented above     #Ovarian and uterine cancer w/ mets to the liver, lungs, and peritoneum  -Psych Onc consult appreciated     #HTN  -Continue Nifedipine     #LLE DVT   -Will continue  Lovenox      #DISPO  -Will d/c back to rehab once medically stable  -DNR/DNI.

## 2022-11-19 NOTE — PROGRESS NOTE ADULT - SUBJECTIVE AND OBJECTIVE BOX
Physical Medicine and Rehabilitation Progress Note :       Patient is a 72y old  Female who presents with a chief complaint of Hypoxemia (18 Nov 2022 10:53)      HPI:  71 yo female pmhx of HTN, Ovarian and uterine cancer w/ spread to the liver, lungs, and peritoneum (on chemo, last tx 3 weeks ago), anemia, urinary retention, and scoliosis who also had a recent admission for PRES and new onset LLE DVT (11/3) that currently presents with hypoxia 88% and reported hypotension at [facility]. Pt reports she felt fine and denied any shortness of breath or chest pain but because of recent DVT diagnosis and hypoxia was forced to come to the hospital. Pt was discharged on full anticoagulation after her last admission (Lovenox 50mg BID) which she states she continued to take consistently and last dose taken last night. Patient denies fever/chills, nausea, vomiting, chest pain, palpitations, dyspnea, cough or sputum production. Otherwise, pt mentions continued weakness and inability to move L leg after diagnosis of DVT on last admission. Also reports she has not been straight cath' d in 2 days and does not urinate otherwise.     Pt presented to the ED w/ vitals of: T 97.4, HR 94, /74, and RR 14 sat 86% on RA  Labs in ED: WBC 12.59, HgB 8.4 (baseline 10-11 from chart), D-dimer 1042, Lactate 2.3, Cr 3.50 (bl 1.5 from chart), Alk 749 and AST 80, BNP 3037    CTA done showing: No abnormal dilatation of the main pulmonary artery, and no pulmonary embolism. Multifocal small to large in parenchymal and pleural masses bilaterally as noted on prior studies. Small left pleural effusion. Bibasilar atelectasis. No pericardial effusion.    Pt was placed on 2L of NC and sat went up to 95%, s/p 2L LR, currently as baseline BP per pt. Received dose of Lovenox 50mg as well.  (16 Nov 2022 09:05)                            8.0    9.43  )-----------( 187      ( 19 Nov 2022 05:30 )             25.8       11-19    134<L>  |  101  |  45<H>  ----------------------------<  98  4.3   |  22  |  2.46<H>    Ca    8.1<L>      19 Nov 2022 05:30  Phos  4.6     11-19  Mg     2.3     11-19    TPro  5.1<L>  /  Alb  1.5<L>  /  TBili  0.8  /  DBili  x   /  AST  35  /  ALT  21  /  AlkPhos  638<H>  11-19    Vital Signs Last 24 Hrs  T(C): 36.4 (19 Nov 2022 09:10), Max: 36.9 (19 Nov 2022 05:36)  T(F): 97.6 (19 Nov 2022 09:10), Max: 98.4 (19 Nov 2022 05:36)  HR: 71 (19 Nov 2022 09:10) (71 - 88)  BP: 125/70 (19 Nov 2022 09:10) (113/67 - 125/70)  BP(mean): --  RR: 18 (19 Nov 2022 13:37) (16 - 18)  SpO2: 95% (19 Nov 2022 13:37) (91% - 95%)    Parameters below as of 19 Nov 2022 13:37  Patient On (Oxygen Delivery Method): nasal cannula w/ humidification  O2 Flow (L/min): 4      MEDICATIONS  (STANDING):  enoxaparin Injectable 60 milliGRAM(s) SubCutaneous every 24 hours  escitalopram 7.5 milliGRAM(s) Oral daily  folic acid 1 milliGRAM(s) Oral daily  gabapentin 100 milliGRAM(s) Oral two times a day  levETIRAcetam  IVPB 500 milliGRAM(s) IV Intermittent every 12 hours  NIFEdipine XL 60 milliGRAM(s) Oral daily  piperacillin/tazobactam IVPB.. 3.375 Gram(s) IV Intermittent every 12 hours  polyethylene glycol 3350 17 Gram(s) Oral daily  senna 2 Tablet(s) Oral at bedtime    MEDICATIONS  (PRN):  acetaminophen     Tablet .. 650 milliGRAM(s) Oral every 6 hours PRN Temp greater or equal to 38C (100.4F), Mild Pain (1 - 3)  aluminum hydroxide/magnesium hydroxide/simethicone Suspension 30 milliLiter(s) Oral every 4 hours PRN Dyspepsia  LORazepam     Tablet 0.5 milliGRAM(s) Oral every 8 hours PRN Nausea and/or Vomiting  melatonin 3 milliGRAM(s) Oral at bedtime PRN Insomnia  ondansetron Injectable 4 milliGRAM(s) IV Push every 8 hours PRN Nausea and/or Vomiting       Initial Physical Therapy Functional Status Assessment :       Previous Level of Function:     · Ambulation Skills	needs device and assist  · Transfer Skills	needs device and assist  · ADL Skills	needs device and assist  · Work/Leisure Activity	needs device and assist  · Additional Comments	Pt is admitted from Rehab and has been working with therapy there, pt reports needing assist and using a RW for mobility at rehab. Prior to hospital admission. Pt lives alone in an apartment.    Cognitive Status Examination:   · Orientation	oriented to person, place, time and situation  · Level of Consciousness	alert  · Follows Commands and Answers Questions	100% of the time  · Personal Safety and Judgment	intact    Range of Motion Exam:   · Range of Motion Examination	bilateral lower extremity ROM was WFL (within functional limits)    Manual Muscle Testing:   · Manual Muscle Testing Results	grossly assessed due to  at least 3/5 BL UE & LE based on antigravity mobility.    Bed Mobility: Rolling/Turning:     · Level of Haines	minimum assist (75% patients effort); moderate assist (50% patients effort)  · Physical Assist/Nonphysical Assist	2 person assist  · Assistive Device	bed rails    Bed Mobility: Scooting/Bridging:     · Level of Haines	minimum assist (75% patients effort)  · Physical Assist/Nonphysical Assist	2 person assist  · Assistive Device	bed rails    Bed Mobility: Sit to Supine:     · Level of Haines	moderate assist (50% patients effort)  · Physical Assist/Nonphysical Assist	2 person assist  · Assistive Device	bed rails    Bed Mobility: Supine to Sit:     · Level of Haines	minimum assist (75% patients effort); moderate assist (50% patients effort)  · Physical Assist/Nonphysical Assist	2 person assist  · Assistive Device	bed rails    Bed Mobility Analysis:     · Bed Mobility Limitations	decreased ability to use legs for bridging/pushing  · Impairments Contributing to Impaired Bed Mobility	impaired balance; decreased flexibility; decreased strength    Transfer: Sit to Stand:     · Level of Haines	moderate assist (50% patients effort)  · Physical Assist/Nonphysical Assist	2 person assist  · Weight-Bearing Restrictions	weight-bearing as tolerated  · Assistive Device	rolling walker    Transfer: Stand to Sit:     · Level of Haines	minimum assist (75% patients effort)  · Physical Assist/Nonphysical Assist	2 person assist  · Weight-Bearing Restrictions	weight-bearing as tolerated  · Assistive Device	rolling walker    Sit/Stand Transfer Safety Analysis:     · Transfer Safety Concerns Noted	losing balance; decreased weight-shifting ability  · Impairments Contributing to Impaired Transfers	impaired balance; impaired postural control; decreased strength    Balance Skills Assessment:     · Sitting Balance: Static	fair plus  · Sitting Balance: Dynamic	fair plus  · Sit-to-Stand Balance	fair balance  · Standing Balance: Static	fair minus  · Standing Balance: Dynamic	fair minus  · Systems Impairment Contributing to Balance Disturbance	musculoskeletal    Clinical Impressions:   · Criteria for Skilled Therapeutic Interventions	impairments found; rehab potential; anticipated equipment needs at discharge; therapy frequency; functional limitations in following categories; predicted duration of therapy intervention; anticipated discharge recommendation  · Impairments Found (describe specific impairments)	aerobic capacity/endurance; arousal, attention, and cognition; gait, locomotion, and balance; muscle strength; posture; ROM  · Functional Limitations in Following Categories (describe specific limitations)	self-care; home management; community/leisure  · Rehab Potential	good, to achieve stated therapy goals  · Therapy Frequency	2-3x/week        PM&R Impression : as above    Current Disposition Plan Recommendations :    subacute rehab placement

## 2022-11-20 LAB
ALBUMIN SERPL ELPH-MCNC: 1.4 G/DL — LOW (ref 3.3–5)
ALP SERPL-CCNC: 662 U/L — HIGH (ref 40–120)
ALT FLD-CCNC: 18 U/L — SIGNIFICANT CHANGE UP (ref 10–45)
ANION GAP SERPL CALC-SCNC: 9 MMOL/L — SIGNIFICANT CHANGE UP (ref 5–17)
ANISOCYTOSIS BLD QL: SLIGHT — SIGNIFICANT CHANGE UP
AST SERPL-CCNC: 28 U/L — SIGNIFICANT CHANGE UP (ref 10–40)
BASOPHILS # BLD AUTO: 0.09 K/UL — SIGNIFICANT CHANGE UP (ref 0–0.2)
BASOPHILS NFR BLD AUTO: 0.9 % — SIGNIFICANT CHANGE UP (ref 0–2)
BILIRUB SERPL-MCNC: 0.6 MG/DL — SIGNIFICANT CHANGE UP (ref 0.2–1.2)
BUN SERPL-MCNC: 40 MG/DL — HIGH (ref 7–23)
CALCIUM SERPL-MCNC: 8 MG/DL — LOW (ref 8.4–10.5)
CHLORIDE SERPL-SCNC: 103 MMOL/L — SIGNIFICANT CHANGE UP (ref 96–108)
CO2 SERPL-SCNC: 22 MMOL/L — SIGNIFICANT CHANGE UP (ref 22–31)
CREAT SERPL-MCNC: 2.26 MG/DL — HIGH (ref 0.5–1.3)
EGFR: 22 ML/MIN/1.73M2 — LOW
EOSINOPHIL # BLD AUTO: 0.09 K/UL — SIGNIFICANT CHANGE UP (ref 0–0.5)
EOSINOPHIL NFR BLD AUTO: 0.9 % — SIGNIFICANT CHANGE UP (ref 0–6)
GIANT PLATELETS BLD QL SMEAR: PRESENT — SIGNIFICANT CHANGE UP
GLUCOSE SERPL-MCNC: 106 MG/DL — HIGH (ref 70–99)
HCT VFR BLD CALC: 24.1 % — LOW (ref 34.5–45)
HGB BLD-MCNC: 7.5 G/DL — LOW (ref 11.5–15.5)
HYPOCHROMIA BLD QL: SLIGHT — SIGNIFICANT CHANGE UP
LMWH PPP CHRO-ACNC: 0.67 IU/ML — SIGNIFICANT CHANGE UP (ref 0.5–1.1)
LYMPHOCYTES # BLD AUTO: 0.58 K/UL — LOW (ref 1–3.3)
LYMPHOCYTES # BLD AUTO: 6.1 % — LOW (ref 13–44)
MACROCYTES BLD QL: SLIGHT — SIGNIFICANT CHANGE UP
MAGNESIUM SERPL-MCNC: 2.2 MG/DL — SIGNIFICANT CHANGE UP (ref 1.6–2.6)
MANUAL SMEAR VERIFICATION: SIGNIFICANT CHANGE UP
MCHC RBC-ENTMCNC: 31.1 GM/DL — LOW (ref 32–36)
MCHC RBC-ENTMCNC: 31.6 PG — SIGNIFICANT CHANGE UP (ref 27–34)
MCV RBC AUTO: 101.7 FL — HIGH (ref 80–100)
MONOCYTES # BLD AUTO: 0.84 K/UL — SIGNIFICANT CHANGE UP (ref 0–0.9)
MONOCYTES NFR BLD AUTO: 8.8 % — SIGNIFICANT CHANGE UP (ref 2–14)
NEUTROPHILS # BLD AUTO: 7.94 K/UL — HIGH (ref 1.8–7.4)
NEUTROPHILS NFR BLD AUTO: 83.3 % — HIGH (ref 43–77)
OVALOCYTES BLD QL SMEAR: SLIGHT — SIGNIFICANT CHANGE UP
PHOSPHATE SERPL-MCNC: 4.5 MG/DL — SIGNIFICANT CHANGE UP (ref 2.5–4.5)
PLAT MORPH BLD: NORMAL — SIGNIFICANT CHANGE UP
PLATELET # BLD AUTO: 169 K/UL — SIGNIFICANT CHANGE UP (ref 150–400)
POTASSIUM SERPL-MCNC: 4.2 MMOL/L — SIGNIFICANT CHANGE UP (ref 3.5–5.3)
POTASSIUM SERPL-SCNC: 4.2 MMOL/L — SIGNIFICANT CHANGE UP (ref 3.5–5.3)
PROT SERPL-MCNC: 4.9 G/DL — LOW (ref 6–8.3)
RBC # BLD: 2.37 M/UL — LOW (ref 3.8–5.2)
RBC # FLD: 21.9 % — HIGH (ref 10.3–14.5)
RBC BLD AUTO: ABNORMAL
SODIUM SERPL-SCNC: 134 MMOL/L — LOW (ref 135–145)
WBC # BLD: 9.53 K/UL — SIGNIFICANT CHANGE UP (ref 3.8–10.5)
WBC # FLD AUTO: 9.53 K/UL — SIGNIFICANT CHANGE UP (ref 3.8–10.5)

## 2022-11-20 PROCEDURE — 99233 SBSQ HOSP IP/OBS HIGH 50: CPT | Mod: GC

## 2022-11-20 RX ADMIN — LEVETIRACETAM 400 MILLIGRAM(S): 250 TABLET, FILM COATED ORAL at 06:11

## 2022-11-20 RX ADMIN — LEVETIRACETAM 400 MILLIGRAM(S): 250 TABLET, FILM COATED ORAL at 17:33

## 2022-11-20 RX ADMIN — GABAPENTIN 100 MILLIGRAM(S): 400 CAPSULE ORAL at 06:11

## 2022-11-20 RX ADMIN — Medication 650 MILLIGRAM(S): at 21:12

## 2022-11-20 RX ADMIN — PIPERACILLIN AND TAZOBACTAM 25 GRAM(S): 4; .5 INJECTION, POWDER, LYOPHILIZED, FOR SOLUTION INTRAVENOUS at 04:42

## 2022-11-20 RX ADMIN — Medication 60 MILLIGRAM(S): at 09:48

## 2022-11-20 RX ADMIN — GABAPENTIN 100 MILLIGRAM(S): 400 CAPSULE ORAL at 17:33

## 2022-11-20 RX ADMIN — Medication 0.5 MILLIGRAM(S): at 19:28

## 2022-11-20 RX ADMIN — POLYETHYLENE GLYCOL 3350 17 GRAM(S): 17 POWDER, FOR SOLUTION ORAL at 09:49

## 2022-11-20 RX ADMIN — Medication 1 MILLIGRAM(S): at 09:48

## 2022-11-20 RX ADMIN — ESCITALOPRAM OXALATE 7.5 MILLIGRAM(S): 10 TABLET, FILM COATED ORAL at 09:48

## 2022-11-20 RX ADMIN — ENOXAPARIN SODIUM 60 MILLIGRAM(S): 100 INJECTION SUBCUTANEOUS at 13:24

## 2022-11-20 RX ADMIN — Medication 650 MILLIGRAM(S): at 22:12

## 2022-11-20 RX ADMIN — PIPERACILLIN AND TAZOBACTAM 25 GRAM(S): 4; .5 INJECTION, POWDER, LYOPHILIZED, FOR SOLUTION INTRAVENOUS at 14:12

## 2022-11-20 NOTE — PROGRESS NOTE ADULT - PROBLEM SELECTOR PLAN 6
possibly 2/2 obstructive in setting of urinary retention vs pre-renal vs ATN in setting of drop in BP. Baseline Cr of 1.5 from chart, presents w/ Cr 3.5. s/p 2L LR w/ drop to 2.16 but uptrending again. Pt reports she does not urinate without straight cath, last done 2 days ago. Bladder scan - 280cc. UA w/ WBC 5-10 and trace leuk esterase but no suprapubic tenderness or complaints of dysuria or frequency/urgency. retroperitoneal US showed 2 stones in L kidney , moderate hydronephrosis with urinary obstruction. dc'd oxybutynin  Plan:    - c/w bladder scan q6h  -pt will discuss urology procedure with family, f/u results of discussion  - strict I&Os  - urology consulted, recommends CT renal stones for further management  - avoid nephrotoxic drugs

## 2022-11-20 NOTE — PROGRESS NOTE ADULT - PROBLEM SELECTOR PLAN 1
2/2 left pleural effusion likely 2/2 progressing metastatic ovarian cancer vs HF. Presented with hypoxia to 86%, placed on 2L NC with improvement to 95%. Denies any fever, chills, cough, or dyspnea. BNP elevated to 3039 but in setting of CKD and unilateral effusion w/ EF of 60-65% on TTE last admission, less likely HF. CTA done showing no abnormal dilatation of the main pulmonary artery, and no pulmonary embolism. Multifocal small to large in parenchymal and pleural masses bilaterally as noted on prior studies. Small left pleural effusion. Bibasilar atelectasis. urine legionella and strep negative, RVP panel negative    -currently on 5L, wean down as tolerated  - c/w zosyn 3.375g q12 for possible RLL PNA  - Pulm consulted, appreciate further recs

## 2022-11-20 NOTE — PROGRESS NOTE ADULT - ATTENDING COMMENTS
Patient was seen and examined at bedside. Case discuss with resident. Pt desaturated overnight and her oxygen had to be increased from 4 L to 5L.   Pt w/o any complaints this morning except for being tired.   OBJECTIVE:  Vital Signs Last 24 Hrs  T(C): 37 (20 Nov 2022 09:52), Max: 37 (20 Nov 2022 09:52)  T(F): 98.6 (20 Nov 2022 09:52), Max: 98.6 (20 Nov 2022 09:52)  HR: 82 (20 Nov 2022 09:52) (82 - 92)  BP: 116/66 (20 Nov 2022 09:52) (113/72 - 116/66)  BP(mean): --  RR: 18 (20 Nov 2022 09:52) (18 - 20)  SpO2: 93% (20 Nov 2022 09:52) (92% - 95%)    Parameters below as of 20 Nov 2022 09:52  Patient On (Oxygen Delivery Method): nasal cannula w/ humidification  O2 Flow (L/min): 4    PHYSICAL EXAM:  Gen: NAD laying in bed; thin female   CV: RRR, +S1/S2, no mumur  Pulm: CTA b/l no wheezing or crackles   Abd: soft, NTND + BS no rebound or guarding       LABS:                        7.5    9.53  )-----------( 169      ( 20 Nov 2022 05:30 )             24.1     11-20    134<L>  |  103  |  40<H>  ----------------------------<  106<H>  4.2   |  22  |  2.26<H>    Ca    8.0<L>      20 Nov 2022 05:30  Phos  4.5     11-20  Mg     2.2     11-20    TPro  4.9<L>  /  Alb  1.4<L>  /  TBili  0.6  /  DBili  x   /  AST  28  /  ALT  18  /  AlkPhos  662<H>  11-20    LIVER FUNCTIONS - ( 20 Nov 2022 05:30 )  Alb: 1.4 g/dL / Pro: 4.9 g/dL / ALK PHOS: 662 U/L / ALT: 18 U/L / AST: 28 U/L / GGT: x           PTT - ( 19 Nov 2022 05:30 )  PTT:35.9 se    CXR: 1.  Slight interval increase bilateral small to moderate pleural   effusions.  2.  Unchanged bilateral pulmonary metastases.    MEDICATIONS  (STANDING):  enoxaparin Injectable 60 milliGRAM(s) SubCutaneous every 24 hours  escitalopram 7.5 milliGRAM(s) Oral daily  folic acid 1 milliGRAM(s) Oral daily  gabapentin 100 milliGRAM(s) Oral two times a day  levETIRAcetam  IVPB 500 milliGRAM(s) IV Intermittent every 12 hours  NIFEdipine XL 60 milliGRAM(s) Oral daily  piperacillin/tazobactam IVPB.. 3.375 Gram(s) IV Intermittent every 12 hours  polyethylene glycol 3350 17 Gram(s) Oral daily  senna 2 Tablet(s) Oral at bedtime    A/P: 73 yo female pmhx of HTN, Ovarian and uterine cancer w/ mets to the liver, lungs, and peritoneum (on chemo, last tx 3 weeks ago), anemia, urinary retention, and scoliosis who also had a recent admission for PRES and new onset LLE DVT (11/3) that currently presents with acute resp failure w/ hypoxia 2/2 pleural effusion as well as PNA.  In addition the pt was found to have left renal stone and hydronephrosis, s/p arevalo and pending intervention with urology after CT A/P completed.     #Acute resp failure w/ hypoxia 2/2 pleural effusion  #Pneumonia  -Pulmonary following   -Will continue Zosyn for PNA  treat for 7 days total (11/16-11/22)  -Pt's urine legionella RVP were negative    Will attempt to titrate nasal cannula back to 4L     #Urinary retention  #Hydronephrosis  -  Urology f/u appreciated; Per urology they discussed with patient about PCNs however the patient wants to speak to her oncologist Dr. Lewis on Monday prior to deciding.     #Ovarian and uterine cancer w/ mets to the liver, lungs, and peritoneum  -Psych Onc consult appreciated     #HTN  -Continue Nifedipine     #LLE DVT   -Will continue Lovenox and likely switch to PO AC if pt decides against any urological procedure      #DISPO  -Will d/c back to rehab once medically stable  -DNR/DNI.

## 2022-11-20 NOTE — PROGRESS NOTE ADULT - SUBJECTIVE AND OBJECTIVE BOX
OVERNIGHT EVENTS:    SUBJECTIVE / INTERVAL HPI: Patient seen and examined at bedside.     VITAL SIGNS:  Vital Signs Last 24 Hrs  T(C): 36.8 (20 Nov 2022 05:15), Max: 36.9 (19 Nov 2022 21:14)  T(F): 98.2 (20 Nov 2022 05:15), Max: 98.4 (19 Nov 2022 21:14)  HR: 92 (20 Nov 2022 05:15) (83 - 92)  BP: 115/63 (20 Nov 2022 05:15) (113/72 - 115/63)  BP(mean): --  RR: 18 (20 Nov 2022 05:15) (18 - 20)  SpO2: 92% (20 Nov 2022 05:15) (92% - 95%)    Parameters below as of 20 Nov 2022 05:15  Patient On (Oxygen Delivery Method): nasal cannula  O2 Flow (L/min): 4      PHYSICAL EXAM:    General: WDWN  HEENT: NCAT; PERRL, anicteric sclera; MMM  Neck: supple, trachea midline  Cardiovascular: S1, S2 normal; RRR, no M/G/R  Respiratory: CTABL; no W/R/R  Gastrointestinal: soft, nontender, nondistended. bowel sounds present.  Skin: no ulcerations or visible rashes appreciated  Extremities: WWP; no edema, clubbing or cyanosis  Vascular: 2+ radial, DP/PT pulses B/L  Neurological: AAOx3; CN II-XII grossly intact; no focal deficits    MEDICATIONS:  MEDICATIONS  (STANDING):  enoxaparin Injectable 60 milliGRAM(s) SubCutaneous every 24 hours  escitalopram 7.5 milliGRAM(s) Oral daily  folic acid 1 milliGRAM(s) Oral daily  gabapentin 100 milliGRAM(s) Oral two times a day  levETIRAcetam  IVPB 500 milliGRAM(s) IV Intermittent every 12 hours  NIFEdipine XL 60 milliGRAM(s) Oral daily  piperacillin/tazobactam IVPB.. 3.375 Gram(s) IV Intermittent every 12 hours  polyethylene glycol 3350 17 Gram(s) Oral daily  senna 2 Tablet(s) Oral at bedtime    MEDICATIONS  (PRN):  acetaminophen     Tablet .. 650 milliGRAM(s) Oral every 6 hours PRN Temp greater or equal to 38C (100.4F), Mild Pain (1 - 3)  aluminum hydroxide/magnesium hydroxide/simethicone Suspension 30 milliLiter(s) Oral every 4 hours PRN Dyspepsia  LORazepam     Tablet 0.5 milliGRAM(s) Oral every 8 hours PRN Nausea and/or Vomiting  melatonin 3 milliGRAM(s) Oral at bedtime PRN Insomnia  ondansetron Injectable 4 milliGRAM(s) IV Push every 8 hours PRN Nausea and/or Vomiting      ALLERGIES:  Allergies    Benadryl (Other)  Compazine (Unknown)    Intolerances        LABS:                        7.5    9.53  )-----------( 169      ( 20 Nov 2022 05:30 )             24.1     11-20    134<L>  |  103  |  40<H>  ----------------------------<  106<H>  4.2   |  22  |  2.26<H>    Ca    8.0<L>      20 Nov 2022 05:30  Phos  4.5     11-20  Mg     2.2     11-20    TPro  4.9<L>  /  Alb  1.4<L>  /  TBili  0.6  /  DBili  x   /  AST  28  /  ALT  18  /  AlkPhos  662<H>  11-20    PTT - ( 19 Nov 2022 05:30 )  PTT:35.9 sec    CAPILLARY BLOOD GLUCOSE          RADIOLOGY & ADDITIONAL TESTS: Reviewed. OVERNIGHT EVENTS:  NICA    SUBJECTIVE / INTERVAL HPI: Patient seen and examined at bedside.     CONSTITUTIONAL: No weakness, fevers or chills  EYES/ENT: No visual changes; No vertigo or throat pain   NECK: No pain or stiffness  RESPIRATORY: c/o cough, denies wheezing, hemoptysis; No shortness of breath  CARDIOVASCULAR: No chest pain or palpitations  GASTROINTESTINAL: No abdominal or epigastric pain. No nausea, vomiting, or hematemesis; No diarrhea or constipation. Last BM 1 hr ago.  GENITOURINARY: No dysuria, frequency or hematuria  NEUROLOGICAL: No numbness, no focal deficits  SKIN: No itching, burning, rashes, or lesions   All other review of systems is negative unless indicated above.      VITAL SIGNS:  Vital Signs Last 24 Hrs  T(C): 36.8 (20 Nov 2022 05:15), Max: 36.9 (19 Nov 2022 21:14)  T(F): 98.2 (20 Nov 2022 05:15), Max: 98.4 (19 Nov 2022 21:14)  HR: 92 (20 Nov 2022 05:15) (83 - 92)  BP: 115/63 (20 Nov 2022 05:15) (113/72 - 115/63)  BP(mean): --  RR: 18 (20 Nov 2022 05:15) (18 - 20)  SpO2: 92% (20 Nov 2022 05:15) (92% - 95%)    Parameters below as of 20 Nov 2022 05:15  Patient On (Oxygen Delivery Method): nasal cannula  O2 Flow (L/min): 4      PHYSICAL EXAM:    General: NAD, elderly lady, appears weak but not short of breath, on 5L  HEENT: NCAT; PERRL, anicteric sclera; MMM  Neck: supple, trachea midline  Cardiovascular: S1, S2 normal; RRR, no M/G/R  Respiratory: CTABL; no W/R/R, no increased WOB, no accessory muscle use, coughing noted during encounter  Gastrointestinal: soft, nontender, nondistended. bowel sounds present.  Skin: no ulcerations or visible rashes appreciated  Extremities: WWP; no edema, clubbing or cyanosis  Vascular: 2+ radial, DP/PT pulses B/L  Neurological: AAOx3; CN II-XII grossly intact; no focal deficits    MEDICATIONS:  MEDICATIONS  (STANDING):  enoxaparin Injectable 60 milliGRAM(s) SubCutaneous every 24 hours  escitalopram 7.5 milliGRAM(s) Oral daily  folic acid 1 milliGRAM(s) Oral daily  gabapentin 100 milliGRAM(s) Oral two times a day  levETIRAcetam  IVPB 500 milliGRAM(s) IV Intermittent every 12 hours  NIFEdipine XL 60 milliGRAM(s) Oral daily  piperacillin/tazobactam IVPB.. 3.375 Gram(s) IV Intermittent every 12 hours  polyethylene glycol 3350 17 Gram(s) Oral daily  senna 2 Tablet(s) Oral at bedtime    MEDICATIONS  (PRN):  acetaminophen     Tablet .. 650 milliGRAM(s) Oral every 6 hours PRN Temp greater or equal to 38C (100.4F), Mild Pain (1 - 3)  aluminum hydroxide/magnesium hydroxide/simethicone Suspension 30 milliLiter(s) Oral every 4 hours PRN Dyspepsia  LORazepam     Tablet 0.5 milliGRAM(s) Oral every 8 hours PRN Nausea and/or Vomiting  melatonin 3 milliGRAM(s) Oral at bedtime PRN Insomnia  ondansetron Injectable 4 milliGRAM(s) IV Push every 8 hours PRN Nausea and/or Vomiting      ALLERGIES:  Allergies    Benadryl (Other)  Compazine (Unknown)    Intolerances        LABS:                        7.5    9.53  )-----------( 169      ( 20 Nov 2022 05:30 )             24.1     11-20    134<L>  |  103  |  40<H>  ----------------------------<  106<H>  4.2   |  22  |  2.26<H>    Ca    8.0<L>      20 Nov 2022 05:30  Phos  4.5     11-20  Mg     2.2     11-20    TPro  4.9<L>  /  Alb  1.4<L>  /  TBili  0.6  /  DBili  x   /  AST  28  /  ALT  18  /  AlkPhos  662<H>  11-20    PTT - ( 19 Nov 2022 05:30 )  PTT:35.9 sec    CAPILLARY BLOOD GLUCOSE          RADIOLOGY & ADDITIONAL TESTS: Reviewed.

## 2022-11-20 NOTE — PROGRESS NOTE ADULT - NS ATTEST RISK PROBLEM GEN_ALL_CORE FT
#Acute resp failure w/ hypoxia 2/2 pleural effusion  #Pneumonia  #Urinary retention  #Hydronephrosis  #Ovarian and uterine cancer w/ mets to the liver, lungs, and peritoneum  #HTN

## 2022-11-20 NOTE — PROGRESS NOTE ADULT - PROBLEM SELECTOR PLAN 3
History of metastatic ovarian/uterine cancer actively on chemotherapy with Dr. Hidalgo. Diagnosed in 2015 w/ metastasis to liver, lungs, and peritoneum in 2018/2019. Last treatment approximately 3 week ago. Past regimen includes avastin use, current regimen on keytruda, dose reduced gemzar, cytoxan, 5-FU continual infusion, docetaxel, and carboplatin. Presented with DVT on last admission likely 2/2 hypercoagulable state, placed on full anticoagulation.   Plan:  - dc'd heparin infusion, started lovenox  - trend PTT

## 2022-11-20 NOTE — PROGRESS NOTE ADULT - PROBLEM SELECTOR PLAN 8
in setting of metastatic cancer. Diagnosed w/ LLE DVT on last admission. Doppler then found Left common femoral vein and saphenofemoral junction deep venous thrombosis. Presents w/ LE edema, L>R, w/ weakness in left leg as well. Was started on Lovenox 50mg BID and pt reports compliance, last taken last night.  Plan:  - c/w lovenox, consider starting eliquis tmro.

## 2022-11-21 ENCOUNTER — TRANSCRIPTION ENCOUNTER (OUTPATIENT)
Age: 72
End: 2022-11-21

## 2022-11-21 LAB
ANION GAP SERPL CALC-SCNC: 9 MMOL/L — SIGNIFICANT CHANGE UP (ref 5–17)
BASOPHILS # BLD AUTO: 0.07 K/UL — SIGNIFICANT CHANGE UP (ref 0–0.2)
BASOPHILS NFR BLD AUTO: 0.8 % — SIGNIFICANT CHANGE UP (ref 0–2)
BUN SERPL-MCNC: 35 MG/DL — HIGH (ref 7–23)
CALCIUM SERPL-MCNC: 8 MG/DL — LOW (ref 8.4–10.5)
CHLORIDE SERPL-SCNC: 102 MMOL/L — SIGNIFICANT CHANGE UP (ref 96–108)
CO2 SERPL-SCNC: 23 MMOL/L — SIGNIFICANT CHANGE UP (ref 22–31)
CREAT SERPL-MCNC: 1.98 MG/DL — HIGH (ref 0.5–1.3)
CULTURE RESULTS: SIGNIFICANT CHANGE UP
CULTURE RESULTS: SIGNIFICANT CHANGE UP
EGFR: 26 ML/MIN/1.73M2 — LOW
EOSINOPHIL # BLD AUTO: 0.17 K/UL — SIGNIFICANT CHANGE UP (ref 0–0.5)
EOSINOPHIL NFR BLD AUTO: 2 % — SIGNIFICANT CHANGE UP (ref 0–6)
GLUCOSE SERPL-MCNC: 101 MG/DL — HIGH (ref 70–99)
HCT VFR BLD CALC: 27.3 % — LOW (ref 34.5–45)
HGB BLD-MCNC: 8.4 G/DL — LOW (ref 11.5–15.5)
IMM GRANULOCYTES NFR BLD AUTO: 2.2 % — HIGH (ref 0–0.9)
LYMPHOCYTES # BLD AUTO: 0.33 K/UL — LOW (ref 1–3.3)
LYMPHOCYTES # BLD AUTO: 3.8 % — LOW (ref 13–44)
MAGNESIUM SERPL-MCNC: 2.2 MG/DL — SIGNIFICANT CHANGE UP (ref 1.6–2.6)
MCHC RBC-ENTMCNC: 30.8 GM/DL — LOW (ref 32–36)
MCHC RBC-ENTMCNC: 32.2 PG — SIGNIFICANT CHANGE UP (ref 27–34)
MCV RBC AUTO: 104.6 FL — HIGH (ref 80–100)
MONOCYTES # BLD AUTO: 1.1 K/UL — HIGH (ref 0–0.9)
MONOCYTES NFR BLD AUTO: 12.6 % — SIGNIFICANT CHANGE UP (ref 2–14)
NEUTROPHILS # BLD AUTO: 6.85 K/UL — SIGNIFICANT CHANGE UP (ref 1.8–7.4)
NEUTROPHILS NFR BLD AUTO: 78.6 % — HIGH (ref 43–77)
NRBC # BLD: 0 /100 WBCS — SIGNIFICANT CHANGE UP (ref 0–0)
PHOSPHATE SERPL-MCNC: 3.5 MG/DL — SIGNIFICANT CHANGE UP (ref 2.5–4.5)
PLATELET # BLD AUTO: 191 K/UL — SIGNIFICANT CHANGE UP (ref 150–400)
POTASSIUM SERPL-MCNC: 4.1 MMOL/L — SIGNIFICANT CHANGE UP (ref 3.5–5.3)
POTASSIUM SERPL-SCNC: 4.1 MMOL/L — SIGNIFICANT CHANGE UP (ref 3.5–5.3)
RBC # BLD: 2.61 M/UL — LOW (ref 3.8–5.2)
RBC # FLD: 21.6 % — HIGH (ref 10.3–14.5)
SODIUM SERPL-SCNC: 134 MMOL/L — LOW (ref 135–145)
SPECIMEN SOURCE: SIGNIFICANT CHANGE UP
SPECIMEN SOURCE: SIGNIFICANT CHANGE UP
WBC # BLD: 8.71 K/UL — SIGNIFICANT CHANGE UP (ref 3.8–10.5)
WBC # FLD AUTO: 8.71 K/UL — SIGNIFICANT CHANGE UP (ref 3.8–10.5)

## 2022-11-21 PROCEDURE — 99232 SBSQ HOSP IP/OBS MODERATE 35: CPT | Mod: GC

## 2022-11-21 PROCEDURE — 99233 SBSQ HOSP IP/OBS HIGH 50: CPT | Mod: GC

## 2022-11-21 RX ORDER — ENOXAPARIN SODIUM 100 MG/ML
60 INJECTION SUBCUTANEOUS EVERY 24 HOURS
Refills: 0 | Status: DISCONTINUED | OUTPATIENT
Start: 2022-11-21 | End: 2022-11-24

## 2022-11-21 RX ORDER — APIXABAN 2.5 MG/1
1 TABLET, FILM COATED ORAL
Qty: 72 | Refills: 0
Start: 2022-11-21

## 2022-11-21 RX ADMIN — GABAPENTIN 100 MILLIGRAM(S): 400 CAPSULE ORAL at 17:20

## 2022-11-21 RX ADMIN — GABAPENTIN 100 MILLIGRAM(S): 400 CAPSULE ORAL at 06:19

## 2022-11-21 RX ADMIN — Medication 1 MILLIGRAM(S): at 11:29

## 2022-11-21 RX ADMIN — PIPERACILLIN AND TAZOBACTAM 25 GRAM(S): 4; .5 INJECTION, POWDER, LYOPHILIZED, FOR SOLUTION INTRAVENOUS at 02:29

## 2022-11-21 RX ADMIN — POLYETHYLENE GLYCOL 3350 17 GRAM(S): 17 POWDER, FOR SOLUTION ORAL at 11:33

## 2022-11-21 RX ADMIN — PIPERACILLIN AND TAZOBACTAM 25 GRAM(S): 4; .5 INJECTION, POWDER, LYOPHILIZED, FOR SOLUTION INTRAVENOUS at 16:00

## 2022-11-21 RX ADMIN — ESCITALOPRAM OXALATE 7.5 MILLIGRAM(S): 10 TABLET, FILM COATED ORAL at 11:30

## 2022-11-21 RX ADMIN — Medication 60 MILLIGRAM(S): at 08:38

## 2022-11-21 RX ADMIN — LEVETIRACETAM 400 MILLIGRAM(S): 250 TABLET, FILM COATED ORAL at 17:20

## 2022-11-21 RX ADMIN — ENOXAPARIN SODIUM 60 MILLIGRAM(S): 100 INJECTION SUBCUTANEOUS at 17:20

## 2022-11-21 RX ADMIN — SENNA PLUS 2 TABLET(S): 8.6 TABLET ORAL at 21:10

## 2022-11-21 RX ADMIN — Medication 0.5 MILLIGRAM(S): at 21:10

## 2022-11-21 RX ADMIN — LEVETIRACETAM 400 MILLIGRAM(S): 250 TABLET, FILM COATED ORAL at 06:18

## 2022-11-21 NOTE — PROGRESS NOTE ADULT - PROBLEM SELECTOR PLAN 6
possibly 2/2 obstructive in setting of urinary retention vs pre-renal vs ATN in setting of drop in BP. Baseline Cr of 1.5 from chart, presents w/ Cr 3.5. s/p 2L LR w/ drop to 2.16 but uptrending again. Pt reports she does not urinate without straight cath, last done 2 days ago. Bladder scan - 280cc. UA w/ WBC 5-10 and trace leuk esterase but no suprapubic tenderness or complaints of dysuria or frequency/urgency. retroperitoneal US showed 2 stones in L kidney , moderate hydronephrosis with urinary obstruction. dc'd oxybutynin  Plan:    - c/w bladder scan q6h  -pt will discuss urology procedure with family, f/u results of discussion  - strict I&Os  - urology consulted, recommends CT renal stones for further management  - avoid nephrotoxic drugs possibly 2/2 obstructive in setting of urinary retention vs pre-renal vs ATN in setting of drop in BP. Baseline Cr of 1.5 from chart, presents w/ Cr 3.5. s/p 2L LR w/ drop to 2.16 but uptrending again. Pt reports she does not urinate without straight cath, last done 2 days ago. Bladder scan - 280cc. UA w/ WBC 5-10 and trace leuk esterase but no suprapubic tenderness or complaints of dysuria or frequency/urgency. retroperitoneal US showed 2 stones in L kidney , moderate hydronephrosis with urinary obstruction. dc'd oxybutynin. CT renal stones performed, shows tumor infiltration into urinary systen.  Plan:    - c/w bladder scan q6h  - urology team rules out any inpatient procedures, recommends outpt f/u for tumor infiltration into urinary system.  - strict I&Os  - avoid nephrotoxic drugs

## 2022-11-21 NOTE — PROCEDURE NOTE - NSUSCPTCODES_ED_ALL
63024 US Chest (PTX, Pleural Effussion/CHF vs COPD)
16501 US Chest (PTX, Pleural Effussion/CHF vs COPD)
26665 US Chest (PTX, Pleural Effussion/CHF vs COPD)

## 2022-11-21 NOTE — PROGRESS NOTE ADULT - SUBJECTIVE AND OBJECTIVE BOX
Physical Medicine and Rehabilitation Progress Note :       Patient is a 72y old  Female who presents with a chief complaint of Hypoxemia (21 Nov 2022 10:17)      HPI:  71 yo female pmhx of HTN, Ovarian and uterine cancer w/ spread to the liver, lungs, and peritoneum (on chemo, last tx 3 weeks ago), anemia, urinary retention, and scoliosis who also had a recent admission for PRES and new onset LLE DVT (11/3) that currently presents with hypoxia 88% and reported hypotension at [facility]. Pt reports she felt fine and denied any shortness of breath or chest pain but because of recent DVT diagnosis and hypoxia was forced to come to the hospital. Pt was discharged on full anticoagulation after her last admission (Lovenox 50mg BID) which she states she continued to take consistently and last dose taken last night. Patient denies fever/chills, nausea, vomiting, chest pain, palpitations, dyspnea, cough or sputum production. Otherwise, pt mentions continued weakness and inability to move L leg after diagnosis of DVT on last admission. Also reports she has not been straight cath' d in 2 days and does not urinate otherwise.     Pt presented to the ED w/ vitals of: T 97.4, HR 94, /74, and RR 14 sat 86% on RA  Labs in ED: WBC 12.59, HgB 8.4 (baseline 10-11 from chart), D-dimer 1042, Lactate 2.3, Cr 3.50 (bl 1.5 from chart), Alk 749 and AST 80, BNP 3037    CTA done showing: No abnormal dilatation of the main pulmonary artery, and no pulmonary embolism. Multifocal small to large in parenchymal and pleural masses bilaterally as noted on prior studies. Small left pleural effusion. Bibasilar atelectasis. No pericardial effusion.    Pt was placed on 2L of NC and sat went up to 95%, s/p 2L LR, currently as baseline BP per pt. Received dose of Lovenox 50mg as well.  (16 Nov 2022 09:05)                            8.4    8.71  )-----------( 191      ( 21 Nov 2022 05:30 )             27.3       11-21    134<L>  |  102  |  35<H>  ----------------------------<  101<H>  4.1   |  23  |  1.98<H>    Ca    8.0<L>      21 Nov 2022 05:30  Phos  3.5     11-21  Mg     2.2     11-21    TPro  4.9<L>  /  Alb  1.4<L>  /  TBili  0.6  /  DBili  x   /  AST  28  /  ALT  18  /  AlkPhos  662<H>  11-20    Vital Signs Last 24 Hrs  T(C): 36.9 (21 Nov 2022 05:23), Max: 36.9 (21 Nov 2022 05:23)  T(F): 98.4 (21 Nov 2022 05:23), Max: 98.4 (21 Nov 2022 05:23)  HR: 73 (21 Nov 2022 08:39) (73 - 86)  BP: 124/78 (21 Nov 2022 08:39) (116/65 - 124/78)  BP(mean): --  RR: 18 (21 Nov 2022 08:39) (17 - 18)  SpO2: 95% (21 Nov 2022 08:39) (93% - 95%)    Parameters below as of 21 Nov 2022 08:39  Patient On (Oxygen Delivery Method): nasal cannula w/ humidification  O2 Flow (L/min): 4      MEDICATIONS  (STANDING):  escitalopram 7.5 milliGRAM(s) Oral daily  folic acid 1 milliGRAM(s) Oral daily  gabapentin 100 milliGRAM(s) Oral two times a day  levETIRAcetam  IVPB 500 milliGRAM(s) IV Intermittent every 12 hours  NIFEdipine XL 60 milliGRAM(s) Oral daily  piperacillin/tazobactam IVPB.. 3.375 Gram(s) IV Intermittent every 12 hours  polyethylene glycol 3350 17 Gram(s) Oral daily  senna 2 Tablet(s) Oral at bedtime    MEDICATIONS  (PRN):  acetaminophen     Tablet .. 650 milliGRAM(s) Oral every 6 hours PRN Temp greater or equal to 38C (100.4F), Mild Pain (1 - 3)  aluminum hydroxide/magnesium hydroxide/simethicone Suspension 30 milliLiter(s) Oral every 4 hours PRN Dyspepsia  LORazepam     Tablet 0.5 milliGRAM(s) Oral every 8 hours PRN Nausea and/or Vomiting  melatonin 3 milliGRAM(s) Oral at bedtime PRN Insomnia  ondansetron Injectable 4 milliGRAM(s) IV Push every 8 hours PRN Nausea and/or Vomiting       Physical Therapy Functional Status Assessment :       Pain Assessment/Number Scale (0-10) Adult  Presence of Pain: complains of pain/discomfort  Body Location: RLE  Pain Rating (0-10): Rest: 0   Pain Rating (0-10): Activity: 6     Cognitive/Neuro      Cognitive/Neuro/Behavioral  Cognitive/Neuro/Behavioral [WDL Definition: Alert; opens eyes spontaneously; arouses to voice or touch; oriented x 4; follows commands; speech spontaneous, logical; purposeful motor response; behavior appropriate to situation]: WDL    Language Assistance  Preferred Language to Address Healthcare Preferred Language to Address Healthcare: English    Therapeutic Interventions      Bed Mobility  Bed Mobility Training Rolling/Turning: minimum assist (75% patient effort);  2 person assist  Bed Mobility Training Scooting: minimum assist (75% patient effort);  2 person assist  Bed Mobility Training Bridging: moderate assist (50% patient effort);  2 person assist  Bed Mobility Training Sit-to-Supine: minimum assist (75% patient effort);  2 person assist  Bed Mobility Training Supine-to-Sit: minimum assist (75% patient effort);  2 person assist  Bed Mobility Training Limitations: impaired ability to control trunk for mobility;  decreased ability to use legs for bridging/pushing;  decreased ability to use arms for pushing/pulling;  impaired balance;  decreased strength;  impaired postural control;  pain    Sit-Stand Transfer Training  Transfer Training Sit-to-Stand Transfer: minimum assist (75% patient effort);  2 person assist;  bilat hand held assist  Transfer Training Stand-to-Sit Transfer: minimum assist (75% patient effort);  2 person assist;  bilat hand held assist  Sit-to-Stand Transfer Training Transfer Safety Analysis: decreased weight-shifting ability;  decreased sequencing ability;  decreased balance;  Unsteady standing balance w/ dec ability to maneuver RLE to adjust ANDREZ 2/2 complaints of pain. ;  decreased strength;  impaired balance;  impaired postural control;  pain    Therapeutic Exercise  Therapeutic Exercise Detail: Seated LAQ, Seated ankle pumps, Seated hip marches, Seated scap retraction             PM&R Impression : as above    Current Disposition Plan Recommendations :    subacute rehab placement

## 2022-11-21 NOTE — DISCHARGE NOTE PROVIDER - ATTENDING DISCHARGE PHYSICAL EXAMINATION:
Physical exam:  GENERAL: NAD, lying in bed comfortably, mildly anxious appearing   HEAD:  Atraumatic, Normocephalic  EYES: EOMI, PERRLA, conjunctiva and sclera clear  ENT: Moist mucous membranes  NECK: Supple, No JVD  CHEST/LUNG: Clear to auscultation bilaterally; No rales, rhonchi, wheezing, or rubs.  HEART: Regular rate and rhythm; S1+ S2+   ABDOMEN: Bowel sounds present; Soft, Nontender, Nondistended   EXTREMITIES:  2+ Peripheral Pulses, brisk capillary refill. No clubbing, cyanosis, or edema  NERVOUS SYSTEM:  Alert & Oriented , speech clear   MSK: FROM all 4 extremities, full and equal strength  SKIN: No rashes or lesions

## 2022-11-21 NOTE — PROCEDURE NOTE - NSUS ED ADDITIONAL DETAIL1 FT
RLL large consolidation, trace effusion  AB line pattern anteriorly on R  L A line pattern anteriorly  Small consolidation on L, trace effusion
A line pattern anteriorly bilaterally  Trace pleural effusion on Right, moderate sized consolidation with air bronchograms on the R relatively unchanged or smaller compared to prior
A line pattern bilaterally  Trace pleural effusion on RLL, simple. Moderate sized consolidation RLL

## 2022-11-21 NOTE — DISCHARGE NOTE PROVIDER - CARE PROVIDER_API CALL
Asya Peace; MPH)  Internal Medicine  [address] [place] [address], [address] [place] [address]  Phone: [phone]  Fax: [phone]  Follow Up Time:    Melani Hidalgo  MEDICAL ONCOLOGY  [address] [place] [address]  Phone: [phone]  Fax: [phone]  Follow Up Time:    Melani Hidalgo  MEDICAL ONCOLOGY  [address] [place] [address]  Phone: [phone]  Fax: [phone]  Established Patient  Scheduled Appointment: 12/07/2022 03:00 PM   Melani Hidalgo  MEDICAL ONCOLOGY  [address] [place] [address]  Phone: [phone]  Fax: [phone]  Established Patient  Scheduled Appointment: 12/07/2022 03:00 PM    Serg Correia)  Urology  [address] [place] [address]  Phone: [phone]  Fax: [phone]  Follow Up Time:

## 2022-11-21 NOTE — PROGRESS NOTE ADULT - PROBLEM SELECTOR PLAN 8
in setting of metastatic cancer. Diagnosed w/ LLE DVT on last admission. Doppler then found Left common femoral vein and saphenofemoral junction deep venous thrombosis. Presents w/ LE edema, L>R, w/ weakness in left leg as well. Was started on Lovenox 50mg BID and pt reports compliance, last taken last night.  Plan:  - c/w lovenox, consider starting eliquis tmro. in setting of metastatic cancer. Diagnosed w/ LLE DVT on last admission. Doppler then found Left common femoral vein and saphenofemoral junction deep venous thrombosis. Presents w/ LE edema, L>R, w/ weakness in left leg as well. Was started on Lovenox 50mg BID and pt reports compliance, last taken last night.  Plan:  - c/w lovenox, consider  - started eliquis due to no urinary sxs

## 2022-11-21 NOTE — PROGRESS NOTE ADULT - SUBJECTIVE AND OBJECTIVE BOX
PULMONARY CONSULT SERVICE FOLLOW-UP NOTE    INTERVAL HPI:  Reviewed chart and overnight events; patient seen and examined at bedside. SOB stable. Fatigued but no cough.     MEDICATIONS:  Antimicrobials:  piperacillin/tazobactam IVPB.. 3.375 Gram(s) IV Intermittent every 12 hours    Anticoagulants:  enoxaparin Injectable 60 milliGRAM(s) SubCutaneous every 24 hours    Cardiac:  NIFEdipine XL 60 milliGRAM(s) Oral daily    Allergies    Benadryl (Other)  Compazine (Unknown)    Vital Signs Last 24 Hrs  T(C): 36.9 (21 Nov 2022 05:23), Max: 37 (20 Nov 2022 09:52)  T(F): 98.4 (21 Nov 2022 05:23), Max: 98.6 (20 Nov 2022 09:52)  HR: 85 (21 Nov 2022 05:23) (82 - 86)  BP: 116/65 (21 Nov 2022 05:23) (116/65 - 119/68)  RR: 18 (21 Nov 2022 05:23) (17 - 18)  SpO2: 94% (21 Nov 2022 05:23) (93% - 94%)    Parameters below as of 21 Nov 2022 05:23  Patient On (Oxygen Delivery Method): nasal cannula w/ humidification  O2 Flow (L/min): 4    PHYSICAL EXAM:  Constitutional: WD, thin woman  Head: NC/AT  EENT: anicteric sclera; oropharynx clear, MMM  Neck: supple, no JVD  Respiratory: dec breath sounds at bases; no W/R/R  Cardiovascular: +S1/S2, RRR  Gastrointestinal: soft, NT/ND  Extremities: WWP; no clubbing or cyanosis; 1+ pitting edema at ankles  Vascular: 2+ radial and pedal pulses  Neurological: alert, oriented    LABS:  CBC Full  -  ( 21 Nov 2022 05:30 )  WBC Count : 8.71 K/uL  RBC Count : 2.61 M/uL  Hemoglobin : 8.4 g/dL  Hematocrit : 27.3 %  Platelet Count - Automated : 191 K/uL  Mean Cell Volume : 104.6 fl  Mean Cell Hemoglobin : 32.2 pg  Mean Cell Hemoglobin Concentration : 30.8 gm/dL  Auto Neutrophil # : 6.85 K/uL  Auto Lymphocyte # : 0.33 K/uL  Auto Monocyte # : 1.10 K/uL  Auto Eosinophil # : 0.17 K/uL  Auto Basophil # : 0.07 K/uL  Auto Neutrophil % : 78.6 %  Auto Lymphocyte % : 3.8 %  Auto Monocyte % : 12.6 %  Auto Eosinophil % : 2.0 %  Auto Basophil % : 0.8 %    11-21    134<L>  |  102  |  35<H>  ----------------------------<  101<H>  4.1   |  23  |  1.98<H>    Ca    8.0<L>      21 Nov 2022 05:30  Phos  3.5     11-21  Mg     2.2     11-21    TPro  4.9<L>  /  Alb  1.4<L>  /  TBili  0.6  /  DBili  x   /  AST  28  /  ALT  18  /  AlkPhos  662<H>  11-20    RADIOLOGY & ADDITIONAL STUDIES: Reviewed.

## 2022-11-21 NOTE — PROGRESS NOTE ADULT - ATTENDING COMMENTS
hypoxia likely multifactorial in the setting of pulmonary mets and pneumonia. clinically looks improved, today during rounds saturating 92% on room air. wean oxygen as tolerated to keep sats > 90%. she may have robinson degree of hyxpoa given the extent of her pulmonary mets. complete 7 days of abx.

## 2022-11-21 NOTE — DISCHARGE NOTE PROVIDER - PROVIDER TOKENS
PROVIDER:[TOKEN:[[id number]]] PROVIDER:[TOKEN:[[id number]]] PROVIDER:[TOKEN:[[id number]],SCHEDULEDAPPT:[12/07/2022],SCHEDULEDAPPTTIME:[03:00 PM],ESTABLISHEDPATIENT:[T]] PROVIDER:[TOKEN:[[id number]],SCHEDULEDAPPT:[12/07/2022],SCHEDULEDAPPTTIME:[03:00 PM],ESTABLISHEDPATIENT:[T]],PROVIDER:[TOKEN:[[id number]]]

## 2022-11-21 NOTE — DISCHARGE NOTE PROVIDER - CARE PROVIDERS DIRECT ADDRESSES
[email or web address] ,DirectAddress_Unknown ,DirectAddress_Unknown,DirectAddress_Unknown

## 2022-11-21 NOTE — DISCHARGE NOTE PROVIDER - NSDCCPCAREPLAN_GEN_ALL_CORE_FT
PRINCIPAL DISCHARGE DIAGNOSIS  Diagnosis: Acute respiratory failure with hypoxia  Assessment and Plan of Treatment: Please follow up with the pulmonology team.      SECONDARY DISCHARGE DIAGNOSES  Diagnosis: RUBIN (acute kidney injury)  Assessment and Plan of Treatment: Please follow up with urology.    Diagnosis: Anemia  Assessment and Plan of Treatment:     Diagnosis: Acute respiratory failure with hypoxia  Assessment and Plan of Treatment:      PRINCIPAL DISCHARGE DIAGNOSIS  Diagnosis: Acute respiratory failure with hypoxia  Assessment and Plan of Treatment: You came into the hospital due to low oxygen levels on a oxygen saturation test. We performed a CT chest exam and a bedside ultrasound exam, which showed you had consolidation in your right lung, which could potentially mean you have pneumonia. We treated you with a medication called Zosyn for a 7 day course, which is an antibiotic that kills bacteria and allows the lungs to heal. We also placed you on supplemental oxygen to help you with breathing.   Your CT chest imaging exam also showed signs of metastasis of tumors to your lungs. Please follow up with the pulmonology team.      SECONDARY DISCHARGE DIAGNOSES  Diagnosis: RUBIN (acute kidney injury)  Assessment and Plan of Treatment: Please follow up with urology.     PRINCIPAL DISCHARGE DIAGNOSIS  Diagnosis: Acute respiratory failure with hypoxia  Assessment and Plan of Treatment: You came into the hospital due to low oxygen levels on a oxygen saturation test. We performed a CT chest exam and a bedside ultrasound exam, which showed you had consolidation in your right lung, which could potentially mean you have pneumonia. We treated you with a medication called Zosyn for a 7 day course, which is an antibiotic that kills bacteria and allows the lungs to heal. We also placed you on supplemental oxygen to help you with breathing. We consulted the pulmonology team who would like a follow up in the outpatient with Dr. Feng for a repeat CT in 6-8 weeks. Your CT chest imaging exam also showed signs of metastasis of tumors to your lungs. Please follow up with the pulmonology team.      SECONDARY DISCHARGE DIAGNOSES  Diagnosis: RUBIN (acute kidney injury)  Assessment and Plan of Treatment: Please follow up with urology.     PRINCIPAL DISCHARGE DIAGNOSIS  Diagnosis: Acute respiratory failure with hypoxia  Assessment and Plan of Treatment: You came into the hospital due to low oxygen levels on a oxygen saturation test. We performed a CT chest exam and a bedside ultrasound exam, which showed you had consolidation in your right lung, which could potentially mean you have pneumonia. We treated you with a medication called Zosyn for a 7 day course, which is an antibiotic that kills bacteria and allows the lungs to heal. We also placed you on supplemental oxygen to help you with breathing. We consulted the pulmonology team who would like a follow up in the outpatient with Dr. Feng for a repeat CT imaging of your lungs in 6-8 weeks. Your CT chest imaging exam also showed signs of metastasis of tumors to your lungs. Please follow up with your primary care provider Dr. Hidalgo on 12/7/22 at 3pm.      SECONDARY DISCHARGE DIAGNOSES  Diagnosis: RUBIN (acute kidney injury)  Assessment and Plan of Treatment: You presented with several days of urine retention. We placed a arevalo cather to help remove the urine from your bladder. We also started you on an antibiotic to protect you from any bacterial infections. We consulted the urology team and we performed imaging of your abdomen, we found infiltrates into your urinary tract, which may possibly be tumor metastasis. Please follow up with urology Dr. Correia in 2-3 weeks for further management of your urinary retention and for possible bladder irrigation. If you bleed into your arevalo and notice clots, please go to the nearest emergency room.     PRINCIPAL DISCHARGE DIAGNOSIS  Diagnosis: Acute respiratory failure with hypoxia  Assessment and Plan of Treatment: You came into the hospital due to low oxygen levels on a oxygen saturation test. We performed a CT chest exam and a bedside ultrasound exam, which showed you had consolidation in your right lung, which could potentially mean you have pneumonia. We treated you with a medication called Zosyn for a 7 day course, which is an antibiotic that kills bacteria and allows the lungs to heal. We also placed you on supplemental oxygen to help you with breathing. We consulted the pulmonology team who would like a follow up in the outpatient with Dr. Feng for a repeat CT imaging of your lungs in 6-8 weeks. Your CT chest imaging exam also showed signs of metastasis of tumors to your lungs. Please follow up with your primary care provider Dr. Hidalgo on 12/7/22 at 3pm.      SECONDARY DISCHARGE DIAGNOSES  Diagnosis: RUBIN (acute kidney injury)  Assessment and Plan of Treatment: You presented with several days of urine retention. We placed a arevalo cather to help remove the urine from your bladder. We also started you on an antibiotic to protect you from any bacterial infections. We consulted the urology team and we performed imaging of your abdomen, we found infiltrates into your urinary tract, which may possibly be tumor metastasis. Please follow up with urologist Dr. Correia in 2-3 weeks for further management of your urinary retention and for possible bladder irrigation. If you bleed into your arevalo and notice clots, please go to the nearest emergency room.    Diagnosis: DVT, lower extremity  Assessment and Plan of Treatment: During your admission, we treated you with lovenox for your previous deep vein thrombosis, which is a clot in your leg. Lovenox decreases clot formation. Your blood hemoglobin level was low and required blood transfusion. Hemoglobin is a protein in the blood and delivers oxygen to tissue in the body, low hemoglobin is a indicator of low blood. Please make sure to get your blood checked with Basic metabolic panel and Complete blood count in 5-7 days to monitor Hemoglobin level  and assess lovenox dosage going further.     PRINCIPAL DISCHARGE DIAGNOSIS  Diagnosis: Acute respiratory failure with hypoxia  Assessment and Plan of Treatment: You came into the hospital due to low oxygen levels on a oxygen saturation test. We performed a CT chest exam and a bedside ultrasound exam, which showed you had consolidation in your right lung, which could potentially mean you have pneumonia. We treated you with a medication called Zosyn for a 7 day course, which is an antibiotic that kills bacteria and allows the lungs to heal. We also placed you on supplemental oxygen to help you with breathing. We consulted the pulmonology team who would like a follow up in the outpatient with Dr. Feng for a repeat CT imaging of your lungs in 6-8 weeks. Your CT chest imaging exam also showed signs of metastasis of tumors to your lungs. Please follow up with your primary care provider Dr. Hidalgo on 12/7/22 at 3pm.      SECONDARY DISCHARGE DIAGNOSES  Diagnosis: RUBIN (acute kidney injury)  Assessment and Plan of Treatment: You presented with several days of urine retention with a decrease in kidney function. We placed a arevalo cather to help remove the urine from your bladder and your kidney function improved. Additionally, we did a CT scan and it showed that the urine from the kidney was backing up because your cancer was blocking the drainage of the kidney. We talked to our urologists and they said that you did not need a procedure right now and that you should follow up with them in 2 weeks. Please follow up with urologist Dr. Correia in 2-3 weeks for further management of your urinary retention.    Diagnosis: DVT, lower extremity  Assessment and Plan of Treatment: During your admission, we treated you with lovenox for your previous deep vein thrombosis, which is a clot in your leg. Lovenox decreases clot formation. Your blood hemoglobin level was low and required blood transfusion. Hemoglobin is a protein in the blood and delivers oxygen to tissue in the body, low hemoglobin is a indicator of low blood. Please make sure to get your blood checked with Basic metabolic panel and Complete blood count in 5-7 days to monitor Hemoglobin level  and assess lovenox dosage going further.    Diagnosis: Hematuria  Assessment and Plan of Treatment: When we placed your arevalo catheter, you had some blood coming out of it. We talked to the urologists and they said that it should go away on its own in the next couple of days to weeks. Your blood levels have been stable at discharge, but your rehab facility should check your blood levels in about a week to continue to monitor.

## 2022-11-21 NOTE — DISCHARGE NOTE PROVIDER - NSDCMRMEDTOKEN_GEN_ALL_CORE_FT
Cozaar 100 mg oral tablet: 1 tab(s) orally every 24 hours  ESCITALOPRAM 5MG TABLETS: 1.5 each orally once a day  ferrous sulfate 325 mg (65 mg elemental iron) oral tablet: 1 tab(s) orally every other day (at bedtime)  gabapentin 100 mg oral capsule: 1 cap(s) orally 2 times a day  levETIRAcetam 500 mg oral tablet: 1 tab(s) orally 2 times a day  LORAZEPAM  0.5 MG TABS: 1 tab(s) orally every 8 hours, As Needed  Lovenox 40 mg/0.4 mL injectable solution: 50 milligram(s) injectable 2 times a day  NIFEdipine 90 mg oral tablet, extended release: 1 tab(s) orally every 24 hours  polyethylene glycol 3350 oral powder for reconstitution: 17 gram(s) orally once a day  senna leaf extract oral tablet: 2 tab(s) orally once a day (at bedtime)   Cozaar 100 mg oral tablet: 1 tab(s) orally every 24 hours  Eliquis Starter Pack for Treatment of DVT and PE 5 mg oral tablet: 2  tab(s) orally 2 times a day x 7 days then 1 tab orally 2 times a day  ESCITALOPRAM 5MG TABLETS: 1.5 each orally once a day  ferrous sulfate 325 mg (65 mg elemental iron) oral tablet: 1 tab(s) orally every other day (at bedtime)  gabapentin 100 mg oral capsule: 1 cap(s) orally 2 times a day  levETIRAcetam 500 mg oral tablet: 1 tab(s) orally 2 times a day  LORAZEPAM  0.5 MG TABS: 1 tab(s) orally every 8 hours, As Needed  Lovenox 40 mg/0.4 mL injectable solution: 50 milligram(s) injectable 2 times a day  NIFEdipine 90 mg oral tablet, extended release: 1 tab(s) orally every 24 hours  polyethylene glycol 3350 oral powder for reconstitution: 17 gram(s) orally once a day  senna leaf extract oral tablet: 2 tab(s) orally once a day (at bedtime)   acetaminophen 325 mg oral tablet: 2 tab(s) orally every 6 hours, As needed, Temp greater or equal to 38C (100.4F), Mild Pain (1 - 3)  aluminum hydroxide-magnesium hydroxide 200 mg-200 mg/5 mL oral suspension: 30 milliliter(s) orally every 4 hours, As needed, Dyspepsia  ESCITALOPRAM 5MG TABLETS: 1.5 each orally once a day  ferrous sulfate 325 mg (65 mg elemental iron) oral tablet: 1 tab(s) orally every other day (at bedtime)  folic acid 1 mg oral tablet: 1 tab(s) orally once a day  gabapentin 100 mg oral capsule: 1 cap(s) orally 2 times a day  levETIRAcetam 500 mg oral tablet: 1 tab(s) orally 2 times a day  LORAZEPAM  0.5 MG TABS: 1 tab(s) orally every 8 hours, As Needed  Lovenox 40 mg/0.4 mL injectable solution: 50 milligram(s) injectable 2 times a day, please check BMP and CBC in 5-7 days to check hemoglobin level and to adjust lovenox dosage accordingly.   melatonin 3 mg oral tablet: 1 tab(s) orally once a day (at bedtime), As needed, Insomnia  NIFEdipine 90 mg oral tablet, extended release: 1 tab(s) orally every 24 hours  ondansetron 2 mg/mL injectable solution: 4 milligram(s) injectable every 8 hours, As Needed for nausea  polyethylene glycol 3350 oral powder for reconstitution: 17 gram(s) orally once a day  senna leaf extract oral tablet: 2 tab(s) orally once a day (at bedtime)   acetaminophen 325 mg oral tablet: 2 tab(s) orally every 6 hours, As needed, Temp greater or equal to 38C (100.4F), Mild Pain (1 - 3)  aluminum hydroxide-magnesium hydroxide 200 mg-200 mg/5 mL oral suspension: 30 milliliter(s) orally every 4 hours, As needed, Dyspepsia  ESCITALOPRAM 5MG TABLETS: 1.5 each orally once a day  ferrous sulfate 325 mg (65 mg elemental iron) oral tablet: 1 tab(s) orally every other day (at bedtime)  folic acid 1 mg oral tablet: 1 tab(s) orally once a day  gabapentin 100 mg oral capsule: 1 cap(s) orally 2 times a day  levETIRAcetam 500 mg oral tablet: 1 tab(s) orally 2 times a day  LORAZEPAM  0.5 MG TABS: 1 tab(s) orally every 8 hours, As Needed  Lovenox 40 mg/0.4 mL injectable solution: 60 milligram(s) injectable once a day   Check CBC and BMP within 7 days to adjust lovenox dosage according to CrCl if Hb stable  melatonin 3 mg oral tablet: 1 tab(s) orally once a day (at bedtime), As needed, Insomnia  NIFEdipine 60 mg oral tablet, extended release: 1 tab(s) orally once a day  ondansetron 2 mg/mL injectable solution: 4 milligram(s) injectable every 8 hours, As Needed for nausea  polyethylene glycol 3350 oral powder for reconstitution: 17 gram(s) orally once a day  senna leaf extract oral tablet: 2 tab(s) orally once a day (at bedtime)

## 2022-11-21 NOTE — PROGRESS NOTE ADULT - ATTENDING COMMENTS
73 yo female pmhx of HTN, Ovarian and uterine cancer w/ mets to the liver, lungs, and peritoneum (on chemo, last tx 3 weeks ago), anemia, urinary retention, and scoliosis who also had a recent admission for PRES and new onset LLE DVT (11/3) that currently presents with acute resp failure w/ hypoxia 2/2 pleural effusion as well as PNA.  In addition the pt was found to have left renal stone and hydronephrosis, s/p arevalo and pending intervention with urology after CT A/P completed.     #Acute resp failure w/ hypoxia 2/2 pleural effusion  #Pneumonia  -Pulmonary following   -Will continue Zosyn for PNA  treat for 7 days total (11/16-11/22)  -Pt's urine legionella RVP were negative   -titrated down to 3l nc   -will need repeat CT in 6-8 week     #ATN:  possibly 2/2 obstructive in setting of urinary retention vs pre-renal vs ATN in setting of drop in BP. Baseline Cr of 1.5  cr 1.98 today -> trended down after peaking     #Urinary retention  #Hydronephrosis  -  Urology f/u appreciated; no further inpt work up, will dc arevalo and do TOV today     #Ovarian and uterine cancer w/ mets to the liver, lungs, and peritoneum  -Psych Onc consult appreciated   -palliative consulted, pending    #HTN  -Continue Nifedipine     #LLE DVT   -Will switch lovenox to eliquis if covered w insurance      #DISPO  -Will d/c back to rehab once medically stable  -DNR/DNI.

## 2022-11-21 NOTE — PROGRESS NOTE ADULT - PROBLEM SELECTOR PLAN 1
2/2 left pleural effusion likely 2/2 progressing metastatic ovarian cancer vs HF. Presented with hypoxia to 86%, placed on 2L NC with improvement to 95%. Denies any fever, chills, cough, or dyspnea. BNP elevated to 3039 but in setting of CKD and unilateral effusion w/ EF of 60-65% on TTE last admission, less likely HF. CTA done showing no abnormal dilatation of the main pulmonary artery, and no pulmonary embolism. Multifocal small to large in parenchymal and pleural masses bilaterally as noted on prior studies. Small left pleural effusion. Bibasilar atelectasis. urine legionella and strep negative, RVP panel negative    - Continue Zosyn 3.375g q12 for RLL pneumonia- treat for 7 days total (11/16-11/22)  - Currently on 5L, wean down to 2-3L O2 as tolerated with goals SpO2 90-92%  - Pulm was consulted and plan for outpatient f/u with Dr. Feng and repeat CT in 6-8 weeks 2/2 left pleural effusion likely 2/2 progressing metastatic ovarian cancer vs HF. Presented with hypoxia to 86%, placed on 2L NC with improvement to 95%. Denies any fever, chills, cough, or dyspnea. BNP elevated to 3039 but in setting of CKD and unilateral effusion w/ EF of 60-65% on TTE last admission, less likely HF. CTA done showing no abnormal dilatation of the main pulmonary artery, and no pulmonary embolism. Multifocal small to large in parenchymal and pleural masses bilaterally as noted on prior studies. Small left pleural effusion. Bibasilar atelectasis. urine legionella and strep negative, RVP panel negative    - Continue Zosyn 3.375g q12 for RLL pneumonia- treat for 7 days total (11/16-11/22)  - Currently on 5L, wean down to 3L O2 as tolerated with goals SpO2 90-92%  - Pulm consulted and plan for outpatient f/u with Dr. Feng and repeat CT in 6-8 weeks

## 2022-11-21 NOTE — PROGRESS NOTE ADULT - SUBJECTIVE AND OBJECTIVE BOX
OVERNIGHT EVENTS:    SUBJECTIVE / INTERVAL HPI: Patient seen and examined at bedside.     VITAL SIGNS:  Vital Signs Last 24 Hrs  T(C): 36.9 (21 Nov 2022 05:23), Max: 37 (20 Nov 2022 09:52)  T(F): 98.4 (21 Nov 2022 05:23), Max: 98.6 (20 Nov 2022 09:52)  HR: 85 (21 Nov 2022 05:23) (82 - 86)  BP: 116/65 (21 Nov 2022 05:23) (116/65 - 119/68)  BP(mean): --  RR: 18 (21 Nov 2022 05:23) (17 - 18)  SpO2: 94% (21 Nov 2022 05:23) (93% - 94%)    Parameters below as of 21 Nov 2022 05:23  Patient On (Oxygen Delivery Method): nasal cannula w/ humidification  O2 Flow (L/min): 4      PHYSICAL EXAM:    General: WDWN  HEENT: NCAT; PERRL, anicteric sclera; MMM  Neck: supple, trachea midline  Cardiovascular: S1, S2 normal; RRR, no M/G/R  Respiratory: CTABL; no W/R/R  Gastrointestinal: soft, nontender, nondistended. bowel sounds present.  Skin: no ulcerations or visible rashes appreciated  Extremities: WWP; no edema, clubbing or cyanosis  Vascular: 2+ radial, DP/PT pulses B/L  Neurological: AAOx3; CN II-XII grossly intact; no focal deficits    MEDICATIONS:  MEDICATIONS  (STANDING):  enoxaparin Injectable 60 milliGRAM(s) SubCutaneous every 24 hours  escitalopram 7.5 milliGRAM(s) Oral daily  folic acid 1 milliGRAM(s) Oral daily  gabapentin 100 milliGRAM(s) Oral two times a day  levETIRAcetam  IVPB 500 milliGRAM(s) IV Intermittent every 12 hours  NIFEdipine XL 60 milliGRAM(s) Oral daily  piperacillin/tazobactam IVPB.. 3.375 Gram(s) IV Intermittent every 12 hours  polyethylene glycol 3350 17 Gram(s) Oral daily  senna 2 Tablet(s) Oral at bedtime    MEDICATIONS  (PRN):  acetaminophen     Tablet .. 650 milliGRAM(s) Oral every 6 hours PRN Temp greater or equal to 38C (100.4F), Mild Pain (1 - 3)  aluminum hydroxide/magnesium hydroxide/simethicone Suspension 30 milliLiter(s) Oral every 4 hours PRN Dyspepsia  LORazepam     Tablet 0.5 milliGRAM(s) Oral every 8 hours PRN Nausea and/or Vomiting  melatonin 3 milliGRAM(s) Oral at bedtime PRN Insomnia  ondansetron Injectable 4 milliGRAM(s) IV Push every 8 hours PRN Nausea and/or Vomiting      ALLERGIES:  Allergies    Benadryl (Other)  Compazine (Unknown)    Intolerances        LABS:                        8.4    8.71  )-----------( 191      ( 21 Nov 2022 05:30 )             27.3     11-20    134<L>  |  103  |  40<H>  ----------------------------<  106<H>  4.2   |  22  |  2.26<H>    Ca    8.0<L>      20 Nov 2022 05:30  Phos  4.5     11-20  Mg     2.2     11-20    TPro  4.9<L>  /  Alb  1.4<L>  /  TBili  0.6  /  DBili  x   /  AST  28  /  ALT  18  /  AlkPhos  662<H>  11-20        CAPILLARY BLOOD GLUCOSE          RADIOLOGY & ADDITIONAL TESTS: Reviewed. OVERNIGHT EVENTS:  No overnight events   SUBJECTIVE / INTERVAL HPI: Patient seen and examined at bedside.     She has been experiencing left lower leg pain for the past few weeks. She was told she had a DVT in the left lower leg and it was worked up accordingly. However, PT has not seen her for the past few days and she is upset because it helped her leg pain. No SOB or cough, she has some sinus irritation with epistaxis due to trauma from the nasal canula. No pain with urination or irritation from the Folley catheter. No other complaints.     VITAL SIGNS:  Vital Signs Last 24 Hrs  T(C): 36.9 (21 Nov 2022 05:23), Max: 37 (20 Nov 2022 09:52)  T(F): 98.4 (21 Nov 2022 05:23), Max: 98.6 (20 Nov 2022 09:52)  HR: 85 (21 Nov 2022 05:23) (82 - 86)  BP: 116/65 (21 Nov 2022 05:23) (116/65 - 119/68)  BP(mean): --  RR: 18 (21 Nov 2022 05:23) (17 - 18)  SpO2: 94% (21 Nov 2022 05:23) (93% - 94%)    Parameters below as of 21 Nov 2022 05:23  Patient On (Oxygen Delivery Method): nasal cannula w/ humidification  O2 Flow (L/min): 4      PHYSICAL EXAM:    General: WDWN  HEENT: NCAT; PERRL, anicteric sclera; MMM  Neck: supple, trachea midline  Cardiovascular: S1, S2 normal; RRR, no M/G/R  Respiratory: CTABL; no W/R/R  Gastrointestinal: soft, nontender, nondistended. bowel sounds present.  Skin: no ulcerations or visible rashes appreciated, echemosis on the left arm.   Extremities: WWP; no edema, clubbing or cyanosis  Vascular: 2+ radial, DP/PT pulses B/L  Neurological: AAOx3; CN II-XII grossly intact; no focal deficits    MEDICATIONS:  MEDICATIONS  (STANDING):  enoxaparin Injectable 60 milliGRAM(s) SubCutaneous every 24 hours  escitalopram 7.5 milliGRAM(s) Oral daily  folic acid 1 milliGRAM(s) Oral daily  gabapentin 100 milliGRAM(s) Oral two times a day  levETIRAcetam  IVPB 500 milliGRAM(s) IV Intermittent every 12 hours  NIFEdipine XL 60 milliGRAM(s) Oral daily  piperacillin/tazobactam IVPB.. 3.375 Gram(s) IV Intermittent every 12 hours  polyethylene glycol 3350 17 Gram(s) Oral daily  senna 2 Tablet(s) Oral at bedtime    MEDICATIONS  (PRN):  acetaminophen     Tablet .. 650 milliGRAM(s) Oral every 6 hours PRN Temp greater or equal to 38C (100.4F), Mild Pain (1 - 3)  aluminum hydroxide/magnesium hydroxide/simethicone Suspension 30 milliLiter(s) Oral every 4 hours PRN Dyspepsia  LORazepam     Tablet 0.5 milliGRAM(s) Oral every 8 hours PRN Nausea and/or Vomiting  melatonin 3 milliGRAM(s) Oral at bedtime PRN Insomnia  ondansetron Injectable 4 milliGRAM(s) IV Push every 8 hours PRN Nausea and/or Vomiting      ALLERGIES:  Allergies    Benadryl (Other)  Compazine (Unknown)    Intolerances        LABS:                        8.4    8.71  )-----------( 191      ( 21 Nov 2022 05:30 )             27.3     11-20    134<L>  |  103  |  40<H>  ----------------------------<  106<H>  4.2   |  22  |  2.26<H>    Ca    8.0<L>      20 Nov 2022 05:30  Phos  4.5     11-20  Mg     2.2     11-20    TPro  4.9<L>  /  Alb  1.4<L>  /  TBili  0.6  /  DBili  x   /  AST  28  /  ALT  18  /  AlkPhos  662<H>  11-20        CAPILLARY BLOOD GLUCOSE          RADIOLOGY & ADDITIONAL TESTS: Reviewed. OVERNIGHT EVENTS:  No overnight events   SUBJECTIVE / INTERVAL HPI: Patient seen and examined at bedside.     She has been experiencing left lower leg pain for the past few weeks. She was told she had a DVT in the left lower leg and it was worked up accordingly. However, PT has not seen her for the past few days and she is upset because it helped her leg pain. No SOB or cough, she has some sinus irritation with epistaxis due to trauma from the nasal canula. No pain with urination or irritation from the Folley catheter. No other complaints.     VITAL SIGNS:  Vital Signs Last 24 Hrs  T(C): 36.9 (21 Nov 2022 05:23), Max: 37 (20 Nov 2022 09:52)  T(F): 98.4 (21 Nov 2022 05:23), Max: 98.6 (20 Nov 2022 09:52)  HR: 85 (21 Nov 2022 05:23) (82 - 86)  BP: 116/65 (21 Nov 2022 05:23) (116/65 - 119/68)  BP(mean): --  RR: 18 (21 Nov 2022 05:23) (17 - 18)  SpO2: 94% (21 Nov 2022 05:23) (93% - 94%)    Parameters below as of 21 Nov 2022 05:23  Patient On (Oxygen Delivery Method): nasal cannula w/ humidification  O2 Flow (L/min): 4      PHYSICAL EXAM:    General: NAD, lying in bed, reading a book, talkative and interactive, on 3L, arevalo in place  HEENT: NCAT; PERRL, anicteric sclera; MMM  Neck: supple, trachea midline  Cardiovascular: S1, S2 normal; RRR, no M/G/R  Respiratory: CTABL; no W/R/R  Gastrointestinal: soft, nontender, nondistended. bowel sounds present.  Skin: no ulcerations or visible rashes appreciated, echemosis on the left arm.   Extremities: WWP; no edema, clubbing or cyanosis  Vascular: 2+ radial, DP/PT pulses B/L  Neurological: AAOx3; CN II-XII grossly intact; no focal deficits    MEDICATIONS:  MEDICATIONS  (STANDING):  enoxaparin Injectable 60 milliGRAM(s) SubCutaneous every 24 hours  escitalopram 7.5 milliGRAM(s) Oral daily  folic acid 1 milliGRAM(s) Oral daily  gabapentin 100 milliGRAM(s) Oral two times a day  levETIRAcetam  IVPB 500 milliGRAM(s) IV Intermittent every 12 hours  NIFEdipine XL 60 milliGRAM(s) Oral daily  piperacillin/tazobactam IVPB.. 3.375 Gram(s) IV Intermittent every 12 hours  polyethylene glycol 3350 17 Gram(s) Oral daily  senna 2 Tablet(s) Oral at bedtime    MEDICATIONS  (PRN):  acetaminophen     Tablet .. 650 milliGRAM(s) Oral every 6 hours PRN Temp greater or equal to 38C (100.4F), Mild Pain (1 - 3)  aluminum hydroxide/magnesium hydroxide/simethicone Suspension 30 milliLiter(s) Oral every 4 hours PRN Dyspepsia  LORazepam     Tablet 0.5 milliGRAM(s) Oral every 8 hours PRN Nausea and/or Vomiting  melatonin 3 milliGRAM(s) Oral at bedtime PRN Insomnia  ondansetron Injectable 4 milliGRAM(s) IV Push every 8 hours PRN Nausea and/or Vomiting      ALLERGIES:  Allergies    Benadryl (Other)  Compazine (Unknown)    Intolerances        LABS:                        8.4    8.71  )-----------( 191      ( 21 Nov 2022 05:30 )             27.3     11-20    134<L>  |  103  |  40<H>  ----------------------------<  106<H>  4.2   |  22  |  2.26<H>    Ca    8.0<L>      20 Nov 2022 05:30  Phos  4.5     11-20  Mg     2.2     11-20    TPro  4.9<L>  /  Alb  1.4<L>  /  TBili  0.6  /  DBili  x   /  AST  28  /  ALT  18  /  AlkPhos  662<H>  11-20        CAPILLARY BLOOD GLUCOSE          RADIOLOGY & ADDITIONAL TESTS: Reviewed.

## 2022-11-21 NOTE — DISCHARGE NOTE PROVIDER - HOSPITAL COURSE
#Discharge: do not delete    Patient is __ yo M/F with past medical history of _____ presented with _____, found to have _____ (one liner)    Hospital course (by problem):     # Acute respiratory failure with hypoxia.   -2/2 left pleural effusion likely 2/2 progressing metastatic ovarian cancer vs HF. Presented with hypoxia to 86%, placed on 2L NC with improvement to 95%. Denies any fever, chills, cough, or dyspnea. BNP elevated to 3039 but in setting of CKD and unilateral effusion w/ EF of 60-65% on TTE last admission, less likely HF. CTA done showing no abnormal dilatation of the main pulmonary artery, and no pulmonary embolism. Multifocal small to large in parenchymal and pleural masses bilaterally as noted on prior studies. Small left pleural effusion. Bibasilar atelectasis. urine legionella and strep negative, RVP panel negative    -currently on 5L, wean down as tolerated  - c/w zosyn 3.375g q12 for possible RLL PNA  - Pulm consulted, appreciate further recs.    #Pleural effusion.   -2/2 worsening cancer vs medication induced pneumonitis In setting of chemotherapy. Pt on keytruda which can be associated w/ immune mediated pneumonitis.   -Plan as above.    # Ovarian cancer.   -History of metastatic ovarian/uterine cancer actively on chemotherapy with Dr. Hidalgo. Diagnosed in 2015 w/ metastasis to liver, lungs, and peritoneum in 2018/2019. Last treatment approximately 3 week ago. Past regimen includes avastin use, current regimen on keytruda, dose reduced gemzar, cytoxan, 5-FU continual infusion, docetaxel, and carboplatin. Presented with DVT on last admission likely 2/2 hypercoagulable state, placed on full anticoagulation.   Plan:  - dc'd heparin infusion, started lovenox  - trend PTT.    #Anemia.   -possibly 2/2 folate deficiency vs bleed w/ possible concurrent CORBIN vs ACD. Pt presents w/ Hgb of 8.4 (baseline 10 from prior admission). Drop to Hgb 7.7 likely dilutional s/p 2L fluid in ED. FOBT positive but pt denies hematemesis or black stool, hasn't had a bowel movement in 2 days. Rectal exam unremarkable for blood. Iron panel from last admission showing normal total iron  Plan:  - start on PPI  - f/u reticulocyte count  - started 1U pRBCs, hgb post-tranfusion improved  - monitor H&H  - active type and screen  - transfuse Hgb <7  - folate supplementation   - f/u folate as pt on 5-FU chemo.    #Hypertension.   ·  Plan: Home meds of Cozaar 100mg and Nifedipine 90mg which were prescribed upon dc last week. Pt states she is compliant with meds. Baseline before last admission was -180 but now baseline is 100s.   Plan:  - hold losartan in setting of RUBIN, start Nifedipine 60mg in AM.    # Acute kidney injury superimposed on CKD.   ·  Plan: possibly 2/2 obstructive in setting of urinary retention vs pre-renal vs ATN in setting of drop in BP. Baseline Cr of 1.5 from chart, presents w/ Cr 3.5. s/p 2L LR w/ drop to 2.16 but uptrending again. Pt reports she does not urinate without straight cath, last done 2 days ago. Bladder scan - 280cc. UA w/ WBC 5-10 and trace leuk esterase but no suprapubic tenderness or complaints of dysuria or frequency/urgency. retroperitoneal US showed 2 stones in L kidney , moderate hydronephrosis with urinary obstruction. dc'd oxybutynin  - c/w bladder scan q6h  -pt will discuss urology procedure with family, f/u results of discussion  - strict I&Os  - urology consulted, recommends CT renal stones for further management  - avoid nephrotoxic drugs.    #Urinary retention.   -Pt reports requiring straight cath as she cant urinate on her own, last done 2 days ago. Possibly 2/2 metastatic cancer and obstructive mass. Bladder scan showing 280cc urine.  Plan:  - c/w bladder scan q6h, straight cath >30cc (likely to require arevalo).    #DVT, lower extremity.   -in setting of metastatic cancer. Diagnosed w/ LLE DVT on last admission. Doppler then found Left common femoral vein and saphenofemoral junction deep venous thrombosis. Presents w/ LE edema, L>R, w/ weakness in left leg as well. Was started on Lovenox 50mg BID and pt reports compliance, last taken last night.  Plan:  - c/w lovenox, consider starting eliquis tmro.      Patient was discharged to: [facility]    New medications:     Changes to old medications:    Medications that were stopped:    Items to follow up as outpatient:    Physical exam at the time of discharge:       #Discharge: do not delete    71 yo female pmhx of HTN, Ovarian and uterine cancer w/ spread to the liver, lungs, and peritoneum (on chemo, last tx 3 weeks ago), anemia, urinary retention, and scoliosis who also had a recent admission for PRES and new onset LLE DVT (11/3) that currently presents with acute resp failure w/ hypoxia 2/2 pleural effusion likely 2/2 worsening cancer    Hospital course (by problem):     # Acute respiratory failure with hypoxia.   -2/2 left pleural effusion likely 2/2 progressing metastatic ovarian cancer vs HF. Presented with hypoxia to 86%, placed on 2L NC with improvement to 95%. Denies any fever, chills, cough, or dyspnea. BNP elevated to 3039 but in setting of CKD and unilateral effusion w/ EF of 60-65% on TTE last admission, less likely HF. CTA done showing no abnormal dilatation of the main pulmonary artery, and no pulmonary embolism. Multifocal small to large in parenchymal and pleural masses bilaterally as noted on prior studies. Small left pleural effusion. Bibasilar atelectasis. urine legionella and strep negative, RVP panel negative  -currently on 5L, wean down as tolerated  - c/w zosyn 3.375g q12 for possible RLL PNA  - Pulm consulted, appreciate further recs.    #Pleural effusion.   -2/2 worsening cancer vs medication induced pneumonitis In setting of chemotherapy. Pt on keytruda which can be associated w/ immune mediated pneumonitis.   -Plan as above.    # Ovarian cancer.   -History of metastatic ovarian/uterine cancer actively on chemotherapy with Dr. Hidalgo. Diagnosed in 2015 w/ metastasis to liver, lungs, and peritoneum in 2018/2019. Last treatment approximately 3 week ago. Past regimen includes avastin use, current regimen on keytruda, dose reduced gemzar, cytoxan, 5-FU continual infusion, docetaxel, and carboplatin. Presented with DVT on last admission likely 2/2 hypercoagulable state, placed on full anticoagulation.   Plan:  - dc'd heparin infusion, started lovenox  - trend PTT.    #Anemia.   -possibly 2/2 folate deficiency vs bleed w/ possible concurrent CORBIN vs ACD. Pt presents w/ Hgb of 8.4 (baseline 10 from prior admission). Drop to Hgb 7.7 likely dilutional s/p 2L fluid in ED. FOBT positive but pt denies hematemesis or black stool, hasn't had a bowel movement in 2 days. Rectal exam unremarkable for blood. Iron panel from last admission showing normal total iron  Plan:  - start on PPI  - f/u reticulocyte count  - started 1U pRBCs, hgb post-tranfusion improved  - monitor H&H  - active type and screen  - transfuse Hgb <7  - folate supplementation   - f/u folate as pt on 5-FU chemo.    #Hypertension.   -Home meds of Cozaar 100mg and Nifedipine 90mg which were prescribed upon dc last week. Pt states she is compliant with meds. Baseline before last admission was -180 but now baseline is 100s.   Plan:  - hold losartan in setting of RUBIN, start Nifedipine 60mg in AM.    # Acute kidney injury superimposed on CKD.   -possibly 2/2 obstructive in setting of urinary retention vs pre-renal vs ATN in setting of drop in BP. Baseline Cr of 1.5 from chart, presents w/ Cr 3.5. s/p 2L LR w/ drop to 2.16 but uptrending again. Pt reports she does not urinate without straight cath, last done 2 days ago. Bladder scan - 280cc. UA w/ WBC 5-10 and trace leuk esterase but no suprapubic tenderness or complaints of dysuria or frequency/urgency. retroperitoneal US showed 2 stones in L kidney , moderate hydronephrosis with urinary obstruction. dc'd oxybutynin  - c/w bladder scan q6h  -pt will discuss urology procedure with family, f/u results of discussion  - strict I&Os  - urology consulted, recommends CT renal stones for further management  - avoid nephrotoxic drugs.    #Urinary retention.   -Pt reports requiring straight cath as she cant urinate on her own, last done 2 days ago. Possibly 2/2 metastatic cancer and obstructive mass. Bladder scan showing 280cc urine.  Plan:  - c/w bladder scan q6h, straight cath >30cc (likely to require arevalo).    #DVT, lower extremity.   -in setting of metastatic cancer. Diagnosed w/ LLE DVT on last admission. Doppler then found Left common femoral vein and saphenofemoral junction deep venous thrombosis. Presents w/ LE edema, L>R, w/ weakness in left leg as well. Was started on Lovenox 50mg BID and pt reports compliance, last taken last night.  Plan:  - c/w lovenox, consider starting eliquis tmro.      Patient was discharged to: [facility]    New medications:     Changes to old medications:    Medications that were stopped:    Items to follow up as outpatient:    Physical exam at the time of discharge:       #Discharge: do not delete    71 yo female pmhx of HTN, Ovarian and uterine cancer w/ spread to the liver, lungs, and peritoneum (on chemo, last tx 3 weeks ago), anemia, urinary retention, and scoliosis who also had a recent admission for PRES and new onset LLE DVT (11/3) that currently presents with acute resp failure w/ hypoxia 2/2 pleural effusion likely 2/2 worsening cancer    Hospital course (by problem):     # Acute respiratory failure with hypoxia.   -2/2 left pleural effusion likely 2/2 progressing metastatic ovarian cancer vs HF. Presented with hypoxia to 86%, placed on 2L NC with improvement to 95%. Denies any fever, chills, cough, or dyspnea. BNP elevated to 3039 but in setting of CKD and unilateral effusion w/ EF of 60-65% on TTE last admission, less likely HF. CTA done showing no abnormal dilatation of the main pulmonary artery, and no pulmonary embolism. Multifocal small to large in parenchymal and pleural masses bilaterally as noted on prior studies. Small left pleural effusion. Bibasilar atelectasis. urine legionella and strep negative, RVP panel negative  -currently on 5L, wean down as tolerated  - c/w zosyn 3.375g q12 for possible RLL PNA  - Pulm consulted, appreciate further recs.    #Pleural effusion.   -2/2 worsening cancer vs medication induced pneumonitis In setting of chemotherapy. Pt on keytruda which can be associated w/ immune mediated pneumonitis.   -Plan as above.    # Ovarian cancer.   -History of metastatic ovarian/uterine cancer actively on chemotherapy with Dr. Hidalgo. Diagnosed in 2015 w/ metastasis to liver, lungs, and peritoneum in 2018/2019. Last treatment approximately 3 week ago. Past regimen includes avastin use, current regimen on keytruda, dose reduced gemzar, cytoxan, 5-FU continual infusion, docetaxel, and carboplatin. Presented with DVT on last admission likely 2/2 hypercoagulable state, placed on full anticoagulation.   - dc'd heparin infusion, started lovenox in anticipation of urology procedure  -restarted eliquis  - trend PTT    #Anemia.   -possibly 2/2 folate deficiency vs bleed w/ possible concurrent CORBIN vs ACD. Pt presents w/ Hgb of 8.4 (baseline 10 from prior admission). Drop to Hgb 7.7 likely dilutional s/p 2L fluid in ED. FOBT positive but pt denies hematemesis or black stool, hasn't had a bowel movement in 2 days. Rectal exam unremarkable for blood. Iron panel from last admission showing normal total iron  Plan:  - start on PPI  - f/u reticulocyte count  - started 1U pRBCs, hgb post-tranfusion improved  - monitor H&H  - active type and screen  - transfuse Hgb <7  - folate supplementation   - f/u folate as pt on 5-FU chemo.    #Hypertension.   -Home meds of Cozaar 100mg and Nifedipine 90mg which were prescribed upon dc last week. Pt states she is compliant with meds. Baseline before last admission was -180 but now baseline is 100s.   Plan:  - hold losartan in setting of RUBIN, start Nifedipine 60mg in AM.    # Acute kidney injury superimposed on CKD.   -possibly 2/2 obstructive in setting of urinary retention vs pre-renal vs ATN in setting of drop in BP. Baseline Cr of 1.5 from chart, presents w/ Cr 3.5. s/p 2L LR w/ drop to 2.16 but uptrending again. Pt reports she does not urinate without straight cath, last done 2 days ago. Bladder scan - 280cc. UA w/ WBC 5-10 and trace leuk esterase but no suprapubic tenderness or complaints of dysuria or frequency/urgency. retroperitoneal US showed 2 stones in L kidney , moderate hydronephrosis with urinary obstruction.   -initially tx with oxybutynin but eventually dc'd  - arevalo placed, tried TOV but failed, arevalo placed back in  - c/w bladder scan q6h  -pt will discuss urology procedure with family, f/u results of discussion  - urology consulted, recommends CT renal stones for further management-showed evidence of tumor infiltrates  .    #Urinary retention.   -Pt reports requiring straight cath as she cant urinate on her own, last done 2 days ago. Possibly 2/2 metastatic cancer and obstructive mass. Bladder scan showing 280cc urine.  Plan:  - c/w bladder scan q6h, straight cath >30cc (likely to require arevalo).    #DVT, lower extremity.   -in setting of metastatic cancer. Diagnosed w/ LLE DVT on last admission. Doppler then found Left common femoral vein and saphenofemoral junction deep venous thrombosis. Presents w/ LE edema, L>R, w/ weakness in left leg as well. Was started on Lovenox 50mg BID and pt reports compliance, last taken last night.  Plan:  - c/w lovenox, consider starting eliquis tmro.      Patient was discharged to: [facility]    New medications:     Changes to old medications:    Medications that were stopped:    Items to follow up as outpatient:    Physical exam at the time of discharge:       #Discharge: do not delete    73 yo female pmhx of HTN, Ovarian and uterine cancer w/ spread to the liver, lungs, and peritoneum (on chemo, last tx 3 weeks ago), anemia, urinary retention, and scoliosis who also had a recent admission for PRES and new onset LLE DVT (11/3) that currently presents with acute resp failure w/ hypoxia 2/2 pleural effusion likely 2/2 worsening cancer    Hospital course (by problem):     # Acute respiratory failure with hypoxia.   -2/2 left pleural effusion likely 2/2 progressing metastatic ovarian cancer vs HF. Presented with hypoxia to 86%, placed on 2L NC with improvement to 95%. Denies any fever, chills, cough, or dyspnea. BNP elevated to 3039 but in setting of CKD and unilateral effusion w/ EF of 60-65% on TTE last admission, less likely HF. CTA done showing no abnormal dilatation of the main pulmonary artery, and no pulmonary embolism. Multifocal small to large in parenchymal and pleural masses bilaterally as noted on prior studies. Small left pleural effusion. Bibasilar atelectasis. urine legionella and strep negative, RVP panel negative  -currently on 5L, wean down as tolerated  - c/w zosyn 3.375g q12 for possible RLL PNA  - Pulm consulted, doesn't recommend any tapping    #Pleural effusion.   -2/2 worsening cancer vs medication induced pneumonitis In setting of chemotherapy. Pt on keytruda which can be associated w/ immune mediated pneumonitis.   -Plan as above.    # Ovarian cancer.   -History of metastatic ovarian/uterine cancer actively on chemotherapy with Dr. Hidalgo. Diagnosed in 2015 w/ metastasis to liver, lungs, and peritoneum in 2018/2019. Last treatment approximately 3 week ago. Past regimen includes avastin use, current regimen on keytruda, dose reduced gemzar, cytoxan, 5-FU continual infusion, docetaxel, and carboplatin. Presented with DVT on last admission likely 2/2 hypercoagulable state, placed on full anticoagulation.   - dc'd heparin infusion, started lovenox in anticipation of urology procedure  -restarted eliquis, will be dc'd on lovenox     #Anemia.   -possibly 2/2 folate deficiency vs bleed w/ possible concurrent CORBIN vs ACD. Pt presents w/ Hgb of 8.4 (baseline 10 from prior admission). Drop to Hgb 7.7 likely dilutional s/p 2L fluid in ED. FOBT positive but pt denies hematemesis or black stool, hasn't had a bowel movement in 2 days. Rectal exam unremarkable for blood. Iron panel from last admission showing normal total iron  Plan:  - started on PPI  - started 1U pRBCs, hgb post-tranfusion improved  - folate supplementation   - f/u folate as pt on 5-FU chemo.    #Hypertension.   -Home meds of Cozaar 100mg and Nifedipine 90mg which were prescribed upon dc last week. Pt states she is compliant with meds. Baseline before last admission was -180 but now baseline is 100s.   Plan:  - hold losartan in setting of RUBIN, start Nifedipine 60mg in AM.    # Acute kidney injury superimposed on CKD.   -possibly 2/2 obstructive in setting of urinary retention vs pre-renal vs ATN in setting of drop in BP. Baseline Cr of 1.5 from chart, presents w/ Cr 3.5. s/p 2L LR w/ drop to 2.16 but uptrending again. Pt reports she does not urinate without straight cath, last done 2 days ago. Bladder scan - 280cc. UA w/ WBC 5-10 and trace leuk esterase but no suprapubic tenderness or complaints of dysuria or frequency/urgency. retroperitoneal US showed 2 stones in L kidney , moderate hydronephrosis with urinary obstruction.   -initially tx with oxybutynin but eventually dc'd  - arevalo placed, tried TOV but failed, arevalo placed back in  - c/w bladder scan q6h  -pt will discuss urology procedure with family, f/u results of discussion  - urology consulted, recommends CT renal stones for further management-showed evidence of tumor infiltrates  .    #Urinary retention.   -Pt reports requiring straight cath as she cant urinate on her own, last done 2 days ago. Possibly 2/2 metastatic cancer and obstructive mass. Bladder scan showing 280cc urine.  Plan:  - c/w bladder scan q6h, straight cath >30cc (likely to require arevalo).    #DVT, lower extremity.   -in setting of metastatic cancer. Diagnosed w/ LLE DVT on last admission. Doppler then found Left common femoral vein and saphenofemoral junction deep venous thrombosis. Presents w/ LE edema, L>R, w/ weakness in left leg as well. Was started on Lovenox 50mg BID and pt reports compliance, last taken last night.  Plan:  - c/w lovenox, consider starting eliquis tmro.      Patient was discharged to: [facility]    New medications:     Changes to old medications:    Medications that were stopped:    Items to follow up as outpatient:    Physical exam at the time of discharge:       #Discharge: do not delete    71 yo female pmhx of HTN, Ovarian and uterine cancer w/ spread to the liver, lungs, and peritoneum (on chemo, last tx 3 weeks ago), anemia, urinary retention, and scoliosis who also had a recent admission for PRES and new onset LLE DVT (11/3) that currently presents with acute resp failure w/ hypoxia 2/2 pleural effusion likely 2/2 worsening cancer    Hospital course (by problem):     # Acute respiratory failure with hypoxia.   -2/2 left pleural effusion likely 2/2 progressing metastatic ovarian cancer vs HF. Presented with hypoxia to 86%, placed on 2L NC with improvement to 95%. Denies any fever, chills, cough, or dyspnea. BNP elevated to 3039 but in setting of CKD and unilateral effusion w/ EF of 60-65% on TTE last admission, less likely HF. CTA done showing no abnormal dilatation of the main pulmonary artery, and no pulmonary embolism. Multifocal small to large in parenchymal and pleural masses bilaterally as noted on prior studies. Small left pleural effusion. Bibasilar atelectasis. urine legionella and strep negative, RVP panel negative  -initially on 5L NC, weaned down to 3L  - c/w zosyn 3.375g q12 for possible RLL PNA 7 day course ending 11/22  - Pulm consulted, doesn't recommend any tapping  - Currently on 5L, wean down to 3L O2 as tolerated with goals SpO2 90-92%  - Pulm consulted and plan for outpatient f/u with Dr. Feng and repeat CT in 6-8 weeks.  #Pleural effusion.   -2/2 worsening cancer vs medication induced pneumonitis In setting of chemotherapy. Pt on keytruda which can be associated w/ immune mediated pneumonitis.   -Plan as above.    #Previous DVT  -will be discharged on lovenox    # Ovarian cancer.   -History of metastatic ovarian/uterine cancer actively on chemotherapy with Dr. Hidalgo. Diagnosed in 2015 w/ metastasis to liver, lungs, and peritoneum in 2018/2019. Last treatment approximately 3 week ago. Past regimen includes avastin use, current regimen on keytruda, dose reduced gemzar, cytoxan, 5-FU continual infusion, docetaxel, and carboplatin. Presented with DVT on last admission likely 2/2 hypercoagulable state, placed on full anticoagulation.   - dc'd heparin infusion, started lovenox in anticipation of urology procedure  -restarted eliquis, will be dc'd on lovenox     #Anemia.   -possibly 2/2 folate deficiency vs bleed w/ possible concurrent CORBIN vs ACD. Pt presents w/ Hgb of 8.4 (baseline 10 from prior admission). Drop to Hgb 7.7 likely dilutional s/p 2L fluid in ED. FOBT positive but pt denies hematemesis or black stool, hasn't had a bowel movement in 2 days. Rectal exam unremarkable for blood. Iron panel from last admission showing normal total iron  Plan:  - started on PPI  - started 1U pRBCs, hgb post-tranfusion improved  - folate supplementation   - f/u folate as pt on 5-FU chemo.    #Hypertension.   -Home meds of Cozaar 100mg and Nifedipine 90mg which were prescribed upon dc last week. Pt states she is compliant with meds. Baseline before last admission was -180 but now baseline is 100s.   Plan:  - hold losartan in setting of RUBIN, start Nifedipine 60mg in AM.    # Acute kidney injury superimposed on CKD.   -possibly 2/2 obstructive in setting of urinary retention vs pre-renal vs ATN in setting of drop in BP. Baseline Cr of 1.5 from chart, presents w/ Cr 3.5. s/p 2L LR w/ drop to 2.16 but uptrending again. Pt reports she does not urinate without straight cath, last done 2 days ago. Bladder scan - 280cc. UA w/ WBC 5-10 and trace leuk esterase but no suprapubic tenderness or complaints of dysuria or frequency/urgency. retroperitoneal US showed 2 stones in L kidney , moderate hydronephrosis with urinary obstruction.   -initially tx with oxybutynin but eventually dc'd  - arevalo placed, tried TOV but failed, arevalo placed back in  - c/w bladder scan q6h  -pt will discuss urology procedure with family, f/u results of discussion  - urology consulted, recommends CT renal stones for further management-showed evidence of tumor infiltrates  .    #Urinary retention.   -Pt reports requiring straight cath as she cant urinate on her own, last done 2 days ago. Possibly 2/2 metastatic cancer and obstructive mass. Bladder scan showing 280cc urine.  Plan:  - c/w bladder scan q6h, straight cath >30cc (likely to require arevalo).    #DVT, lower extremity.   -in setting of metastatic cancer. Diagnosed w/ LLE DVT on last admission. Doppler then found Left common femoral vein and saphenofemoral junction deep venous thrombosis. Presents w/ LE edema, L>R, w/ weakness in left leg as well. Was started on Lovenox 50mg BID and pt reports compliance, last taken last night.  Plan:  - c/w lovenox, consider starting eliquis tmro.      Patient was discharged to: [facility]    New medications:     Changes to old medications:    Medications that were stopped:    Items to follow up as outpatient:    Physical exam at the time of discharge:       #Discharge: do not delete    73 yo female pmhx of HTN, Ovarian and uterine cancer w/ spread to the liver, lungs, and peritoneum (on chemo, last tx 3 weeks ago), anemia, urinary retention, and scoliosis who also had a recent admission for PRES and new onset LLE DVT (11/3) that currently presents with acute resp failure w/ hypoxia 2/2 pleural effusion likely 2/2 worsening cancer    Hospital course (by problem):     # Acute respiratory failure with hypoxia.   -2/2 left pleural effusion likely 2/2 progressing metastatic ovarian cancer vs HF. Presented with hypoxia to 86%, placed on 2L NC with improvement to 95%. Denies any fever, chills, cough, or dyspnea. BNP elevated to 3039 but in setting of CKD and unilateral effusion w/ EF of 60-65% on TTE last admission, less likely HF. CTA done showing no abnormal dilatation of the main pulmonary artery, and no pulmonary embolism. Multifocal small to large in parenchymal and pleural masses bilaterally as noted on prior studies. Small left pleural effusion. Bibasilar atelectasis. urine legionella and strep negative, RVP panel negative  -initially on 5L NC, weaned down to 3L  - c/w zosyn 3.375g q12 for possible RLL PNA 7 day course ending 11/22  - Pulm consulted, doesn't recommend any tapping  - Currently on 5L, wean down to 3L O2 as tolerated with goals SpO2 90-92%  - Pulm consulted and plan for outpatient f/u with Dr. Feng and repeat lung CT in 6-8 weeks.  - f/u with PCP Dr. Hidalgo on 12/7 3pm        #Pleural effusion.   -2/2 worsening cancer vs medication induced pneumonitis In setting of chemotherapy. Pt on keytruda which can be associated w/ immune mediated pneumonitis.   -Plan as above.    #Previous DVT  -will be discharged on lovenox    # Ovarian cancer.   -History of metastatic ovarian/uterine cancer actively on chemotherapy with Dr. Hidalgo. Diagnosed in 2015 w/ metastasis to liver, lungs, and peritoneum in 2018/2019. Last treatment approximately 3 week ago. Past regimen includes avastin use, current regimen on keytruda, dose reduced gemzar, cytoxan, 5-FU continual infusion, docetaxel, and carboplatin. Presented with DVT on last admission likely 2/2 hypercoagulable state, placed on full anticoagulation.   - dc'd heparin infusion, started lovenox in anticipation of urology procedure  -restarted eliquis, will be dc'd on lovenox     #Anemia.   -possibly 2/2 folate deficiency vs bleed w/ possible concurrent CORBIN vs ACD. Pt presents w/ Hgb of 8.4 (baseline 10 from prior admission). Drop to Hgb 7.7 likely dilutional s/p 2L fluid in ED. FOBT positive but pt denies hematemesis or black stool, hasn't had a bowel movement in 2 days. Rectal exam unremarkable for blood. Iron panel from last admission showing normal total iron  Plan:  - started on PPI  - started 1U pRBCs, hgb post-tranfusion improved  - folate supplementation   - f/u folate as pt on 5-FU chemo.    #Hypertension.   -Home meds of Cozaar 100mg and Nifedipine 90mg which were prescribed upon dc last week. Pt states she is compliant with meds. Baseline before last admission was -180 but now baseline is 100s.   Plan:  - hold losartan in setting of RUBIN, start Nifedipine 60mg in AM.    # Acute kidney injury superimposed on CKD.   -possibly 2/2 obstructive in setting of urinary retention vs pre-renal vs ATN in setting of drop in BP. Baseline Cr of 1.5 from chart, presents w/ Cr 3.5. s/p 2L LR w/ drop to 2.16 but uptrending again. Pt reports she does not urinate without straight cath, last done 2 days ago. Bladder scan - 280cc. UA w/ WBC 5-10 and trace leuk esterase but no suprapubic tenderness or complaints of dysuria or frequency/urgency. retroperitoneal US showed 2 stones in L kidney , moderate hydronephrosis with urinary obstruction.   -initially tx with oxybutynin but eventually dc'd  - arevalo placed, tried TOV but failed, arevalo placed back in  -urology performed bladder irrigation, will be discharged with arevalo.  - urology consulted, CT renal stones for further management-showed evidence of tumor infiltrates  - outpt f/u with urology Dr. Correia for further management of care and for arevalo      #Urinary retention.   -Pt reports requiring straight cath as she cant urinate on her own, last done 2 days ago. Possibly 2/2 metastatic cancer and obstructive mass. Bladder scan showing 280cc urine.  Plan as above      #DVT, lower extremity.   -in setting of metastatic cancer. Diagnosed w/ LLE DVT on last admission. Doppler then found Left common femoral vein and saphenofemoral junction deep venous thrombosis. Presents w/ LE edema, L>R, w/ weakness in left leg as well. Was started on Lovenox 50mg BID and pt reports compliance, last taken last night.  Plan:  - tx w lovenox, will be discharged on lovenox      Patient was discharged to: [facility]    New medications: none    Changes to old medications: none    Medications that were stopped: none    Items to follow up as outpatient:  - outpatient f/u with Pulm Dr. Feng and repeat lung CT in 6-8 weeks.  - f/u with PCP Dr. Hidalgo on 12/7 3pm  - outpt f/u with urology Dr. Correia for further management of care and for arevalo    Physical exam at the time of discharge:  General: NAD, lying in bed, reading a book, talkative and interactive, on 3L, arevalo in place  HEENT: NCAT; PERRL, anicteric sclera; MMM  Neck: supple, trachea midline  Cardiovascular: S1, S2 normal; RRR, no M/G/R  Respiratory: CTABL; no W/R/R  Gastrointestinal: soft, nontender, nondistended. bowel sounds present.  Skin: no ulcerations or visible rashes appreciated, echemosis on the left arm.   Extremities: WWP; no edema, clubbing or cyanosis  Vascular: 2+ radial, DP/PT pulses B/L  Neurological: AAOx3; CN II-XII grossly intact; no focal deficits     #Discharge: do not delete    71 yo female pmhx of HTN, Ovarian and uterine cancer w/ spread to the liver, lungs, and peritoneum (on chemo, last tx 3 weeks ago), anemia, urinary retention, and scoliosis who also had a recent admission for PRES and new onset LLE DVT (11/3) that currently presents with acute resp failure w/ hypoxia 2/2 pleural effusion likely 2/2 worsening cancer    Hospital course (by problem):     # Acute respiratory failure with hypoxia.   -2/2 left pleural effusion likely 2/2 progressing metastatic ovarian cancer vs HF. Presented with hypoxia to 86%, placed on 2L NC with improvement to 95%. Denies any fever, chills, cough, or dyspnea. BNP elevated to 3039 but in setting of CKD and unilateral effusion w/ EF of 60-65% on TTE last admission, less likely HF. CTA done showing no abnormal dilatation of the main pulmonary artery, and no pulmonary embolism. Multifocal small to large in parenchymal and pleural masses bilaterally as noted on prior studies. Small left pleural effusion. Bibasilar atelectasis. urine legionella and strep negative, RVP panel negative  -initially on 5L NC, weaned down to 3L  - c/w zosyn 3.375g q12 for possible RLL PNA 7 day course ending 11/22  - Pulm consulted, doesn't recommend any tapping  - down to 3L O2 as tolerated with goals SpO2 90-92%  - Pulm consulted and plan for outpatient f/u with Dr. Feng and repeat lung CT in 6-8 weeks.  - f/u with PCP Dr. Hidalgo on 12/7 3pm        #Pleural effusion.   -2/2 worsening cancer vs medication induced pneumonitis In setting of chemotherapy. Pt on keytruda which can be associated w/ immune mediated pneumonitis.   -Plan as above.    #Previous DVT  -will be discharged on lovenox    # Ovarian cancer.   -History of metastatic ovarian/uterine cancer actively on chemotherapy with Dr. Hidalgo. Diagnosed in 2015 w/ metastasis to liver, lungs, and peritoneum in 2018/2019. Last treatment approximately 3 week ago. Past regimen includes avastin use, current regimen on keytruda, dose reduced gemzar, cytoxan, 5-FU continual infusion, docetaxel, and carboplatin. Presented with DVT on last admission likely 2/2 hypercoagulable state, placed on full anticoagulation.   - dc'd heparin infusion, started lovenox in anticipation of urology procedure  -restarted eliquis, will be dc'd on lovenox     #Anemia.   -possibly 2/2 folate deficiency vs bleed w/ possible concurrent CORBIN vs ACD. Pt presents w/ Hgb of 8.4 (baseline 10 from prior admission). Drop to Hgb 7.7 likely dilutional s/p 2L fluid in ED. FOBT positive but pt denies hematemesis or black stool, hasn't had a bowel movement in 2 days. Rectal exam unremarkable for blood. Iron panel from last admission showing normal total iron  Plan:  - started on PPI  - started 1U pRBCs, hgb post-tranfusion improved  - folate supplementation   - f/u folate as pt on 5-FU chemo.    #Hypertension.   -Home meds of Cozaar 100mg and Nifedipine 90mg which were prescribed upon dc last week. Pt states she is compliant with meds. Baseline before last admission was -180 but now baseline is 100s.   Plan:  - hold losartan in setting of RUBIN, start Nifedipine 60mg in AM.    # Acute kidney injury superimposed on CKD.   -possibly 2/2 obstructive in setting of urinary retention vs pre-renal vs ATN in setting of drop in BP. Baseline Cr of 1.5 from chart, presents w/ Cr 3.5. s/p 2L LR w/ drop to 2.16 but uptrending again. Pt reports she does not urinate without straight cath, last done 2 days ago. Bladder scan - 280cc. UA w/ WBC 5-10 and trace leuk esterase but no suprapubic tenderness or complaints of dysuria or frequency/urgency. retroperitoneal US showed 2 stones in L kidney , moderate hydronephrosis with urinary obstruction.   -initially tx with oxybutynin but eventually dc'd  - arevalo placed, tried TOV but failed, arevalo placed back in  -urology performed bladder irrigation, will be discharged with arevalo.  - urology consulted, CT renal stones for further management-showed evidence of tumor infiltrates  - outpt f/u with urology Dr. Correia for further management of care and for arevalo      #Urinary retention.   -Pt reports requiring straight cath as she cant urinate on her own, last done 2 days ago. Possibly 2/2 metastatic cancer and obstructive mass. Bladder scan showing 280cc urine.  Plan as above      #DVT, lower extremity.   -in setting of metastatic cancer. Diagnosed w/ LLE DVT on last admission. Doppler then found Left common femoral vein and saphenofemoral junction deep venous thrombosis. Presents w/ LE edema, L>R, w/ weakness in left leg as well. Was started on Lovenox 50mg BID and pt reports compliance, last taken last night.  Plan:  - tx w lovenox, will be discharged on lovenox      Patient was discharged to: [facility]    New medications: none    Changes to old medications: none    Medications that were stopped: none    Items to follow up as outpatient:  - outpatient f/u with Pulm Dr. Feng and repeat lung CT in 6-8 weeks.  - f/u with PCP Dr. Hidalgo on 12/7 3pm  - outpt f/u with urology Dr. Correia for further management of care and for arevalo    Physical exam at the time of discharge:  General: NAD, lying in bed, reading a book, talkative and interactive, on 3L, arevalo in place  HEENT: NCAT; PERRL, anicteric sclera; MMM  Neck: supple, trachea midline  Cardiovascular: S1, S2 normal; RRR, no M/G/R  Respiratory: CTABL; no W/R/R  Gastrointestinal: soft, nontender, nondistended. bowel sounds present.  Skin: no ulcerations or visible rashes appreciated, echemosis on the left arm.   Extremities: WWP; no edema, clubbing or cyanosis  Vascular: 2+ radial, DP/PT pulses B/L  Neurological: AAOx3; CN II-XII grossly intact; no focal deficits     #Discharge: do not delete    73 yo female pmhx of HTN, Ovarian and uterine cancer w/ spread to the liver, lungs, and peritoneum (on chemo, last tx 3 weeks ago), anemia, urinary retention, and scoliosis who also had a recent admission for PRES and new onset LLE DVT (11/3) that currently presents with acute resp failure w/ hypoxia 2/2 pleural effusion likely 2/2 worsening cancer    Hospital course (by problem):     # Acute respiratory failure with hypoxia.   -2/2 left pleural effusion likely 2/2 progressing metastatic ovarian cancer vs HF. Presented with hypoxia to 86%, placed on 2L NC with improvement to 95%. Denies any fever, chills, cough, or dyspnea. BNP elevated to 3039 but in setting of CKD and unilateral effusion w/ EF of 60-65% on TTE last admission, less likely HF.  -CTA done showing no abnormal dilatation of the main pulmonary artery, and no pulmonary embolism. Multifocal small to large in parenchymal and pleural masses bilaterally as noted on prior studies. Small left pleural effusion.   -Bibasilar atelectasis. urine legionella and strep negative, RVP panel negative  -initially on 5L NC, weaned down to 3L  - tx with zosyn 3.375g q12 for possible RLL PNA 7 day course ending 11/22  - Pulm consulted, doesn't recommend any tapping  - down to 3L O2 as tolerated with goals SpO2 90-92%  - Pulm recommends plan for outpatient f/u with Dr. Feng and repeat lung CT in 6-8 weeks.  - f/u with PCP Dr. Hidalgo on 12/7 3pm        #Pleural effusion.   -2/2 worsening cancer vs medication induced pneumonitis In setting of chemotherapy. Pt on keytruda which can be associated w/ immune mediated pneumonitis.   -Plan as above.    #Previous DVT  -will be discharged on lovenox    # Ovarian cancer.   -History of metastatic ovarian/uterine cancer actively on chemotherapy with Dr. Hidalgo. Diagnosed in 2015 w/ metastasis to liver, lungs, and peritoneum in 2018/2019. Last treatment approximately 3 week ago. Past regimen includes avastin use, current regimen on keytruda, dose reduced gemzar, cytoxan, 5-FU continual infusion, docetaxel, and carboplatin. Presented with DVT on last admission likely 2/2 hypercoagulable state, placed on full anticoagulation.   - dc'd heparin infusion, started lovenox, will be dc'd on lovenox      #Anemia   -possibly 2/2 folate deficiency vs bleed w/ possible concurrent CORBIN vs ACD. Pt presents w/ Hgb of 8.4 (baseline 10 from prior admission). Drop to Hgb 7.7 likely dilutional s/p 2L fluid in ED. FOBT positive but pt denies hematemesis or black stool, hasn't had a bowel movement in 2 days. Rectal exam unremarkable for blood. Iron panel from last admission showing normal total iron  - started on PPI  - transfused with 2U pRBCs during admission due to low hgb  - folate supplementation       #Hypertension   -Home meds of Cozaar 100mg and Nifedipine 90mg which were prescribed upon dc last week. Pt states she is compliant with meds. Baseline before last admission was -180 but now baseline is 100s.   Plan:  - hold losartan in setting of RUBIN, start Nifedipine 60mg in AM.    # Acute kidney injury superimposed on CKD.\   -possibly 2/2 obstructive in setting of urinary retention vs pre-renal vs ATN in setting of drop in BP. Baseline Cr of 1.5 from chart, presents w/ Cr 3.5. s/p 2L LR w/ drop to 2.16 but uptrending again. Pt reports she does not urinate without straight cath, last done 2 days ago. Bladder scan - 280cc. UA w/ WBC 5-10 and trace leuk esterase but no suprapubic tenderness or complaints of dysuria or frequency/urgency. retroperitoneal US showed 2 stones in L kidney , moderate hydronephrosis with urinary obstruction.   -initially tx with oxybutynin but eventually dc'd  - arevalo placed, tried TOV but failed, arevalo placed back in  -urology performed bladder irrigation, will be discharged with arevalo.  - urology consulted, CT renal stones for further management-showed evidence of tumor infiltrates  - outpt f/u with urology Dr. Correia for further management of care and for arevalo      #Urinary retention.   -Pt reports requiring straight cath as she cant urinate on her own, last done 2 days ago. Possibly 2/2 metastatic cancer and obstructive mass. Bladder scan showing 280cc urine.  Plan as above      #DVT, lower extremity.   -in setting of metastatic cancer. Diagnosed w/ LLE DVT on last admission. Doppler then found Left common femoral vein and saphenofemoral junction deep venous thrombosis. Presents w/ LE edema, L>R, w/ weakness in left leg as well. Was started on Lovenox 50mg BID and pt reports compliance, last taken last night.  Plan:  - tx w lovenox, will be discharged on lovenox  -f/u with BMP and CBC in 5-7 days to monitor Hgb and assess lovenox dosage    Patient was discharged to: [facility]    New medications: none    Changes to old medications: none    Medications that were stopped: none    Items to follow up as outpatient:  - outpatient f/u with Pulm Dr. Feng and repeat lung CT in 6-8 weeks.  - f/u with PCP Dr. Hidalgo on 12/7 3pm  - outpt f/u with urology Dr. Correia for further management of care and for arevalo    Physical exam at the time of discharge:  General: NAD, lying in bed, reading a book, talkative and interactive, on 3L, arevalo in place  HEENT: NCAT; PERRL, anicteric sclera; MMM  Neck: supple, trachea midline  Cardiovascular: S1, S2 normal; RRR, no M/G/R  Respiratory: CTABL; no W/R/R  Gastrointestinal: soft, nontender, nondistended. bowel sounds present.  Skin: no ulcerations or visible rashes appreciated, echemosis on the left arm.   Extremities: WWP; no edema, clubbing or cyanosis  Vascular: 2+ radial, DP/PT pulses B/L  Neurological: AAOx3; CN II-XII grossly intact; no focal deficits     #Discharge: do not delete    71 yo female pmhx of HTN, Ovarian and uterine cancer w/ spread to the liver, lungs, and peritoneum (on chemo, last tx 3 weeks ago), anemia, urinary retention, and scoliosis who also had a recent admission for PRES and new onset LLE DVT (11/3) that currently presents with acute resp failure w/ hypoxia 2/2 pneumonia c/b hydronephrosis 2/2 cancer impinging ureter with urology saying no procedure indicated.    Hospital course (by problem):     # Acute respiratory failure with hypoxia.   -2/2 pneumonia. Presented with hypoxia to 86%, placed on NC with improvement. Denies any fever, chills, cough, or dyspnea. BNP elevated to 3039 but in setting of CKD and unilateral effusion w/ EF of 60-65% on TTE last admission, less likely HF.  -CTA done showing no abnormal dilatation of the main pulmonary artery, and no pulmonary embolism. Multifocal small to large in parenchymal and pleural masses bilaterally as  -Bibasilar atelectasis. urine legionella and strep negative, RVP panel negative  - pulm consulted, reports likely pna seen on POCUS with pleural effusion to small to drain  - s/p tx with zosyn 3.375g q12 for RLL PNA 7 day course ending 11/22  Plan:  - goal O2 SpO2 above 90%  - Pulm recommends plan for outpatient f/u with Dr. Feng and repeat lung CT in 6-8 weeks.  - f/u with PCP Dr. Hidalgo on 12/7 3pm    #Previous DVT  -will be discharged on lovenox 60mg OD per renal function  - recheck BMP within 1 week at [facility] to adjust lovenox dose per renal function    # Ovarian cancer.   -History of metastatic ovarian/uterine cancer actively on chemotherapy with Dr. Hidalgo. Diagnosed in 2015 w/ metastasis to liver, lungs, and peritoneum in 2018/2019. Last treatment approximately 3 week ago. Past regimen includes avastin use, current regimen on keytruda, dose reduced gemzar, cytoxan, 5-FU continual infusion, docetaxel, and carboplatin. Presented with DVT on last admission likely 2/2 hypercoagulable state, placed on full anticoagulation.   - dc'd heparin infusion, started lovenox, will be dc'd on lovenox    #Anemia   -Fe studies c/w AoCD likely c/b acute blood loss from traumatic arevalo. Hb now stable.  - transfused with 2U pRBCs during admission due to low hgb  Plan:  - folate supplementation   - recheck CBC at [facility] as pt still with hematuria (urology says no procedure indicated) while on lovenox    #Hypertension   -Home meds of Cozaar 100mg and Nifedipine 90mg which were prescribed upon dc last week. Pt states she is compliant with meds. Baseline before last admission was -180 but now baseline is 100s.   Plan:  - held losartan in setting of RUBIN  - stable on Nifedipine 60mg    # Acute kidney injury superimposed on CKD.\   - Baseline Cr of 1.5 from chart, presents w/ Cr 3.5.  - Pt reports she does not urinate without straight cath, last done 2 days ago. Bladder scan - 280cc in ED  - retroperitoneal US showed 2 stones in L kidney , moderate hydronephrosis with urinary obstruction.  - CT stone hunt: Uterine soft tissue mass encases and severely narrows distal left ureter with associated moderate left hydronephrosis  Plan:  - improved with arevalo catheter (failed TOV) c/b traumatic arevalo placement. seen by urology with bladder irrigation and report no intervention at this time. at discharge, still having dark red urine  - urology consulted for hydronephrosis and report no surgical intervention at this time  - outpt f/u with urology Dr. Correia for further management of care and for arevalo      #Urinary retention.   Plan as above      #DVT, lower extremity.   -in setting of metastatic cancer. Diagnosed w/ LLE DVT on last admission. Doppler then found Left common femoral vein and saphenofemoral junction deep venous thrombosis. Presents w/ LE edema, L>R, w/ weakness in left leg as well.  Plan:  - will be discharged on lovenox 60mg OD with goal to recheck BMP and CBC at [facility] and adjust dose per creatinine and improvement in hematuria      Patient was discharged to: [facility]    New medications: none    Changes to old medications: nifedipine 90mg switched to 60mg. lovenox switched to 60mg OD    Medications that were stopped: losartan 100mg    Items to follow up as outpatient:  - outpatient f/u with Pulm Dr. Feng and repeat lung CT in 6-8 weeks.  - f/u with PCP Dr. Hidalgo on 12/7 3pm  - outpt f/u with urology Dr. Correia for further management of care and for iirna    Physical exam at the time of discharge:  General: NAD, lying in bed, reading a book, talkative and interactive, on 3L, arevalo in place  HEENT: NCAT; PERRL, anicteric sclera; MMM  Neck: supple, trachea midline  Cardiovascular: S1, S2 normal; RRR, no M/G/R  Respiratory: CTABL; no W/R/R  Gastrointestinal: soft, nontender, nondistended. bowel sounds present.  Skin: no ulcerations or visible rashes appreciated, echemosis on the left arm.   Extremities: WWP; no edema, clubbing or cyanosis  Vascular: 2+ radial, DP/PT pulses B/L  Neurological: AAOx3; CN II-XII grossly intact; no focal deficits

## 2022-11-22 ENCOUNTER — TRANSCRIPTION ENCOUNTER (OUTPATIENT)
Age: 72
End: 2022-11-22

## 2022-11-22 DIAGNOSIS — N17.9 ACUTE KIDNEY FAILURE, UNSPECIFIED: ICD-10-CM

## 2022-11-22 LAB
ANION GAP SERPL CALC-SCNC: 10 MMOL/L — SIGNIFICANT CHANGE UP (ref 5–17)
BASOPHILS # BLD AUTO: 0.06 K/UL — SIGNIFICANT CHANGE UP (ref 0–0.2)
BASOPHILS NFR BLD AUTO: 0.7 % — SIGNIFICANT CHANGE UP (ref 0–2)
BLD GP AB SCN SERPL QL: NEGATIVE — SIGNIFICANT CHANGE UP
BUN SERPL-MCNC: 31 MG/DL — HIGH (ref 7–23)
CALCIUM SERPL-MCNC: 8 MG/DL — LOW (ref 8.4–10.5)
CHLORIDE SERPL-SCNC: 102 MMOL/L — SIGNIFICANT CHANGE UP (ref 96–108)
CO2 SERPL-SCNC: 21 MMOL/L — LOW (ref 22–31)
CREAT SERPL-MCNC: 1.72 MG/DL — HIGH (ref 0.5–1.3)
EGFR: 31 ML/MIN/1.73M2 — LOW
EOSINOPHIL # BLD AUTO: 0.13 K/UL — SIGNIFICANT CHANGE UP (ref 0–0.5)
EOSINOPHIL NFR BLD AUTO: 1.6 % — SIGNIFICANT CHANGE UP (ref 0–6)
GLUCOSE SERPL-MCNC: 104 MG/DL — HIGH (ref 70–99)
HCT VFR BLD CALC: 22.5 % — LOW (ref 34.5–45)
HCT VFR BLD CALC: 23.3 % — LOW (ref 34.5–45)
HGB BLD-MCNC: 7 G/DL — CRITICAL LOW (ref 11.5–15.5)
HGB BLD-MCNC: 7.1 G/DL — LOW (ref 11.5–15.5)
IMM GRANULOCYTES NFR BLD AUTO: 3 % — HIGH (ref 0–0.9)
LYMPHOCYTES # BLD AUTO: 0.4 K/UL — LOW (ref 1–3.3)
LYMPHOCYTES # BLD AUTO: 4.9 % — LOW (ref 13–44)
MAGNESIUM SERPL-MCNC: 2 MG/DL — SIGNIFICANT CHANGE UP (ref 1.6–2.6)
MCHC RBC-ENTMCNC: 30.5 GM/DL — LOW (ref 32–36)
MCHC RBC-ENTMCNC: 31.1 GM/DL — LOW (ref 32–36)
MCHC RBC-ENTMCNC: 31.8 PG — SIGNIFICANT CHANGE UP (ref 27–34)
MCHC RBC-ENTMCNC: 32 PG — SIGNIFICANT CHANGE UP (ref 27–34)
MCV RBC AUTO: 102.7 FL — HIGH (ref 80–100)
MCV RBC AUTO: 104.5 FL — HIGH (ref 80–100)
MONOCYTES # BLD AUTO: 1.05 K/UL — HIGH (ref 0–0.9)
MONOCYTES NFR BLD AUTO: 12.9 % — SIGNIFICANT CHANGE UP (ref 2–14)
NEUTROPHILS # BLD AUTO: 6.24 K/UL — SIGNIFICANT CHANGE UP (ref 1.8–7.4)
NEUTROPHILS NFR BLD AUTO: 76.9 % — SIGNIFICANT CHANGE UP (ref 43–77)
NRBC # BLD: 0 /100 WBCS — SIGNIFICANT CHANGE UP (ref 0–0)
NRBC # BLD: 0 /100 WBCS — SIGNIFICANT CHANGE UP (ref 0–0)
PHOSPHATE SERPL-MCNC: 3.5 MG/DL — SIGNIFICANT CHANGE UP (ref 2.5–4.5)
PLATELET # BLD AUTO: 190 K/UL — SIGNIFICANT CHANGE UP (ref 150–400)
PLATELET # BLD AUTO: 230 K/UL — SIGNIFICANT CHANGE UP (ref 150–400)
POTASSIUM SERPL-MCNC: 4 MMOL/L — SIGNIFICANT CHANGE UP (ref 3.5–5.3)
POTASSIUM SERPL-SCNC: 4 MMOL/L — SIGNIFICANT CHANGE UP (ref 3.5–5.3)
RBC # BLD: 2.19 M/UL — LOW (ref 3.8–5.2)
RBC # BLD: 2.23 M/UL — LOW (ref 3.8–5.2)
RBC # FLD: 21.4 % — HIGH (ref 10.3–14.5)
RBC # FLD: 21.5 % — HIGH (ref 10.3–14.5)
RH IG SCN BLD-IMP: POSITIVE — SIGNIFICANT CHANGE UP
SODIUM SERPL-SCNC: 133 MMOL/L — LOW (ref 135–145)
WBC # BLD: 8.12 K/UL — SIGNIFICANT CHANGE UP (ref 3.8–10.5)
WBC # BLD: 8.7 K/UL — SIGNIFICANT CHANGE UP (ref 3.8–10.5)
WBC # FLD AUTO: 8.12 K/UL — SIGNIFICANT CHANGE UP (ref 3.8–10.5)
WBC # FLD AUTO: 8.7 K/UL — SIGNIFICANT CHANGE UP (ref 3.8–10.5)

## 2022-11-22 PROCEDURE — 99233 SBSQ HOSP IP/OBS HIGH 50: CPT | Mod: GC

## 2022-11-22 RX ORDER — LEVETIRACETAM 250 MG/1
500 TABLET, FILM COATED ORAL
Refills: 0 | Status: DISCONTINUED | OUTPATIENT
Start: 2022-11-22 | End: 2022-11-24

## 2022-11-22 RX ADMIN — Medication 650 MILLIGRAM(S): at 16:10

## 2022-11-22 RX ADMIN — Medication 60 MILLIGRAM(S): at 08:51

## 2022-11-22 RX ADMIN — LEVETIRACETAM 500 MILLIGRAM(S): 250 TABLET, FILM COATED ORAL at 17:42

## 2022-11-22 RX ADMIN — ENOXAPARIN SODIUM 60 MILLIGRAM(S): 100 INJECTION SUBCUTANEOUS at 15:24

## 2022-11-22 RX ADMIN — Medication 1 MILLIGRAM(S): at 11:21

## 2022-11-22 RX ADMIN — PIPERACILLIN AND TAZOBACTAM 25 GRAM(S): 4; .5 INJECTION, POWDER, LYOPHILIZED, FOR SOLUTION INTRAVENOUS at 13:12

## 2022-11-22 RX ADMIN — POLYETHYLENE GLYCOL 3350 17 GRAM(S): 17 POWDER, FOR SOLUTION ORAL at 11:21

## 2022-11-22 RX ADMIN — LEVETIRACETAM 400 MILLIGRAM(S): 250 TABLET, FILM COATED ORAL at 06:18

## 2022-11-22 RX ADMIN — GABAPENTIN 100 MILLIGRAM(S): 400 CAPSULE ORAL at 06:12

## 2022-11-22 RX ADMIN — Medication 650 MILLIGRAM(S): at 17:10

## 2022-11-22 RX ADMIN — PIPERACILLIN AND TAZOBACTAM 25 GRAM(S): 4; .5 INJECTION, POWDER, LYOPHILIZED, FOR SOLUTION INTRAVENOUS at 03:17

## 2022-11-22 RX ADMIN — GABAPENTIN 100 MILLIGRAM(S): 400 CAPSULE ORAL at 17:42

## 2022-11-22 RX ADMIN — ESCITALOPRAM OXALATE 7.5 MILLIGRAM(S): 10 TABLET, FILM COATED ORAL at 11:21

## 2022-11-22 RX ADMIN — SENNA PLUS 2 TABLET(S): 8.6 TABLET ORAL at 22:28

## 2022-11-22 NOTE — PROGRESS NOTE ADULT - ATTENDING COMMENTS
71 yo female pmhx of HTN, Ovarian and uterine cancer w/ mets to the liver, lungs, and peritoneum (on chemo, last tx 3 weeks ago), anemia, urinary retention, and scoliosis who also had a recent admission for PRES and new onset LLE DVT (11/3) that currently presents with acute resp failure w/ hypoxia 2/2 pleural effusion as well as PNA.  In addition the pt was found to have left renal stone and hydronephrosis, s/p arevalo and pending intervention with urology after CT A/P completed.     #Acute resp failure w/ hypoxia 2/2 pleural effusion  #Pneumonia  -Pulmonary following   -Will complete Zosyn for PNA  treat for 7 days today (11/16-11/22)  -Pt's urine legionella RVP were negative   -titrated down to 3l nc   -will need repeat CT in 6-8 week     #anemia   likely multifactorial in the setting of malignancy and CORBIN   pt was oral iron supp, held in the setting of active infection   will resume on discharge   pt will fu repeat cbc at [facility]    #ATN:  possibly 2/2 obstructive in setting of urinary retention vs pre-renal vs ATN in setting of drop in BP. Baseline Cr of 1.5  cr 1.98 today -> trended down after peaking     #Urinary retention  #Hydronephrosis  -  Urology f/u appreciated; no further inpt work up,failed TOV 11/21-- arevalo placed back. Pt will be discharged with arevalo and have a tov at [facility]     #Ovarian and uterine cancer w/ mets to the liver, lungs, and peritoneum  -Psych Onc consult appreciated   -palliative consulted,     #HTN  -Continue Nifedipine     #LLE DVT   -Will cw lovenox, pt continues to have improving kidney function and will need tp repeat BMP at [facility] to continue dose adjusted lovenox   -eliquis not currently covered, can reassess at [facility] and change to eliquis      #DISPO  -Will d/c back to rehab   -DNR/DNI.

## 2022-11-22 NOTE — PROGRESS NOTE ADULT - PROBLEM SELECTOR PLAN 1
2/2 left pleural effusion likely 2/2 progressing metastatic ovarian cancer vs HF. Presented with hypoxia to 86%, placed on 2L NC with improvement to 95%. Denies any fever, chills, cough, or dyspnea. BNP elevated to 3039 but in setting of CKD and unilateral effusion w/ EF of 60-65% on TTE last admission, less likely HF. CTA done showing no abnormal dilatation of the main pulmonary artery, and no pulmonary embolism. Multifocal small to large in parenchymal and pleural masses bilaterally as noted on prior studies. Small left pleural effusion. Bibasilar atelectasis. urine legionella and strep negative, RVP panel negative    - Continue Zosyn 3.375g q12 for RLL pneumonia- treat for 7 days total (11/16-11/22)  - Currently on 5L, wean down to 3L O2 as tolerated with goals SpO2 90-92%  - Pulm consulted and plan for outpatient f/u with Dr. Feng and repeat CT in 6-8 weeks

## 2022-11-22 NOTE — DISCHARGE NOTE NURSING/CASE MANAGEMENT/SOCIAL WORK - NSDCPEFALRISK_GEN_ALL_CORE
For information on Fall & Injury Prevention, visit: [email or web address] OR  [email or web address] OR  https://www.cdc.gov/steadi/patient.html

## 2022-11-22 NOTE — PROGRESS NOTE ADULT - PROBLEM SELECTOR PLAN 3
possibly 2/2 obstructive in setting of urinary retention vs pre-renal vs ATN in setting of drop in BP. Baseline Cr of 1.5 from chart, presents w/ Cr 3.5. s/p 2L LR w/ drop to 2.16 but uptrending again. Pt reports she does not urinate without straight cath, last done 2 days ago. Bladder scan - 280cc. UA w/ WBC 5-10 and trace leuk esterase but no suprapubic tenderness or complaints of dysuria or frequency/urgency. retroperitoneal US showed 2 stones in L kidney , moderate hydronephrosis with urinary obstruction. dc'd oxybutynin. CT renal stones performed, shows tumor infiltration into urinary system.  Plan:    -urology irrigated pt's bladder for blood in arevalo, uro recommends outpt f/u with Dr. Correia in 2-3 wks  - c/w bladder scan q6h  - urology team rules out any inpatient procedures, recommends outpt f/u for tumor infiltration into urinary system.  - strict I&Os  - avoid nephrotoxic drugs

## 2022-11-22 NOTE — DISCHARGE NOTE NURSING/CASE MANAGEMENT/SOCIAL WORK - PATIENT PORTAL LINK FT
You can access the FollowMyHealth Patient Portal offered by [facility] by registering at the following website: [email or web address]. By joining [employer]’s FollowMyHealth portal, you will also be able to view your health information using other applications (apps) compatible with our system.

## 2022-11-22 NOTE — PROGRESS NOTE ADULT - PROBLEM SELECTOR PLAN 6
possibly 2/2 folate deficiency vs bleed w/ possible concurrent CORBIN vs ACD. Pt presents w/ Hgb of 8.4 (baseline 10 from prior admission). Drop to Hgb 7.7 likely dilutional s/p 2L fluid in ED. FOBT positive but pt denies hematemesis or black stool, hasn't had a bowel movement in 2 days. Rectal exam unremarkable for blood. Iron panel from last admission showing normal total iron. s/p 1U pRBC.  Plan:  -will give another 1U RBC, plan for discharge tmro if repeat hgb is normal  - start on PPI  - f/u reticulocyte count  - monitor H&H  - active type and screen  - transfuse Hgb <7  - folate supplementation   - f/u folate as pt on 5-FU chemo

## 2022-11-22 NOTE — PROGRESS NOTE ADULT - SUBJECTIVE AND OBJECTIVE BOX
OVERNIGHT EVENTS:    SUBJECTIVE / INTERVAL HPI: Patient seen and examined at bedside.     VITAL SIGNS:  Vital Signs Last 24 Hrs  T(C): 36.7 (22 Nov 2022 05:55), Max: 36.8 (21 Nov 2022 15:08)  T(F): 98 (22 Nov 2022 05:55), Max: 98.2 (21 Nov 2022 15:08)  HR: 91 (22 Nov 2022 05:55) (73 - 93)  BP: 122/67 (22 Nov 2022 05:55) (122/67 - 126/71)  BP(mean): --  RR: 17 (22 Nov 2022 05:55) (17 - 18)  SpO2: 95% (22 Nov 2022 05:55) (93% - 96%)    Parameters below as of 22 Nov 2022 05:55  Patient On (Oxygen Delivery Method): nasal cannula w/ humidification  O2 Flow (L/min): 3      PHYSICAL EXAM:    General: WDWN  HEENT: NCAT; PERRL, anicteric sclera; MMM  Neck: supple, trachea midline  Cardiovascular: S1, S2 normal; RRR, no M/G/R  Respiratory: CTABL; no W/R/R  Gastrointestinal: soft, nontender, nondistended. bowel sounds present.  Skin: no ulcerations or visible rashes appreciated  Extremities: WWP; no edema, clubbing or cyanosis  Vascular: 2+ radial, DP/PT pulses B/L  Neurological: AAOx3; CN II-XII grossly intact; no focal deficits    MEDICATIONS:  MEDICATIONS  (STANDING):  enoxaparin Injectable 60 milliGRAM(s) SubCutaneous every 24 hours  escitalopram 7.5 milliGRAM(s) Oral daily  folic acid 1 milliGRAM(s) Oral daily  gabapentin 100 milliGRAM(s) Oral two times a day  levETIRAcetam  IVPB 500 milliGRAM(s) IV Intermittent every 12 hours  NIFEdipine XL 60 milliGRAM(s) Oral daily  piperacillin/tazobactam IVPB.. 3.375 Gram(s) IV Intermittent every 12 hours  polyethylene glycol 3350 17 Gram(s) Oral daily  senna 2 Tablet(s) Oral at bedtime    MEDICATIONS  (PRN):  acetaminophen     Tablet .. 650 milliGRAM(s) Oral every 6 hours PRN Temp greater or equal to 38C (100.4F), Mild Pain (1 - 3)  aluminum hydroxide/magnesium hydroxide/simethicone Suspension 30 milliLiter(s) Oral every 4 hours PRN Dyspepsia  LORazepam     Tablet 0.5 milliGRAM(s) Oral every 8 hours PRN Nausea and/or Vomiting  melatonin 3 milliGRAM(s) Oral at bedtime PRN Insomnia  ondansetron Injectable 4 milliGRAM(s) IV Push every 8 hours PRN Nausea and/or Vomiting      ALLERGIES:  Allergies    Benadryl (Other)  Compazine (Unknown)    Intolerances        LABS:                        8.4    8.71  )-----------( 191      ( 21 Nov 2022 05:30 )             27.3     11-21    134<L>  |  102  |  35<H>  ----------------------------<  101<H>  4.1   |  23  |  1.98<H>    Ca    8.0<L>      21 Nov 2022 05:30  Phos  3.5     11-21  Mg     2.2     11-21          CAPILLARY BLOOD GLUCOSE          RADIOLOGY & ADDITIONAL TESTS: Reviewed. OVERNIGHT EVENTS:  pt failed TOV, arevalo placed back in, pt has blood in arevalo    SUBJECTIVE / INTERVAL HPI: Patient seen and examined at bedside.     CONSTITUTIONAL: No weakness, fevers or chills  EYES/ENT: No visual changes;  No vertigo or throat pain   NECK: No pain or stiffness  RESPIRATORY: No cough, wheezing, hemoptysis; No shortness of breath  CARDIOVASCULAR: No chest pain or palpitations  GASTROINTESTINAL: No abdominal or epigastric pain. No nausea, vomiting, or hematemesis; No diarrhea or constipation. No melena or hematochezia.  GENITOURINARY: No dysuria, frequency or hematuria  NEUROLOGICAL: No numbness or weakness  SKIN: No itching, burning, rashes, or lesions   All other review of systems is negative unless indicated above.    VITAL SIGNS:  Vital Signs Last 24 Hrs  T(C): 36.7 (22 Nov 2022 05:55), Max: 36.8 (21 Nov 2022 15:08)  T(F): 98 (22 Nov 2022 05:55), Max: 98.2 (21 Nov 2022 15:08)  HR: 91 (22 Nov 2022 05:55) (73 - 93)  BP: 122/67 (22 Nov 2022 05:55) (122/67 - 126/71)  BP(mean): --  RR: 17 (22 Nov 2022 05:55) (17 - 18)  SpO2: 95% (22 Nov 2022 05:55) (93% - 96%)    Parameters below as of 22 Nov 2022 05:55  Patient On (Oxygen Delivery Method): nasal cannula w/ humidification  O2 Flow (L/min): 3      PHYSICAL EXAM:    General: NAD, pt talkative and cooperative, arevalo draining blood tinged fluid  HEENT: NCAT; PERRL, anicteric sclera; MMM  Neck: supple, trachea midline  Cardiovascular: S1, S2 normal; RRR, no M/G/R  Respiratory: CTABL; no W/R/R  Gastrointestinal: soft, nontender, nondistended. bowel sounds present.  Skin: no ulcerations or visible rashes appreciated  Extremities: WWP; no edema, clubbing or cyanosis  Vascular: 2+ radial, DP/PT pulses B/L  Neurological: AAOx3; CN II-XII grossly intact; no focal deficits    MEDICATIONS:  MEDICATIONS  (STANDING):  enoxaparin Injectable 60 milliGRAM(s) SubCutaneous every 24 hours  escitalopram 7.5 milliGRAM(s) Oral daily  folic acid 1 milliGRAM(s) Oral daily  gabapentin 100 milliGRAM(s) Oral two times a day  levETIRAcetam  IVPB 500 milliGRAM(s) IV Intermittent every 12 hours  NIFEdipine XL 60 milliGRAM(s) Oral daily  piperacillin/tazobactam IVPB.. 3.375 Gram(s) IV Intermittent every 12 hours  polyethylene glycol 3350 17 Gram(s) Oral daily  senna 2 Tablet(s) Oral at bedtime    MEDICATIONS  (PRN):  acetaminophen     Tablet .. 650 milliGRAM(s) Oral every 6 hours PRN Temp greater or equal to 38C (100.4F), Mild Pain (1 - 3)  aluminum hydroxide/magnesium hydroxide/simethicone Suspension 30 milliLiter(s) Oral every 4 hours PRN Dyspepsia  LORazepam     Tablet 0.5 milliGRAM(s) Oral every 8 hours PRN Nausea and/or Vomiting  melatonin 3 milliGRAM(s) Oral at bedtime PRN Insomnia  ondansetron Injectable 4 milliGRAM(s) IV Push every 8 hours PRN Nausea and/or Vomiting      ALLERGIES:  Allergies    Benadryl (Other)  Compazine (Unknown)    Intolerances        LABS:                        8.4    8.71  )-----------( 191      ( 21 Nov 2022 05:30 )             27.3     11-21    134<L>  |  102  |  35<H>  ----------------------------<  101<H>  4.1   |  23  |  1.98<H>    Ca    8.0<L>      21 Nov 2022 05:30  Phos  3.5     11-21  Mg     2.2     11-21          CAPILLARY BLOOD GLUCOSE          RADIOLOGY & ADDITIONAL TESTS: Reviewed.

## 2022-11-23 LAB
ANION GAP SERPL CALC-SCNC: 8 MMOL/L — SIGNIFICANT CHANGE UP (ref 5–17)
BUN SERPL-MCNC: 28 MG/DL — HIGH (ref 7–23)
CALCIUM SERPL-MCNC: 8.1 MG/DL — LOW (ref 8.4–10.5)
CHLORIDE SERPL-SCNC: 105 MMOL/L — SIGNIFICANT CHANGE UP (ref 96–108)
CO2 SERPL-SCNC: 23 MMOL/L — SIGNIFICANT CHANGE UP (ref 22–31)
CREAT SERPL-MCNC: 1.69 MG/DL — HIGH (ref 0.5–1.3)
EGFR: 32 ML/MIN/1.73M2 — LOW
GLUCOSE SERPL-MCNC: 98 MG/DL — SIGNIFICANT CHANGE UP (ref 70–99)
HCT VFR BLD CALC: 26.4 % — LOW (ref 34.5–45)
HGB BLD-MCNC: 8.5 G/DL — LOW (ref 11.5–15.5)
MAGNESIUM SERPL-MCNC: 2 MG/DL — SIGNIFICANT CHANGE UP (ref 1.6–2.6)
MCHC RBC-ENTMCNC: 32 PG — SIGNIFICANT CHANGE UP (ref 27–34)
MCHC RBC-ENTMCNC: 32.2 GM/DL — SIGNIFICANT CHANGE UP (ref 32–36)
MCV RBC AUTO: 99.2 FL — SIGNIFICANT CHANGE UP (ref 80–100)
NRBC # BLD: 0 /100 WBCS — SIGNIFICANT CHANGE UP (ref 0–0)
PHOSPHATE SERPL-MCNC: 3.5 MG/DL — SIGNIFICANT CHANGE UP (ref 2.5–4.5)
PLATELET # BLD AUTO: 195 K/UL — SIGNIFICANT CHANGE UP (ref 150–400)
POTASSIUM SERPL-MCNC: 3.9 MMOL/L — SIGNIFICANT CHANGE UP (ref 3.5–5.3)
POTASSIUM SERPL-SCNC: 3.9 MMOL/L — SIGNIFICANT CHANGE UP (ref 3.5–5.3)
RBC # BLD: 2.66 M/UL — LOW (ref 3.8–5.2)
RBC # FLD: 20.3 % — HIGH (ref 10.3–14.5)
SARS-COV-2 RNA SPEC QL NAA+PROBE: NEGATIVE — SIGNIFICANT CHANGE UP
SODIUM SERPL-SCNC: 136 MMOL/L — SIGNIFICANT CHANGE UP (ref 135–145)
WBC # BLD: 8.41 K/UL — SIGNIFICANT CHANGE UP (ref 3.8–10.5)
WBC # FLD AUTO: 8.41 K/UL — SIGNIFICANT CHANGE UP (ref 3.8–10.5)

## 2022-11-23 PROCEDURE — 99233 SBSQ HOSP IP/OBS HIGH 50: CPT | Mod: GC

## 2022-11-23 RX ORDER — LANOLIN ALCOHOL/MO/W.PET/CERES
1 CREAM (GRAM) TOPICAL
Qty: 0 | Refills: 0 | DISCHARGE
Start: 2022-11-23

## 2022-11-23 RX ORDER — ACETAMINOPHEN 500 MG
2 TABLET ORAL
Qty: 0 | Refills: 0 | DISCHARGE
Start: 2022-11-23

## 2022-11-23 RX ORDER — FOLIC ACID 0.8 MG
1 TABLET ORAL
Qty: 0 | Refills: 0 | DISCHARGE
Start: 2022-11-23

## 2022-11-23 RX ORDER — ENOXAPARIN SODIUM 100 MG/ML
50 INJECTION SUBCUTANEOUS
Qty: 0 | Refills: 0 | DISCHARGE

## 2022-11-23 RX ORDER — ONDANSETRON 8 MG/1
4 TABLET, FILM COATED ORAL
Qty: 0 | Refills: 0 | DISCHARGE
Start: 2022-11-23

## 2022-11-23 RX ADMIN — Medication 650 MILLIGRAM(S): at 03:16

## 2022-11-23 RX ADMIN — Medication 60 MILLIGRAM(S): at 09:13

## 2022-11-23 RX ADMIN — PIPERACILLIN AND TAZOBACTAM 25 GRAM(S): 4; .5 INJECTION, POWDER, LYOPHILIZED, FOR SOLUTION INTRAVENOUS at 02:29

## 2022-11-23 RX ADMIN — LEVETIRACETAM 500 MILLIGRAM(S): 250 TABLET, FILM COATED ORAL at 06:40

## 2022-11-23 RX ADMIN — LEVETIRACETAM 500 MILLIGRAM(S): 250 TABLET, FILM COATED ORAL at 17:21

## 2022-11-23 RX ADMIN — Medication 1 MILLIGRAM(S): at 11:34

## 2022-11-23 RX ADMIN — ENOXAPARIN SODIUM 60 MILLIGRAM(S): 100 INJECTION SUBCUTANEOUS at 15:28

## 2022-11-23 RX ADMIN — GABAPENTIN 100 MILLIGRAM(S): 400 CAPSULE ORAL at 17:21

## 2022-11-23 RX ADMIN — Medication 0.5 MILLIGRAM(S): at 20:52

## 2022-11-23 RX ADMIN — GABAPENTIN 100 MILLIGRAM(S): 400 CAPSULE ORAL at 06:40

## 2022-11-23 RX ADMIN — Medication 650 MILLIGRAM(S): at 04:00

## 2022-11-23 RX ADMIN — ESCITALOPRAM OXALATE 7.5 MILLIGRAM(S): 10 TABLET, FILM COATED ORAL at 11:34

## 2022-11-23 NOTE — PROGRESS NOTE ADULT - ATTENDING COMMENTS
73 yo female pmhx of HTN, Ovarian and uterine cancer w/ mets to the liver, lungs, and peritoneum (on chemo, last tx 3 weeks ago), anemia, urinary retention, and scoliosis who also had a recent admission for PRES and new onset LLE DVT (11/3) that currently presents with acute resp failure w/ hypoxia 2/2 pleural effusion as well as PNA.  In addition the pt was found to have left renal stone and hydronephrosis, s/p arevalo and pending intervention with urology after CT A/P completed.     #Acute resp failure w/ hypoxia 2/2 pleural effusion  #Pneumonia  -Pulmonary following   -completed Zosyn for PNA 7 days (11/16-11/22)  -Pt's urine legionella RVP were negative   -titrated down to 2l nc   -will need repeat CT in 6-8 week     #anemia   likely multifactorial in the setting of malignancy and CORBIN and acute blood loss 2/2 traumatic arevalo   pt was oral iron supp, held in the setting of active infection, will resume on discharge   hgb 7.1 on 11/22 s/p 1 u prbc -- hgb improved to 8.4  pending urology follow up as pt continues to have hematuria     #ATN:  possibly 2/2 obstructive in setting of urinary retention vs pre-renal vs ATN in setting of drop in BP. Baseline Cr of 1.5  cr 1.6 today -> trended down after peaking     #Urinary retention  #Hydronephrosis  -  Urology f/u appreciated; no further inpt work up,failed TOV 11/21-- arevalo placed back. Pt will be discharged with arevalo and have a tov at [facility]     #Ovarian and uterine cancer w/ mets to the liver, lungs, and peritoneum  -Psych Onc consult appreciated   -palliative consulted,     #HTN  -Continue Nifedipine     #LLE DVT   -Will cw lovenox, pt continues to have improving kidney function and will need tp repeat BMP at [facility] to continue dose adjusted lovenox        #DISPO  -Will d/c back to rehab   -DNR/DNI.

## 2022-11-23 NOTE — PROGRESS NOTE ADULT - NUTRITIONAL ASSESSMENT
This patient has been assessed with a concern for Malnutrition and has been determined to have a diagnosis/diagnoses of Severe protein-calorie malnutrition.    This patient is being managed with:   Diet Regular-  Supplement Feeding Modality:  Oral  Ensure Enlive Cans or Servings Per Day:  2       Frequency:  Two Times a day  Entered: Nov 16 2022  3:59PM    

## 2022-11-23 NOTE — PROGRESS NOTE ADULT - PROBLEM SELECTOR PLAN 5
History of metastatic ovarian/uterine cancer actively on chemotherapy with Dr. Hidalgo. Diagnosed in 2015 w/ metastasis to liver, lungs, and peritoneum in 2018/2019. Last treatment approximately 3 week ago. Past regimen includes avastin use, current regimen on keytruda, dose reduced gemzar, cytoxan, 5-FU continual infusion, docetaxel, and carboplatin. Presented with DVT on last admission likely 2/2 hypercoagulable state, placed on full anticoagulation.   Plan:  - dc'd heparin infusion, started lovenox  - trend PTT
Home meds of Cozaar 100mg and Nifedipine 90mg which were prescribed upon dc last week. Pt states she is compliant with meds. Baseline before last admission was -180 but now baseline is 100s.   Plan:  - hold losartan in setting of RUBIN, start Nifedipine 60mg in AM
History of metastatic ovarian/uterine cancer actively on chemotherapy with Dr. Hidalgo. Diagnosed in 2015 w/ metastasis to liver, lungs, and peritoneum in 2018/2019. Last treatment approximately 3 week ago. Past regimen includes avastin use, current regimen on keytruda, dose reduced gemzar, cytoxan, 5-FU continual infusion, docetaxel, and carboplatin. Presented with DVT on last admission likely 2/2 hypercoagulable state, placed on full anticoagulation.   Plan:  - dc'd heparin infusion, started lovenox  - trend PTT
Home meds of Cozaar 100mg and Nifedipine 90mg which were prescribed upon dc last week. Pt states she is compliant with meds. Baseline before last admission was -180 but now baseline is 100s.   Plan:  - hold losartan in setting of RUBIN, start Nifedipine 60mg in AM

## 2022-11-23 NOTE — PROGRESS NOTE ADULT - PROBLEM SELECTOR PLAN 7
Pt reports requiring straight cath as she cant urinate on her own, last done 2 days ago. Possibly 2/2 metastatic cancer and obstructive mass. Bladder scan showing 280cc urine.  Plan:  - c/w bladder scan q6h, straight cath >30cc (likely to require arevalo)
Home meds of Cozaar 100mg and Nifedipine 90mg which were prescribed upon dc last week. Pt states she is compliant with meds. Baseline before last admission was -180 but now baseline is 100s.   Plan:  - hold losartan in setting of RUBIN, start Nifedipine 60mg in AM
Home meds of Cozaar 100mg and Nifedipine 90mg which were prescribed upon dc last week. Pt states she is compliant with meds. Baseline before last admission was -180 but now baseline is 100s.   Plan:  - hold losartan in setting of RUBIN, start Nifedipine 60mg in AM
Pt reports requiring straight cath as she cant urinate on her own, last done 2 days ago. Possibly 2/2 metastatic cancer and obstructive mass. Bladder scan showing 280cc urine.  Plan:  - c/w bladder scan q6h, straight cath >30cc (likely to require arevalo)

## 2022-11-23 NOTE — PROGRESS NOTE ADULT - PROBLEM SELECTOR PLAN 9
F: s/p 1L LR x 2  E: replete as needed, K>4 and Mg>2  N: Clear liquid    DVT: subq heparin

## 2022-11-23 NOTE — PROGRESS NOTE ADULT - PROBLEM SELECTOR PROBLEM 2
Acute hypoxemic respiratory failure
Acute hypoxemic respiratory failure
Pleural effusion

## 2022-11-23 NOTE — PROGRESS NOTE ADULT - ASSESSMENT
71 yo female pmhx of HTN, Ovarian and uterine cancer w/ spread to the liver, lungs, and peritoneum (on chemo, last tx 3 weeks ago), anemia, urinary retention, and scoliosis who also had a recent admission for PRES and new onset LLE DVT (11/3) admitted for hypoxia 88% and reported hypotension.  consulted for RP ultrasound results showing 2 possible renal calculi  1.5cm with left hydro, unsure if hydro is from the stones given the CT Chest captured part of the upper pole. CT A/P 11/18 reviewed, left hydro is due to obstructing mass at the distal ureter, kidney is still enhancing and there is some function. Cr is improving slightly    Recs:  - Cr downtrending, RUIBN maybe also due to contrast nephropathy  - discussed with patient about PCNs - patient wants to speak to oncologist Dr. Lewis on Monday, will call him to discuss  - discussed with attending  - continue care per primary team  - continue GOC discussions    NELY Lopez MD (PGY-2)  Consult Urology Resident  Please feel free to reach out on Teams Chat  
73 yo female PMHx of HTN, metastatic ovarian cancer (mets to liver, lungs, peritoneum), s/p multiple rounds of chemotherapy and most recently on Keytruda, last dose 3 weeks ago, anemia, urinary retention, and LLE DVT (11/3) that currently presents with hypoxia 88% for which pulmonary has been consulted.    #Hypoxemic respiratory failure  #RLL pneumonia  #Ovarian cancer with metastatic disease to the lung    Patient with 7 years of ovarian cancer and many rounds of chemotherapy, most recently Keytruda with last dose 3 weeks ago, presents from rehab for acute hypoxemic respiratory failure with RLL consolidation. Discussed with patient's oncologist- no prior hx of RLL pneumonia on previous scans. POCUS with large consolidation consistent with pneumonia. Would treat this immunocompromised patient for pneumonia and cover Pseudomonas. Would send urine legionella and strep and sputum culture. Unlikely pneumonitis as the CT chest is not consistent with this diagnosis. Known to have lung mets which is seen also on CT.    Recommend:  - Continue Zosyn for RLL pneumonia- treat for 7 days total (11/16-11/22)  - Check urine legionella and strep  - Check RVP  - Check procalcitonin  - Collect sputum culture please  - Given elevated BNP and LE swelling would consider care with fluids  - Still awaiting  to bring in old images for comparison    Patient s/e/d with attending Dr. Feng. 
73 yo female PMHx of HTN, metastatic ovarian cancer (mets to liver, lungs, peritoneum), s/p multiple rounds of chemotherapy and most recently on Keytruda, last dose 3 weeks ago, anemia, urinary retention, and LLE DVT (11/3) that currently presents with hypoxia 88% for which pulmonary has been consulted.    #Hypoxemic respiratory failure  #RLL pneumonia  #Ovarian cancer with metastatic disease to the lung    Patient with 7 years of ovarian cancer and many rounds of chemotherapy, most recently Keytruda with last dose 3 weeks ago, presents from rehab for acute hypoxemic respiratory failure with RLL consolidation. Discussed with patient's oncologist- no prior hx of RLL pneumonia on previous scans. Would treat this immunocompromised patient for pneumonia and cover Pseudomonas. Unlikely pneumonitis as the CT chest is not consistent with this diagnosis. Known to have lung mets which is seen also on CT. Overall clinically improving, POCUS relatively unchanged.     Recommend:  - Continue Zosyn for RLL pneumonia- treat for 7 days total (11/16-11/22)  - Wean O2 as tolerated with goals SpO2 90-92%  - We will plan for outpatient f/u with Dr. Feng and repeat CT in 6-8 weeks     Patient s/e/d with attending Dr. Feng. 
  per Internal Medicine    72 y o female pmhx of HTN, Ovarian and uterine cancer w/ mets to the liver, lungs, and peritoneum (on chemo, last tx 3 weeks ago), anemia, urinary retention, and scoliosis who also had a recent admission for PRES and new onset LLE DVT (11/3) that currently presents with acute resp failure w/ hypoxia 2/2 pleural effusion as well as PNA.  In addition the pt was found to have left renal stone and hydronephrosis, s/p arevalo and pending intervention with urology after CT A/P completed.     #Acute resp failure w/ hypoxia 2/2 pleural effusion  #Pneumonia  -Pulmonary following   -Will continue Zosyn for PNA  treat for 7 days total (11/16-11/22)  -Will check urine legionella and strep, RVPand  procalcitonin  - Will check sputum culture  -Will repeat CXR given episode of desaturation overnight; Will attempt to titrate nasal cannula back to 4L     #Urinary retention  #Hydronephrosis  - Will f/u with Urology regarding the CT findings documented above     #Ovarian and uterine cancer w/ mets to the liver, lungs, and peritoneum  -Psych Onc consult appreciated     #HTN  -Continue Nifedipine     #LLE DVT   -Will continue  Lovenox      #DISPO  -Will d/c back to rehab once medically stable  -DNR/DNI.   
  per Internal Medicine    72 y o female pmhx of HTN, Ovarian and uterine cancer w/ spread to the liver, lungs, and peritoneum (on chemo, last tx 3 weeks ago), anemia, urinary retention, and scoliosis who also had a recent admission for PRES and new onset LLE DVT (11/3) that currently presents with acute resp failure w/ hypoxia 2/2 pleural effusion likely 2/2 worsening cancer     Problem/Plan - 1:  ·  Problem: Acute respiratory failure with hypoxia.   ·  Plan: 2/2 left pleural effusion likely 2/2 progressing metastatic ovarian cancer vs HF. Presented with hypoxia to 86%, placed on 2L NC with improvement to 95%. Denies any fever, chills, cough, or dyspnea. BNP elevated to 3039 but in setting of CKD and unilateral effusion w/ EF of 60-65% on TTE last admission, less likely HF. CTA done showing no abnormal dilatation of the main pulmonary artery, and no pulmonary embolism. Multifocal small to large in parenchymal and pleural masses bilaterally as noted on prior studies. Small left pleural effusion. Bibasilar atelectasis. urine legionella and strep negative, RVP panel negative    - Continue Zosyn 3.375g q12 for RLL pneumonia- treat for 7 days total (11/16-11/22)  - Currently on 5L, wean down to 2-3L O2 as tolerated with goals SpO2 90-92%  - Pulm was consulted and plan for outpatient f/u with Dr. Feng and repeat CT in 6-8 weeks.    Problem/Plan - 2:  ·  Problem: Pleural effusion.   ·  Plan: 2/2 worsening cancer vs medication induced pneumonitis In setting of chemotherapy. Pt on keytruda which can be associated w/ immune mediated pneumonitis.   -Plan as above.    Problem/Plan - 3:  ·  Problem: Ovarian cancer.   ·  Plan: History of metastatic ovarian/uterine cancer actively on chemotherapy with Dr. Hidalgo. Diagnosed in 2015 w/ metastasis to liver, lungs, and peritoneum in 2018/2019. Last treatment approximately 3 week ago. Past regimen includes avastin use, current regimen on keytruda, dose reduced gemzar, cytoxan, 5-FU continual infusion, docetaxel, and carboplatin. Presented with DVT on last admission likely 2/2 hypercoagulable state, placed on full anticoagulation.   Plan:  - dc'd heparin infusion, started lovenox  - trend PTT.    Problem/Plan - 4:  ·  Problem: Anemia.   ·  Plan: possibly 2/2 folate deficiency vs bleed w/ possible concurrent CORBIN vs ACD. Pt presents w/ Hgb of 8.4 (baseline 10 from prior admission). Drop to Hgb 7.7 likely dilutional s/p 2L fluid in ED. FOBT positive but pt denies hematemesis or black stool, hasn't had a bowel movement in 2 days. Rectal exam unremarkable for blood. Iron panel from last admission showing normal total iron  Plan:  - start on PPI  - f/u reticulocyte count  - started 1U pRBCs, hgb post-tranfusion improved  - monitor H&H  - active type and screen  - transfuse Hgb <7  - folate supplementation   - f/u folate as pt on 5-FU chemo.    Problem/Plan - 5:  ·  Problem: Hypertension.   ·  Plan: Home meds of Cozaar 100mg and Nifedipine 90mg which were prescribed upon dc last week. Pt states she is compliant with meds. Baseline before last admission was -180 but now baseline is 100s.   Plan:  - hold losartan in setting of RUBIN, start Nifedipine 60mg in AM.    Problem/Plan - 6:  ·  Problem: Acute kidney injury superimposed on CKD.   ·  Plan: possibly 2/2 obstructive in setting of urinary retention vs pre-renal vs ATN in setting of drop in BP. Baseline Cr of 1.5 from chart, presents w/ Cr 3.5. s/p 2L LR w/ drop to 2.16 but uptrending again. Pt reports she does not urinate without straight cath, last done 2 days ago. Bladder scan - 280cc. UA w/ WBC 5-10 and trace leuk esterase but no suprapubic tenderness or complaints of dysuria or frequency/urgency. retroperitoneal US showed 2 stones in L kidney , moderate hydronephrosis with urinary obstruction. dc'd oxybutynin  Plan:    - c/w bladder scan q6h  -pt will discuss urology procedure with family, f/u results of discussion  - strict I&Os  - urology consulted, recommends CT renal stones for further management  - avoid nephrotoxic drugs.    Problem/Plan - 7:  ·  Problem: Urinary retention.   ·  Plan: Pt reports requiring straight cath as she cant urinate on her own, last done 2 days ago. Possibly 2/2 metastatic cancer and obstructive mass. Bladder scan showing 280cc urine.  Plan:  - c/w bladder scan q6h, straight cath >30cc (likely to require arevalo).    Problem/Plan - 8:  ·  Problem: DVT, lower extremity.   ·  Plan: in setting of metastatic cancer. Diagnosed w/ LLE DVT on last admission. Doppler then found Left common femoral vein and saphenofemoral junction deep venous thrombosis. Presents w/ LE edema, L>R, w/ weakness in left leg as well. Was started on Lovenox 50mg BID and pt reports compliance, last taken last night.  Plan:  - c/w lovenox, consider starting eliquis tmro.    Problem/Plan - 9:  ·  Problem: Healthcare maintenance.   ·  Plan: F: s/p 1L LR x 2  E: replete as needed, K>4 and Mg>2  N: Clear liquid    DVT: subq heparin.  
73 yo female pmhx of HTN, Ovarian and uterine cancer w/ spread to the liver, lungs, and peritoneum (on chemo, last tx 3 weeks ago), anemia, urinary retention, and scoliosis who also had a recent admission for PRES and new onset LLE DVT (11/3) admitted for hypoxia 88% and reported hypotension.  consulted for RP ultrasound results showing 2 possible renal calculi  1.5cm with left hydro, unsure if hydro is from the stones given the CT Chest captured part of the upper pole.     After thorough discussion, given the RUBIN and elevated Cr 2.70 from a baseline of 1.5 (1.3 is documented in the chart as well), and unsure if the hydro is chronic or obstructed from stone vs metastasis would recommend a CT A/P noncon to evaluate for stones. Patient was extensively counselled, and was on the fence about pursuing further evaluation. However characterizing whether there are stones or obstruction would be helpful with treatment modalities, even before discussion about treatments and GOC.    Recs:  - CT A/P non con for stone evaluation  - no  interventions at this time  - discussed with attending  - continue care per primary team  - continue GOC discussions    B John AMARO (PGY-2)  Consult Urology Resident  Please feel free to reach out on Teams Chat  
71 yo female pmhx of HTN, Ovarian and uterine cancer w/ spread to the liver, lungs, and peritoneum (on chemo, last tx 3 weeks ago), anemia, urinary retention, and scoliosis who also had a recent admission for PRES and new onset LLE DVT (11/3) that currently presents with acute resp failure w/ hypoxia 2/2 pleural effusion likely 2/2 worsening cancer
71 yo female pmhx of HTN, Ovarian and uterine cancer w/ spread to the liver, lungs, and peritoneum (on chemo, last tx 3 weeks ago), anemia, urinary retention, and scoliosis who also had a recent admission for PRES and new onset LLE DVT (11/3) that currently presents with acute resp failure w/ hypoxia 2/2 pleural effusion likely 2/2 worsening cancer
73 yo female pmhx of HTN, Ovarian and uterine cancer w/ spread to the liver, lungs, and peritoneum (on chemo, last tx 3 weeks ago), anemia, urinary retention, and scoliosis who also had a recent admission for PRES and new onset LLE DVT (11/3) that currently presents with acute resp failure w/ hypoxia 2/2 pleural effusion likely 2/2 worsening cancer
71 yo female pmhx of HTN, Ovarian and uterine cancer w/ spread to the liver, lungs, and peritoneum (on chemo, last tx 3 weeks ago), anemia, urinary retention, and scoliosis who also had a recent admission for PRES and new onset LLE DVT (11/3) that currently presents with acute resp failure w/ hypoxia 2/2 pleural effusion likely 2/2 worsening cancer
73 yo female pmhx of HTN, Ovarian and uterine cancer w/ spread to the liver, lungs, and peritoneum (on chemo, last tx 3 weeks ago), anemia, urinary retention, and scoliosis who also had a recent admission for PRES and new onset LLE DVT (11/3) that currently presents with acute resp failure w/ hypoxia 2/2 pleural effusion likely 2/2 worsening cancer
71 yo female pmhx of HTN, Ovarian and uterine cancer w/ spread to the liver, lungs, and peritoneum (on chemo, last tx 3 weeks ago), anemia, urinary retention, and scoliosis who also had a recent admission for PRES and new onset LLE DVT (11/3) that currently presents with acute resp failure w/ hypoxia 2/2 pleural effusion likely 2/2 worsening cancer

## 2022-11-23 NOTE — PROGRESS NOTE ADULT - PROBLEM SELECTOR PROBLEM 1
RLL pneumonia
RLL pneumonia
Acute respiratory failure with hypoxia

## 2022-11-23 NOTE — PROGRESS NOTE ADULT - REASON FOR ADMISSION
Hypoxemia

## 2022-11-23 NOTE — PROGRESS NOTE ADULT - PROBLEM SELECTOR PROBLEM 3
Ovarian cancer
Acute kidney injury superimposed on CKD
Ovarian cancer
Ovarian cancer
Acute kidney injury superimposed on CKD

## 2022-11-23 NOTE — CHART NOTE - NSCHARTNOTEFT_GEN_A_CORE
C/L therapist met with patient for 30-minute individual psychotherapy session. Pt was distressed regarding event with her  this morning, during which he stated that he could no longer be available for emotional support. She reported that  has never responded to her in this way before, and that he is currently having difficulties with his own mental health. Pt was tearful during individual therapy session. She reported that she has a few close friends who she can rely on for emotional support. She asked for writer to include friends' names and phone numbers in her chart and has given permission for [facility] medical providers to speak to two friends for collateral or in case providers cannot reach pt's : Sho Sahni, [phone] and Barb Cobb, [phone]. C/L therapist also let pt know that she will need to communicate to a nurse or  to update her emergency contacts within her medical chart. Writer provided supportive psychotherapy and encouraged pt emotional expression. No S/H/I/I/P/A/V/H reported. The patient was meaningfully engaged and appeared to benefit from these interventions. The patient ended the session in good behavioral and emotional control.
[facility]  Christian Genao, Palliative Care Attending  Contact Info: Call [phone] (HEAL Line) or message on [employer] Teams    HPI:  71 yo female pmhx of HTN, Ovarian and uterine cancer w/ spread to the liver, lungs, and peritoneum (on chemo, last tx 3 weeks ago), anemia, urinary retention, and scoliosis who also had a recent admission for PRES and new onset LLE DVT (11/3) that currently presents with hypoxia 88% and reported hypotension at [facility]. Pt reports she felt fine and denied any shortness of breath or chest pain but because of recent DVT diagnosis and hypoxia was forced to come to the hospital. Pt was discharged on full anticoagulation after her last admission (Lovenox 50mg BID) which she states she continued to take consistently and last dose taken last night. Patient denies fever/chills, nausea, vomiting, chest pain, palpitations, dyspnea, cough or sputum production. Otherwise, pt mentions continued weakness and inability to move L leg after diagnosis of DVT on last admission. Also reports she has not been straight cath' d in 2 days and does not urinate otherwise.     Pt presented to the ED w/ vitals of: T 97.4, HR 94, /74, and RR 14 sat 86% on RA  Labs in ED: WBC 12.59, HgB 8.4 (baseline 10-11 from chart), D-dimer 1042, Lactate 2.3, Cr 3.50 (bl 1.5 from chart), Alk 749 and AST 80, BNP 3037    CTA done showing: No abnormal dilatation of the main pulmonary artery, and no pulmonary embolism. Multifocal small to large in parenchymal and pleural masses bilaterally as noted on prior studies. Small left pleural effusion. Bibasilar atelectasis. No pericardial effusion.    Pt was placed on 2L of NC and sat went up to 95%, s/p 2L LR, currently as baseline BP per pt. Received dose of Lovenox 50mg as well.  (16 Nov 2022 09:05)    PERTINENT PM/SXH:   Ovarian cancer    Ovarian ca    HTN (hypertension)    Anemia      No significant past surgical history      FAMILY HISTORY:    ITEMS NOT CHECKED ARE NOT PRESENT    SOCIAL HISTORY:   Significant other/partner:  [x]  Children:  []   Substance hx:  []   Tobacco hx:  []   Alcohol hx: []   Home Opioid hx:  [] I-Stop Reference No:  - no active Rx's / see chart note  Living Situation: [x]Home  []Long term care  []Rehab []Other  Synagogue/Spiritual practice: ; Role of organized Congregation [x ] important [ ] some [ ] unable to assess  Coping: [ ] well [ ] with difficulty [ x] poor coping [ ] unable to assess  Support system: [ ] strong [x ] adequate [ ] inadequate    ADVANCE DIRECTIVES:    []MOLST  []Living Will  DECISION MAKER(s):  [] Health Care Proxy(s)  [] Surrogate(s)  [] Guardian           Name(s)/Phone Number(s):  Yash Bergman (Friend) [phone]    BASELINE (I)ADLs (prior to admission):  [place]: []Total  [] Moderate [x]Dependent    ALLERGIES:  Benadryl (Other)  Compazine (Unknown)    MEDICATIONS  (STANDING):    MEDICATIONS  (PRN):    PRESENT SYMPTOMS: []Unable to obtain due to poor mentation/encephalopathy  Source if other than patient:  []Family   []Team     Pain: [x ] yes [ ] no  QOL impact - Unable to do ADLs  Location - Abdomen                    Aggravating Factors - movement  Quality - sharp  Radiation - none  Timing - intermittent  Severity (0-10 scale) - 7  Minimal Acceptable Level (0-10 scale) - 4    PAIN AD Score:  http://geriatrictoolkit.[place].[facility]/cog/painad.pdf (press ctrl +  left click to view)    Dyspnea:                           [ ]Mild  [x ]Moderate [ ]Severe  Anxiety:                             [ ]Mild [ ]Moderate [x ]Severe  Fatigue:                             [ ]Mild [ ]Moderate [x ]Severe  Nausea:                             [x ]Mild [ ]Moderate [ ]Severe  Loss of Appetite:             [ ]Mild [ ]Moderate [ x]Severe  Constipation:                   [x ]Mild [ ]Moderate [ ]Severe    Other Symptoms:  [x ]All other review of systems negative     Palliative Performance Status Version 2:  40%    http://npcrc.org/files/news/palliative_performance_scale_ppsv2.pdf    PHYSICAL EXAM:  GENERAL:  [ x]Alert  [ x]Oriented x 3  [ ]Lethargic  [ ]Cachexia  [ ]Unarousable  [ ]Verbal  [ ]Non-Verbal  Behavioral:   [ ]Anxiety  [ ]Delirium [ ]Agitation [x ]Cooperative  HEENT:  [ ]Normal   [ x]Dry mouth   [ ]ET Tube/Trach  [ ]Oral lesions  PULMONARY:   [ ]Clear [x]Tachypnea  []Audible excessive secretions   [ ]Rhonchi        [ ]Right [ ]Left [ ]Bilateral  [ ]Crackles        [ ]Right [ ]Left [ ]Bilateral  [ ]Wheezing     [ ]Right [ ]Left [ ]Bilateral  CARDIOVASCULAR:    [ ]Regular [ ]Irregular [ x]Tachy  [ ]Lonnie [ ]Murmur [ ]Other  GASTROINTESTINAL:  [x ]Soft  [ ]Distended   [ x]+BS  [x ]Non tender [ ]Tender  [ ]PEG [ ]OGT/ NGT  Last BM:   GENITOURINARY:  [ x]Normal [ ] Incontinent   [ ]Oliguria/Anuria   [ ]Michaels  MUSCULOSKELETAL:   [ ]Normal   [x ]Weakness  [ ]Bed/Wheelchair bound [ ]Edema  NEUROLOGIC:   [ x]No focal deficits  [ ]Cognitive impairment  [ ]Dysphagia [ ]Dysarthria [ ]Paresis [ ]Encephalopathic   SKIN:   [ x]Normal   [ ]Pressure ulcer(s)  [ ]Rash    CRITICAL CARE:  [ ]Shock Present  [ ]Septic [ ]Cardiogenic [ ]Neurologic [ ]Hypovolemic  [ ]Vasopressors [ ]Inotropes   [ ]Respiratory failure present [ ]Mechanical Ventilation [ ]Non-invasive ventilatory support [ ]High-Flow  [ ]Acute  [ ]Chronic [ ]Hypoxic  [ ]Hypercarbic  [ ]Other organ failure    Vital Signs Last 24 Hrs  T(C): --  T(F): --  HR: -- 73  BP: -- 106/67  BP(mean): --  RR: -- 16  SpO2: -- 93     I&O's Summary      LABS:    Reviewed  Creatinine of 2.7.        RADIOLOGY & ADDITIONAL STUDIES:  Reviewed  Metastatic disease to lung, lung, peritenoneum     PROTEIN CALORIE MALNUTRITION PRESENT: [ ]mild [ ]moderate [ ]severe [ ]underweight [ ]morbid obesity  [ ]PPSV2 < or = to 30% [ ]significant weight loss  [ ]poor nutritional intake [ ]catabolic state [ ]anasarca     Artificial Nutrition [ ]     REFERRALS:  [x]Social Work  [ ]Case management [ ]PT/OT [ ]Chaplaincy  [ ]Hospice  [ ]Patient/Family Support    DISCUSSION OF CASE: Family - to obtain additional history and to provide emotional support; ( ) -     AP: 72 F with stage IV ovarian and uterine cancer, respiratory failure, functional quadriplegia, encounter for palliative care.     1) Metastatic Ovarian/Uterine Cancer:  - Stage IV disease.   - Extensive progression seen on imaging.   - Patient is on palliative Keytruda as an outpatient.  - Prognosis is extremely guarded.  - Hospice care is recommended.  2) Respiratory Failure:  - Multifactorial. Believed to be secondary to PNA, although patient also with metastatic disease, pleural effusions.  - Supportive care. DNR/DNI in place. No role for opiates at this time.  3) Functional Quadriplegia:  - PPSV2 is 30%.  - Full assist with most ADLs.  - Poor prognostic sign.  4) Encounter for Palliative Care:  - Patient known to Palliative care team, with advanced GYN malignancy, still on disease modifying interventions. Consulted for symptom management as well as goals of care.  5) Advanced Care Planning:  - 60 minutes spent with patient at bedside.  - Discussion regarding advancing malignancy, as well as the fact that the patients symptoms are worsening and that hospice care is recommended.  - Patient very tearful, "not ready to die" or to stop immunotherapy.  - Not accepting of hospice services.
PALLIATIVE MEDICINE COORDINATION OF CARE NOTE FOR DESHAWN KING  [  ] ED Trigger   [  ] MICU Trigger     [  x] Consult    Patient last assessed: _11/17/22____  to manage: GOC/AD, Symptoms, and Support was recommended:___11/17/22___    ____40__ Minutes; Start: __1230___  End: _1310___, of non-face-to-face prolonged service provided that relates to (face-to-face) care that has or will occur and ongoing patient management, including one or more of the following:   - Reviewed records from other physicians or other health care professional services, including one or more of the following: other medical records and diagnostic / radiology study results     HPI:  71 yo female pmhx of HTN, Ovarian and uterine cancer w/ spread to the liver, lungs, and peritoneum (on chemo, last tx 3 weeks ago), anemia, urinary retention, and scoliosis who also had a recent admission for PRES and new onset LLE DVT (11/3) that currently presents with hypoxia 88% and reported hypotension at [facility]. Pt reports she felt fine and denied any shortness of breath or chest pain but because of recent DVT diagnosis and hypoxia was forced to come to the hospital. Pt was discharged on full anticoagulation after her last admission (Lovenox 50mg BID) which she states she continued to take consistently and last dose taken last night. Patient denies fever/chills, nausea, vomiting, chest pain, palpitations, dyspnea, cough or sputum production. Otherwise, pt mentions continued weakness and inability to move L leg after diagnosis of DVT on last admission. Also reports she has not been straight cath' d in 2 days and does not urinate otherwise.     Pt presented to the ED w/ vitals of: T 97.4, HR 94, /74, and RR 14 sat 86% on RA  Labs in ED: WBC 12.59, HgB 8.4 (baseline 10-11 from chart), D-dimer 1042, Lactate 2.3, Cr 3.50 (bl 1.5 from chart), Alk 749 and AST 80, BNP 3037    CTA done showing: No abnormal dilatation of the main pulmonary artery, and no pulmonary embolism. Multifocal small to large in parenchymal and pleural masses bilaterally as noted on prior studies. Small left pleural effusion. Bibasilar atelectasis. No pericardial effusion.    Pt was placed on 2L of NC and sat went up to 95%, s/p 2L LR, currently as baseline BP per pt. Received dose of Lovenox 50mg as well.  (16 Nov 2022 09:05)      - Other: iStop reviewed.    - Other: Medication reviewed.    The patient HAS NOT used PRN's in the last 24h.    MEDICATIONS  (STANDING):    MEDICATIONS  (PRN):      - Other: Advanced directives     Full Code     No documented MOLST form found on [facility]     No documented HCP form found on [facility]     No Living will / POA / Advance directives found on [place] / [facility].     No documented GOC notes on [facility]    - Other: Coordination/Plan of care     _2___ admissions in 1 year     Current admission LOS: 1___ days     LACE score: __14__ ADVANCE ILLNESS PATIENT.     Patient NOT previously seen by palliative medicine consult service.      Discussed with  Consult request for: "  Stage IV ovarian cancer, metastatic disease  "    Patient is an Advanced Illness patient hence the primary team will need to complete GOC document of any prior GOC discussions that were done during this admission so far as well as information available for Proxy/surrogates/NOK/guardians prior to a palliative contact.    Full consult to follow within 24h.    Will reassess later today during bedside rounds.
The writer met with the patient for f/u individual psychotherapy for 35 minutes.
Had a conversation with Ms. Roldan's oncologist, Dr. Trent Lewis. Spoke about the hydronephrosis and the workup for chronic hydronephrosis including renal nuclear function tests. Given patient's Cr is downtrending and patient is asymptomatic, both Dr. Lewis, my attending, and I agree to hold off of any interventions at this time and consider outpatient workup if choosing to pursue this route. The hydronephrosis is due to a pelvic mass that is known.    Dr. Lewis wishes to speak with someone on the primary team, and we conveyed the message to the primary team.    Dr. Lewis's phone number is , for the primary team to touch base with him.

## 2022-11-23 NOTE — PROGRESS NOTE ADULT - PROBLEM SELECTOR PLAN 8
in setting of metastatic cancer. Diagnosed w/ LLE DVT on last admission. Doppler then found Left common femoral vein and saphenofemoral junction deep venous thrombosis. Presents w/ LE edema, L>R, w/ weakness in left leg as well. Was started on Lovenox 50mg BID and pt reports compliance, last taken last night.  Plan:  - c/w lovenox, consider  - started eliquis due to no urinary sxs in setting of metastatic cancer. Diagnosed w/ LLE DVT on last admission. Doppler then found Left common femoral vein and saphenofemoral junction deep venous thrombosis. Presents w/ LE edema, L>R, w/ weakness in left leg as well. Was started on Lovenox 50mg BID and pt reports compliance, last taken last night.  Plan:  - c/w lovenox, will be dc'd on lovenox

## 2022-11-23 NOTE — PROGRESS NOTE ADULT - PROBLEM SELECTOR PLAN 2
2/2 worsening cancer vs medication induced pneumonitis In setting of chemotherapy. Pt on keytruda which can be associated w/ immune mediated pneumonitis.   -Plan as above

## 2022-11-23 NOTE — PROGRESS NOTE ADULT - SUBJECTIVE AND OBJECTIVE BOX
OVERNIGHT EVENTS:  urine in arevalo has continued to be red and has become darker in color    SUBJECTIVE / INTERVAL HPI: Patient seen and examined at bedside.     CONSTITUTIONAL: No weakness, fevers or chills  EYES/ENT: No visual changes;  No vertigo or throat pain   NECK: No pain or stiffness  RESPIRATORY: No cough, wheezing, hemoptysis; No shortness of breath  CARDIOVASCULAR: No chest pain or palpitations  GASTROINTESTINAL: No abdominal or epigastric pain. No nausea, vomiting, or hematemesis; No diarrhea or constipation. Last BM ystdy,  GENITOURINARY: No dysuria, frequency or hematuria  NEUROLOGICAL: No numbness or weakness  SKIN: No itching, burning, rashes, or lesions   All other review of systems is negative unless indicated above.      VITAL SIGNS:  Vital Signs Last 24 Hrs  T(C): 36.9 (23 Nov 2022 09:01), Max: 37.4 (22 Nov 2022 20:40)  T(F): 98.4 (23 Nov 2022 09:01), Max: 99.3 (22 Nov 2022 20:40)  HR: 89 (23 Nov 2022 09:01) (85 - 90)  BP: 134/74 (23 Nov 2022 09:01) (120/68 - 134/74)  BP(mean): --  RR: 20 (23 Nov 2022 09:01) (18 - 20)  SpO2: 96% (23 Nov 2022 09:01) (94% - 96%)    Parameters below as of 23 Nov 2022 09:01  Patient On (Oxygen Delivery Method): nasal cannula w/ humidification  O2 Flow (L/min): 3      PHYSICAL EXAM:    General: NAD, lying in bed, teary, arevalo draining dark red fluid  HEENT: NCAT; PERRL, anicteric sclera; MMM  Neck: supple, trachea midline  Cardiovascular: S1, S2 normal; RRR, no M/G/R  Respiratory: CTABL; no W/R/R  Gastrointestinal: soft, nontender, nondistended. bowel sounds present.  Skin: no ulcerations or visible rashes appreciated  Extremities: WWP; no edema, clubbing or cyanosis  Vascular: 2+ radial, DP/PT pulses B/L  Neurological: AAOx3; CN II-XII grossly intact; no focal deficits, sad affect, teary, appears fearful and anxious    MEDICATIONS:  MEDICATIONS  (STANDING):  enoxaparin Injectable 60 milliGRAM(s) SubCutaneous every 24 hours  escitalopram 7.5 milliGRAM(s) Oral daily  folic acid 1 milliGRAM(s) Oral daily  gabapentin 100 milliGRAM(s) Oral two times a day  levETIRAcetam  Solution 500 milliGRAM(s) Oral two times a day  NIFEdipine XL 60 milliGRAM(s) Oral daily  polyethylene glycol 3350 17 Gram(s) Oral daily  senna 2 Tablet(s) Oral at bedtime    MEDICATIONS  (PRN):  acetaminophen     Tablet .. 650 milliGRAM(s) Oral every 6 hours PRN Temp greater or equal to 38C (100.4F), Mild Pain (1 - 3)  aluminum hydroxide/magnesium hydroxide/simethicone Suspension 30 milliLiter(s) Oral every 4 hours PRN Dyspepsia  LORazepam     Tablet 0.5 milliGRAM(s) Oral every 8 hours PRN Nausea and/or Vomiting - Second line  melatonin 3 milliGRAM(s) Oral at bedtime PRN Insomnia  ondansetron Injectable 4 milliGRAM(s) IV Push every 8 hours PRN Nausea and/or Vomiting - First Line      ALLERGIES:  Allergies    Benadryl (Other)  Compazine (Unknown)    Intolerances        LABS:                        8.5    8.41  )-----------( 195      ( 23 Nov 2022 05:30 )             26.4     11-23    136  |  105  |  28<H>  ----------------------------<  98  3.9   |  23  |  1.69<H>    Ca    8.1<L>      23 Nov 2022 05:30  Phos  3.5     11-23  Mg     2.0     11-23          CAPILLARY BLOOD GLUCOSE          RADIOLOGY & ADDITIONAL TESTS: Reviewed.

## 2022-11-23 NOTE — PROGRESS NOTE ADULT - PROVIDER SPECIALTY LIST ADULT
Hospitalist
Internal Medicine
Rehab Medicine
Rehab Medicine
Internal Medicine
Pulmonology
Urology
Urology
Pulmonology
Internal Medicine

## 2022-11-23 NOTE — PROGRESS NOTE ADULT - PROBLEM SELECTOR PLAN 3
possibly 2/2 obstructive in setting of urinary retention vs pre-renal vs ATN in setting of drop in BP. Baseline Cr of 1.5 from chart, presents w/ Cr 3.5. s/p 2L LR w/ drop to 2.16 but uptrending again. Pt reports she does not urinate without straight cath, last done 2 days ago. Bladder scan - 280cc. UA w/ WBC 5-10 and trace leuk esterase but no suprapubic tenderness or complaints of dysuria or frequency/urgency. retroperitoneal US showed 2 stones in L kidney , moderate hydronephrosis with urinary obstruction. dc'd oxybutynin. CT renal stones performed, shows tumor infiltration into urinary system.  Plan:    -arevalo draining dark red fluid, urology called to consider 2nd bladder irrigation   -urology irrigated pt's bladder for blood in arevalo, uro recommends outpt f/u with Dr. Correia in 2-3 wks  - c/w bladder scan q6h  - urology team rules out any inpatient procedures, recommends outpt f/u for tumor infiltration into urinary system.  - strict I&Os  - avoid nephrotoxic drugs

## 2022-11-23 NOTE — PROGRESS NOTE ADULT - PROBLEM SELECTOR PROBLEM 9
Healthcare maintenance

## 2022-11-23 NOTE — PROGRESS NOTE ADULT - PROBLEM SELECTOR PLAN 1
2/2 left pleural effusion likely 2/2 progressing metastatic ovarian cancer vs HF. Presented with hypoxia to 86%, placed on 2L NC with improvement to 95%. Denies any fever, chills, cough, or dyspnea. BNP elevated to 3039 but in setting of CKD and unilateral effusion w/ EF of 60-65% on TTE last admission, less likely HF. CTA done showing no abnormal dilatation of the main pulmonary artery, and no pulmonary embolism. Multifocal small to large in parenchymal and pleural masses bilaterally as noted on prior studies. Small left pleural effusion. Bibasilar atelectasis. urine legionella and strep negative, RVP panel negative    -completion of 7 day Zosyn course, monitor off ABx  - Currently on 5L, wean down to 3L O2 as tolerated with goals SpO2 90-92%  - Pulm consulted and plan for outpatient f/u with Dr. Feng and repeat CT in 6-8 weeks

## 2022-11-23 NOTE — PROGRESS NOTE ADULT - PROBLEM SELECTOR PROBLEM 6
Anemia
Acute kidney injury superimposed on CKD
Acute kidney injury superimposed on CKD
Anemia
Acute kidney injury superimposed on CKD
Acute kidney injury superimposed on CKD

## 2022-11-23 NOTE — CHART NOTE - NSCHARTNOTESELECT_GEN_ALL_CORE
Off Service Note
Palliative Care Attending
Palliative Care Attending
Psychotherapy/Event Note
Therapy Note- Psychology Intern/Event Note

## 2022-11-23 NOTE — PROGRESS NOTE ADULT - PROBLEM SELECTOR PLAN 4
possibly 2/2 folate deficiency vs bleed w/ possible concurrent CORBIN vs ACD. Pt presents w/ Hgb of 8.4 (baseline 10 from prior admission). Drop to Hgb 7.7 likely dilutional s/p 2L fluid in ED. FOBT positive but pt denies hematemesis or black stool, hasn't had a bowel movement in 2 days. Rectal exam unremarkable for blood. Iron panel from last admission showing normal total iron  Plan:  - start on PPI  - f/u reticulocyte count  - started 1U pRBCs, hgb post-tranfusion improved  - monitor H&H  - active type and screen  - transfuse Hgb <7  - folate supplementation   - f/u folate as pt on 5-FU chemo
Pt reports requiring straight cath as she cant urinate on her own, last done 2 days ago. Possibly 2/2 metastatic cancer and obstructive mass. Bladder scan showing 280cc urine.  Plan:  - c/w bladder scan q6h, straight cath >30cc (likely to require arevalo)
Pt reports requiring straight cath as she cant urinate on her own, last done 2 days ago. Possibly 2/2 metastatic cancer and obstructive mass. Bladder scan showing 280cc urine.  Plan:  - c/w bladder scan q6h, straight cath >30cc (likely to require arevalo)
possibly 2/2 folate deficiency vs bleed w/ possible concurrent CORBIN vs ACD. Pt presents w/ Hgb of 8.4 (baseline 10 from prior admission). Drop to Hgb 7.7 likely dilutional s/p 2L fluid in ED. FOBT positive but pt denies hematemesis or black stool, hasn't had a bowel movement in 2 days. Rectal exam unremarkable for blood. Iron panel from last admission showing normal total iron  Plan:  - start on PPI  - f/u reticulocyte count  - started 1U pRBCs, f/u hgb post-tranfusion  - monitor H&H  - active type and screen  - transfuse Hgb <7  - folate supplementation   - f/u folate as pt on 5-FU chemo  -
possibly 2/2 folate deficiency vs bleed w/ possible concurrent CORBIN vs ACD. Pt presents w/ Hgb of 8.4 (baseline 10 from prior admission). Drop to Hgb 7.7 likely dilutional s/p 2L fluid in ED. FOBT positive but pt denies hematemesis or black stool, hasn't had a bowel movement in 2 days. Rectal exam unremarkable for blood. Iron panel from last admission showing normal total iron  Plan:  - start on PPI  - f/u reticulocyte count  - started 1U pRBCs, hgb post-tranfusion improved  - monitor H&H  - active type and screen  - transfuse Hgb <7  - folate supplementation   - f/u folate as pt on 5-FU chemo
possibly 2/2 folate deficiency vs bleed w/ possible concurrent CORBIN vs ACD. Pt presents w/ Hgb of 8.4 (baseline 10 from prior admission). Drop to Hgb 7.7 likely dilutional s/p 2L fluid in ED. FOBT positive but pt denies hematemesis or black stool, hasn't had a bowel movement in 2 days. Rectal exam unremarkable for blood. Iron panel from last admission showing normal total iron  Plan:  - start on PPI  - f/u reticulocyte count  - started 1U pRBCs, hgb post-tranfusion improved  - monitor H&H  - active type and screen  - transfuse Hgb <7  - folate supplementation   - f/u folate as pt on 5-FU chemo

## 2022-11-24 VITALS
SYSTOLIC BLOOD PRESSURE: 129 MMHG | DIASTOLIC BLOOD PRESSURE: 74 MMHG | HEART RATE: 87 BPM | OXYGEN SATURATION: 96 % | RESPIRATION RATE: 18 BRPM | TEMPERATURE: 98 F

## 2022-11-24 LAB
ANION GAP SERPL CALC-SCNC: 11 MMOL/L — SIGNIFICANT CHANGE UP (ref 5–17)
ANISOCYTOSIS BLD QL: SLIGHT — SIGNIFICANT CHANGE UP
BASOPHILS # BLD AUTO: 0.09 K/UL — SIGNIFICANT CHANGE UP (ref 0–0.2)
BASOPHILS NFR BLD AUTO: 0.9 % — SIGNIFICANT CHANGE UP (ref 0–2)
BUN SERPL-MCNC: 26 MG/DL — HIGH (ref 7–23)
CALCIUM SERPL-MCNC: 8.2 MG/DL — LOW (ref 8.4–10.5)
CHLORIDE SERPL-SCNC: 102 MMOL/L — SIGNIFICANT CHANGE UP (ref 96–108)
CO2 SERPL-SCNC: 19 MMOL/L — LOW (ref 22–31)
CREAT SERPL-MCNC: 1.48 MG/DL — HIGH (ref 0.5–1.3)
DACRYOCYTES BLD QL SMEAR: SLIGHT — SIGNIFICANT CHANGE UP
EGFR: 37 ML/MIN/1.73M2 — LOW
EOSINOPHIL # BLD AUTO: 0.09 K/UL — SIGNIFICANT CHANGE UP (ref 0–0.5)
EOSINOPHIL NFR BLD AUTO: 0.9 % — SIGNIFICANT CHANGE UP (ref 0–6)
GIANT PLATELETS BLD QL SMEAR: PRESENT — SIGNIFICANT CHANGE UP
GLUCOSE SERPL-MCNC: 84 MG/DL — SIGNIFICANT CHANGE UP (ref 70–99)
HCT VFR BLD CALC: 27.8 % — LOW (ref 34.5–45)
HGB BLD-MCNC: 8.7 G/DL — LOW (ref 11.5–15.5)
LYMPHOCYTES # BLD AUTO: 0.09 K/UL — LOW (ref 1–3.3)
LYMPHOCYTES # BLD AUTO: 0.9 % — LOW (ref 13–44)
MACROCYTES BLD QL: SLIGHT — SIGNIFICANT CHANGE UP
MAGNESIUM SERPL-MCNC: 2 MG/DL — SIGNIFICANT CHANGE UP (ref 1.6–2.6)
MANUAL SMEAR VERIFICATION: SIGNIFICANT CHANGE UP
MCHC RBC-ENTMCNC: 31.3 GM/DL — LOW (ref 32–36)
MCHC RBC-ENTMCNC: 32.2 PG — SIGNIFICANT CHANGE UP (ref 27–34)
MCV RBC AUTO: 103 FL — HIGH (ref 80–100)
MICROCYTES BLD QL: SLIGHT — SIGNIFICANT CHANGE UP
MONOCYTES # BLD AUTO: 0.97 K/UL — HIGH (ref 0–0.9)
MONOCYTES NFR BLD AUTO: 9.5 % — SIGNIFICANT CHANGE UP (ref 2–14)
MYELOCYTES NFR BLD: 0.9 % — HIGH (ref 0–0)
NEUTROPHILS # BLD AUTO: 8.86 K/UL — HIGH (ref 1.8–7.4)
NEUTROPHILS NFR BLD AUTO: 86.9 % — HIGH (ref 43–77)
OVALOCYTES BLD QL SMEAR: SLIGHT — SIGNIFICANT CHANGE UP
PHOSPHATE SERPL-MCNC: 3.6 MG/DL — SIGNIFICANT CHANGE UP (ref 2.5–4.5)
PLAT MORPH BLD: ABNORMAL
PLATELET # BLD AUTO: 198 K/UL — SIGNIFICANT CHANGE UP (ref 150–400)
POIKILOCYTOSIS BLD QL AUTO: SLIGHT — SIGNIFICANT CHANGE UP
POLYCHROMASIA BLD QL SMEAR: SLIGHT — SIGNIFICANT CHANGE UP
POTASSIUM SERPL-MCNC: 3.9 MMOL/L — SIGNIFICANT CHANGE UP (ref 3.5–5.3)
POTASSIUM SERPL-SCNC: 3.9 MMOL/L — SIGNIFICANT CHANGE UP (ref 3.5–5.3)
RBC # BLD: 2.7 M/UL — LOW (ref 3.8–5.2)
RBC # FLD: 20.8 % — HIGH (ref 10.3–14.5)
RBC BLD AUTO: ABNORMAL
SODIUM SERPL-SCNC: 132 MMOL/L — LOW (ref 135–145)
WBC # BLD: 10.19 K/UL — SIGNIFICANT CHANGE UP (ref 3.8–10.5)
WBC # FLD AUTO: 10.19 K/UL — SIGNIFICANT CHANGE UP (ref 3.8–10.5)

## 2022-11-24 PROCEDURE — 83735 ASSAY OF MAGNESIUM: CPT

## 2022-11-24 PROCEDURE — 87040 BLOOD CULTURE FOR BACTERIA: CPT

## 2022-11-24 PROCEDURE — 81001 URINALYSIS AUTO W/SCOPE: CPT

## 2022-11-24 PROCEDURE — 71275 CT ANGIOGRAPHY CHEST: CPT

## 2022-11-24 PROCEDURE — 85379 FIBRIN DEGRADATION QUANT: CPT

## 2022-11-24 PROCEDURE — 84156 ASSAY OF PROTEIN URINE: CPT

## 2022-11-24 PROCEDURE — 82803 BLOOD GASES ANY COMBINATION: CPT

## 2022-11-24 PROCEDURE — 82607 VITAMIN B-12: CPT

## 2022-11-24 PROCEDURE — 86850 RBC ANTIBODY SCREEN: CPT

## 2022-11-24 PROCEDURE — 86923 COMPATIBILITY TEST ELECTRIC: CPT

## 2022-11-24 PROCEDURE — 97110 THERAPEUTIC EXERCISES: CPT

## 2022-11-24 PROCEDURE — 86900 BLOOD TYPING SEROLOGIC ABO: CPT

## 2022-11-24 PROCEDURE — 96360 HYDRATION IV INFUSION INIT: CPT

## 2022-11-24 PROCEDURE — 85025 COMPLETE CBC W/AUTO DIFF WBC: CPT

## 2022-11-24 PROCEDURE — 85027 COMPLETE CBC AUTOMATED: CPT

## 2022-11-24 PROCEDURE — 85045 AUTOMATED RETICULOCYTE COUNT: CPT

## 2022-11-24 PROCEDURE — 71045 X-RAY EXAM CHEST 1 VIEW: CPT

## 2022-11-24 PROCEDURE — 82962 GLUCOSE BLOOD TEST: CPT

## 2022-11-24 PROCEDURE — 82272 OCCULT BLD FECES 1-3 TESTS: CPT

## 2022-11-24 PROCEDURE — P9040: CPT

## 2022-11-24 PROCEDURE — 87640 STAPH A DNA AMP PROBE: CPT

## 2022-11-24 PROCEDURE — 85520 HEPARIN ASSAY: CPT

## 2022-11-24 PROCEDURE — 80202 ASSAY OF VANCOMYCIN: CPT

## 2022-11-24 PROCEDURE — 83930 ASSAY OF BLOOD OSMOLALITY: CPT

## 2022-11-24 PROCEDURE — 97530 THERAPEUTIC ACTIVITIES: CPT

## 2022-11-24 PROCEDURE — 84133 ASSAY OF URINE POTASSIUM: CPT

## 2022-11-24 PROCEDURE — 84145 PROCALCITONIN (PCT): CPT

## 2022-11-24 PROCEDURE — 87449 NOS EACH ORGANISM AG IA: CPT

## 2022-11-24 PROCEDURE — 87641 MR-STAPH DNA AMP PROBE: CPT

## 2022-11-24 PROCEDURE — 82570 ASSAY OF URINE CREATININE: CPT

## 2022-11-24 PROCEDURE — 80053 COMPREHEN METABOLIC PANEL: CPT

## 2022-11-24 PROCEDURE — 36415 COLL VENOUS BLD VENIPUNCTURE: CPT

## 2022-11-24 PROCEDURE — 87899 AGENT NOS ASSAY W/OPTIC: CPT

## 2022-11-24 PROCEDURE — 96361 HYDRATE IV INFUSION ADD-ON: CPT

## 2022-11-24 PROCEDURE — 74176 CT ABD & PELVIS W/O CONTRAST: CPT

## 2022-11-24 PROCEDURE — 84484 ASSAY OF TROPONIN QUANT: CPT

## 2022-11-24 PROCEDURE — 85610 PROTHROMBIN TIME: CPT

## 2022-11-24 PROCEDURE — 87635 SARS-COV-2 COVID-19 AMP PRB: CPT

## 2022-11-24 PROCEDURE — 84100 ASSAY OF PHOSPHORUS: CPT

## 2022-11-24 PROCEDURE — 86803 HEPATITIS C AB TEST: CPT

## 2022-11-24 PROCEDURE — 80048 BASIC METABOLIC PNL TOTAL CA: CPT

## 2022-11-24 PROCEDURE — 83605 ASSAY OF LACTIC ACID: CPT

## 2022-11-24 PROCEDURE — 99239 HOSP IP/OBS DSCHRG MGMT >30: CPT

## 2022-11-24 PROCEDURE — 83880 ASSAY OF NATRIURETIC PEPTIDE: CPT

## 2022-11-24 PROCEDURE — 76770 US EXAM ABDO BACK WALL COMP: CPT

## 2022-11-24 PROCEDURE — 85730 THROMBOPLASTIN TIME PARTIAL: CPT

## 2022-11-24 PROCEDURE — 36430 TRANSFUSION BLD/BLD COMPNT: CPT

## 2022-11-24 PROCEDURE — 99291 CRITICAL CARE FIRST HOUR: CPT

## 2022-11-24 PROCEDURE — 0225U NFCT DS DNA&RNA 21 SARSCOV2: CPT

## 2022-11-24 PROCEDURE — 84300 ASSAY OF URINE SODIUM: CPT

## 2022-11-24 PROCEDURE — 84540 ASSAY OF URINE/UREA-N: CPT

## 2022-11-24 PROCEDURE — 86901 BLOOD TYPING SEROLOGIC RH(D): CPT

## 2022-11-24 PROCEDURE — 83935 ASSAY OF URINE OSMOLALITY: CPT

## 2022-11-24 PROCEDURE — 97161 PT EVAL LOW COMPLEX 20 MIN: CPT

## 2022-11-24 RX ORDER — ENOXAPARIN SODIUM 100 MG/ML
50 INJECTION SUBCUTANEOUS
Qty: 0 | Refills: 0 | DISCHARGE

## 2022-11-24 RX ORDER — NIFEDIPINE 30 MG
1 TABLET, EXTENDED RELEASE 24 HR ORAL
Qty: 0 | Refills: 0 | DISCHARGE
Start: 2022-11-24

## 2022-11-24 RX ADMIN — GABAPENTIN 100 MILLIGRAM(S): 400 CAPSULE ORAL at 06:09

## 2022-11-24 RX ADMIN — Medication 60 MILLIGRAM(S): at 08:45

## 2022-11-24 RX ADMIN — LEVETIRACETAM 500 MILLIGRAM(S): 250 TABLET, FILM COATED ORAL at 06:08

## 2022-11-24 RX ADMIN — Medication 650 MILLIGRAM(S): at 12:29

## 2022-11-24 RX ADMIN — Medication 1 MILLIGRAM(S): at 11:45

## 2022-11-24 RX ADMIN — ESCITALOPRAM OXALATE 7.5 MILLIGRAM(S): 10 TABLET, FILM COATED ORAL at 11:45

## 2022-11-25 RX ORDER — ENOXAPARIN SODIUM 100 MG/ML
60 INJECTION SUBCUTANEOUS
Qty: 18 | Refills: 0
Start: 2022-11-25 | End: 2022-12-24

## 2022-11-26 VITALS
HEART RATE: 105 BPM | OXYGEN SATURATION: 95 % | DIASTOLIC BLOOD PRESSURE: 76 MMHG | WEIGHT: 117.07 LBS | SYSTOLIC BLOOD PRESSURE: 124 MMHG | HEIGHT: 61 IN | RESPIRATION RATE: 19 BRPM | TEMPERATURE: 98 F

## 2022-11-26 LAB
ALBUMIN SERPL ELPH-MCNC: 1 G/DL — LOW (ref 3.4–5)
ALP SERPL-CCNC: 776 U/L — HIGH (ref 40–120)
ALT FLD-CCNC: 18 U/L — SIGNIFICANT CHANGE UP (ref 12–42)
ANION GAP SERPL CALC-SCNC: 6 MMOL/L — LOW (ref 9–16)
ANISOCYTOSIS BLD QL: SLIGHT — SIGNIFICANT CHANGE UP
APPEARANCE UR: ABNORMAL
APTT BLD: 34.3 SEC — SIGNIFICANT CHANGE UP (ref 27.5–35.5)
AST SERPL-CCNC: 74 U/L — HIGH (ref 15–37)
BACTERIA # UR AUTO: PRESENT /HPF
BASOPHILS # BLD AUTO: 0.07 K/UL — SIGNIFICANT CHANGE UP (ref 0–0.2)
BASOPHILS NFR BLD AUTO: 0.5 % — SIGNIFICANT CHANGE UP (ref 0–2)
BILIRUB SERPL-MCNC: 0.5 MG/DL — SIGNIFICANT CHANGE UP (ref 0.2–1.2)
BILIRUB UR-MCNC: NEGATIVE — SIGNIFICANT CHANGE UP
BUN SERPL-MCNC: 28 MG/DL — HIGH (ref 7–23)
CALCIUM SERPL-MCNC: 8.1 MG/DL — LOW (ref 8.5–10.5)
CHLORIDE SERPL-SCNC: 107 MMOL/L — SIGNIFICANT CHANGE UP (ref 96–108)
CO2 SERPL-SCNC: 20 MMOL/L — LOW (ref 22–31)
COLOR SPEC: ABNORMAL
COMMENT - URINE: SIGNIFICANT CHANGE UP
CREAT SERPL-MCNC: 1.18 MG/DL — SIGNIFICANT CHANGE UP (ref 0.5–1.3)
DIFF PNL FLD: ABNORMAL
EGFR: 49 ML/MIN/1.73M2 — LOW
EOSINOPHIL # BLD AUTO: 0.12 K/UL — SIGNIFICANT CHANGE UP (ref 0–0.5)
EOSINOPHIL NFR BLD AUTO: 0.8 % — SIGNIFICANT CHANGE UP (ref 0–6)
EPI CELLS # UR: SIGNIFICANT CHANGE UP /HPF (ref 0–5)
GIANT PLATELETS BLD QL SMEAR: PRESENT — SIGNIFICANT CHANGE UP
GLUCOSE BLDC GLUCOMTR-MCNC: 132 MG/DL — HIGH (ref 70–99)
GLUCOSE SERPL-MCNC: 120 MG/DL — HIGH (ref 70–99)
GLUCOSE UR QL: NEGATIVE — SIGNIFICANT CHANGE UP
HCT VFR BLD CALC: 19.3 % — CRITICAL LOW (ref 34.5–45)
HCT VFR BLD CALC: 21.3 % — LOW (ref 34.5–45)
HGB BLD-MCNC: 6.1 G/DL — CRITICAL LOW (ref 11.5–15.5)
HGB BLD-MCNC: 6.4 G/DL — CRITICAL LOW (ref 11.5–15.5)
IMM GRANULOCYTES NFR BLD AUTO: 1.3 % — HIGH (ref 0–0.9)
INR BLD: 1.05 — SIGNIFICANT CHANGE UP (ref 0.88–1.16)
KETONES UR-MCNC: ABNORMAL MG/DL
LACTATE SERPL-SCNC: 1 MMOL/L — SIGNIFICANT CHANGE UP (ref 0.4–2)
LEUKOCYTE ESTERASE UR-ACNC: ABNORMAL
LYMPHOCYTES # BLD AUTO: 0.49 K/UL — LOW (ref 1–3.3)
LYMPHOCYTES # BLD AUTO: 3.2 % — LOW (ref 13–44)
MACROCYTES BLD QL: SLIGHT — SIGNIFICANT CHANGE UP
MANUAL SMEAR VERIFICATION: SIGNIFICANT CHANGE UP
MCHC RBC-ENTMCNC: 30 GM/DL — LOW (ref 32–36)
MCHC RBC-ENTMCNC: 31.6 GM/DL — LOW (ref 32–36)
MCHC RBC-ENTMCNC: 32.5 PG — SIGNIFICANT CHANGE UP (ref 27–34)
MCHC RBC-ENTMCNC: 33.3 PG — SIGNIFICANT CHANGE UP (ref 27–34)
MCV RBC AUTO: 105.5 FL — HIGH (ref 80–100)
MCV RBC AUTO: 108.1 FL — HIGH (ref 80–100)
MONOCYTES # BLD AUTO: 0.97 K/UL — HIGH (ref 0–0.9)
MONOCYTES NFR BLD AUTO: 6.4 % — SIGNIFICANT CHANGE UP (ref 2–14)
NEUTROPHILS # BLD AUTO: 13.26 K/UL — HIGH (ref 1.8–7.4)
NEUTROPHILS NFR BLD AUTO: 87.8 % — HIGH (ref 43–77)
NITRITE UR-MCNC: POSITIVE
NRBC # BLD: 0 /100 WBCS — SIGNIFICANT CHANGE UP (ref 0–0)
NRBC # BLD: 0 /100 WBCS — SIGNIFICANT CHANGE UP (ref 0–0)
OB PNL STL: POSITIVE
PH UR: 6.5 — SIGNIFICANT CHANGE UP (ref 5–8)
PLAT MORPH BLD: ABNORMAL
PLATELET # BLD AUTO: 220 K/UL — SIGNIFICANT CHANGE UP (ref 150–400)
PLATELET # BLD AUTO: 256 K/UL — SIGNIFICANT CHANGE UP (ref 150–400)
PLATELET COUNT - ESTIMATE: NORMAL — SIGNIFICANT CHANGE UP
POLYCHROMASIA BLD QL SMEAR: SLIGHT — SIGNIFICANT CHANGE UP
POTASSIUM SERPL-MCNC: 5 MMOL/L — SIGNIFICANT CHANGE UP (ref 3.5–5.3)
POTASSIUM SERPL-SCNC: 5 MMOL/L — SIGNIFICANT CHANGE UP (ref 3.5–5.3)
PROT SERPL-MCNC: 5.5 G/DL — LOW (ref 6.4–8.2)
PROT UR-MCNC: >=300 MG/DL
PROTHROM AB SERPL-ACNC: 12.3 SEC — SIGNIFICANT CHANGE UP (ref 10.5–13.4)
RBC # BLD: 1.83 M/UL — LOW (ref 3.8–5.2)
RBC # BLD: 1.97 M/UL — LOW (ref 3.8–5.2)
RBC # FLD: 20 % — HIGH (ref 10.3–14.5)
RBC # FLD: 20.2 % — HIGH (ref 10.3–14.5)
RBC BLD AUTO: ABNORMAL
RBC CASTS # UR COMP ASSIST: ABNORMAL /HPF
SODIUM SERPL-SCNC: 133 MMOL/L — SIGNIFICANT CHANGE UP (ref 132–145)
SP GR SPEC: 1.02 — SIGNIFICANT CHANGE UP (ref 1–1.03)
UROBILINOGEN FLD QL: 1 E.U./DL — SIGNIFICANT CHANGE UP
WBC # BLD: 15.11 K/UL — HIGH (ref 3.8–10.5)
WBC # BLD: 16.28 K/UL — HIGH (ref 3.8–10.5)
WBC # FLD AUTO: 15.11 K/UL — HIGH (ref 3.8–10.5)
WBC # FLD AUTO: 16.28 K/UL — HIGH (ref 3.8–10.5)
WBC UR QL: ABNORMAL /HPF

## 2022-11-26 PROCEDURE — 71045 X-RAY EXAM CHEST 1 VIEW: CPT | Mod: 26

## 2022-11-26 PROCEDURE — 93010 ELECTROCARDIOGRAM REPORT: CPT

## 2022-11-26 PROCEDURE — 99285 EMERGENCY DEPT VISIT HI MDM: CPT

## 2022-11-26 RX ORDER — PIPERACILLIN AND TAZOBACTAM 4; .5 G/20ML; G/20ML
3.38 INJECTION, POWDER, LYOPHILIZED, FOR SOLUTION INTRAVENOUS ONCE
Refills: 0 | Status: COMPLETED | OUTPATIENT
Start: 2022-11-26 | End: 2022-11-26

## 2022-11-26 RX ORDER — FLUCONAZOLE 150 MG/1
100 TABLET ORAL ONCE
Refills: 0 | Status: DISCONTINUED | OUTPATIENT
Start: 2022-11-26 | End: 2022-11-27

## 2022-11-26 RX ORDER — FLUCONAZOLE 150 MG/1
100 TABLET ORAL EVERY 24 HOURS
Refills: 0 | Status: DISCONTINUED | OUTPATIENT
Start: 2022-11-27 | End: 2022-11-27

## 2022-11-26 RX ORDER — MORPHINE SULFATE 50 MG/1
2 CAPSULE, EXTENDED RELEASE ORAL ONCE
Refills: 0 | Status: DISCONTINUED | OUTPATIENT
Start: 2022-11-26 | End: 2022-11-26

## 2022-11-26 RX ORDER — FLUCONAZOLE 150 MG/1
TABLET ORAL
Refills: 0 | Status: DISCONTINUED | OUTPATIENT
Start: 2022-11-26 | End: 2022-11-27

## 2022-11-26 RX ADMIN — Medication 0.5 MILLIGRAM(S): at 22:16

## 2022-11-26 RX ADMIN — PIPERACILLIN AND TAZOBACTAM 200 GRAM(S): 4; .5 INJECTION, POWDER, LYOPHILIZED, FOR SOLUTION INTRAVENOUS at 20:58

## 2022-11-26 RX ADMIN — MORPHINE SULFATE 2 MILLIGRAM(S): 50 CAPSULE, EXTENDED RELEASE ORAL at 22:56

## 2022-11-26 NOTE — ED PROVIDER NOTE - OBJECTIVE STATEMENT
Pt is a 71yo F with a h/o HTN, anemia, LLE DVT (on Lovenox), anxiety, ovarian/uterine CA with mets to liver, lungs, and peritoneum (last chemo ~ 4 weeks ago) and a recent admission to [facility] for PNA/severe anemia (required blood transfusion).  Pt was discharged from [facility] to [facility] rehab facility on 11/24.  Staff at [facility] noted large out put of glen blood from Michaels today.  Initially they report blood tinged urine but today became much darker.  Michaels continues to drain and on arrival a bedside US was performed by me and I confirmed that Michaels balloon was within the bladder and the bladder was decompressed.  Pt reports generalized weakness but otherwise no new sxs.

## 2022-11-26 NOTE — ED PROVIDER NOTE - PROGRESS NOTE DETAILS
CBI catheter placed and CBI initiated.  Hematuria clearing.  Will call to initiate admission for blood transfusion and potential re-consultation of .      [facility] called but medicine hospitalist unable to take call at this time.  They will call me back. Pt accepted by Dr. Ellis.  ISSA conferenced in and will consult on pt in house.

## 2022-11-26 NOTE — ED ADULT NURSE NOTE - OBJECTIVE STATEMENT
Sent from LTC facility for blood in urinary catheter, pt notes pain x 1 day at site of catheter on urethra, placed 5 days ago at [facility] with "I was peeing around the tube from the beginning." Denies increased weakness/cp/sob/fevers/chills.     On assessment- AOx4, breathing even and unlabored on RA, no apparent distress, VSS in triage x mild tachy 103, able to speak in clear coherent sentences, non-amb, neuro intact with no apparent facial asymmetry, PERRLA. PIV from facility 22g L forearm, patent. Urinary cath in with very bloody output.  Pt pale and dry.

## 2022-11-26 NOTE — ED ADULT NURSE NOTE - CHIEF COMPLAINT QUOTE
sent from [facility] for an increase in dark red blood in arevalo catheter. pt arrives pale, generalized weakness. h/o cancer

## 2022-11-26 NOTE — ED ADULT TRIAGE NOTE - CHIEF COMPLAINT QUOTE
sent from [facility] for an increase in dark red blood in arevalo catheter. pt arrives pale, generalized weakness. h/o cancer

## 2022-11-26 NOTE — ED PROVIDER NOTE - PHYSICAL EXAMINATION
VITAL SIGNS: I have reviewed nursing notes and confirm.  CONSTITUTIONAL: Appears weak but is A&O x 4 and able to provide full history.   SKIN: Skin is warm and dry, no acute rash.  HEAD: Normocephalic; atraumatic.  EYES: PERRL, EOM intact; conjunctiva and sclera clear.  ENT: No nasal discharge; airway clear.  NECK: Supple; non tender.  CARD: S1, S2 normal; no murmurs, gallops, or rubs. Regular rate and rhythm with intermittent mild tachycardia up to 105.   RESP: No wheezes, rales or rhonchi.  ABD: Normal bowel sounds; soft; non-distended; non-tender; no hepatosplenomegaly.  : Michaels in place.  [place] blood in Michaels bag.  Draining.   MSK: Normal ROM. No clubbing, cyanosis or edema.  NEURO: Alert, oriented. Grossly unremarkable.  PSYCH: Cooperative, appropriate. VITAL SIGNS: I have reviewed nursing notes and confirm.  CONSTITUTIONAL: Appears weak but is A&O x 4 and able to provide full history.   SKIN: Skin is warm and dry, no acute rash.  HEAD: Normocephalic; atraumatic.  EYES: PERRL, EOM intact; conjunctiva and sclera clear.  ENT: No nasal discharge; airway clear.  NECK: Supple; non tender.  CARD: S1, S2 normal; no murmurs, gallops, or rubs. Regular rate and rhythm with intermittent mild tachycardia up to 105.   RESP: No wheezes, rales or rhonchi.  ABD: Normal bowel sounds; soft; non-distended; non-tender; no hepatosplenomegaly.  RECTAL: dark brown stool - guaiac sent to lab.   : Michaels in place.  [place] blood in Michaels bag.  Draining.   MSK: Normal ROM. No clubbing, cyanosis or edema.  NEURO: Alert, oriented. Grossly unremarkable.  PSYCH: Cooperative, appropriate.

## 2022-11-27 ENCOUNTER — INPATIENT (INPATIENT)
Facility: HOSPITAL | Age: 72
LOS: 11 days | Discharge: HOSPICE | DRG: 698 | End: 2022-12-09
Attending: INTERNAL MEDICINE | Admitting: STUDENT IN AN ORGANIZED HEALTH CARE EDUCATION/TRAINING PROGRAM
Payer: MEDICARE

## 2022-11-27 DIAGNOSIS — Z86.718 PERSONAL HISTORY OF OTHER VENOUS THROMBOSIS AND EMBOLISM: ICD-10-CM

## 2022-11-27 DIAGNOSIS — G89.3 NEOPLASM RELATED PAIN (ACUTE) (CHRONIC): ICD-10-CM

## 2022-11-27 DIAGNOSIS — Z88.9 ALLERGY STATUS TO UNSPECIFIED DRUGS, MEDICAMENTS AND BIOLOGICAL SUBSTANCES: ICD-10-CM

## 2022-11-27 DIAGNOSIS — C78.00 SECONDARY MALIGNANT NEOPLASM OF UNSPECIFIED LUNG: ICD-10-CM

## 2022-11-27 DIAGNOSIS — N20.0 CALCULUS OF KIDNEY: ICD-10-CM

## 2022-11-27 DIAGNOSIS — N13.30 UNSPECIFIED HYDRONEPHROSIS: ICD-10-CM

## 2022-11-27 DIAGNOSIS — F32.9 MAJOR DEPRESSIVE DISORDER, SINGLE EPISODE, UNSPECIFIED: ICD-10-CM

## 2022-11-27 DIAGNOSIS — I82.409 ACUTE EMBOLISM AND THROMBOSIS OF UNSPECIFIED DEEP VEINS OF UNSPECIFIED LOWER EXTREMITY: ICD-10-CM

## 2022-11-27 DIAGNOSIS — N32.89 OTHER SPECIFIED DISORDERS OF BLADDER: ICD-10-CM

## 2022-11-27 DIAGNOSIS — E43 UNSPECIFIED SEVERE PROTEIN-CALORIE MALNUTRITION: ICD-10-CM

## 2022-11-27 DIAGNOSIS — N18.9 CHRONIC KIDNEY DISEASE, UNSPECIFIED: ICD-10-CM

## 2022-11-27 DIAGNOSIS — R31.9 HEMATURIA, UNSPECIFIED: ICD-10-CM

## 2022-11-27 DIAGNOSIS — M41.9 SCOLIOSIS, UNSPECIFIED: ICD-10-CM

## 2022-11-27 DIAGNOSIS — I10 ESSENTIAL (PRIMARY) HYPERTENSION: ICD-10-CM

## 2022-11-27 DIAGNOSIS — C55 MALIGNANT NEOPLASM OF UTERUS, PART UNSPECIFIED: ICD-10-CM

## 2022-11-27 DIAGNOSIS — I12.9 HYPERTENSIVE CHRONIC KIDNEY DISEASE WITH STAGE 1 THROUGH STAGE 4 CHRONIC KIDNEY DISEASE, OR UNSPECIFIED CHRONIC KIDNEY DISEASE: ICD-10-CM

## 2022-11-27 DIAGNOSIS — Z66 DO NOT RESUSCITATE: ICD-10-CM

## 2022-11-27 DIAGNOSIS — D50.9 IRON DEFICIENCY ANEMIA, UNSPECIFIED: ICD-10-CM
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
## 2022-11-27 DIAGNOSIS — N13.6 PYONEPHROSIS: ICD-10-CM

## 2022-11-27 DIAGNOSIS — C56.9 MALIGNANT NEOPLASM OF UNSPECIFIED OVARY: ICD-10-CM

## 2022-11-27 DIAGNOSIS — Z79.01 LONG TERM (CURRENT) USE OF ANTICOAGULANTS: ICD-10-CM

## 2022-11-27 DIAGNOSIS — T83.83XA HEMORRHAGE DUE TO GENITOURINARY PROSTHETIC DEVICES, IMPLANTS AND GRAFTS, INITIAL ENCOUNTER: ICD-10-CM

## 2022-11-27 DIAGNOSIS — N39.0 URINARY TRACT INFECTION, SITE NOT SPECIFIED: ICD-10-CM

## 2022-11-27 DIAGNOSIS — C79.82 SECONDARY MALIGNANT NEOPLASM OF GENITAL ORGANS: ICD-10-CM
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
## 2022-11-27 DIAGNOSIS — D62 ACUTE POSTHEMORRHAGIC ANEMIA: ICD-10-CM

## 2022-11-27 DIAGNOSIS — R33.9 RETENTION OF URINE, UNSPECIFIED: ICD-10-CM
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
## 2022-11-27 DIAGNOSIS — C78.7 SECONDARY MALIGNANT NEOPLASM OF LIVER AND INTRAHEPATIC BILE DUCT: ICD-10-CM

## 2022-11-27 DIAGNOSIS — Z29.9 ENCOUNTER FOR PROPHYLACTIC MEASURES, UNSPECIFIED: ICD-10-CM

## 2022-11-27 DIAGNOSIS — I80.10 PHLEBITIS AND THROMBOPHLEBITIS OF UNSPECIFIED FEMORAL VEIN: ICD-10-CM

## 2022-11-27 DIAGNOSIS — D64.9 ANEMIA, UNSPECIFIED: ICD-10-CM

## 2022-11-27 DIAGNOSIS — D63.0 ANEMIA IN NEOPLASTIC DISEASE: ICD-10-CM

## 2022-11-27 DIAGNOSIS — C78.6 SECONDARY MALIGNANT NEOPLASM OF RETROPERITONEUM AND PERITONEUM: ICD-10-CM

## 2022-11-27 DIAGNOSIS — N30.90 CYSTITIS, UNSPECIFIED WITHOUT HEMATURIA: ICD-10-CM

## 2022-11-27 DIAGNOSIS — A41.9 SEPSIS, UNSPECIFIED ORGANISM: ICD-10-CM

## 2022-11-27 DIAGNOSIS — I80.12 PHLEBITIS AND THROMBOPHLEBITIS OF LEFT FEMORAL VEIN: ICD-10-CM

## 2022-11-27 LAB
BLD GP AB SCN SERPL QL: NEGATIVE — SIGNIFICANT CHANGE UP
HCT VFR BLD CALC: 26.2 % — LOW (ref 34.5–45)
HGB BLD-MCNC: 8.6 G/DL — LOW (ref 11.5–15.5)
MCHC RBC-ENTMCNC: 32.7 PG — SIGNIFICANT CHANGE UP (ref 27–34)
MCHC RBC-ENTMCNC: 32.8 GM/DL — SIGNIFICANT CHANGE UP (ref 32–36)
MCV RBC AUTO: 99.6 FL — SIGNIFICANT CHANGE UP (ref 80–100)
NRBC # BLD: 0 /100 WBCS — SIGNIFICANT CHANGE UP (ref 0–0)
PLATELET # BLD AUTO: 232 K/UL — SIGNIFICANT CHANGE UP (ref 150–400)
RBC # BLD: 2.63 M/UL — LOW (ref 3.8–5.2)
RBC # FLD: 20.6 % — HIGH (ref 10.3–14.5)
RH IG SCN BLD-IMP: POSITIVE — SIGNIFICANT CHANGE UP
SARS-COV-2 RNA SPEC QL NAA+PROBE: SIGNIFICANT CHANGE UP
WBC # BLD: 16.32 K/UL — HIGH (ref 3.8–10.5)
WBC # FLD AUTO: 16.32 K/UL — HIGH (ref 3.8–10.5)

## 2022-11-27 PROCEDURE — 99223 1ST HOSP IP/OBS HIGH 75: CPT

## 2022-11-27 PROCEDURE — 99221 1ST HOSP IP/OBS SF/LOW 40: CPT

## 2022-11-27 RX ORDER — FLUCONAZOLE 150 MG/1
100 TABLET ORAL EVERY 24 HOURS
Refills: 0 | Status: DISCONTINUED | OUTPATIENT
Start: 2022-11-27 | End: 2022-11-28

## 2022-11-27 RX ORDER — ENOXAPARIN SODIUM 100 MG/ML
50 INJECTION SUBCUTANEOUS EVERY 12 HOURS
Refills: 0 | Status: DISCONTINUED | OUTPATIENT
Start: 2022-11-27 | End: 2022-11-27

## 2022-11-27 RX ORDER — SENNA PLUS 8.6 MG/1
2 TABLET ORAL AT BEDTIME
Refills: 0 | Status: DISCONTINUED | OUTPATIENT
Start: 2022-11-27 | End: 2022-12-09

## 2022-11-27 RX ORDER — FOLIC ACID 0.8 MG
1 TABLET ORAL DAILY
Refills: 0 | Status: DISCONTINUED | OUTPATIENT
Start: 2022-11-27 | End: 2022-12-07

## 2022-11-27 RX ORDER — PIPERACILLIN AND TAZOBACTAM 4; .5 G/20ML; G/20ML
3.38 INJECTION, POWDER, LYOPHILIZED, FOR SOLUTION INTRAVENOUS ONCE
Refills: 0 | Status: COMPLETED | OUTPATIENT
Start: 2022-11-27 | End: 2022-11-27

## 2022-11-27 RX ORDER — POLYETHYLENE GLYCOL 3350 17 G/17G
17 POWDER, FOR SOLUTION ORAL DAILY
Refills: 0 | Status: DISCONTINUED | OUTPATIENT
Start: 2022-11-27 | End: 2022-12-09

## 2022-11-27 RX ORDER — HYDROMORPHONE HYDROCHLORIDE 2 MG/ML
0.25 INJECTION INTRAMUSCULAR; INTRAVENOUS; SUBCUTANEOUS
Refills: 0 | Status: DISCONTINUED | OUTPATIENT
Start: 2022-11-27 | End: 2022-11-27

## 2022-11-27 RX ORDER — ESCITALOPRAM OXALATE 10 MG/1
7.5 TABLET, FILM COATED ORAL DAILY
Refills: 0 | Status: DISCONTINUED | OUTPATIENT
Start: 2022-11-27 | End: 2022-12-09

## 2022-11-27 RX ORDER — MORPHINE SULFATE 50 MG/1
2 CAPSULE, EXTENDED RELEASE ORAL ONCE
Refills: 0 | Status: DISCONTINUED | OUTPATIENT
Start: 2022-11-27 | End: 2022-11-27

## 2022-11-27 RX ORDER — OXYCODONE HYDROCHLORIDE 5 MG/1
5 TABLET ORAL EVERY 4 HOURS
Refills: 0 | Status: DISCONTINUED | OUTPATIENT
Start: 2022-11-27 | End: 2022-12-01

## 2022-11-27 RX ORDER — ONDANSETRON 8 MG/1
4 TABLET, FILM COATED ORAL EVERY 8 HOURS
Refills: 0 | Status: DISCONTINUED | OUTPATIENT
Start: 2022-11-27 | End: 2022-12-09

## 2022-11-27 RX ORDER — PIPERACILLIN AND TAZOBACTAM 4; .5 G/20ML; G/20ML
3.38 INJECTION, POWDER, LYOPHILIZED, FOR SOLUTION INTRAVENOUS EVERY 8 HOURS
Refills: 0 | Status: DISCONTINUED | OUTPATIENT
Start: 2022-11-27 | End: 2022-11-28

## 2022-11-27 RX ORDER — GABAPENTIN 400 MG/1
100 CAPSULE ORAL
Refills: 0 | Status: DISCONTINUED | OUTPATIENT
Start: 2022-11-27 | End: 2022-11-30

## 2022-11-27 RX ORDER — LANOLIN ALCOHOL/MO/W.PET/CERES
3 CREAM (GRAM) TOPICAL AT BEDTIME
Refills: 0 | Status: DISCONTINUED | OUTPATIENT
Start: 2022-11-27 | End: 2022-11-28

## 2022-11-27 RX ORDER — LANOLIN ALCOHOL/MO/W.PET/CERES
3 CREAM (GRAM) TOPICAL AT BEDTIME
Refills: 0 | Status: DISCONTINUED | OUTPATIENT
Start: 2022-11-27 | End: 2022-12-09

## 2022-11-27 RX ORDER — ACETAMINOPHEN 500 MG
650 TABLET ORAL EVERY 6 HOURS
Refills: 0 | Status: DISCONTINUED | OUTPATIENT
Start: 2022-11-27 | End: 2022-11-30

## 2022-11-27 RX ORDER — OXYBUTYNIN CHLORIDE 5 MG
5 TABLET ORAL EVERY 12 HOURS
Refills: 0 | Status: DISCONTINUED | OUTPATIENT
Start: 2022-11-27 | End: 2022-12-06

## 2022-11-27 RX ORDER — HYDROMORPHONE HYDROCHLORIDE 2 MG/ML
0.5 INJECTION INTRAMUSCULAR; INTRAVENOUS; SUBCUTANEOUS ONCE
Refills: 0 | Status: DISCONTINUED | OUTPATIENT
Start: 2022-11-27 | End: 2022-11-27

## 2022-11-27 RX ORDER — LEVETIRACETAM 250 MG/1
500 TABLET, FILM COATED ORAL
Refills: 0 | Status: DISCONTINUED | OUTPATIENT
Start: 2022-11-27 | End: 2022-12-09

## 2022-11-27 RX ADMIN — GABAPENTIN 100 MILLIGRAM(S): 400 CAPSULE ORAL at 17:07

## 2022-11-27 RX ADMIN — HYDROMORPHONE HYDROCHLORIDE 0.5 MILLIGRAM(S): 2 INJECTION INTRAMUSCULAR; INTRAVENOUS; SUBCUTANEOUS at 16:10

## 2022-11-27 RX ADMIN — POLYETHYLENE GLYCOL 3350 17 GRAM(S): 17 POWDER, FOR SOLUTION ORAL at 11:29

## 2022-11-27 RX ADMIN — ESCITALOPRAM OXALATE 7.5 MILLIGRAM(S): 10 TABLET, FILM COATED ORAL at 11:28

## 2022-11-27 RX ADMIN — Medication 5 MILLIGRAM(S): at 15:05

## 2022-11-27 RX ADMIN — MORPHINE SULFATE 2 MILLIGRAM(S): 50 CAPSULE, EXTENDED RELEASE ORAL at 04:57

## 2022-11-27 RX ADMIN — HYDROMORPHONE HYDROCHLORIDE 0.5 MILLIGRAM(S): 2 INJECTION INTRAMUSCULAR; INTRAVENOUS; SUBCUTANEOUS at 15:56

## 2022-11-27 RX ADMIN — PIPERACILLIN AND TAZOBACTAM 200 GRAM(S): 4; .5 INJECTION, POWDER, LYOPHILIZED, FOR SOLUTION INTRAVENOUS at 11:29

## 2022-11-27 RX ADMIN — Medication 5 MILLIGRAM(S): at 23:48

## 2022-11-27 RX ADMIN — MORPHINE SULFATE 2 MILLIGRAM(S): 50 CAPSULE, EXTENDED RELEASE ORAL at 04:35

## 2022-11-27 RX ADMIN — OXYCODONE HYDROCHLORIDE 5 MILLIGRAM(S): 5 TABLET ORAL at 22:34

## 2022-11-27 RX ADMIN — LEVETIRACETAM 500 MILLIGRAM(S): 250 TABLET, FILM COATED ORAL at 17:07

## 2022-11-27 RX ADMIN — Medication 1 MILLIGRAM(S): at 11:29

## 2022-11-27 RX ADMIN — FLUCONAZOLE 50 MILLIGRAM(S): 150 TABLET ORAL at 17:05

## 2022-11-27 RX ADMIN — PIPERACILLIN AND TAZOBACTAM 25 GRAM(S): 4; .5 INJECTION, POWDER, LYOPHILIZED, FOR SOLUTION INTRAVENOUS at 15:16

## 2022-11-27 RX ADMIN — PIPERACILLIN AND TAZOBACTAM 25 GRAM(S): 4; .5 INJECTION, POWDER, LYOPHILIZED, FOR SOLUTION INTRAVENOUS at 22:28

## 2022-11-27 NOTE — H&P ADULT - PROBLEM SELECTOR PLAN 11
F: Per oral   E: K>4, Mg>2   N: Regular diet   DVT: Hold i/s/o active bleed  GI: Pantoprazole  Bowel Regimen: Miralax, Senna   CODE STATUS: DNR/DNI Discharged on nifedipine during last admission.     Plan:   - holding antihypertensives for now  - resume as able

## 2022-11-27 NOTE — H&P ADULT - NSHPLABSRESULTS_GEN_ALL_CORE
.  LABS:                         6.1    16. )-----------( 256      ( 2022 22:34 )             19.3         133  |  107  |  28<H>  ----------------------------<  120<H>  5.0   |  20<L>  |  1.18    Ca    8.1<L>      2022 18:42    TPro  5.5<L>  /  Alb  1.0<L>  /  TBili  0.5  /  DBili  x   /  AST  74<H>  /  ALT  18  /  AlkPhos  776<H>      PT/INR - ( 2022 18:42 )   PT: 12.3 sec;   INR: 1.05          PTT - ( 2022 18:42 )  PTT:34.3 sec  Urinalysis Basic - ( 2022 18:42 )    Color: Red / Appearance: Turbid / S.020 / pH: x  Gluc: x / Ketone: Trace mg/dL  / Bili: NEGATIVE / Urobili: 1.0 E.U./dL   Blood: x / Protein: >=300 mg/dL / Nitrite: POSITIVE   Leuk Esterase: Small / RBC: Many /HPF / WBC Many /HPF   Sq Epi: x / Non Sq Epi: 0-5 /HPF / Bacteria: Present /HPF            Lactate, Blood: 1.0 mmoL/L ( @ 22:34)      RADIOLOGY, EKG & ADDITIONAL TESTS: Reviewed.

## 2022-11-27 NOTE — H&P ADULT - PROBLEM SELECTOR PLAN 10
F: Per oral   E: K>4, Mg>2   N: Regular diet   DVT: Hold i/s/o active bleed  GI: Pantoprazole  Bowel Regimen: Miralax, Senna   CODE STATUS: DNR/DNI Discharged on nifedipine during last admission.     Plan:   - holding antihypertensives for now  - resume as able Baseline 1.5 per chart review. On admission: 1.18.     Plan:   - continue to monitor   - daily CrCl monitoring for medication dosage

## 2022-11-27 NOTE — H&P ADULT - NSHPPHYSICALEXAM_GEN_ALL_CORE
VITALS:   T(C): 36.4 (11-27-22 @ 05:40), Max: 36.9 (11-26-22 @ 18:13)  HR: 90 (11-27-22 @ 05:40) (80 - 105)  BP: 114/68 (11-27-22 @ 05:40) (110/63 - 124/76)  RR: 18 (11-27-22 @ 05:40) (18 - 19)  SpO2: 96% (11-27-22 @ 05:40) (93% - 96%)    GENERAL: NAD, lying in bed comfortably, tearful affect  HEAD:  Atraumatic, normocephalic  EYES: EOMI, PERRLA, conjunctiva and sclera clear  ENT: Moist mucous membranes  NECK: Supple, no JVD  HEART: Regular rate and rhythm, no murmurs, rubs, or gallops. Chemo port over left upper chest   LUNGS: Unlabored respirations, comfortable at 3L 02. Clear to auscultation bilaterally, no crackles, wheezing, or rhonchi  ABDOMEN: Soft, nontender, nondistended, +BS  : Indwelling Michaels catheter   EXTREMITIES: 2+ peripheral pulses bilaterally. 3+ edema LLE, no edema R  NERVOUS SYSTEM:  A&Ox3, no focal deficits   SKIN: No rashes or lesions

## 2022-11-27 NOTE — H&P ADULT - PROBLEM SELECTOR PLAN 7
F: Per oral   E: K>4, Mg>2   N: Regular diet   DVT: Lovenox 50 mg q12  GI: Pantoprazole  Bowel Regimen: Miralax, Senna   CODE STATUS: DNR/DNI Baseline 1.5 per chart review. On admission: 1.18.     Plan:   - continue to monitor   - daily CrCl monitoring for medication dosage Patient with tearful affect during admission. Did not want to be left alone. During last hospitalization, patient met with a counselor and disclosed that her  has essentially opted out from being her emotional support. Patient gave two contacts for her friends incase  cannot be reached.     Sho Sahni: [phone]   Barb Cobb: [phone]    Plan:   - c/w Escitalopram 5 mg - 1.5 tab orally qd   - Lorazepam 0.5 mg q8 prn   - consider inpatient psychotherapy or palliative consult In the setting of metastatic cancer. Diagnosed w/ LLE DVT on 11/03 admisstion. Doppler then found Left common femoral vein and saphenofemoral junction deep venous thrombosis. Was discharged on Lovenox. Continues to have 3+ edema of LLE.     Plan:   - repeat LE US   - consult vascular for possible IVC filter   - hold AC i/s/o active bleed History of metastatic ovarian/uterine cancer actively on chemotherapy with Dr. Hidalgo. Diagnosed in 2015 w/ metastasis to liver, lungs, and peritoneum in 2018/2019. Last treatment approximately 4 week ago. Past regimen includes avastin use, current regimen on keytruda, dose reduced gemzar, cytoxan, 5-FU continual infusion, docetaxel, and carboplatin. Presented with DVT on last admission likely 2/2 hypercoagulable state, placed on full anticoagulation.     Plan:   - obtain collateral/contact Dr. Lewis : [phone]    Pain:   Morphine 2mg IV PRN q8  Dilaudid 0.25 mg PRN q2 for breakthrough pain

## 2022-11-27 NOTE — CONSULT NOTE ADULT - SUBJECTIVE AND OBJECTIVE BOX
Patient is a 72y old  Female who presents with a chief complaint of hematuria (2022 08:43)      HPI:  71 yo female PMHx of HTN, Ovarian and uterine cancer w/ spread to the liver, lungs, and peritoneum (on chemo, last tx 3 weeks ago), anemia, urinary retention, and scoliosis who also had multiple recent admissions for PRES and new onset LLE DVT (11/3) and an pneumonia + hydroureter 2/2 metastatic disease and anemia requiring 2u pRBC's from (-) at which time she was discharged with an indwelling Arevalo catheter. Initially, patient had blood tinged urine but patient [facility] for glen blood in her arevalo. Per the patient, she is has experienced intermittent burning in her bladder for the last two days. She otherwise denies fevers/chills, no cough, wheezing, SOB, chest pain, abdominal pain, nausea, vomiting.     ED course:   Vitals: T: 98.5 BP: 124/76, HR: 105, RR: 19, 95% on RA   Labs: WBC 15.11, Hb: 6.4 (repeat 6.1), MCV: 108.1, RDW: 20.2, HCO3: 20, A, BUN 28, Ca: 8.1, Alk Phos: 776, UA: Red urine, >300 protein, nitrites, +leuk esterase, + bacteria. FOBT+.   Imaging: CXR: cannonball mets to lungs   Intervention: 6mg IV morphine (2X3), Percocet (5mg/325mg), Zosyn 3.375 g IV   Procedures: None  Interventions: Urology consulted      (2022 08:43)     Note:  Pt is a 72y F with PMHx metastatic ovarian/uterine cancer, urinary retention, scoliosis who presents for hematuria. Patient endorses substantial abdominal pain, states catheter has been uncomfortable since exchange on presentation. Patient endorses intermittent dysuria of 2-3d history.    Vital Signs Last 24 Hrs  T(C): 36.7 (2022 15:58), Max: 36.7 (2022 15:58)  T(F): 98 (2022 15:58), Max: 98 (2022 15:58)  HR: 86 (2022 15:58) (80 - 103)  BP: 135/80 (2022 15:58) (110/63 - 135/80)  BP(mean): --  RR: 16 (2022 15:58) (16 - 20)  SpO2: 99% (2022 15:58) (93% - 99%)    Parameters below as of 2022 15:58  Patient On (Oxygen Delivery Method): nasal cannula  O2 Flow (L/min): 3    I&O's Summary    2022 07:01  -  2022 07:00  --------------------------------------------------------  IN: 42992 mL / OUT: 2700 mL / NET: 9300 mL    2022 07:01  -  2022 18:56  --------------------------------------------------------  IN: 5500 mL / OUT: 4300 mL / NET: 1200 mL          LABS:                        8.6    16.32 )-----------( 232      ( 2022 18:44 )             26.2     11-    133  |  107  |  28<H>  ----------------------------<  120<H>  5.0   |  20<L>  |  1.18    Ca    8.1<L>      2022 18:42    TPro  5.5<L>  /  Alb  1.0<L>  /  TBili  0.5  /  DBili  x   /  AST  74<H>  /  ALT  18  /  AlkPhos  776<H>  11    PT/INR - ( 2022 18:42 )   PT: 12.3 sec;   INR: 1.05          PTT - ( 2022 18:42 )  PTT:34.3 sec  Cultures      A/P

## 2022-11-27 NOTE — H&P ADULT - ATTENDING COMMENTS
73 yo female with PMH of ovarian/uterine cancer with spread to the liver, lungs, peritoneum, DVT on AC with Lovenox, anemia, recently discharged from hospital for AHRF 2/2 pneumonia and treated with abx, course complicated by urinary retention, RUBIN- traumatic arevalo and severe hematuria requiring blood transfusion. Pt now presented with glen hematuria at [facility] and noted to have worsening of anemia. Patient presented with hgb 6.4, repeat 6.1. Likely multifactorial i/s/o malignancy, CORBIN, and worsening hematuria. urology was consulted and is s/p CBI. Hematuria is improving. Pt is getting transfused with 1 unit prbc. DVT tx on hold in light of recurrent transfusion 2/2 hematuria, will consult vascular for possible IVC filter. fu post transfusion cbc and rpt q12h  UTI: pt w complaints of suprapubic tenderness and spasm: will replace arevalo as pt noted to have yeast on recent UA. will tx w 1 dose of anti fungal and fu repeat UA/ UCX. Currently on zosyn. 71 yo female with PMH of ovarian/uterine cancer with spread to the liver, lungs, peritoneum, DVT on AC with Lovenox, anemia, recently discharged from hospital for AHRF 2/2 pneumonia and treated with abx, course complicated by urinary retention, RUBIN- traumatic arevalo and severe hematuria requiring blood transfusion. Pt now presented with glen hematuria at [facility] and noted to have worsening of anemia. Patient presented with hgb 6.4, repeat 6.1. Likely multifactorial i/s/o malignancy, CORBIN, and worsening hematuria. urology was consulted and is s/p CBI. Hematuria is improving. Pt is getting transfused with 1 unit prbc. DVT tx on hold in light of recurrent transfusion 2/2 hematuria, will consult vascular for possible IVC filter. fu post transfusion cbc and rpt q12h  UTI: pt w complaints of suprapubic tenderness and spasm: will replace arevalo as pt noted to have yeast on recent UA. will tx w 1 dose of anti fungal and fu repeat UA/ UCX. Currently on zosyn.    fu: repeat cbc post transfusion, may need further imaging   fu vasc recs for IVF filter   fu LE duplex to eval for previous clot

## 2022-11-27 NOTE — PATIENT PROFILE ADULT - FALL HARM RISK - HARM RISK INTERVENTIONS

## 2022-11-27 NOTE — H&P ADULT - HISTORY OF PRESENT ILLNESS
73 yo female PMHx of HTN, Ovarian and uterine cancer w/ spread to the liver, lungs, and peritoneum (on chemo, last tx 3 weeks ago), anemia, urinary retention, and scoliosis who also had multiple recent admissions for PRES and new onset LLE DVT (11/3) and an pneumonia + hydroureter 2/2 metastatic disease and anemia requiring 2u pRBC's from (-) at which time she was discharged with an indwelling Arevalo catheter. Initially, patient had blood tinged urine but patient presents yesterday from [facility] for glen blood in her arevalo. Per the patient, she is has experienced intermittent burning in her bladder for the last two days. She otherwise denies fevers/chills, no cough, wheezing, SOB, chest pain, abdominal pain, nausea, vomitting.     ED course:   Vitals: T: 98.5 BP: 124/76, HR: 105, RR: 19, 95% on RA   Labs: WBC 15.11, Hb: 6.4 (repeat 6.1), MCV: 108.1, RDW: 20.2, HCO3: 20, A, BUN 28, Ca: 8.1, Alk Phos: 776, UA: Red urine, >300 protein, nitrites, +leuk esterase, + bacteria. FOBT+.   Imaging: CXR: cannonball mets to lungs   Procedures: None  Interventions: Urology consulted      73 yo female PMHx of HTN, Ovarian and uterine cancer w/ spread to the liver, lungs, and peritoneum (on chemo, last tx 3 weeks ago), anemia, urinary retention, and scoliosis who also had multiple recent admissions for PRES and new onset LLE DVT (11/3) and an pneumonia + hydroureter 2/2 metastatic disease and anemia requiring 2u pRBC's from (-) at which time she was discharged with an indwelling Arevalo catheter. Initially, patient had blood tinged urine but patient presents yesterday from [facility] for glen blood in her arevalo. Per the patient, she is has experienced intermittent burning in her bladder for the last two days. She otherwise denies fevers/chills, no cough, wheezing, SOB, chest pain, abdominal pain, nausea, vomitting.     ED course:   Vitals: T: 98.5 BP: 124/76, HR: 105, RR: 19, 95% on RA   Labs: WBC 15.11, Hb: 6.4 (repeat 6.1), MCV: 108.1, RDW: 20.2, HCO3: 20, A, BUN 28, Ca: 8.1, Alk Phos: 776, UA: Red urine, >300 protein, nitrites, +leuk esterase, + bacteria. FOBT+.   Imaging: CXR: cannonball mets to lungs   Intervention: 6mg IV morphine (2X3), Percocet (5mg/325mg)  Procedures: None  Interventions: Urology consulted      73 yo female PMHx of HTN, Ovarian and uterine cancer w/ spread to the liver, lungs, and peritoneum (on chemo, last tx 3 weeks ago), anemia, urinary retention, and scoliosis who also had multiple recent admissions for PRES and new onset LLE DVT (11/3) and an pneumonia + hydroureter 2/2 metastatic disease and anemia requiring 2u pRBC's from (-) at which time she was discharged with an indwelling Arevalo catheter. Initially, patient had blood tinged urine but patient presents yesterday from [facility] for glen blood in her arevalo. Per the patient, she is has experienced intermittent burning in her bladder for the last two days. She otherwise denies fevers/chills, no cough, wheezing, SOB, chest pain, abdominal pain, nausea, vomitting.     ED course:   Vitals: T: 98.5 BP: 124/76, HR: 105, RR: 19, 95% on RA   Labs: WBC 15.11, Hb: 6.4 (repeat 6.1), MCV: 108.1, RDW: 20.2, HCO3: 20, A, BUN 28, Ca: 8.1, Alk Phos: 776, UA: Red urine, >300 protein, nitrites, +leuk esterase, + bacteria. FOBT+.   Imaging: CXR: cannonball mets to lungs   Intervention: 6mg IV morphine (2X3), Percocet (5mg/325mg), Zosyn 3.375 g IV   Procedures: None  Interventions: Urology consulted

## 2022-11-27 NOTE — H&P ADULT - PROBLEM SELECTOR PLAN 5
Patient with tearful affect during admission. Did not want to be left alone. During last hospitalization, patient met with a counselor and disclosed that her  has essentially opted out from being her emotional support. Patient gave two contacts for her friends incase  cannot be reached.     Sho Sahni: [phone]   Barb Cobb: [phone]    Plan:   - c/w Escitalopram 5 mg - 1.5 tab orally qd   - Lorazepam 0.5 mg q8 prn   - consider inpatient psychotherapy or palliative consult In the setting of metastatic cancer. Diagnosed w/ LLE DVT on 11/03 admisstion. Doppler then found Left common femoral vein and saphenofemoral junction deep venous thrombosis. Was discharged on Lovenox. Continues to have 3+ edema of LLE.     Plan:   - Lovenox 50 mg q12   - CrCl : 36  - Watch Cr for dosage changes as patient w/ labile creatinine (per chart review, baseline is 1.5) In the setting of metastatic cancer. Diagnosed w/ LLE DVT on 11/03 admisstion. Doppler then found Left common femoral vein and saphenofemoral junction deep venous thrombosis. Was discharged on Lovenox. Continues to have 3+ edema of LLE.     Plan:   - repeat LE US   - consult vascular for possible IVC filter   - hold AC i/s/o active bleed History of metastatic ovarian/uterine cancer actively on chemotherapy with Dr. Hidalgo. Diagnosed in 2015 w/ metastasis to liver, lungs, and peritoneum in 2018/2019. Last treatment approximately 4 week ago. Past regimen includes avastin use, current regimen on keytruda, dose reduced gemzar, cytoxan, 5-FU continual infusion, docetaxel, and carboplatin. Presented with DVT on last admission likely 2/2 hypercoagulable state, placed on full anticoagulation.     Plan:   - obtain collateral/contact Dr. Lewis : [phone] History of metastatic ovarian/uterine cancer actively on chemotherapy with Dr. Hidalgo. Diagnosed in 2015 w/ metastasis to liver, lungs, and peritoneum in 2018/2019. Last treatment approximately 4 week ago. Past regimen includes avastin use, current regimen on keytruda, dose reduced gemzar, cytoxan, 5-FU continual infusion, docetaxel, and carboplatin. Presented with DVT on last admission likely 2/2 hypercoagulable state, placed on full anticoagulation.     Plan:   - obtain collateral/contact Dr. Lewis : [phone]    Pain:   Morphine 2mg IV PRN q8  Dilaudid 0.25 mg PRN q2 for breakthrough Patient presented with 6.4, repeat 6.1. Likely multifactorial i/s/o malignancy, CORBIN, and hematuria. FOBT (+)    Plan:   - give 1U pRBC's   - Transfusion threshold <6  - hold iron supplementation i/s/o infection   - avoid unnecessary blood draws   - T&S active (11/27) Patient w/ bladder spasm likely 2/2 arevalo.     Plan:   - oxybutynin 5 mg BID

## 2022-11-27 NOTE — H&P ADULT - PROBLEM SELECTOR PLAN 4
In the setting of metastatic cancer. Diagnosed w/ LLE DVT on 11/03 admisstion. Doppler then found Left common femoral vein and saphenofemoral junction deep venous thrombosis. Was discharged on Lovenox. Continues to have 3+ edema of LLE.     Plan:   - Lovenox 50 mg q12   - CrCl : 36  - Watch Cr for dosage changes as patient w/ labile creatinine (per chart review, baseline is 1.5) History of metastatic ovarian/uterine cancer actively on chemotherapy with Dr. Hidalgo. Diagnosed in 2015 w/ metastasis to liver, lungs, and peritoneum in 2018/2019. Last treatment approximately 4 week ago. Past regimen includes avastin use, current regimen on keytruda, dose reduced gemzar, cytoxan, 5-FU continual infusion, docetaxel, and carboplatin. Presented with DVT on last admission likely 2/2 hypercoagulable state, placed on full anticoagulation.     Plan:   - obtain collateral/contact Dr. Lewis : [phone] Patient presented with 6.4, repeat 6.1. Likely multifactorial i/s/o malignancy, CORBIN, and hematuria. FOBT (+)    Plan:   - give 1U pRBC's   - Transfusion threshold <6  - hold iron supplementation i/s/o infection   - avoid unnecessary blood draws   - T&S active (11/27) CT 11/18: Uterine soft tissue mass encases and severely narrows distal left ureter with associated moderate left hydronephrosis. Would       Plan:   - Urology following CT 11/18: Uterine soft tissue mass encases and severely narrows distal left ureter with associated moderate left hydronephrosis. Urology following, would ultimately need stenting but patient spoke to her oncologist and decided not to pursue at the time. Urology is following, and has spoken to her about risks of both intervention vs. no intervention. Cr is stable at this time.       Plan:   - Urology following Patient with 2 day onset of hematuria, and ultimately admitted for glen red blood in arevalo catheter. During last admission, retroperitoneal ultrasound showed two left renal calculi. Patient denies flank pain.     Plan:   - urology consulted, appreciate recs

## 2022-11-27 NOTE — CONSULT NOTE ADULT - ASSESSMENT
Patient is a 72y F with PMHx of metastatic ovarian/uterine cancer, urinary retention, scoliosis currently admitted for hematuria. 18F 3-way catheter in palce draining light pink urine on minimal CBI.     Plan:  - Appreciate excellent care per primary team  - Continue to tirate CBI to light pink, fruit punch-anthony color  - Urology will continue to follow urine color, no acute interventions indicated at this time

## 2022-11-27 NOTE — H&P ADULT - PROBLEM SELECTOR PLAN 2
Patient presented with 6.4, repeat 6.1. Likely multifactorial i/s/o malignancy, CORBIN, and hematuria. Patient is now s/p 1U pRBC's in ED.     Plan:   - Transfusion threshold <6  - hold iron supplementation i/s/o infection   - avoid uneccesary blood draws   - T&S active (11/27) Patient presented with 6.4, repeat 6.1. Likely multifactorial i/s/o malignancy, CORBIN, and hematuria. Patient is now s/p 1U pRBC's in ED.     Plan:   - Transfusion threshold <6  - hold iron supplementation i/s/o infection   - avoid unnecessary blood draws   - T&S active (11/27) As above. As above.    - f/u blood cultures Patient w/ suprapubic tenderness + dysuria + hematuria for two days. UA + w/ indwelling Michaels catheter. + many pseudohyphae present. Michaels placed on 11/21 prior to DC as patient failed TOV. Michaels has now been replaced by urology (11/27 am) and patient is undergoing constant bladder irrigation.     Michaels replaced: 11/27    Plan:   - c/w continuous bladder irrigation  - f/u urology recs   - Ab regimen:   - Vancomycin 500 mg q12  - Zosyn 3.375 g q6  - Fluconazole 100 mg Iv q24  - f/u urine cultures Patient w/ suprapubic tenderness + dysuria + hematuria for two days. UA + w/ indwelling Michaels catheter. + many pseudohyphae present. Michaels placed on 11/21 prior to DC as patient failed TOV. Michaels has now been replaced by urology (11/27 am) and patient is undergoing constant bladder irrigation.       Plan:   - c/w continuous bladder irrigation  - f/u urology recs   - Ab regimen:   - Zosyn 3.375 g q6  - Fluconazole 100 mg Iv q24  - f/u urine cultures Patient w/ suprapubic tenderness + dysuria + hematuria for two days. UA + w/ indwelling Michaels catheter. + many pseudohyphae present. Michaels placed on 11/21 prior to DC as patient failed TOV. Michaels has now been replaced by urology (11/27 am) and patient is undergoing constant bladder irrigation.       Plan:   - c/w continuous bladder irrigation  - f/u urology recs   - Ab regimen:   - Zosyn 3.375 g q8  - Fluconazole 100 mg Iv q24  - f/u repeat ua and urine cultures

## 2022-11-27 NOTE — H&P ADULT - PROBLEM SELECTOR PLAN 3
History of metastatic ovarian/uterine cancer actively on chemotherapy with Dr. Hidalgo. Diagnosed in 2015 w/ metastasis to liver, lungs, and peritoneum in 2018/2019. Last treatment approximately 4 week ago. Past regimen includes avastin use, current regimen on keytruda, dose reduced gemzar, cytoxan, 5-FU continual infusion, docetaxel, and carboplatin. Presented with DVT on last admission likely 2/2 hypercoagulable state, placed on full anticoagulation.     Plan:   - obtain collateral/contact Dr. Lewis : [phone] Patient presented with 6.4, repeat 6.1. Likely multifactorial i/s/o malignancy, CORBIN, and hematuria. P    Plan:   - give 1U pRBC's   - Transfusion threshold <6  - hold iron supplementation i/s/o infection   - avoid unnecessary blood draws   - T&S active (11/27) Patient presented with 6.4, repeat 6.1. Likely multifactorial i/s/o malignancy, CORBIN, and hematuria. FOBT (+)    Plan:   - give 1U pRBC's   - Transfusion threshold <6  - hold iron supplementation i/s/o infection   - avoid unnecessary blood draws   - T&S active (11/27) Patient with ____ day onset of hematuria, and ultimately admitted for glen red blood in arevalo catheter. During last admission, retroperitoneal ultrasound showed two left renal calculi. Patient denies flank pain.   During last admission, urology was consulted and recommended  CT A/P noncon to evaluate for stones / PCN for obstruction although patient was unsure if she wanted to pursue further treatment and imaging was not completed.     Plan:   - urology consulted, appreciate recs Patient with 2 day onset of hematuria, and ultimately admitted for glen red blood in arevalo catheter. During last admission, retroperitoneal ultrasound showed two left renal calculi. Patient denies flank pain.   During last admission, urology was consulted and recommended  CT A/P noncon to evaluate for stones / PCN for obstruction although patient was unsure if she wanted to pursue further treatment and imaging was not completed.     Plan:   - urology consulted, appreciate recs Patient with 2 day onset of hematuria, and ultimately admitted for glen red blood in arevalo catheter. During last admission, retroperitoneal ultrasound showed two left renal calculi. Patient denies flank pain.     Plan:   - urology consulted, appreciate recs As above.    - f/u blood cultures

## 2022-11-27 NOTE — H&P ADULT - PROBLEM SELECTOR PLAN 6
Baseline 1.5 per chart review. On admission: 1.18.     Plan:   - continue to monitor   - daily CrCl monitoring for medication dosage Patient with tearful affect during admission. Did not want to be left alone. During last hospitalization, patient met with a counselor and disclosed that her  has essentially opted out from being her emotional support. Patient gave two contacts for her friends incase  cannot be reached.     Sho Sahni: [phone]   Barb Cobb: [phone]    Plan:   - c/w Escitalopram 5 mg - 1.5 tab orally qd   - Lorazepam 0.5 mg q8 prn   - consider inpatient psychotherapy or palliative consult In the setting of metastatic cancer. Diagnosed w/ LLE DVT on 11/03 admisstion. Doppler then found Left common femoral vein and saphenofemoral junction deep venous thrombosis. Was discharged on Lovenox. Continues to have 3+ edema of LLE.     Plan:   - repeat LE US   - consult vascular for possible IVC filter   - hold AC i/s/o active bleed History of metastatic ovarian/uterine cancer actively on chemotherapy with Dr. Hidalgo. Diagnosed in 2015 w/ metastasis to liver, lungs, and peritoneum in 2018/2019. Last treatment approximately 4 week ago. Past regimen includes avastin use, current regimen on keytruda, dose reduced gemzar, cytoxan, 5-FU continual infusion, docetaxel, and carboplatin. Presented with DVT on last admission likely 2/2 hypercoagulable state, placed on full anticoagulation.     Plan:   - obtain collateral/contact Dr. Lewis : [phone]    Pain:   Morphine 2mg IV PRN q8  Dilaudid 0.25 mg PRN q2 for breakthrough History of metastatic ovarian/uterine cancer actively on chemotherapy with Dr. Hidalgo. Diagnosed in 2015 w/ metastasis to liver, lungs, and peritoneum in 2018/2019. Last treatment approximately 4 week ago. Past regimen includes avastin use, current regimen on keytruda, dose reduced gemzar, cytoxan, 5-FU continual infusion, docetaxel, and carboplatin. Presented with DVT on last admission likely 2/2 hypercoagulable state, placed on full anticoagulation.     Plan:   - obtain collateral/contact Dr. Lewis : [phone]    Pain:   Morphine 2mg IV PRN q8  Dilaudid 0.25 mg PRN q2 for breakthrough pain CT 11/18: Uterine soft tissue mass encases and severely narrows distal left ureter with associated moderate left hydronephrosis. Urology following, would ultimately need stenting but patient spoke to her oncologist and decided not to pursue at the time. Urology is following, and has spoken to her about risks of both intervention vs. no intervention. Cr is stable at this time.       Plan:   - Urology following

## 2022-11-27 NOTE — H&P ADULT - PROBLEM SELECTOR PLAN 12
F: Per oral   E: K>4, Mg>2   N: Regular diet   DVT: Hold i/s/o active bleed  GI: Pantoprazole  Bowel Regimen: Miralax, Senna   CODE STATUS: DNR/DNI

## 2022-11-27 NOTE — H&P ADULT - PROBLEM SELECTOR PROBLEM 7
Prophylactic measure Chronic kidney disease (CKD) Depression, major DVT, lower extremity Ovarian cancer

## 2022-11-27 NOTE — CONSULT NOTE ADULT - ATTENDING COMMENTS
Patient is a 72y F with PMHx of metastatic ovarian/uterine cancer, urinary retention, scoliosis    - no significant hematuria at time of my visit  - no CBI needed  - consider palliative for GOC discussion as well as symptomatic management

## 2022-11-27 NOTE — H&P ADULT - ASSESSMENT
Patient is a 73 yo female with PMH of ovarian/uterine cancer with spread to the liver, lungs, peritoneum, DVT, anemia, indwelling arevalo catheter who presents with glen hematuria of one day onset, admitted for hematuria, UTI and anemia.

## 2022-11-27 NOTE — H&P ADULT - PROBLEM SELECTOR PLAN 1
Patient w/ suprapubic tenderness + dysuria + hematuria for two days. UA + w/ indwelling Michaels catheter. Michaels placed on 11/21 prior to DC as patient failed TOV. Michaels has now been replaced by urology (11/27 am) and patient is undergoing constant bladder irrigation.     Michaels replaced: 11/27    Plan:   - c/w continuous bladder irrigation  - f/u urology recs   - Ab regimen: Ceftriaxone 1g IV qd  - f/u urine cultures Patient w/ suprapubic tenderness + dysuria + hematuria for two days. UA + w/ indwelling Michaels catheter. + many pseudohyphae present. Michaels placed on 11/21 prior to DC as patient failed TOV. Michaels has now been replaced by urology (11/27 am) and patient is undergoing constant bladder irrigation.     Michaels replaced: 11/27    Plan:   - c/w continuous bladder irrigation  - f/u urology recs   - Ab regimen: Zosyn 3.375 g, Fluconazole 100 mg Iv q24  - f/u urine cultures Patient w/ suprapubic tenderness + dysuria + hematuria for two days. UA + w/ indwelling Michaels catheter. + many pseudohyphae present. Michaels placed on 11/21 prior to DC as patient failed TOV. Michaels has now been replaced by urology (11/27 am) and patient is undergoing constant bladder irrigation.     Michaels replaced: 11/27    Plan:   - c/w continuous bladder irrigation  - f/u urology recs   - Ab regimen:   - Vancomycin 500 mg q12  - Zosyn 3.375 g q6  - Fluconazole 100 mg Iv q24  - f/u urine cultures Patient presented with 6.4, repeat 6.1. Likely multifactorial i/s/o malignancy, CORBIN, and hematuria. FOBT (+)    Plan:   - give 1U pRBC's   - Transfusion threshold <6  - hold iron supplementation i/s/o infection   - avoid unnecessary blood draws   - T&S active (11/27) Patient presented with 6.4, repeat 6.1. Likely multifactorial i/s/o malignancy, CORBIN, and hematuria. FOBT (+)    Plan:   - give 1U pRBC's   - Transfusion threshold <7  - hold iron supplementation i/s/o infection   - avoid unnecessary blood draws   - T&S active (11/27) Patient presented with 6.4, repeat 6.1. Likely multifactorial i/s/o malignancy, CORBIN, and hematuria. FOBT (+)  discharge hgb > 8.4    Plan:   - DVT tx on hold in light of recurrent transfusion 2/2 hematuria, will consult vascular for possible IVC filter   - give 1U pRBC's   - Transfusion threshold <7  - hold iron supplementation i/s/o infection   - avoid unnecessary blood draws   - T&S active (11/27)

## 2022-11-27 NOTE — H&P ADULT - PROBLEM SELECTOR PLAN 8
F: Per oral   E: K>4, Mg>2   N: Regular diet   DVT: Lovenox 50 mg q12  GI: Pantoprazole  Bowel Regimen: Miralax, Senna   CODE STATUS: DNR/DNI Baseline 1.5 per chart review. On admission: 1.18.     Plan:   - continue to monitor   - daily CrCl monitoring for medication dosage Patient with tearful affect during admission. Did not want to be left alone. During last hospitalization, patient met with a counselor and disclosed that her  has essentially opted out from being her emotional support. Patient gave two contacts for her friends incase  cannot be reached.     Sho Sahni: [phone]   Barb Cobb: [phone]    Plan:   - c/w Escitalopram 5 mg - 1.5 tab orally qd   - Lorazepam 0.5 mg q8 prn   - consider inpatient psychotherapy or palliative consult In the setting of metastatic cancer. Diagnosed w/ LLE DVT on 11/03 admisstion. Doppler then found Left common femoral vein and saphenofemoral junction deep venous thrombosis. Was discharged on Lovenox. Continues to have 3+ edema of LLE.     Plan:   - repeat LE US   - consult vascular for possible IVC filter   - hold AC i/s/o active bleed

## 2022-11-27 NOTE — H&P ADULT - PROBLEM SELECTOR PLAN 9
F: Per oral   E: K>4, Mg>2   N: Regular diet   DVT: Lovenox 50 mg q12  GI: Pantoprazole  Bowel Regimen: Miralax, Senna   CODE STATUS: DNR/DNI Baseline 1.5 per chart review. On admission: 1.18.     Plan:   - continue to monitor   - daily CrCl monitoring for medication dosage Patient with tearful affect during admission. Did not want to be left alone. During last hospitalization, patient met with a counselor and disclosed that her  has essentially opted out from being her emotional support. Patient gave two contacts for her friends incase  cannot be reached.     Sho Sahni: [phone]   Barb Cobb: [phone]    Plan:   - c/w Escitalopram 5 mg - 1.5 tab orally qd   - Lorazepam 0.5 mg q8 prn   - consider inpatient psychotherapy or palliative consult

## 2022-11-27 NOTE — H&P ADULT - PROBLEM SELECTOR PROBLEM 6
Chronic kidney disease (CKD) Depression, major DVT, lower extremity Ovarian cancer Hydronephrosis, left

## 2022-11-27 NOTE — H&P ADULT - NSHPREVIEWOFSYSTEMS_GEN_ALL_CORE
REVIEW OF SYSTEMS:  CONSTITUTIONAL: No weakness, fevers or chills  EYES/ENT: No visual changes;  No vertigo or throat pain   NECK: No pain or stiffness  RESPIRATORY: No cough, wheezing, hemoptysis; No shortness of breath  CARDIOVASCULAR: No chest pain or palpitations  GASTROINTESTINAL:  Last BM yesterday, was told it was black. No abdominal or epigastric pain. Last BM yesterday, was told it was black. No nausea, vomiting, or hematemesis; No diarrhea or constipation.  GENITOURINARY: + dysuria, frequency or hematuria, +arevalo catheter   NEUROLOGICAL: No numbness or weakness  SKIN: No itching, rashes

## 2022-11-28 LAB
ANION GAP SERPL CALC-SCNC: 8 MMOL/L — SIGNIFICANT CHANGE UP (ref 5–17)
APPEARANCE UR: CLEAR — SIGNIFICANT CHANGE UP
BACTERIA # UR AUTO: PRESENT /HPF
BILIRUB UR-MCNC: NEGATIVE — SIGNIFICANT CHANGE UP
BUN SERPL-MCNC: 28 MG/DL — HIGH (ref 7–23)
CALCIUM SERPL-MCNC: 7.9 MG/DL — LOW (ref 8.4–10.5)
CHLORIDE SERPL-SCNC: 108 MMOL/L — SIGNIFICANT CHANGE UP (ref 96–108)
CO2 SERPL-SCNC: 23 MMOL/L — SIGNIFICANT CHANGE UP (ref 22–31)
COLOR SPEC: YELLOW — SIGNIFICANT CHANGE UP
COMMENT - URINE: SIGNIFICANT CHANGE UP
CREAT SERPL-MCNC: 1.26 MG/DL — SIGNIFICANT CHANGE UP (ref 0.5–1.3)
CULTURE RESULTS: SIGNIFICANT CHANGE UP
DIFF PNL FLD: ABNORMAL
EGFR: 45 ML/MIN/1.73M2 — LOW
EPI CELLS # UR: SIGNIFICANT CHANGE UP /HPF (ref 0–5)
GLUCOSE SERPL-MCNC: 96 MG/DL — SIGNIFICANT CHANGE UP (ref 70–99)
GLUCOSE UR QL: NEGATIVE — SIGNIFICANT CHANGE UP
HCT VFR BLD CALC: 22 % — LOW (ref 34.5–45)
HCT VFR BLD CALC: 25 % — LOW (ref 34.5–45)
HGB BLD-MCNC: 7.1 G/DL — LOW (ref 11.5–15.5)
HGB BLD-MCNC: 7.7 G/DL — LOW (ref 11.5–15.5)
KETONES UR-MCNC: NEGATIVE — SIGNIFICANT CHANGE UP
LEUKOCYTE ESTERASE UR-ACNC: ABNORMAL
MAGNESIUM SERPL-MCNC: 2.1 MG/DL — SIGNIFICANT CHANGE UP (ref 1.6–2.6)
MCHC RBC-ENTMCNC: 30.8 GM/DL — LOW (ref 32–36)
MCHC RBC-ENTMCNC: 31.7 PG — SIGNIFICANT CHANGE UP (ref 27–34)
MCHC RBC-ENTMCNC: 32 PG — SIGNIFICANT CHANGE UP (ref 27–34)
MCHC RBC-ENTMCNC: 32.3 GM/DL — SIGNIFICANT CHANGE UP (ref 32–36)
MCV RBC AUTO: 102.9 FL — HIGH (ref 80–100)
MCV RBC AUTO: 99.1 FL — SIGNIFICANT CHANGE UP (ref 80–100)
NITRITE UR-MCNC: NEGATIVE — SIGNIFICANT CHANGE UP
NRBC # BLD: 0 /100 WBCS — SIGNIFICANT CHANGE UP (ref 0–0)
NRBC # BLD: 0 /100 WBCS — SIGNIFICANT CHANGE UP (ref 0–0)
PH UR: 6 — SIGNIFICANT CHANGE UP (ref 5–8)
PHOSPHATE SERPL-MCNC: 3.1 MG/DL — SIGNIFICANT CHANGE UP (ref 2.5–4.5)
PLATELET # BLD AUTO: 205 K/UL — SIGNIFICANT CHANGE UP (ref 150–400)
PLATELET # BLD AUTO: 225 K/UL — SIGNIFICANT CHANGE UP (ref 150–400)
POTASSIUM SERPL-MCNC: 4 MMOL/L — SIGNIFICANT CHANGE UP (ref 3.5–5.3)
POTASSIUM SERPL-SCNC: 4 MMOL/L — SIGNIFICANT CHANGE UP (ref 3.5–5.3)
PROT UR-MCNC: NEGATIVE MG/DL — SIGNIFICANT CHANGE UP
RBC # BLD: 2.22 M/UL — LOW (ref 3.8–5.2)
RBC # BLD: 2.43 M/UL — LOW (ref 3.8–5.2)
RBC # FLD: 21.2 % — HIGH (ref 10.3–14.5)
RBC # FLD: 21.3 % — HIGH (ref 10.3–14.5)
RBC CASTS # UR COMP ASSIST: ABNORMAL /HPF
SODIUM SERPL-SCNC: 139 MMOL/L — SIGNIFICANT CHANGE UP (ref 135–145)
SP GR SPEC: 1.01 — SIGNIFICANT CHANGE UP (ref 1–1.03)
SPECIMEN SOURCE: SIGNIFICANT CHANGE UP
UROBILINOGEN FLD QL: 0.2 E.U./DL — SIGNIFICANT CHANGE UP
WBC # BLD: 15.07 K/UL — HIGH (ref 3.8–10.5)
WBC # BLD: 15.66 K/UL — HIGH (ref 3.8–10.5)
WBC # FLD AUTO: 15.07 K/UL — HIGH (ref 3.8–10.5)
WBC # FLD AUTO: 15.66 K/UL — HIGH (ref 3.8–10.5)
WBC UR QL: ABNORMAL /HPF

## 2022-11-28 PROCEDURE — 99231 SBSQ HOSP IP/OBS SF/LOW 25: CPT

## 2022-11-28 PROCEDURE — 99358 PROLONG SERVICE W/O CONTACT: CPT | Mod: NC

## 2022-11-28 PROCEDURE — 93970 EXTREMITY STUDY: CPT | Mod: 26

## 2022-11-28 PROCEDURE — 99233 SBSQ HOSP IP/OBS HIGH 50: CPT | Mod: GC

## 2022-11-28 RX ORDER — ENOXAPARIN SODIUM 100 MG/ML
45 INJECTION SUBCUTANEOUS EVERY 12 HOURS
Refills: 0 | Status: DISCONTINUED | OUTPATIENT
Start: 2022-11-28 | End: 2022-11-30

## 2022-11-28 RX ORDER — MORPHINE SULFATE 50 MG/1
1 CAPSULE, EXTENDED RELEASE ORAL ONCE
Refills: 0 | Status: DISCONTINUED | OUTPATIENT
Start: 2022-11-28 | End: 2022-11-28

## 2022-11-28 RX ORDER — HYDROMORPHONE HYDROCHLORIDE 2 MG/ML
0.5 INJECTION INTRAMUSCULAR; INTRAVENOUS; SUBCUTANEOUS ONCE
Refills: 0 | Status: DISCONTINUED | OUTPATIENT
Start: 2022-11-28 | End: 2022-11-28

## 2022-11-28 RX ADMIN — Medication 1 MILLIGRAM(S): at 11:31

## 2022-11-28 RX ADMIN — LEVETIRACETAM 500 MILLIGRAM(S): 250 TABLET, FILM COATED ORAL at 17:16

## 2022-11-28 RX ADMIN — Medication 5 MILLIGRAM(S): at 22:13

## 2022-11-28 RX ADMIN — HYDROMORPHONE HYDROCHLORIDE 0.5 MILLIGRAM(S): 2 INJECTION INTRAMUSCULAR; INTRAVENOUS; SUBCUTANEOUS at 13:25

## 2022-11-28 RX ADMIN — POLYETHYLENE GLYCOL 3350 17 GRAM(S): 17 POWDER, FOR SOLUTION ORAL at 11:32

## 2022-11-28 RX ADMIN — LEVETIRACETAM 500 MILLIGRAM(S): 250 TABLET, FILM COATED ORAL at 05:12

## 2022-11-28 RX ADMIN — GABAPENTIN 100 MILLIGRAM(S): 400 CAPSULE ORAL at 17:16

## 2022-11-28 RX ADMIN — OXYCODONE HYDROCHLORIDE 5 MILLIGRAM(S): 5 TABLET ORAL at 00:00

## 2022-11-28 RX ADMIN — OXYCODONE HYDROCHLORIDE 5 MILLIGRAM(S): 5 TABLET ORAL at 06:26

## 2022-11-28 RX ADMIN — MORPHINE SULFATE 1 MILLIGRAM(S): 50 CAPSULE, EXTENDED RELEASE ORAL at 03:00

## 2022-11-28 RX ADMIN — OXYCODONE HYDROCHLORIDE 5 MILLIGRAM(S): 5 TABLET ORAL at 20:25

## 2022-11-28 RX ADMIN — OXYCODONE HYDROCHLORIDE 5 MILLIGRAM(S): 5 TABLET ORAL at 19:46

## 2022-11-28 RX ADMIN — GABAPENTIN 100 MILLIGRAM(S): 400 CAPSULE ORAL at 05:11

## 2022-11-28 RX ADMIN — OXYCODONE HYDROCHLORIDE 5 MILLIGRAM(S): 5 TABLET ORAL at 05:38

## 2022-11-28 RX ADMIN — ESCITALOPRAM OXALATE 7.5 MILLIGRAM(S): 10 TABLET, FILM COATED ORAL at 11:32

## 2022-11-28 RX ADMIN — SENNA PLUS 2 TABLET(S): 8.6 TABLET ORAL at 22:14

## 2022-11-28 RX ADMIN — MORPHINE SULFATE 1 MILLIGRAM(S): 50 CAPSULE, EXTENDED RELEASE ORAL at 02:06

## 2022-11-28 RX ADMIN — OXYCODONE HYDROCHLORIDE 5 MILLIGRAM(S): 5 TABLET ORAL at 10:40

## 2022-11-28 RX ADMIN — ENOXAPARIN SODIUM 45 MILLIGRAM(S): 100 INJECTION SUBCUTANEOUS at 17:15

## 2022-11-28 RX ADMIN — OXYCODONE HYDROCHLORIDE 5 MILLIGRAM(S): 5 TABLET ORAL at 09:39

## 2022-11-28 RX ADMIN — HYDROMORPHONE HYDROCHLORIDE 0.5 MILLIGRAM(S): 2 INJECTION INTRAMUSCULAR; INTRAVENOUS; SUBCUTANEOUS at 13:04

## 2022-11-28 RX ADMIN — Medication 5 MILLIGRAM(S): at 11:32

## 2022-11-28 RX ADMIN — PIPERACILLIN AND TAZOBACTAM 25 GRAM(S): 4; .5 INJECTION, POWDER, LYOPHILIZED, FOR SOLUTION INTRAVENOUS at 05:14

## 2022-11-28 NOTE — PHYSICAL THERAPY INITIAL EVALUATION ADULT - BALANCE DISTURBANCE, IDENTIFIED IMPAIRMENT CONTRIBUTE, REHAB EVAL
Prescription approved per [facility] Refill Protocol.  Patient due for follow up telephone encounter for depression.   Evelia Epps RN     pain/impaired postural control/decreased strength

## 2022-11-28 NOTE — PROGRESS NOTE ADULT - PROBLEM SELECTOR PLAN 4
Patient with 2 day onset of hematuria, and ultimately admitted for glen red blood in arevalo catheter. During last admission, retroperitoneal ultrasound showed two left renal calculi. Patient denies flank pain.     Plan:   - urology consulted, appreciate recs

## 2022-11-28 NOTE — PROGRESS NOTE ADULT - ASSESSMENT
72y F with PMHx of metastatic ovarian/uterine cancer, urinary retention, scoliosis currently admitted for hematuria. 18F 3-way catheter in place, CBI removed by urology this AM    Plan:  - no need for CBI  - no sig hematuria  - transfuse per primary team  - f/u palliative/GOC discussion  - care per primary team  - discussed with attending    NELY Lopez MD (PGY-2)  Consult Urology Resident  Please feel free to reach out on Teams Chat or text me at      72y F with PMHx of metastatic ovarian/uterine cancer, urinary retention, scoliosis currently admitted for hematuria. 18F 3-way catheter in place, CBI removed by urology this AM    Plan:  - no need for CBI  - no sig hematuria  - transfuse per primary team  - f/u palliative/GOC discussion  - care per primary team  - discussed with attending  - okay to restart anticoagulation    NELY Lopez MD (PGY-2)  Consult Urology Resident  Please feel free to reach out on Teams Chat or text me at

## 2022-11-28 NOTE — PROGRESS NOTE ADULT - SUBJECTIVE AND OBJECTIVE BOX
UROLOGY PROGRESS NOTE    SUBJECTIVE: Patient seen and examined bedside. Patient c/o pain, tired.  Tolerating CBi overnight    fluconAZOLE IVPB 100 milliGRAM(s) IV Intermittent every 24 hours  piperacillin/tazobactam IVPB.. 3.375 Gram(s) IV Intermittent every 8 hours      Vital Signs Last 24 Hrs  T(C): 36.6 (2022 05:01), Max: 36.7 (2022 15:58)  T(F): 97.9 (2022 05:01), Max: 98 (2022 15:58)  HR: 90 (2022 05:01) (81 - 90)  BP: 130/78 (2022 05:) (119/80 - 135/80)  BP(mean): --  RR: 18 (2022 05:01) (16 - 20)  SpO2: 96% (2022 05:01) (96% - 100%)    Parameters below as of 2022 05:01  Patient On (Oxygen Delivery Method): nasal cannula  O2 Flow (L/min): 3    I&O's Detail    2022 07:01  -  2022 07:00  --------------------------------------------------------  IN:    Continuous Bladder Irrigation (mL): 95040 mL  Total IN: 66420 mL    OUT:    Continuous Bladder Irrigation (mL): 9700 mL  Total OUT: 9700 mL    Total NET: 2050 mL          PHYSICAL EXAM    General: NAD, resting comfortably in bed  C/V: NSR  Pulm: Nonlabored breathing, no respiratory distress on room air  Abd: soft, ND/NT,   :CBI on, output clear fluid  Extrem: WWP        LABS:                        8.6    16.32 )-----------( 232      ( 2022 18:44 )             26.2     11-    133  |  107  |  28<H>  ----------------------------<  120<H>  5.0   |  20<L>  |  1.18    Ca    8.1<L>      2022 18:42    TPro  5.5<L>  /  Alb  1.0<L>  /  TBili  0.5  /  DBili  x   /  AST  74<H>  /  ALT  18  /  AlkPhos  776<H>      PT/INR - ( 2022 18:42 )   PT: 12.3 sec;   INR: 1.05          PTT - ( 2022 18:42 )  PTT:34.3 sec  Urinalysis Basic - ( 2022 05:06 )    Color: Yellow / Appearance: Clear / S.010 / pH: x  Gluc: x / Ketone: NEGATIVE  / Bili: Negative / Urobili: 0.2 E.U./dL   Blood: x / Protein: NEGATIVE mg/dL / Nitrite: NEGATIVE   Leuk Esterase: Small / RBC: Many /HPF / WBC Many /HPF   Sq Epi: x / Non Sq Epi: 0-5 /HPF / Bacteria: Present /HPF        CULTURES:      Culture - Blood (collected 22 @ 20:06)  Source: .Blood Blood  Preliminary Report (22 @ 03:02):    No growth to date.    Culture - Blood (collected 22 @ 20:06)  Source: .Blood Blood  Preliminary Report (22 @ 03:02):    No growth to date.        Culture - Urine (collected 22 @ 18:42)  Source: Clean Catch Clean Catch (Midstream)  Preliminary Report (22 @ 00:33):    Culture in progress        RADIOLOGY & ADDITIONAL STUDIES:

## 2022-11-28 NOTE — PROGRESS NOTE ADULT - SUBJECTIVE AND OBJECTIVE BOX
Attending:  Isai    HPI:  71 yo female PMHx of HTN, Ovarian and uterine cancer w/ spread to the liver, lungs, and peritoneum (on chemo, last tx 3 weeks ago), anemia, urinary retention, and scoliosis who also had multiple recent admissions for PRES and new onset LLE DVT (11/3) and an pneumonia + hydroureter 2/2 metastatic disease and anemia requiring 2u pRBC's from (-) at which time she was discharged with an indwelling Arevalo catheter. Initially, patient had blood tinged urine but patient [facility] for glen blood in her arevalo. Per the patient, she is has experienced intermittent burning in her bladder for the last two days. She otherwise denies fevers/chills, no cough, wheezing, SOB, chest pain, abdominal pain, nausea, vomitting.     ED course:   Vitals: T: 98.5 BP: 124/76, HR: 105, RR: 19, 95% on RA   Labs: WBC 15.11, Hb: 6.4 (repeat 6.1), MCV: 108.1, RDW: 20.2, HCO3: 20, A, BUN 28, Ca: 8.1, Alk Phos: 776, UA: Red urine, >300 protein, nitrites, +leuk esterase, + bacteria. FOBT+.   Imaging: CXR: cannonball mets to lungs   Intervention: 6mg IV morphine (2X3), Percocet (5mg/325mg), Zosyn 3.375 g IV   Procedures: None  Interventions: Urology consulted      (2022 08:43)    Vascular surgery consulted for known history of LLE common fem and saphenofemoral junction dvt diagnosed on 11/3.  On previous admission we declined IVC filter placement due to active malignancy.  Patient presented with hgb of 6.1 and had hematuria.  Urology started CBI which was completed this am.  Reconsulted for IVC filter placement.  Patient denies CP/SOB/N/V/calf tenderness.    PAST MEDICAL & SURGICAL HISTORY:  Ovarian cancer      Ovarian ca      HTN (hypertension)      Anemia      No significant past surgical history          MEDICATIONS  (STANDING):  enoxaparin Injectable 45 milliGRAM(s) SubCutaneous every 12 hours  escitalopram 7.5 milliGRAM(s) Oral daily  folic acid 1 milliGRAM(s) Oral daily  gabapentin 100 milliGRAM(s) Oral two times a day  levETIRAcetam 500 milliGRAM(s) Oral two times a day  oxybutynin 5 milliGRAM(s) Oral every 12 hours  polyethylene glycol 3350 17 Gram(s) Oral daily  senna 2 Tablet(s) Oral at bedtime    MEDICATIONS  (PRN):  acetaminophen     Tablet .. 650 milliGRAM(s) Oral every 6 hours PRN Temp greater or equal to 38C (100.4F), Mild Pain (1 - 3)  aluminum hydroxide/magnesium hydroxide/simethicone Suspension 30 milliLiter(s) Oral every 4 hours PRN Dyspepsia  LORazepam     Tablet 0.5 milliGRAM(s) Oral every 8 hours PRN Anxiety  melatonin 3 milliGRAM(s) Oral at bedtime PRN Insomnia  ondansetron Injectable 4 milliGRAM(s) IV Push every 8 hours PRN Nausea and/or Vomiting  oxyCODONE    IR 5 milliGRAM(s) Oral every 4 hours PRN Moderate Pain (4 - 6)      Allergies    Benadryl (Other)  Compazine (Unknown)    Intolerances        SOCIAL HISTORY:    FAMILY HISTORY:      Vital Signs Last 24 Hrs  T(C): 36.6 (2022 10:59), Max: 36.7 (2022 20:15)  T(F): 97.9 (2022 10:59), Max: 98 (2022 20:15)  HR: 81 (2022 10:59) (81 - 90)  BP: 127/77 (2022 10:59) (120/76 - 130/78)  BP(mean): --  RR: 16 (2022 10:59) (16 - 18)  SpO2: 97% (2022 10:59) (96% - 100%)    Parameters below as of 2022 10:59  Patient On (Oxygen Delivery Method): nasal cannula  O2 Flow (L/min): 3      I&O's Summary    2022 07:01  -  2022 07:00  --------------------------------------------------------  IN: 90731 mL / OUT: 9700 mL / NET: 0 mL        Physical Exam:  General: NAD, resting comfortably  Pulmonary: normal resp effort  Cardiovascular: NSR  Abdominal: soft, NT/ND  Extremities: RLE- 2+ pitting edema up to thigh no calf tenderness  LLE- 1+ pitting edema no calf tenderness  Pulses:   Right: tri DP pal PT  Left: tri DP pal PT    LABS:                        7.7    15.66 )-----------( 225      ( 2022 14:39 )             25.0         139  |  108  |  28<H>  ----------------------------<  96  4.0   |  23  |  1.26    Ca    7.9<L>      2022 05:30  Phos  3.1       Mg     2.1         TPro  5.5<L>  /  Alb  1.0<L>  /  TBili  0.5  /  DBili  x   /  AST  74<H>  /  ALT  18  /  AlkPhos  776<H>      PT/INR - ( 2022 18:42 )   PT: 12.3 sec;   INR: 1.05          PTT - ( 2022 18:42 )  PTT:34.3 sec  Urinalysis Basic - ( 2022 05:06 )    Color: Yellow / Appearance: Clear / S.010 / pH: x  Gluc: x / Ketone: NEGATIVE  / Bili: Negative / Urobili: 0.2 E.U./dL   Blood: x / Protein: NEGATIVE mg/dL / Nitrite: NEGATIVE   Leuk Esterase: Small / RBC: Many /HPF / WBC Many /HPF   Sq Epi: x / Non Sq Epi: 0-5 /HPF / Bacteria: Present /HPF      CAPILLARY BLOOD GLUCOSE        LIVER FUNCTIONS - ( 2022 18:42 )  Alb: 1.0 g/dL / Pro: 5.5 g/dL / ALK PHOS: 776 U/L / ALT: 18 U/L / AST: 74 U/L / GGT: x             Cultures:  Culture Results:   No growth to date. ( @ 20:06)  Culture Results:   No growth to date. ( @ 20:06)  Culture Results:   >100,000 CFU/ml Candida albicans "Susceptibilities not performed" ( @ 18:42)        Assessment: 72y Female PMHx of HTN, Ovarian and uterine cancer w/ spread to the liver, lungs, and peritoneum (on chemo, last tx 3 weeks ago), anemia, urinary retention, and scoliosis who also had multiple recent admissions for PRES and new onset LLE DVT (11/3) and an pneumonia + hydroureter 2/2 metastatic disease and anemia requiring 2u pRBC's from (-) Vascular surgery consulted for known history of LLE common fem and saphenofemoral junction dvt diagnosed on 11/3. Reconsulted for IVC filter placement.     Recommendations:  - Urology states can restart anticoagulation with close monitoring  - No vascular intervention at this time as patient can be anticoagulated  - f/u b/l LE duplex for RLE swelling >LLE  - discussed with Chief on call  - call x [phone] with questions

## 2022-11-28 NOTE — PROGRESS NOTE ADULT - PROBLEM SELECTOR PLAN 8
In the setting of metastatic cancer. Diagnosed w/ LLE DVT on 11/03 admisstion. Doppler then found Left common femoral vein and saphenofemoral junction deep venous thrombosis. Was discharged on Lovenox. Continues to have 3+ edema of LLE.     Plan:   - repeat LE US   - consult vascular for possible IVC filter   - hold AC i/s/o active bleed In the setting of metastatic cancer. Diagnosed w/ LLE DVT on 11/03 admisstion. Doppler then found Left common femoral vein and saphenofemoral junction deep venous thrombosis. Was discharged on Lovenox. Continues to have 3+ edema of LLE.     Plan:   - f/u BL LE doppler  - f/u vascular recs  - hold AC i/s/o active bleed

## 2022-11-28 NOTE — PROGRESS NOTE ADULT - ATTENDING COMMENTS
71 yo F metastatic cancer    monitor cr  hematuria resolved  outpatient follow-up after discharge arevalo change in 1 month

## 2022-11-28 NOTE — PHYSICAL THERAPY INITIAL EVALUATION ADULT - ADDITIONAL COMMENTS
Pt states she has been living in a rehab facility and has not been able to stand or walk without assistance. Has not stood in a week.

## 2022-11-28 NOTE — PHYSICAL THERAPY INITIAL EVALUATION ADULT - PERTINENT HX OF CURRENT PROBLEM, REHAB EVAL
Patient is a 71 yo female with PMH of ovarian/uterine cancer with spread to the liver, lungs, peritoneum, DVT, anemia, indwelling arevalo catheter who presents with glen hematuria of one day onset, admitted for hematuria, UTI and anemia.

## 2022-11-28 NOTE — PROGRESS NOTE ADULT - PROBLEM SELECTOR PLAN 2
Patient w/ suprapubic tenderness + dysuria + hematuria for two days. UA + w/ indwelling Michaels catheter. + many pseudohyphae present. Michaels placed on 11/21 prior to DC as patient failed TOV. Michaels has now been replaced by urology (11/27 am) and patient is undergoing constant bladder irrigation.       Plan:   - uro recommends against further bladder irrigation  - f/u urology recs   - Ab regimen: Zosyn 3.375 g q8, Fluconazole 100 mg Iv q24  - f/u repeat ua and urine cultures

## 2022-11-28 NOTE — PROGRESS NOTE ADULT - SUBJECTIVE AND OBJECTIVE BOX
OVERNIGHT EVENTS:    SUBJECTIVE / INTERVAL HPI: Patient seen and examined at bedside.     VITAL SIGNS:  Vital Signs Last 24 Hrs  T(C): 36.6 (2022 05:01), Max: 36.7 (2022 15:58)  T(F): 97.9 (2022 05:01), Max: 98 (2022 15:58)  HR: 90 (2022 05:) (81 - 90)  BP: 130/78 (2022 05:) (119/80 - 135/80)  BP(mean): --  RR: 18 (2022 05:) (16 - 20)  SpO2: 96% (2022 05:) (96% - 100%)    Parameters below as of 2022 05:  Patient On (Oxygen Delivery Method): nasal cannula  O2 Flow (L/min): 3      PHYSICAL EXAM:    General: WDWN  HEENT: NCAT; PERRL, anicteric sclera; MMM  Neck: supple, trachea midline  Cardiovascular: S1, S2 normal; RRR, no M/G/R  Respiratory: CTABL; no W/R/R  Gastrointestinal: soft, nontender, nondistended. bowel sounds present.  Skin: no ulcerations or visible rashes appreciated  Extremities: WWP; no edema, clubbing or cyanosis  Vascular: 2+ radial, DP/PT pulses B/L  Neurological: AAOx3; CN II-XII grossly intact; no focal deficits    MEDICATIONS:  MEDICATIONS  (STANDING):  escitalopram 7.5 milliGRAM(s) Oral daily  fluconAZOLE IVPB 100 milliGRAM(s) IV Intermittent every 24 hours  folic acid 1 milliGRAM(s) Oral daily  gabapentin 100 milliGRAM(s) Oral two times a day  levETIRAcetam 500 milliGRAM(s) Oral two times a day  oxybutynin 5 milliGRAM(s) Oral every 12 hours  piperacillin/tazobactam IVPB.. 3.375 Gram(s) IV Intermittent every 8 hours  polyethylene glycol 3350 17 Gram(s) Oral daily  senna 2 Tablet(s) Oral at bedtime    MEDICATIONS  (PRN):  acetaminophen     Tablet .. 650 milliGRAM(s) Oral every 6 hours PRN Temp greater or equal to 38C (100.4F), Mild Pain (1 - 3)  aluminum hydroxide/magnesium hydroxide/simethicone Suspension 30 milliLiter(s) Oral every 4 hours PRN Dyspepsia  LORazepam     Tablet 0.5 milliGRAM(s) Oral every 8 hours PRN Anxiety  melatonin 3 milliGRAM(s) Oral at bedtime PRN Insomnia  melatonin 3 milliGRAM(s) Oral at bedtime PRN Insomnia  ondansetron Injectable 4 milliGRAM(s) IV Push every 8 hours PRN Nausea and/or Vomiting  oxyCODONE    IR 5 milliGRAM(s) Oral every 4 hours PRN Moderate Pain (4 - 6)      ALLERGIES:  Allergies    Benadryl (Other)  Compazine (Unknown)    Intolerances        LABS:                        8.6    16.32 )-----------( 232      ( 2022 18:44 )             26.2         133  |  107  |  28<H>  ----------------------------<  120<H>  5.0   |  20<L>  |  1.18    Ca    8.1<L>      2022 18:42    TPro  5.5<L>  /  Alb  1.0<L>  /  TBili  0.5  /  DBili  x   /  AST  74<H>  /  ALT  18  /  AlkPhos  776<H>      PT/INR - ( 2022 18:42 )   PT: 12.3 sec;   INR: 1.05          PTT - ( 2022 18:42 )  PTT:34.3 sec  Urinalysis Basic - ( 2022 05:06 )    Color: Yellow / Appearance: Clear / S.010 / pH: x  Gluc: x / Ketone: NEGATIVE  / Bili: Negative / Urobili: 0.2 E.U./dL   Blood: x / Protein: NEGATIVE mg/dL / Nitrite: NEGATIVE   Leuk Esterase: Small / RBC: Many /HPF / WBC Many /HPF   Sq Epi: x / Non Sq Epi: 0-5 /HPF / Bacteria: Present /HPF      CAPILLARY BLOOD GLUCOSE      POCT Blood Glucose.: 132 mg/dL (2022 18:17)      RADIOLOGY & ADDITIONAL TESTS: Reviewed. OVERNIGHT EVENTS:  pt reported pelvic pain ON, given morphine 1mg    SUBJECTIVE / INTERVAL HPI: Patient seen and examined at bedside.     CONSTITUTIONAL: No weakness, fevers or chills  EYES/ENT: No visual changes;  No vertigo or throat pain   NECK: No pain or stiffness  RESPIRATORY: No cough, wheezing, hemoptysis; No shortness of breath  CARDIOVASCULAR: No chest pain or palpitations  GASTROINTESTINAL: No abdominal or epigastric pain. No nausea, vomiting, or hematemesis; last bm ystdy.  GENITOURINARY: Reports bladder pain in pelvic region  NEUROLOGICAL: No numbness or weakness  SKIN: No itching, burning, rashes, or lesions   All other review of systems is negative unless indicated above.      VITAL SIGNS:  Vital Signs Last 24 Hrs  T(C): 36.6 (2022 05:01), Max: 36.7 (2022 15:58)  T(F): 97.9 (2022 05:01), Max: 98 (2022 15:58)  HR: 90 (2022 05:) (81 - 90)  BP: 130/78 (2022 05:01) (119/80 - 135/80)  BP(mean): --  RR: 18 (2022 05:) (16 - 20)  SpO2: 96% (2022 05:) (96% - 100%)    Parameters below as of 2022 05:01  Patient On (Oxygen Delivery Method): nasal cannula  O2 Flow (L/min): 3      PHYSICAL EXAM:    General: NAD, lying in bed comfortably talkative and cooperative, arevalo draining yellow fluid  HEENT: NCAT; PERRL, anicteric sclera; MMM  Neck: supple, trachea midline  Cardiovascular: S1, S2 normal; RRR, no M/G/R  Respiratory: CTABL; no W/R/R, NC in place on 3L  Gastrointestinal: soft, nontender, nondistended. bowel sounds present.   : mild suprapubic tenderness to palpation  Skin: no ulcerations or visible rashes appreciated  Extremities: WWP; no edema, clubbing or cyanosis  Vascular: 2+ radial, DP/PT pulses B/L  Neurological: AAOx3; CN II-XII grossly intact; no focal deficits    MEDICATIONS:  MEDICATIONS  (STANDING):  escitalopram 7.5 milliGRAM(s) Oral daily  fluconAZOLE IVPB 100 milliGRAM(s) IV Intermittent every 24 hours  folic acid 1 milliGRAM(s) Oral daily  gabapentin 100 milliGRAM(s) Oral two times a day  levETIRAcetam 500 milliGRAM(s) Oral two times a day  oxybutynin 5 milliGRAM(s) Oral every 12 hours  piperacillin/tazobactam IVPB.. 3.375 Gram(s) IV Intermittent every 8 hours  polyethylene glycol 3350 17 Gram(s) Oral daily  senna 2 Tablet(s) Oral at bedtime    MEDICATIONS  (PRN):  acetaminophen     Tablet .. 650 milliGRAM(s) Oral every 6 hours PRN Temp greater or equal to 38C (100.4F), Mild Pain (1 - 3)  aluminum hydroxide/magnesium hydroxide/simethicone Suspension 30 milliLiter(s) Oral every 4 hours PRN Dyspepsia  LORazepam     Tablet 0.5 milliGRAM(s) Oral every 8 hours PRN Anxiety  melatonin 3 milliGRAM(s) Oral at bedtime PRN Insomnia  melatonin 3 milliGRAM(s) Oral at bedtime PRN Insomnia  ondansetron Injectable 4 milliGRAM(s) IV Push every 8 hours PRN Nausea and/or Vomiting  oxyCODONE    IR 5 milliGRAM(s) Oral every 4 hours PRN Moderate Pain (4 - 6)      ALLERGIES:  Allergies    Benadryl (Other)  Compazine (Unknown)    Intolerances        LABS:                        8.6    16.32 )-----------( 232      ( 2022 18:44 )             26.2         133  |  107  |  28<H>  ----------------------------<  120<H>  5.0   |  20<L>  |  1.18    Ca    8.1<L>      2022 18:42    TPro  5.5<L>  /  Alb  1.0<L>  /  TBili  0.5  /  DBili  x   /  AST  74<H>  /  ALT  18  /  AlkPhos  776<H>      PT/INR - ( 2022 18:42 )   PT: 12.3 sec;   INR: 1.05          PTT - ( 2022 18:42 )  PTT:34.3 sec  Urinalysis Basic - ( 2022 05:06 )    Color: Yellow / Appearance: Clear / S.010 / pH: x  Gluc: x / Ketone: NEGATIVE  / Bili: Negative / Urobili: 0.2 E.U./dL   Blood: x / Protein: NEGATIVE mg/dL / Nitrite: NEGATIVE   Leuk Esterase: Small / RBC: Many /HPF / WBC Many /HPF   Sq Epi: x / Non Sq Epi: 0-5 /HPF / Bacteria: Present /HPF      CAPILLARY BLOOD GLUCOSE      POCT Blood Glucose.: 132 mg/dL (2022 18:17)      RADIOLOGY & ADDITIONAL TESTS: Reviewed.

## 2022-11-29 ENCOUNTER — TRANSCRIPTION ENCOUNTER (OUTPATIENT)
Age: 72
End: 2022-11-29

## 2022-11-29 LAB
ANION GAP SERPL CALC-SCNC: 10 MMOL/L — SIGNIFICANT CHANGE UP (ref 5–17)
ANISOCYTOSIS BLD QL: SLIGHT — SIGNIFICANT CHANGE UP
BASOPHILS # BLD AUTO: 0 K/UL — SIGNIFICANT CHANGE UP (ref 0–0.2)
BASOPHILS # BLD AUTO: 0.03 K/UL — SIGNIFICANT CHANGE UP (ref 0–0.2)
BASOPHILS NFR BLD AUTO: 0 % — SIGNIFICANT CHANGE UP (ref 0–2)
BASOPHILS NFR BLD AUTO: 0.2 % — SIGNIFICANT CHANGE UP (ref 0–2)
BUN SERPL-MCNC: 25 MG/DL — HIGH (ref 7–23)
CALCIUM SERPL-MCNC: 8 MG/DL — LOW (ref 8.4–10.5)
CHLORIDE SERPL-SCNC: 106 MMOL/L — SIGNIFICANT CHANGE UP (ref 96–108)
CO2 SERPL-SCNC: 21 MMOL/L — LOW (ref 22–31)
CREAT SERPL-MCNC: 1.28 MG/DL — SIGNIFICANT CHANGE UP (ref 0.5–1.3)
CULTURE RESULTS: SIGNIFICANT CHANGE UP
EGFR: 44 ML/MIN/1.73M2 — LOW
EOSINOPHIL # BLD AUTO: 0.16 K/UL — SIGNIFICANT CHANGE UP (ref 0–0.5)
EOSINOPHIL # BLD AUTO: 0.25 K/UL — SIGNIFICANT CHANGE UP (ref 0–0.5)
EOSINOPHIL NFR BLD AUTO: 1.2 % — SIGNIFICANT CHANGE UP (ref 0–6)
EOSINOPHIL NFR BLD AUTO: 1.7 % — SIGNIFICANT CHANGE UP (ref 0–6)
GIANT PLATELETS BLD QL SMEAR: PRESENT — SIGNIFICANT CHANGE UP
GLUCOSE SERPL-MCNC: 104 MG/DL — HIGH (ref 70–99)
HCT VFR BLD CALC: 20 % — CRITICAL LOW (ref 34.5–45)
HCT VFR BLD CALC: 22.6 % — LOW (ref 34.5–45)
HGB BLD-MCNC: 6.4 G/DL — CRITICAL LOW (ref 11.5–15.5)
HGB BLD-MCNC: 7.3 G/DL — LOW (ref 11.5–15.5)
IMM GRANULOCYTES NFR BLD AUTO: 0.8 % — SIGNIFICANT CHANGE UP (ref 0–0.9)
LYMPHOCYTES # BLD AUTO: 0.13 K/UL — LOW (ref 1–3.3)
LYMPHOCYTES # BLD AUTO: 0.49 K/UL — LOW (ref 1–3.3)
LYMPHOCYTES # BLD AUTO: 0.9 % — LOW (ref 13–44)
LYMPHOCYTES # BLD AUTO: 3.7 % — LOW (ref 13–44)
MAGNESIUM SERPL-MCNC: 1.9 MG/DL — SIGNIFICANT CHANGE UP (ref 1.6–2.6)
MANUAL SMEAR VERIFICATION: SIGNIFICANT CHANGE UP
MCHC RBC-ENTMCNC: 32 GM/DL — SIGNIFICANT CHANGE UP (ref 32–36)
MCHC RBC-ENTMCNC: 32.3 GM/DL — SIGNIFICANT CHANGE UP (ref 32–36)
MCHC RBC-ENTMCNC: 32.3 PG — SIGNIFICANT CHANGE UP (ref 27–34)
MCHC RBC-ENTMCNC: 33 PG — SIGNIFICANT CHANGE UP (ref 27–34)
MCV RBC AUTO: 100 FL — SIGNIFICANT CHANGE UP (ref 80–100)
MCV RBC AUTO: 103.1 FL — HIGH (ref 80–100)
MONOCYTES # BLD AUTO: 0.52 K/UL — SIGNIFICANT CHANGE UP (ref 0–0.9)
MONOCYTES # BLD AUTO: 1.17 K/UL — HIGH (ref 0–0.9)
MONOCYTES NFR BLD AUTO: 3.5 % — SIGNIFICANT CHANGE UP (ref 2–14)
MONOCYTES NFR BLD AUTO: 8.9 % — SIGNIFICANT CHANGE UP (ref 2–14)
NEUTROPHILS # BLD AUTO: 11.13 K/UL — HIGH (ref 1.8–7.4)
NEUTROPHILS # BLD AUTO: 13.91 K/UL — HIGH (ref 1.8–7.4)
NEUTROPHILS NFR BLD AUTO: 85.2 % — HIGH (ref 43–77)
NEUTROPHILS NFR BLD AUTO: 93 % — HIGH (ref 43–77)
NEUTS BAND # BLD: 0.9 % — SIGNIFICANT CHANGE UP (ref 0–8)
NRBC # BLD: 0 /100 WBCS — SIGNIFICANT CHANGE UP (ref 0–0)
PHOSPHATE SERPL-MCNC: 3 MG/DL — SIGNIFICANT CHANGE UP (ref 2.5–4.5)
PLAT MORPH BLD: ABNORMAL
PLATELET # BLD AUTO: 186 K/UL — SIGNIFICANT CHANGE UP (ref 150–400)
PLATELET # BLD AUTO: 192 K/UL — SIGNIFICANT CHANGE UP (ref 150–400)
POIKILOCYTOSIS BLD QL AUTO: SLIGHT — SIGNIFICANT CHANGE UP
POTASSIUM SERPL-MCNC: 3.6 MMOL/L — SIGNIFICANT CHANGE UP (ref 3.5–5.3)
POTASSIUM SERPL-SCNC: 3.6 MMOL/L — SIGNIFICANT CHANGE UP (ref 3.5–5.3)
RBC # BLD: 1.94 M/UL — LOW (ref 3.8–5.2)
RBC # BLD: 2.26 M/UL — LOW (ref 3.8–5.2)
RBC # FLD: 20.7 % — HIGH (ref 10.3–14.5)
RBC # FLD: 20.9 % — HIGH (ref 10.3–14.5)
RBC BLD AUTO: ABNORMAL
SODIUM SERPL-SCNC: 137 MMOL/L — SIGNIFICANT CHANGE UP (ref 135–145)
SPECIMEN SOURCE: SIGNIFICANT CHANGE UP
SPHEROCYTES BLD QL SMEAR: SLIGHT — SIGNIFICANT CHANGE UP
WBC # BLD: 13.08 K/UL — HIGH (ref 3.8–10.5)
WBC # BLD: 14.81 K/UL — HIGH (ref 3.8–10.5)
WBC # FLD AUTO: 13.08 K/UL — HIGH (ref 3.8–10.5)
WBC # FLD AUTO: 14.81 K/UL — HIGH (ref 3.8–10.5)

## 2022-11-29 PROCEDURE — 99231 SBSQ HOSP IP/OBS SF/LOW 25: CPT

## 2022-11-29 PROCEDURE — 99223 1ST HOSP IP/OBS HIGH 75: CPT

## 2022-11-29 PROCEDURE — 99233 SBSQ HOSP IP/OBS HIGH 50: CPT | Mod: GC

## 2022-11-29 PROCEDURE — 99497 ADVNCD CARE PLAN 30 MIN: CPT | Mod: 25

## 2022-11-29 RX ORDER — HYDROMORPHONE HYDROCHLORIDE 2 MG/ML
0.5 INJECTION INTRAMUSCULAR; INTRAVENOUS; SUBCUTANEOUS EVERY 6 HOURS
Refills: 0 | Status: DISCONTINUED | OUTPATIENT
Start: 2022-11-29 | End: 2022-11-29

## 2022-11-29 RX ORDER — HYDROMORPHONE HYDROCHLORIDE 2 MG/ML
0.25 INJECTION INTRAMUSCULAR; INTRAVENOUS; SUBCUTANEOUS EVERY 6 HOURS
Refills: 0 | Status: DISCONTINUED | OUTPATIENT
Start: 2022-11-29 | End: 2022-12-06

## 2022-11-29 RX ADMIN — SENNA PLUS 2 TABLET(S): 8.6 TABLET ORAL at 21:47

## 2022-11-29 RX ADMIN — ESCITALOPRAM OXALATE 7.5 MILLIGRAM(S): 10 TABLET, FILM COATED ORAL at 10:17

## 2022-11-29 RX ADMIN — GABAPENTIN 100 MILLIGRAM(S): 400 CAPSULE ORAL at 06:21

## 2022-11-29 RX ADMIN — GABAPENTIN 100 MILLIGRAM(S): 400 CAPSULE ORAL at 17:42

## 2022-11-29 RX ADMIN — OXYCODONE HYDROCHLORIDE 5 MILLIGRAM(S): 5 TABLET ORAL at 10:50

## 2022-11-29 RX ADMIN — Medication 5 MILLIGRAM(S): at 10:17

## 2022-11-29 RX ADMIN — OXYCODONE HYDROCHLORIDE 5 MILLIGRAM(S): 5 TABLET ORAL at 19:10

## 2022-11-29 RX ADMIN — OXYCODONE HYDROCHLORIDE 5 MILLIGRAM(S): 5 TABLET ORAL at 05:10

## 2022-11-29 RX ADMIN — OXYCODONE HYDROCHLORIDE 5 MILLIGRAM(S): 5 TABLET ORAL at 04:19

## 2022-11-29 RX ADMIN — LEVETIRACETAM 500 MILLIGRAM(S): 250 TABLET, FILM COATED ORAL at 06:21

## 2022-11-29 RX ADMIN — ENOXAPARIN SODIUM 45 MILLIGRAM(S): 100 INJECTION SUBCUTANEOUS at 17:42

## 2022-11-29 RX ADMIN — LEVETIRACETAM 500 MILLIGRAM(S): 250 TABLET, FILM COATED ORAL at 17:41

## 2022-11-29 RX ADMIN — OXYCODONE HYDROCHLORIDE 5 MILLIGRAM(S): 5 TABLET ORAL at 18:36

## 2022-11-29 RX ADMIN — Medication 0.5 MILLIGRAM(S): at 17:42

## 2022-11-29 RX ADMIN — HYDROMORPHONE HYDROCHLORIDE 0.25 MILLIGRAM(S): 2 INJECTION INTRAMUSCULAR; INTRAVENOUS; SUBCUTANEOUS at 15:03

## 2022-11-29 RX ADMIN — OXYCODONE HYDROCHLORIDE 5 MILLIGRAM(S): 5 TABLET ORAL at 10:17

## 2022-11-29 RX ADMIN — Medication 1 MILLIGRAM(S): at 10:17

## 2022-11-29 RX ADMIN — Medication 5 MILLIGRAM(S): at 23:24

## 2022-11-29 RX ADMIN — POLYETHYLENE GLYCOL 3350 17 GRAM(S): 17 POWDER, FOR SOLUTION ORAL at 10:16

## 2022-11-29 RX ADMIN — HYDROMORPHONE HYDROCHLORIDE 0.25 MILLIGRAM(S): 2 INJECTION INTRAMUSCULAR; INTRAVENOUS; SUBCUTANEOUS at 15:50

## 2022-11-29 RX ADMIN — ENOXAPARIN SODIUM 45 MILLIGRAM(S): 100 INJECTION SUBCUTANEOUS at 06:21

## 2022-11-29 NOTE — PROGRESS NOTE ADULT - ASSESSMENT
72y F with PMHx of metastatic ovarian/uterine cancer, urinary retention, scoliosis currently admitted for hematuria. 18F 3-way catheter in place, CBI removed off 11/28, urine clear now on AC restarted 11/28    Plan:  - no need for CBI  - no sig hematuria  - transfuse per primary team  - f/u palliative/GOC discussion  - care per primary team  - discussed with attending  - urine fine on AC    B John AMARO (PGY-2)  Consult Urology Resident  Please feel free to reach out on Teams Chat or text me at

## 2022-11-29 NOTE — PROGRESS NOTE ADULT - PROBLEM SELECTOR PLAN 4
Patient with 2 day onset of hematuria, and ultimately admitted for glen red blood in arevalo catheter. During last admission, retroperitoneal ultrasound showed two left renal calculi. Patient denies flank pain.     Plan:   - urology consulted, appreciate recs Patient with 2 day onset of hematuria, and ultimately admitted for glen red blood in arevalo catheter. During last admission, retroperitoneal ultrasound showed two left renal calculi. Patient denies flank pain.     Plan:   - urology consulted, recommends restarting lovenox for previous DVT  -arevalo draining yellow urine with some sediments, continue to monitor urine in arevalo bag

## 2022-11-29 NOTE — PROGRESS NOTE ADULT - ATTENDING COMMENTS
funguria hematuria now resolved    urine remains yellow after starting AC  please call with any additional questions or concerns  arevalo change every month outpatient

## 2022-11-29 NOTE — PROGRESS NOTE ADULT - SUBJECTIVE AND OBJECTIVE BOX
UROLOGY PROGRESS NOTE    SUBJECTIVE: Patient seen and examined bedside. Sleeping, still complaining of pain and tiredness    enoxaparin Injectable 45 milliGRAM(s) SubCutaneous every 12 hours      Vital Signs Last 24 Hrs  T(C): 36.8 (2022 10:22), Max: 37.2 (2022 20:55)  T(F): 98.3 (2022 10:22), Max: 99 (2022 20:55)  HR: 95 (2022 10:) (81 - 96)  BP: 147/75 (2022 10:22) (127/77 - 153/73)  BP(mean): --  RR: 18 (2022 10:) (16 - 18)  SpO2: 98% (2022 10:) (94% - 98%)    Parameters below as of 2022 10:22  Patient On (Oxygen Delivery Method): nasal cannula w/ humidification  O2 Flow (L/min): 3    I&O's Detail    2022 07:01  -  2022 07:00  --------------------------------------------------------  IN:  Total IN: 0 mL    OUT:    Indwelling Catheter - Urethral (mL): 475 mL  Total OUT: 475 mL    Total NET: -475 mL          PHYSICAL EXAM    General: NAD, resting comfortably in bed  C/V: NSR  Pulm: Nonlabored breathing, no respiratory distress on NC  : arevalo draining clear yellow with rust sediment        LABS:                        7.3    14.81 )-----------( 192      ( 2022 05:30 )             22.6     11-29    137  |  106  |  25<H>  ----------------------------<  104<H>  3.6   |  21<L>  |  1.28    Ca    8.0<L>      2022 05:30  Phos  3.0     11-  Mg     1.9     11-29        Urinalysis Basic - ( 2022 05:06 )    Color: Yellow / Appearance: Clear / S.010 / pH: x  Gluc: x / Ketone: NEGATIVE  / Bili: Negative / Urobili: 0.2 E.U./dL   Blood: x / Protein: NEGATIVE mg/dL / Nitrite: NEGATIVE   Leuk Esterase: Small / RBC: Many /HPF / WBC Many /HPF   Sq Epi: x / Non Sq Epi: 0-5 /HPF / Bacteria: Present /HPF        CULTURES:      Culture - Blood (collected 22 @ 20:06)  Source: .Blood Blood  Preliminary Report (22 @ 03:02):    No growth to date.    Culture - Blood (collected 22 @ 20:06)  Source: .Blood Blood  Preliminary Report (22 @ 03:02):    No growth to date.        Culture - Urine (collected 22 @ 05:06)  Source: Catheterized None  Final Report (22 @ 09:34):    Less than 10,000 cols/cc; Insignificant amount of growth.    Culture - Urine (collected 22 @ 18:42)  Source: Clean Catch Clean Catch (Midstream)  Final Report (22 @ 15:43):    >100,000 CFU/ml Candida albicans "Susceptibilities not performed"        RADIOLOGY & ADDITIONAL STUDIES:

## 2022-11-29 NOTE — PROGRESS NOTE ADULT - PROBLEM SELECTOR PLAN 8
In the setting of metastatic cancer. Diagnosed w/ LLE DVT on 11/03 admisstion. Doppler then found Left common femoral vein and saphenofemoral junction deep venous thrombosis. Was discharged on Lovenox. Continues to have 3+ edema of LLE.     Plan:   - f/u BL LE doppler  - f/u vascular recs  - hold AC i/s/o active bleed In the setting of metastatic cancer. Diagnosed w/ LLE DVT on 11/03 admisstion. Doppler then found Left common femoral vein and saphenofemoral junction deep venous thrombosis. Was discharged on Lovenox. Continues to have 3+ edema of LLE.     Plan:   - BL LE doppler negative  -vascular doesn't recommend any vascular procedure  -continue to follow vascular recs

## 2022-11-29 NOTE — DISCHARGE NOTE PROVIDER - ATTENDING DISCHARGE PHYSICAL EXAMINATION:
Physical Exam:  General: NAD  Resp: no increased WOB, CTAB  CVS: RRR, no m/r/g, no pitting edema  GI: +BS, nt/nd  Neuro: alert and oriented to person place time and situation  : arevalo in place   Physical Exam:  General: NAD  Resp: no increased WOB, CTAB  CVS: RRR, no m/r/g, no pitting edema  GI: +BS, nt/nd  Neuro: alert and oriented to person place time and situation  : arevalo in place    Patient did not meet sepsis criteria on admission. patient met sirs criteria.  Patient had +candida in urine which is a common contaminant, unknown etiology. She was treated for UTI +candida with fluconazole due to recent urological procedure and hematuria.   Physical Exam:  General: NAD  Resp: no increased WOB, CTAB  CVS: RRR, no m/r/g, no pitting edema  GI: +BS, nt/nd  Neuro: alert and oriented to person place time and situation  : arevalo in place    Patient did not meet sepsis criteria on admission. patient met sirs criteria.  Patient had +candida in urine which is a common contaminant, unknown etiology. She was treated for UTI +candida with fluconazole due to recent urological procedure and hematuria.    Patient not-well nourished. She had severe malnutrition.

## 2022-11-29 NOTE — DISCHARGE NOTE PROVIDER - DETAILS OF MALNUTRITION DIAGNOSIS/DIAGNOSES
This patient has been assessed with a concern for Malnutrition and was treated during this hospitalization for the following Nutrition diagnosis/diagnoses:     -  12/05/2022: Severe protein-calorie malnutrition   -  12/05/2022: Underweight (BMI < 19)

## 2022-11-29 NOTE — DISCHARGE NOTE PROVIDER - NSDCCPCAREPLAN_GEN_ALL_CORE_FT
PRINCIPAL DISCHARGE DIAGNOSIS  Diagnosis: Severe anemia  Assessment and Plan of Treatment: You came into the hosptal with blood in your urine and with a low hemoglobin, which is a protein in the blood. Low hemoglobin indicated you have anemia. Anemia is a condition in which there is not enough red blood cells or hemoglobin in the blood. Hemoglobin is a substance in red blood cells that carries oxygen. When you do not have enough red blood cells or hemoglobin (are anemic), your body cannot get enough oxygen and your organs may not work properly. As a result, you may feel very tired or have other problems.  We gave you a blood transfusion which helped treat your anemia. We rechecked your hemoglobin after and found that it had improved. WE decided to restart your lovenox which is a medication that we are using to treat the clot you had in your leg previously, we performed a ultrasound imaging exam of your legs and found that the clot in your leg hasnt worsened from       SECONDARY DISCHARGE DIAGNOSES  Diagnosis: Hematuria  Assessment and Plan of Treatment:      PRINCIPAL DISCHARGE DIAGNOSIS  Diagnosis: Severe anemia  Assessment and Plan of Treatment: You came into the hosptal with blood in your urine and with a low hemoglobin, which is a protein in the blood. Low hemoglobin indicated you have anemia. Anemia is a condition in which there is not enough red blood cells or hemoglobin in the blood. Hemoglobin is a substance in red blood cells that carries oxygen. When you do not have enough red blood cells or hemoglobin (are anemic), your body cannot get enough oxygen and your organs may not work properly. As a result, you may feel very tired or have other problems.  We gave you a blood transfusion which helped treat your anemia. We rechecked your hemoglobin after and found that it had improved.      SECONDARY DISCHARGE DIAGNOSES  Diagnosis: Acute cystitis  Assessment and Plan of Treatment: While you were in the hospital you were found to have a fungal (Candida) infection in your bladder. You were given flucanazole to treat this UTI.     PRINCIPAL DISCHARGE DIAGNOSIS  Diagnosis: Severe anemia  Assessment and Plan of Treatment: Anemia is a condition in which you lack enough healthy red blood cells to carry adequate oxygen to your body's tissues. Having anemia, also referred to as low hemoglobin, can make you feel tired and weak.  There are many forms of anemia, each with its own cause. Anemia can be temporary or long term and can range from mild to severe. In most cases, anemia has more than one cause. See your doctor if you suspect that you have anemia. It can be a warning sign of serious illness.  You were found to be anemic due to losing blood in your urine. The cause of the blood in your urine was likely due to a cancer that was bleeding in your bladder. While you were in the hospital, you were evaluated by the palliative care team. In their discussion, it was in your best interest to move your care to prioritize quality and comfort. You will be going home with home hospice.

## 2022-11-29 NOTE — PROGRESS NOTE ADULT - SUBJECTIVE AND OBJECTIVE BOX
SUBJECTIVE: Patient seen and examined bedside. Pt reports feeling well this morning. States that she is concerned about her urinary problem but otherwise is asymptomatic. Denies chest pain, sob, palpitations, or leg pain. Tolerating diet    enoxaparin Injectable 45 milliGRAM(s) SubCutaneous every 12 hours      Vital Signs Last 24 Hrs  T(C): 36.8 (2022 10:22), Max: 37.2 (2022 20:55)  T(F): 98.3 (2022 10:22), Max: 99 (2022 20:55)  HR: 95 (2022 10:22) (81 - 96)  BP: 147/75 (2022 10:22) (127/77 - 153/73)  BP(mean): --  RR: 18 (2022 10:22) (16 - 18)  SpO2: 98% (2022 10:22) (94% - 98%)    Parameters below as of 2022 10:22  Patient On (Oxygen Delivery Method): nasal cannula  O2 Flow (L/min): 3    I&O's Detail    2022 07:01  -  2022 07:00  --------------------------------------------------------  IN:  Total IN: 0 mL    OUT:    Indwelling Catheter - Urethral (mL): 475 mL  Total OUT: 475 mL    Total NET: -475 mL          Physical Exam:  General: NAD, resting comfortably  Pulmonary: normal resp effort  Cardiovascular: NSR  Abdominal: soft, NT/ND  Extremities: RLE- 2+ pitting edema up to thigh no calf tenderness  LLE- 1+ pitting edema to the knee, no calf tenderness  Pulses:   Right: tri DP pal PT  Left: tri DP pal PT        LABS:                        7.3    14.81 )-----------( 192      ( 2022 05:30 )             22.6     11    137  |  106  |  25<H>  ----------------------------<  104<H>  3.6   |  21<L>  |  1.28    Ca    8.0<L>      2022 05:30  Phos  3.0     11-  Mg     1.9     -        Urinalysis Basic - ( 2022 05:06 )    Color: Yellow / Appearance: Clear / S.010 / pH: x  Gluc: x / Ketone: NEGATIVE  / Bili: Negative / Urobili: 0.2 E.U./dL   Blood: x / Protein: NEGATIVE mg/dL / Nitrite: NEGATIVE   Leuk Esterase: Small / RBC: Many /HPF / WBC Many /HPF   Sq Epi: x / Non Sq Epi: 0-5 /HPF / Bacteria: Present /HPF        RADIOLOGY & ADDITIONAL STUDIES:

## 2022-11-29 NOTE — PROGRESS NOTE ADULT - PROBLEM SELECTOR PLAN 1
Patient presented with 6.4, repeat 6.1. Likely multifactorial i/s/o malignancy, CORBIN, and hematuria. FOBT (+)  discharge hgb > 8.4 s/p 1U pRBCs, repeat hgb >7.     Plan:   - plan to start lovenox today 11/28  - Transfusion threshold <7  - hold iron supplementation i/s/o infection   - avoid unnecessary blood draws   - T&S active (11/27) Patient presented with 6.4, repeat 6.1. Likely multifactorial i/s/o malignancy, CORBIN, and hematuria. FOBT (+)  discharge hgb > 8.4 s/p 1U pRBCs, repeat hgb >7.     Plan:   -10pm hgb f/u   - Transfusion threshold <7  - hold iron supplementation i/s/o infection   - avoid unnecessary blood draws   - T&S active (11/27)

## 2022-11-29 NOTE — PROGRESS NOTE ADULT - SUBJECTIVE AND OBJECTIVE BOX
OVERNIGHT EVENTS:    SUBJECTIVE / INTERVAL HPI: Patient seen and examined at bedside.     VITAL SIGNS:  Vital Signs Last 24 Hrs  T(C): 37 (2022 05:30), Max: 37.2 (2022 20:55)  T(F): 98.6 (2022 05:30), Max: 99 (2022 20:55)  HR: 96 (2022 05:30) (81 - 96)  BP: 153/73 (2022 05:30) (127/77 - 153/73)  BP(mean): --  RR: 18 (2022 05:30) (16 - 18)  SpO2: 94% (2022 05:30) (94% - 97%)    Parameters below as of 2022 05:30  Patient On (Oxygen Delivery Method): nasal cannula  O2 Flow (L/min): 3      PHYSICAL EXAM:    General: WDWN  HEENT: NCAT; PERRL, anicteric sclera; MMM  Neck: supple, trachea midline  Cardiovascular: S1, S2 normal; RRR, no M/G/R  Respiratory: CTABL; no W/R/R  Gastrointestinal: soft, nontender, nondistended. bowel sounds present.  Skin: no ulcerations or visible rashes appreciated  Extremities: WWP; no edema, clubbing or cyanosis  Vascular: 2+ radial, DP/PT pulses B/L  Neurological: AAOx3; CN II-XII grossly intact; no focal deficits    MEDICATIONS:  MEDICATIONS  (STANDING):  enoxaparin Injectable 45 milliGRAM(s) SubCutaneous every 12 hours  escitalopram 7.5 milliGRAM(s) Oral daily  folic acid 1 milliGRAM(s) Oral daily  gabapentin 100 milliGRAM(s) Oral two times a day  levETIRAcetam 500 milliGRAM(s) Oral two times a day  oxybutynin 5 milliGRAM(s) Oral every 12 hours  polyethylene glycol 3350 17 Gram(s) Oral daily  senna 2 Tablet(s) Oral at bedtime    MEDICATIONS  (PRN):  acetaminophen     Tablet .. 650 milliGRAM(s) Oral every 6 hours PRN Temp greater or equal to 38C (100.4F), Mild Pain (1 - 3)  aluminum hydroxide/magnesium hydroxide/simethicone Suspension 30 milliLiter(s) Oral every 4 hours PRN Dyspepsia  LORazepam     Tablet 0.5 milliGRAM(s) Oral every 8 hours PRN Anxiety  melatonin 3 milliGRAM(s) Oral at bedtime PRN Insomnia  ondansetron Injectable 4 milliGRAM(s) IV Push every 8 hours PRN Nausea and/or Vomiting  oxyCODONE    IR 5 milliGRAM(s) Oral every 4 hours PRN Moderate Pain (4 - 6)      ALLERGIES:  Allergies    Benadryl (Other)  Compazine (Unknown)    Intolerances        LABS:                        7.7    15.66 )-----------( 225      ( 2022 14:39 )             25.0     11-28    139  |  108  |  28<H>  ----------------------------<  96  4.0   |  23  |  1.26    Ca    7.9<L>      2022 05:30  Phos  3.1     -  Mg     2.1             Urinalysis Basic - ( 2022 05:06 )    Color: Yellow / Appearance: Clear / S.010 / pH: x  Gluc: x / Ketone: NEGATIVE  / Bili: Negative / Urobili: 0.2 E.U./dL   Blood: x / Protein: NEGATIVE mg/dL / Nitrite: NEGATIVE   Leuk Esterase: Small / RBC: Many /HPF / WBC Many /HPF   Sq Epi: x / Non Sq Epi: 0-5 /HPF / Bacteria: Present /HPF      CAPILLARY BLOOD GLUCOSE          RADIOLOGY & ADDITIONAL TESTS: Reviewed. Hospital Course:  71 yo female PMHx of HTN, Ovarian and uterine cancer w/ spread to the liver, lungs, and peritoneum (on chemo, last tx 3 weeks ago), anemia, urinary retention, and scoliosis who also had multiple recent admissions for PRES and new onset LLE DVT (11/3) and an pneumonia + hydroureter 2/2 metastatic disease and anemia requiring 2u pRBC's from (-) at which time she was discharged with an indwelling Michaels that represented with anemia from continuous hematuria.     OVERNIGHT EVENTS:    SUBJECTIVE / INTERVAL HPI: Patient seen and examined at bedside.     VITAL SIGNS:  Vital Signs Last 24 Hrs  T(C): 37 (2022 05:30), Max: 37.2 (2022 20:55)  T(F): 98.6 (2022 05:30), Max: 99 (2022 20:55)  HR: 96 (2022 05:30) (81 - 96)  BP: 153/73 (2022 05:30) (127/77 - 153/73)  BP(mean): --  RR: 18 (2022 05:30) (16 - 18)  SpO2: 94% (2022 05:30) (94% - 97%)    Parameters below as of 2022 05:30  Patient On (Oxygen Delivery Method): nasal cannula  O2 Flow (L/min): 3      PHYSICAL EXAM:    General: WDWN  HEENT: NCAT; PERRL, anicteric sclera; MMM  Neck: supple, trachea midline  Cardiovascular: S1, S2 normal; RRR, no M/G/R  Respiratory: CTABL; no W/R/R  Gastrointestinal: soft, nontender, nondistended. bowel sounds present.  Skin: no ulcerations or visible rashes appreciated  Extremities: WWP; no edema, clubbing or cyanosis  Vascular: 2+ radial, DP/PT pulses B/L  Neurological: AAOx3; CN II-XII grossly intact; no focal deficits    MEDICATIONS:  MEDICATIONS  (STANDING):  enoxaparin Injectable 45 milliGRAM(s) SubCutaneous every 12 hours  escitalopram 7.5 milliGRAM(s) Oral daily  folic acid 1 milliGRAM(s) Oral daily  gabapentin 100 milliGRAM(s) Oral two times a day  levETIRAcetam 500 milliGRAM(s) Oral two times a day  oxybutynin 5 milliGRAM(s) Oral every 12 hours  polyethylene glycol 3350 17 Gram(s) Oral daily  senna 2 Tablet(s) Oral at bedtime    MEDICATIONS  (PRN):  acetaminophen     Tablet .. 650 milliGRAM(s) Oral every 6 hours PRN Temp greater or equal to 38C (100.4F), Mild Pain (1 - 3)  aluminum hydroxide/magnesium hydroxide/simethicone Suspension 30 milliLiter(s) Oral every 4 hours PRN Dyspepsia  LORazepam     Tablet 0.5 milliGRAM(s) Oral every 8 hours PRN Anxiety  melatonin 3 milliGRAM(s) Oral at bedtime PRN Insomnia  ondansetron Injectable 4 milliGRAM(s) IV Push every 8 hours PRN Nausea and/or Vomiting  oxyCODONE    IR 5 milliGRAM(s) Oral every 4 hours PRN Moderate Pain (4 - 6)      ALLERGIES:  Allergies    Benadryl (Other)  Compazine (Unknown)    Intolerances        LABS:                        7.7    15.66 )-----------( 225      ( 2022 14:39 )             25.0     11-28    139  |  108  |  28<H>  ----------------------------<  96  4.0   |  23  |  1.26    Ca    7.9<L>      2022 05:30  Phos  3.1       Mg     2.1             Urinalysis Basic - ( 2022 05:06 )    Color: Yellow / Appearance: Clear / S.010 / pH: x  Gluc: x / Ketone: NEGATIVE  / Bili: Negative / Urobili: 0.2 E.U./dL   Blood: x / Protein: NEGATIVE mg/dL / Nitrite: NEGATIVE   Leuk Esterase: Small / RBC: Many /HPF / WBC Many /HPF   Sq Epi: x / Non Sq Epi: 0-5 /HPF / Bacteria: Present /HPF      CAPILLARY BLOOD GLUCOSE          RADIOLOGY & ADDITIONAL TESTS: Reviewed. Hospital Course:  73 yo female PMHx of HTN, Ovarian and uterine cancer w/ spread to the liver, lungs, and peritoneum (on chemo, last tx 3 weeks ago), anemia, urinary retention, and scoliosis who also had multiple recent admissions for PRES and new onset LLE DVT (11/3) and an pneumonia + hydroureter 2/2 metastatic disease and anemia requiring 2u pRBC's from (-) at which time she was discharged with an indwelling Arevalo that represented with anemia from continuous hematuria likely from traumatic arevalo placement. Pt hgb on presentation was     OVERNIGHT EVENTS:    SUBJECTIVE / INTERVAL HPI: Patient seen and examined at bedside.     VITAL SIGNS:  Vital Signs Last 24 Hrs  T(C): 37 (2022 05:30), Max: 37.2 (2022 20:55)  T(F): 98.6 (2022 05:30), Max: 99 (2022 20:55)  HR: 96 (2022 05:30) (81 - 96)  BP: 153/73 (2022 05:30) (127/77 - 153/73)  BP(mean): --  RR: 18 (2022 05:30) (16 - 18)  SpO2: 94% (2022 05:30) (94% - 97%)    Parameters below as of 2022 05:30  Patient On (Oxygen Delivery Method): nasal cannula  O2 Flow (L/min): 3      PHYSICAL EXAM:    General: WDWN  HEENT: NCAT; PERRL, anicteric sclera; MMM  Neck: supple, trachea midline  Cardiovascular: S1, S2 normal; RRR, no M/G/R  Respiratory: CTABL; no W/R/R  Gastrointestinal: soft, nontender, nondistended. bowel sounds present.  Skin: no ulcerations or visible rashes appreciated  Extremities: WWP; no edema, clubbing or cyanosis  Vascular: 2+ radial, DP/PT pulses B/L  Neurological: AAOx3; CN II-XII grossly intact; no focal deficits    MEDICATIONS:  MEDICATIONS  (STANDING):  enoxaparin Injectable 45 milliGRAM(s) SubCutaneous every 12 hours  escitalopram 7.5 milliGRAM(s) Oral daily  folic acid 1 milliGRAM(s) Oral daily  gabapentin 100 milliGRAM(s) Oral two times a day  levETIRAcetam 500 milliGRAM(s) Oral two times a day  oxybutynin 5 milliGRAM(s) Oral every 12 hours  polyethylene glycol 3350 17 Gram(s) Oral daily  senna 2 Tablet(s) Oral at bedtime    MEDICATIONS  (PRN):  acetaminophen     Tablet .. 650 milliGRAM(s) Oral every 6 hours PRN Temp greater or equal to 38C (100.4F), Mild Pain (1 - 3)  aluminum hydroxide/magnesium hydroxide/simethicone Suspension 30 milliLiter(s) Oral every 4 hours PRN Dyspepsia  LORazepam     Tablet 0.5 milliGRAM(s) Oral every 8 hours PRN Anxiety  melatonin 3 milliGRAM(s) Oral at bedtime PRN Insomnia  ondansetron Injectable 4 milliGRAM(s) IV Push every 8 hours PRN Nausea and/or Vomiting  oxyCODONE    IR 5 milliGRAM(s) Oral every 4 hours PRN Moderate Pain (4 - 6)      ALLERGIES:  Allergies    Benadryl (Other)  Compazine (Unknown)    Intolerances        LABS:                        7.7    15.66 )-----------( 225      ( 2022 14:39 )             25.0     11-28    139  |  108  |  28<H>  ----------------------------<  96  4.0   |  23  |  1.26    Ca    7.9<L>      2022 05:30  Phos  3.1       Mg     2.1             Urinalysis Basic - ( 2022 05:06 )    Color: Yellow / Appearance: Clear / S.010 / pH: x  Gluc: x / Ketone: NEGATIVE  / Bili: Negative / Urobili: 0.2 E.U./dL   Blood: x / Protein: NEGATIVE mg/dL / Nitrite: NEGATIVE   Leuk Esterase: Small / RBC: Many /HPF / WBC Many /HPF   Sq Epi: x / Non Sq Epi: 0-5 /HPF / Bacteria: Present /HPF      CAPILLARY BLOOD GLUCOSE          RADIOLOGY & ADDITIONAL TESTS: Reviewed. Hospital Course:  73 yo female PMHx of HTN, Ovarian and uterine cancer w/ spread to the liver, lungs, and peritoneum (on chemo, last tx 3 weeks ago), anemia, urinary retention, and scoliosis who also had multiple recent admissions for PRES and new onset LLE DVT (11/3) and an pneumonia + hydroureter 2/2 metastatic disease and anemia requiring 2u pRBC's from (-) at which time she was discharged with an indwelling Arevalo that represented with anemia from continuous hematuria likely from traumatic arevalo placement. Pt hgb on presentation was 6.4, given 1 UpRBCs, repeat hgb 8.6. Ua + and growing candida, placed on Zosyn and fluconazole. Urology consulted, performed continuous bladder irrigation. Started oxybutynin for bladder spasms. Consulted Vascular, recommends BL LE dopper, which was negative. Repeat Ucx showing no growth. Started oxycodone IR 5mg q4 and dilaudid 0.25mg q6 IV for pelvic pain.        OVERNIGHT EVENTS:    SUBJECTIVE / INTERVAL HPI: Patient seen and examined at bedside.     VITAL SIGNS:  Vital Signs Last 24 Hrs  T(C): 37 (2022 05:30), Max: 37.2 (2022 20:55)  T(F): 98.6 (2022 05:30), Max: 99 (2022 20:55)  HR: 96 (2022 05:30) (81 - 96)  BP: 153/73 (2022 05:30) (127/77 - 153/73)  BP(mean): --  RR: 18 (2022 05:30) (16 - 18)  SpO2: 94% (2022 05:30) (94% - 97%)    Parameters below as of 2022 05:30  Patient On (Oxygen Delivery Method): nasal cannula  O2 Flow (L/min): 3      PHYSICAL EXAM:    General: WDWN  HEENT: NCAT; PERRL, anicteric sclera; MMM  Neck: supple, trachea midline  Cardiovascular: S1, S2 normal; RRR, no M/G/R  Respiratory: CTABL; no W/R/R  Gastrointestinal: soft, nontender, nondistended. bowel sounds present.  Skin: no ulcerations or visible rashes appreciated  Extremities: WWP; no edema, clubbing or cyanosis  Vascular: 2+ radial, DP/PT pulses B/L  Neurological: AAOx3; CN II-XII grossly intact; no focal deficits    MEDICATIONS:  MEDICATIONS  (STANDING):  enoxaparin Injectable 45 milliGRAM(s) SubCutaneous every 12 hours  escitalopram 7.5 milliGRAM(s) Oral daily  folic acid 1 milliGRAM(s) Oral daily  gabapentin 100 milliGRAM(s) Oral two times a day  levETIRAcetam 500 milliGRAM(s) Oral two times a day  oxybutynin 5 milliGRAM(s) Oral every 12 hours  polyethylene glycol 3350 17 Gram(s) Oral daily  senna 2 Tablet(s) Oral at bedtime    MEDICATIONS  (PRN):  acetaminophen     Tablet .. 650 milliGRAM(s) Oral every 6 hours PRN Temp greater or equal to 38C (100.4F), Mild Pain (1 - 3)  aluminum hydroxide/magnesium hydroxide/simethicone Suspension 30 milliLiter(s) Oral every 4 hours PRN Dyspepsia  LORazepam     Tablet 0.5 milliGRAM(s) Oral every 8 hours PRN Anxiety  melatonin 3 milliGRAM(s) Oral at bedtime PRN Insomnia  ondansetron Injectable 4 milliGRAM(s) IV Push every 8 hours PRN Nausea and/or Vomiting  oxyCODONE    IR 5 milliGRAM(s) Oral every 4 hours PRN Moderate Pain (4 - 6)      ALLERGIES:  Allergies    Benadryl (Other)  Compazine (Unknown)    Intolerances        LABS:                        7.7    15.66 )-----------( 225      ( 2022 14:39 )             25.0         139  |  108  |  28<H>  ----------------------------<  96  4.0   |  23  |  1.26    Ca    7.9<L>      2022 05:30  Phos  3.1       Mg     2.1             Urinalysis Basic - ( 2022 05:06 )    Color: Yellow / Appearance: Clear / S.010 / pH: x  Gluc: x / Ketone: NEGATIVE  / Bili: Negative / Urobili: 0.2 E.U./dL   Blood: x / Protein: NEGATIVE mg/dL / Nitrite: NEGATIVE   Leuk Esterase: Small / RBC: Many /HPF / WBC Many /HPF   Sq Epi: x / Non Sq Epi: 0-5 /HPF / Bacteria: Present /HPF      CAPILLARY BLOOD GLUCOSE          RADIOLOGY & ADDITIONAL TESTS: Reviewed. Hospital Course:  73 yo female PMHx of HTN, Ovarian and uterine cancer w/ spread to the liver, lungs, and peritoneum (on chemo, last tx 3 weeks ago), anemia, urinary retention, and scoliosis who also had multiple recent admissions for PRES and new onset LLE DVT (11/3) and an pneumonia + hydroureter 2/2 metastatic disease and anemia requiring 2u pRBC's from (-) at which time she was discharged with an indwelling Arevalo that represented with anemia from continuous hematuria likely from traumatic arevalo placement. Pt hgb on presentation was 6.4, given 1 UpRBCs, repeat hgb 8.6. Ua + and growing candida, placed on Zosyn and fluconazole. Urology consulted, performed continuous bladder irrigation. Started oxybutynin for bladder spasms. Consulted Vascular, recommends BL LE dopper, which was negative. Repeat Ucx showing no growth. Started oxycodone IR 5mg q4 and dilaudid 0.25mg q6 IV for pelvic pain.        OVERNIGHT EVENTS:  pt c/o pain in pelvic area ON    SUBJECTIVE / INTERVAL HPI: Patient seen and examined at bedside.     CONSTITUTIONAL: No weakness, fevers or chills  EYES/ENT: No visual changes;  No vertigo or throat pain   NECK: No pain or stiffness  RESPIRATORY: No cough, wheezing, hemoptysis; No shortness of breath  CARDIOVASCULAR: No chest pain or palpitations  GASTROINTESTINAL: No abdominal or epigastric pain. No nausea, vomiting, or hematemesis; No diarrhea or constipation. No melena or hematochezia.  GENITOURINARY: No dysuria, frequency or hematuria  NEUROLOGICAL: No numbness or weakness  SKIN: No itching, burning, rashes, or lesions   All other review of systems is negative unless indicated above.    VITAL SIGNS:  Vital Signs Last 24 Hrs  T(C): 37 (2022 05:30), Max: 37.2 (2022 20:55)  T(F): 98.6 (2022 05:30), Max: 99 (2022 20:55)  HR: 96 (2022 05:30) (81 - 96)  BP: 153/73 (2022 05:30) (127/77 - 153/73)  BP(mean): --  RR: 18 (2022 05:30) (16 - 18)  SpO2: 94% (2022 05:30) (94% - 97%)    Parameters below as of 2022 05:30  Patient On (Oxygen Delivery Method): nasal cannula  O2 Flow (L/min): 3      PHYSICAL EXAM:    General: NAD, talkative and cooperative, sad affect, teary  HEENT: NCAT; PERRL, anicteric sclera; MMM  Neck: supple, trachea midline  Cardiovascular: S1, S2 normal; RRR, no M/G/R  Respiratory: CTABL; no W/R/R  Gastrointestinal: soft, nontender, nondistended. bowel sounds present.   : suprapubic tenderness  Skin: no ulcerations or visible rashes appreciated  Extremities: WWP; no edema, clubbing or cyanosis  Vascular: 2+ radial, DP/PT pulses B/L  Neurological: AAOx3; CN II-XII grossly intact; no focal deficits    MEDICATIONS:  MEDICATIONS  (STANDING):  enoxaparin Injectable 45 milliGRAM(s) SubCutaneous every 12 hours  escitalopram 7.5 milliGRAM(s) Oral daily  folic acid 1 milliGRAM(s) Oral daily  gabapentin 100 milliGRAM(s) Oral two times a day  levETIRAcetam 500 milliGRAM(s) Oral two times a day  oxybutynin 5 milliGRAM(s) Oral every 12 hours  polyethylene glycol 3350 17 Gram(s) Oral daily  senna 2 Tablet(s) Oral at bedtime    MEDICATIONS  (PRN):  acetaminophen     Tablet .. 650 milliGRAM(s) Oral every 6 hours PRN Temp greater or equal to 38C (100.4F), Mild Pain (1 - 3)  aluminum hydroxide/magnesium hydroxide/simethicone Suspension 30 milliLiter(s) Oral every 4 hours PRN Dyspepsia  LORazepam     Tablet 0.5 milliGRAM(s) Oral every 8 hours PRN Anxiety  melatonin 3 milliGRAM(s) Oral at bedtime PRN Insomnia  ondansetron Injectable 4 milliGRAM(s) IV Push every 8 hours PRN Nausea and/or Vomiting  oxyCODONE    IR 5 milliGRAM(s) Oral every 4 hours PRN Moderate Pain (4 - 6)      ALLERGIES:  Allergies    Benadryl (Other)  Compazine (Unknown)    Intolerances        LABS:                        7.7    15.66 )-----------( 225      ( 2022 14:39 )             25.0     -    139  |  108  |  28<H>  ----------------------------<  96  4.0   |  23  |  1.26    Ca    7.9<L>      2022 05:30  Phos  3.1       Mg     2.1             Urinalysis Basic - ( 2022 05:06 )    Color: Yellow / Appearance: Clear / S.010 / pH: x  Gluc: x / Ketone: NEGATIVE  / Bili: Negative / Urobili: 0.2 E.U./dL   Blood: x / Protein: NEGATIVE mg/dL / Nitrite: NEGATIVE   Leuk Esterase: Small / RBC: Many /HPF / WBC Many /HPF   Sq Epi: x / Non Sq Epi: 0-5 /HPF / Bacteria: Present /HPF      CAPILLARY BLOOD GLUCOSE          RADIOLOGY & ADDITIONAL TESTS: Reviewed. Hospital Course:  71 yo female PMHx of HTN, Ovarian and uterine cancer w/ spread to the liver, lungs, and peritoneum (on chemo, last tx 3 weeks ago), anemia, urinary retention, and scoliosis who also had multiple recent admissions for PRES and new onset LLE DVT (11/3) and an pneumonia + hydroureter 2/2 metastatic disease and anemia requiring 2u pRBC's from (-) at which time she was discharged with an indwelling Arevalo that represented with anemia from continuous hematuria likely from traumatic arevalo placement. Pt hgb on presentation was 6.4, given 1 UpRBCs, repeat hgb 8.6. Ua + and growing candida, placed on Zosyn and fluconazole. Urology consulted, performed continuous bladder irrigation. Started oxybutynin for bladder spasms. Consulted Vascular, recommends BL LE dopper, which was negative. Repeat Ucx showing no growth. Started oxycodone IR 5mg q4 and dilaudid 0.25mg q6 IV for pelvic pain.        OVERNIGHT EVENTS:  pt c/o pain in pelvic area ON    SUBJECTIVE / INTERVAL HPI: Patient seen and examined at bedside.     CONSTITUTIONAL: No weakness, fevers or chills  EYES/ENT: No visual changes;  No vertigo or throat pain   NECK: No pain or stiffness  RESPIRATORY: No cough, wheezing, hemoptysis; No shortness of breath  CARDIOVASCULAR: No chest pain or palpitations  GASTROINTESTINAL: No abdominal or epigastric pain. No nausea, vomiting, or hematemesis; Last BM ystdy.  GENITOURINARY: No dysuria, frequency or hematuria  NEUROLOGICAL: No numbness or weakness  SKIN: No itching, burning, rashes, or lesions   All other review of systems is negative unless indicated above.    VITAL SIGNS:  Vital Signs Last 24 Hrs  T(C): 37 (2022 05:30), Max: 37.2 (2022 20:55)  T(F): 98.6 (2022 05:30), Max: 99 (2022 20:55)  HR: 96 (2022 05:30) (81 - 96)  BP: 153/73 (2022 05:30) (127/77 - 153/73)  BP(mean): --  RR: 18 (2022 05:30) (16 - 18)  SpO2: 94% (2022 05:30) (94% - 97%)    Parameters below as of 2022 05:30  Patient On (Oxygen Delivery Method): nasal cannula  O2 Flow (L/min): 3      PHYSICAL EXAM:    General: NAD, talkative and cooperative, sad affect, teary  HEENT: NCAT; PERRL, anicteric sclera; MMM  Neck: supple, trachea midline  Cardiovascular: S1, S2 normal; RRR, no M/G/R  Respiratory: CTABL; no W/R/R  Gastrointestinal: soft, nontender, nondistended. bowel sounds present.   : suprapubic tenderness  Skin: no ulcerations or visible rashes appreciated  Extremities: WWP; no edema, clubbing or cyanosis  Vascular: 2+ radial, DP/PT pulses B/L  Neurological: AAOx3; CN II-XII grossly intact; no focal deficits    MEDICATIONS:  MEDICATIONS  (STANDING):  enoxaparin Injectable 45 milliGRAM(s) SubCutaneous every 12 hours  escitalopram 7.5 milliGRAM(s) Oral daily  folic acid 1 milliGRAM(s) Oral daily  gabapentin 100 milliGRAM(s) Oral two times a day  levETIRAcetam 500 milliGRAM(s) Oral two times a day  oxybutynin 5 milliGRAM(s) Oral every 12 hours  polyethylene glycol 3350 17 Gram(s) Oral daily  senna 2 Tablet(s) Oral at bedtime    MEDICATIONS  (PRN):  acetaminophen     Tablet .. 650 milliGRAM(s) Oral every 6 hours PRN Temp greater or equal to 38C (100.4F), Mild Pain (1 - 3)  aluminum hydroxide/magnesium hydroxide/simethicone Suspension 30 milliLiter(s) Oral every 4 hours PRN Dyspepsia  LORazepam     Tablet 0.5 milliGRAM(s) Oral every 8 hours PRN Anxiety  melatonin 3 milliGRAM(s) Oral at bedtime PRN Insomnia  ondansetron Injectable 4 milliGRAM(s) IV Push every 8 hours PRN Nausea and/or Vomiting  oxyCODONE    IR 5 milliGRAM(s) Oral every 4 hours PRN Moderate Pain (4 - 6)      ALLERGIES:  Allergies    Benadryl (Other)  Compazine (Unknown)    Intolerances        LABS:                        7.7    15.66 )-----------( 225      ( 2022 14:39 )             25.0     -    139  |  108  |  28<H>  ----------------------------<  96  4.0   |  23  |  1.26    Ca    7.9<L>      2022 05:30  Phos  3.1       Mg     2.1             Urinalysis Basic - ( 2022 05:06 )    Color: Yellow / Appearance: Clear / S.010 / pH: x  Gluc: x / Ketone: NEGATIVE  / Bili: Negative / Urobili: 0.2 E.U./dL   Blood: x / Protein: NEGATIVE mg/dL / Nitrite: NEGATIVE   Leuk Esterase: Small / RBC: Many /HPF / WBC Many /HPF   Sq Epi: x / Non Sq Epi: 0-5 /HPF / Bacteria: Present /HPF      CAPILLARY BLOOD GLUCOSE          RADIOLOGY & ADDITIONAL TESTS: Reviewed.

## 2022-11-29 NOTE — PROGRESS NOTE ADULT - PROBLEM SELECTOR PLAN 12
F: Per oral   E: K>4, Mg>2   N: Regular diet   DVT: Hold i/s/o active bleed  GI: Pantoprazole  Bowel Regimen: Miralax, Senna   CODE STATUS: DNR/DNI
F: Per oral   E: K>4, Mg>2   N: Regular diet   DVT: Hold i/s/o active bleed  GI: Pantoprazole  Bowel Regimen: Miralax, Senna   CODE STATUS: DNR/DNI

## 2022-11-29 NOTE — PROGRESS NOTE ADULT - PROBLEM SELECTOR PLAN 2
Patient w/ suprapubic tenderness + dysuria + hematuria for two days. UA + w/ indwelling Michaels catheter. + many pseudohyphae present. Michaels placed on 11/21 prior to DC as patient failed TOV. Michaels has now been replaced by urology (11/27 am) and patient is undergoing constant bladder irrigation.       Plan:   - uro recommends against further bladder irrigation  - f/u urology recs   - Ab regimen: Zosyn 3.375 g q8, Fluconazole 100 mg Iv q24  - f/u repeat ua and urine cultures Patient w/ suprapubic tenderness + dysuria + hematuria for two days. UA + w/ indwelling Michaels catheter. + many pseudohyphae present. Michaels placed on 11/21 prior to DC as patient failed TOV. Michaels has now been replaced by urology (11/27 am) and patient is undergoing constant bladder irrigation. - Ab regimen: Zosyn 3.375 g q8, Fluconazole 100 mg Iv q24 eventually dc'd.      Plan:   -monitor off ABx  - uro recommends against further bladder irrigation  - f/u urology recs   - f/u repeat ua and urine cultures

## 2022-11-29 NOTE — DISCHARGE NOTE PROVIDER - NSDCMRMEDTOKEN_GEN_ALL_CORE_FT
acetaminophen 325 mg oral tablet: 2 tab(s) orally every 6 hours, As needed, Temp greater or equal to 38C (100.4F), Mild Pain (1 - 3)  aluminum hydroxide-magnesium hydroxide 200 mg-200 mg/5 mL oral suspension: 30 milliliter(s) orally every 4 hours, As needed, Dyspepsia  enoxaparin 60 mg/0.6 mL injectable solution: 60 milligram(s) subcutaneously once a day   Please draw CBC and BMP on 11/28/22. Provided that Hb is stable, may be able to redose lovenox according to CrCl.  ESCITALOPRAM 5MG TABLETS: 1.5 each orally once a day  ferrous sulfate 325 mg (65 mg elemental iron) oral tablet: 1 tab(s) orally every other day (at bedtime)  folic acid 1 mg oral tablet: 1 tab(s) orally once a day  gabapentin 100 mg oral capsule: 1 cap(s) orally 2 times a day  levETIRAcetam 500 mg oral tablet: 1 tab(s) orally 2 times a day  LORAZEPAM  0.5 MG TABS: 1 tab(s) orally every 8 hours, As Needed  Lovenox 40 mg/0.4 mL injectable solution: 60 milligram(s) injectable once a day   Check CBC and BMP within 7 days to adjust lovenox dosage according to CrCl if Hb stable  melatonin 3 mg oral tablet: 1 tab(s) orally once a day (at bedtime), As needed, Insomnia  NIFEdipine 60 mg oral tablet, extended release: 1 tab(s) orally once a day  ondansetron 2 mg/mL injectable solution: 4 milligram(s) injectable every 8 hours, As Needed for nausea  polyethylene glycol 3350 oral powder for reconstitution: 17 gram(s) orally once a day  senna leaf extract oral tablet: 2 tab(s) orally once a day (at bedtime)   acetaminophen 325 mg oral tablet: 2 tab(s) orally every 6 hours, As needed, Temp greater or equal to 38C (100.4F), Mild Pain (1 - 3)  aluminum hydroxide-magnesium hydroxide 200 mg-200 mg/5 mL oral suspension: 30 milliliter(s) orally every 4 hours, As needed, Dyspepsia  enoxaparin 60 mg/0.6 mL injectable solution: 60 milligram(s) subcutaneously once a day   Please draw CBC and BMP on 11/28/22. Provided that Hb is stable, may be able to redose lovenox according to CrCl.  ESCITALOPRAM 5MG TABLETS: 1.5 each orally once a day  ferrous sulfate 325 mg (65 mg elemental iron) oral tablet: 1 tab(s) orally every other day (at bedtime)  folic acid 1 mg oral tablet: 1 tab(s) orally once a day  gabapentin 100 mg oral capsule: 1 cap(s) orally 2 times a day  levETIRAcetam 500 mg oral tablet: 1 tab(s) orally 2 times a day  LORAZEPAM  0.5 MG TABS: 1 tab(s) orally every 8 hours, As Needed  Lovenox 40 mg/0.4 mL injectable solution: 60 milligram(s) injectable once a day   Check CBC and BMP within 7 days to adjust lovenox dosage according to CrCl if Hb stable  melatonin 3 mg oral tablet: 1 tab(s) orally once a day (at bedtime), As needed, Insomnia  naloxone 4 mg/0.1 mL nasal spray: 1 milligram(s) intranasally every 5 minutes, As Needed for overdose  NIFEdipine 60 mg oral tablet, extended release: 1 tab(s) orally once a day  ondansetron 2 mg/mL injectable solution: 4 milligram(s) injectable every 8 hours, As Needed for nausea  oxybutynin 5 mg oral tablet: 1 tab(s) orally every 8 hours MDD:3 tabs  oxyCODONE 5 mg oral tablet: 1 tab(s) orally every 4 hours, As Needed -for severe pain MDD:6 tabs  polyethylene glycol 3350 oral powder for reconstitution: 17 gram(s) orally once a day  senna leaf extract oral tablet: 2 tab(s) orally once a day (at bedtime)   acetaminophen 325 mg oral tablet: 2 tab(s) orally every 6 hours, As needed, Temp greater or equal to 38C (100.4F), Mild Pain (1 - 3)  aluminum hydroxide-magnesium hydroxide 200 mg-200 mg/5 mL oral suspension: 30 milliliter(s) orally every 4 hours, As needed, Dyspepsia  ESCITALOPRAM 5MG TABLETS: 1.5 each orally once a day  ferrous sulfate 325 mg (65 mg elemental iron) oral tablet: 1 tab(s) orally every other day (at bedtime)  folic acid 1 mg oral tablet: 1 tab(s) orally once a day  gabapentin 100 mg oral capsule: 1 cap(s) orally 2 times a day  levETIRAcetam 500 mg oral tablet: 1 tab(s) orally 2 times a day  LORAZEPAM  0.5 MG TABS: 1 tab(s) orally every 8 hours, As Needed  melatonin 3 mg oral tablet: 1 tab(s) orally once a day (at bedtime), As needed, Insomnia  naloxone 4 mg/0.1 mL nasal spray: 1 milligram(s) intranasally every 5 minutes, As Needed for overdose  NIFEdipine 60 mg oral tablet, extended release: 1 tab(s) orally once a day  ondansetron 2 mg/mL injectable solution: 4 milligram(s) injectable every 8 hours, As Needed for nausea  oxybutynin 5 mg oral tablet: 1 tab(s) orally every 8 hours MDD:3 tabs  oxyCODONE 5 mg oral tablet: 1 tab(s) orally every 4 hours, As Needed -for severe pain MDD:6 tabs  polyethylene glycol 3350 oral powder for reconstitution: 17 gram(s) orally once a day  senna leaf extract oral tablet: 2 tab(s) orally once a day (at bedtime)   acetaminophen 325 mg oral tablet: 2 tab(s) orally every 6 hours, As needed, Temp greater or equal to 38C (100.4F), Mild Pain (1 - 3)  aluminum hydroxide-magnesium hydroxide 200 mg-200 mg/5 mL oral suspension: 30 milliliter(s) orally every 4 hours, As needed, Dyspepsia  ESCITALOPRAM 5MG TABLETS: 1.5 each orally once a day  gabapentin 100 mg oral capsule: 1 cap(s) orally 2 times a day  levETIRAcetam 500 mg oral tablet: 1 tab(s) orally 2 times a day  LORAZEPAM  0.5 MG TABS: 1 tab(s) orally every 8 hours, As Needed  melatonin 3 mg oral tablet: 1 tab(s) orally once a day (at bedtime), As needed, Insomnia  naloxone 4 mg/0.1 mL nasal spray: 1 milligram(s) intranasally every 5 minutes, As Needed for overdose  oxybutynin 5 mg oral tablet: 1 tab(s) orally every 8 hours MDD:3 tabs  oxyCODONE 5 mg oral tablet: 1 tab(s) orally every 4 hours, As Needed -for severe pain MDD:6 tabs  polyethylene glycol 3350 oral powder for reconstitution: 17 gram(s) orally once a day  senna leaf extract oral tablet: 2 tab(s) orally once a day (at bedtime)   acetaminophen 325 mg oral tablet: 2 tab(s) orally every 6 hours, As needed, Temp greater or equal to 38C (100.4F), Mild Pain (1 - 3)  aluminum hydroxide-magnesium hydroxide 200 mg-200 mg/5 mL oral suspension: 30 milliliter(s) orally every 4 hours, As needed, Dyspepsia  ESCITALOPRAM 5MG TABLETS: 1.5 each orally once a day  fluconazole 100 mg oral tablet: 1 tab(s) orally every 24 hours from 12/10 to 12/15.  gabapentin 100 mg oral capsule: 1 cap(s) orally 2 times a day  levETIRAcetam 500 mg oral tablet: 1 tab(s) orally 2 times a day  LORAZEPAM  0.5 MG TABS: 1 tab(s) orally every 8 hours, As Needed  melatonin 3 mg oral tablet: 1 tab(s) orally once a day (at bedtime), As needed, Insomnia  naloxone 4 mg/0.1 mL nasal spray: 1 milligram(s) intranasally every 5 minutes, As Needed for overdose  oxybutynin 5 mg oral tablet: 1 tab(s) orally every 8 hours MDD:3 tabs  oxyCODONE 5 mg oral tablet: 1 tab(s) orally every 4 hours, As Needed -for severe pain MDD:6 tabs  polyethylene glycol 3350 oral powder for reconstitution: 17 gram(s) orally once a day  senna leaf extract oral tablet: 2 tab(s) orally once a day (at bedtime)

## 2022-11-29 NOTE — PROGRESS NOTE ADULT - ASSESSMENT
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 Reconsulted for IVC filter placement. Restarted on PO AC with Lovenox 45BID. Hb stable    Recommendations:  - Urology states can restart anticoagulation with close monitoring - now on Lovenox 45mg BID  - No vascular intervention at this time as patient can be anticoagulated  - f/u b/l LE duplex for RLE swelling >LLE  - discussed with Chief on call  - call [phone] with questions

## 2022-11-29 NOTE — DISCHARGE NOTE PROVIDER - CARE PROVIDER_API CALL
Serg Correia)  Urology  [address] [place] [address], [place] [address]  Phone: [phone]  Fax: [phone]  Follow Up Time:

## 2022-11-29 NOTE — DISCHARGE NOTE PROVIDER - HOSPITAL COURSE
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     Hospital course (by problem):   # Acute Blood Loss Anemia.   - Patient presented with 6.4, repeat 6.1. Likely multifactorial i/s/o traumatic arevalo placement, malignancy, CORBIN, and hematuria.   FOBT (+)  -discharge hgb > 8.4 s/p 1U pRBCs, repeat hgb >7.   - consulted urology who performed bladder irrigation  -given 1 UpRBCs, repeat hgb >7  -initially held lovenox but restarted with improved hgb     # Urinary tract infection with hematuria.   -Patient w/ suprapubic tenderness + dysuria + hematuria for two days. UA + w/ indwelling Arevalo catheter. + many pseudohyphae present.  - Arevalo placed on 11/21 prior to DC as patient failed TOV.  - Arevalo has now been replaced by urology (11/27 am) and patient is undergoing constant bladder irrigation.   - uro recommends against further bladder irrigation  - Ab regimen: Zosyn 3.375 g q8, Fluconazole 100 mg Iv q24 but eventually dc'd  - repeat urine cx shows no growth    #Hematuria.   -Patient with 2 day onset of hematuria, and ultimately admitted for glen red blood in arevalo catheter. During last admission, retroperitoneal ultrasound showed two left renal calculi. Patient denies flank pain.   - urology consulted, performed bladder irrigation and recommend restarting ACs for previous DVT    # DVT, lower extremity.   -In the setting of metastatic cancer. Diagnosed w/ LLE DVT on 11/03 admisstion. Doppler then found Left common femoral vein and saphenofemoral junction deep venous thrombosis. Was discharged on Lovenox. Continues to have 3+ edema of LLE.   - BL LE doppler negative  -vascular doesn't recommend any vascular interventions  - lovenox restarted per urology      #Bladder spasm.   -Patient w/ bladder spasm likely 2/2 arevalo.   - oxybutynin 5 mg BID.    # Hydronephrosis, left.   -CT 11/18: Uterine soft tissue mass encases and severely narrows distal left ureter with associated moderate left hydronephrosis. Urology following, would ultimately need stenting but patient spoke to her oncologist and decided not to pursue at the time. Urology is following, and has spoken to her about risks of both intervention vs. no intervention. Cr is stable at this time.   - Urology following.    #Ovarian cancer.   ·  Plan: History of metastatic ovarian/uterine cancer actively on chemotherapy with Dr. Hidalgo. Diagnosed in 2015 w/ metastasis to liver, lungs, and peritoneum in 2018/2019. Last treatment approximately 4 week ago. Past regimen includes avastin use, current regimen on keytruda, dose reduced gemzar, cytoxan, 5-FU continual infusion, docetaxel, and carboplatin. Presented with DVT on last admission likely 2/2 hypercoagulable state, placed on full anticoagulation.   - obtain collateral/contact Dr. Lewis : [phone]        Patient was discharged to: (home/[facility]/acute rehab/hospice, etc, and with what services – home health PT/RN? Home O2?)    New medications:   Changes to old medications:  Medications that were stopped:    Items to follow up as outpatient:    Physical exam at the time of discharge:       Patient is a 71 yo female with PMH of ovarian/uterine cancer with spread to the liver, lungs, peritoneum, DVT, anemia, indwelling arevalo catheter who presented with glen hematuria (11/26), admitted for hematuria, UTI and anemia. Patient received 1 unit PRBC on admission. She later developed hematuria and Hgb dropped to <7 and patient was given another unit of PRBC. She was started on CBI and Hgb is uptrending.       Problem/Plan - 1: Acute blood loss anemia.   ·  Plan: Patient presented with Hgb 6.4, repeat 6.1. Likely multifactorial i/s/o malignancy, CORBIN, and hematuria. Suspect blood loss is likely anatomical given acuity. Patient with Hgb 7.8. Now s/p 2 units PRBC throughout hospital stay.     Problem/Plan - 2: Hematuria.   ·  Plan: Patient with 2 day onset of hematuria, and ultimately admitted for glen red blood in arevalo catheter. During last admission, retroperitoneal ultrasound showed two left renal calculi. Patient now with worsening dark urine in AM suspect new blood loss secondary to new arevalo placement vs. candida UTI. arevalo draining merlot red dark urine -> now light pink      Problem/Plan - 3: Cystitis.   ·  Plan: Patient w/ suprapubic tenderness + dysuria + hematuria for three days. UA + w/ indwelling Arevalo catheter. + many pseudohyphae present. Arevalo placed on 11/21 prior to DC as patient failed TOV. Arevalo has now been replaced by urology (11/27 am). . Patient's symptoms and elevated WBC are likely reactive to blood in the bladder however cannot rule out fungal cystitis. Will start fluconazole for 2 weeks (start 12/2).    Patient was discharged to:  New medications:   Changes to old medications:   Medications that were stopped:  Items to Follow up as outpatient   Physical exam at time of discharge: Patient is a 71 yo female with PMH of ovarian/uterine cancer with spread to the liver, lungs, peritoneum, DVT, anemia, indwelling arevalo catheter who presented with glen hematuria (11/26), admitted for hematuria 2/2 traumatic arevalo placement vs implantation of metastatic disease on left ureter, UTI and anemia. Patient received 1 unit PRBC on admission. She later developed hematuria and Hgb dropped to <7 and patient was given another unit of PRBC. She was started on CBI and Hgb was uptrending. This AM hgb stable. Patient is pending home hospice.     Problem/Plan - 1:Acute blood loss anemia.    Patient presented with Hgb 6.4, repeat 6.1. Likely multifactorial i/s/o malignancy, CORBIN, and hematuria. Suspect blood loss is likely anatomical given acuity (see below). Now s/p 2 units PRBC throughout hospital stay. Urology advises against cystoscopy at this time. Palliative consulted and patient pending home hospice.       Problem/Plan - 2: Hematuria.   Patient with 2 day onset of hematuria, and ultimately admitted for glen red blood in arevalo catheter. During last admission, retroperitoneal ultrasound showed two left renal calculi. Patient now with worsening dark urine in AM suspect new blood loss secondary to new arevalo placement vs. candida UTI vs. metastatic invasive tumor implantation of left ureter.       Problem/Plan - 3:Cystitis.   ·  Plan: Patient w/ suprapubic tenderness + dysuria + hematuria for three days. UA + w/ indwelling Arevalo catheter. + many pseudohyphae present. Arevalo placed on 11/21 prior to DC as patient failed TOV. Arevalo has now been replaced by urology (11/27 am). . Patient's symptoms and elevated WBC are likely reactive to blood in the bladder however cannot rule out fungal cystitis. Will cont fluconazole for 2 weeks (start 12/2).    Problem/Plan - 4: Bladder spasm.   Patient w/ bladder spasm likely 2/2 arevalo. Pt reports improved s/s with increased frequency, but does not report resolution of s/s. cont oxybutynin 5 mg TID.      Problem/Plan - 5: Hydronephrosis, left.    CT 11/18: Uterine soft tissue mass encases and severely narrows distal left ureter with associated moderate left hydronephrosis. Urology following, would ultimately need stenting but patient spoke to her oncologist and decided not to pursue at the time. Urology is following, and has spoken to her about risks of both intervention vs. no intervention. Cr is stable at this time.     Problem/Plan - 6: Ovarian cancer.   History of metastatic ovarian/uterine cancer actively on chemotherapy with Dr. Hidalgo. Diagnosed in 2015 w/ metastasis to liver, lungs, and peritoneum in 2018/2019. Last treatment approximately 4 week ago. Past regimen includes avastin use, current regimen on keytruda, dose reduced gemzar, cytoxan, 5-FU continual infusion, docetaxel, and carboplatin. Presented with DVT on last admission likely 2/2 hypercoagulable state, placed on full anticoagulation. Is currently off AC and ultrasounds from this admission showed that her previous DVT (11/3) has resolved. Patient going towards home hospice. Plan:   Morphine 2mg IV PRN q8  Dilaudid 0.25 mg PRN q2 for breakthrough pain.    Problem/Plan - 7: DVT, lower extremity.   In the setting of metastatic cancer. Diagnosed w/ LLE DVT on 11/03 admission. Doppler then found Left common femoral vein and saphenofemoral junction deep venous thrombosis. Was discharged on Lovenox. Continues to have edema of LLE.   - BL LE doppler negative  -vascular doesn't recommend any vascular procedure      Patient was discharged to: Home hospice  New medications: None  Changes to old medications: None  Medications that were stopped: Stopping AC    Physical exam at time of discharge:   Patient is a 71 yo female with PMH of ovarian/uterine cancer with spread to the liver, lungs, peritoneum, DVT, anemia, indwelling arevalo catheter who presented with glen hematuria (11/26), admitted for hematuria 2/2 traumatic arevalo placement vs implantation of metastatic disease on left ureter, UTI and anemia. Patient received 1 unit PRBC on admission. She later developed hematuria and Hgb dropped to <7 and patient was given another unit of PRBC. She was started on CBI and Hgb was uptrending. This AM hgb stable. Patient is pending home hospice.     Problem/Plan - 1:Acute blood loss anemia.    Patient presented with Hgb 6.4, repeat 6.1. Likely multifactorial i/s/o malignancy, CORBIN, and hematuria. Suspect blood loss is likely anatomical given acuity (see below). Now s/p 2 units PRBC throughout hospital stay. Urology advises against cystoscopy at this time. Palliative consulted and patient pending home hospice.       Problem/Plan - 2: Hematuria.   Patient with 2 day onset of hematuria, and ultimately admitted for glen red blood in arevalo catheter. During last admission, retroperitoneal ultrasound showed two left renal calculi. Patient now with worsening dark urine in AM suspect new blood loss secondary to new arevalo placement vs. candida UTI vs. metastatic invasive tumor implantation of left ureter.       Problem/Plan - 3:Cystitis.   ·  Plan: Patient w/ suprapubic tenderness + dysuria + hematuria for three days. UA + w/ indwelling Arevalo catheter. + many pseudohyphae present. Arevalo placed on 11/21 prior to DC as patient failed TOV. Arevalo has now been replaced by urology (11/27 am). . Patient's symptoms and elevated WBC are likely reactive to blood in the bladder however cannot rule out fungal cystitis. Will cont fluconazole for 2 weeks (start 12/2).    Problem/Plan - 4: Bladder spasm.   Patient w/ bladder spasm likely 2/2 arevalo. Pt reports improved s/s with increased frequency, but does not report resolution of s/s. cont oxybutynin 5 mg TID.      Problem/Plan - 5: Hydronephrosis, left.    CT 11/18: Uterine soft tissue mass encases and severely narrows distal left ureter with associated moderate left hydronephrosis. Urology following, would ultimately need stenting but patient spoke to her oncologist and decided not to pursue at the time. Urology is following, and has spoken to her about risks of both intervention vs. no intervention. Cr is stable at this time.     Problem/Plan - 6: Ovarian cancer.   History of metastatic ovarian/uterine cancer actively on chemotherapy with Dr. Hidalgo. Diagnosed in 2015 w/ metastasis to liver, lungs, and peritoneum in 2018/2019. Last treatment approximately 4 week ago. Past regimen includes avastin use, current regimen on keytruda, dose reduced gemzar, cytoxan, 5-FU continual infusion, docetaxel, and carboplatin. Presented with DVT on last admission likely 2/2 hypercoagulable state, placed on full anticoagulation. Is currently off AC and ultrasounds from this admission showed that her previous DVT (11/3) has resolved. Patient going towards home hospice. Plan:   Morphine 2mg IV PRN q8  Dilaudid 0.25 mg PRN q2 for breakthrough pain.    Problem/Plan - 7: DVT, lower extremity.   In the setting of metastatic cancer. Diagnosed w/ LLE DVT on 11/03 admission. Doppler then found Left common femoral vein and saphenofemoral junction deep venous thrombosis. Was discharged on Lovenox. Continues to have edema of LLE.   - BL LE doppler negative  -vascular doesn't recommend any vascular procedure      Patient was discharged to: Home hospice  New medications: None  Changes to old medications: None  Medications that were stopped: Stopping AC    Physical exam at time of discharge:   General: NAD, AAOx3  HEENT: atraumatic, normocephalic  Pulmonary: clear to auscultation bilaterally; No wheeze  Cardiovascular: Regular rate and rhythm; no murmurs, rubs or gallops. Normal S1S2  Gastrointestinal: Soft, positive suprapubic tenderness, nontender, nondistended; bowel sounds present  Musculoskeletal: 2+ peripheral pulses, no clubbing, cyanosis or edema  Neurology: Pt. alert and oriented, fluent speech, able to move all extremities  Skin: no rashes or lesions   Patient is a 71 yo female with PMH of ovarian/uterine cancer with spread to the liver, lungs, peritoneum, DVT, anemia, indwelling arevalo catheter who presented with glen hematuria (11/26), admitted for hematuria 2/2 traumatic arevalo placement vs implantation of metastatic disease on left ureter, UTI and anemia. Patient received 1 unit PRBC on admission. She later developed hematuria and Hgb dropped to <7 and patient was given another unit of PRBC. She was started on CBI and Hgb was uptrending. Patient is pending home hospice.     Problem/Plan - 1:Acute blood loss anemia.    Patient presented with Hgb 6.4, repeat 6.1. Likely multifactorial i/s/o malignancy, CORBIN, and hematuria. Suspect blood loss is likely anatomical given acuity (see below). Now s/p 2 units PRBC throughout hospital stay. Urology advises against cystoscopy at this time. Palliative consulted and patient pending home hospice.       Problem/Plan - 2: Hematuria.   Patient with 2 day onset of hematuria, and ultimately admitted for glen red blood in arevalo catheter. During last admission, retroperitoneal ultrasound showed two left renal calculi. Patient now with worsening dark urine in AM suspect new blood loss secondary to new arevalo placement vs. candida UTI vs. metastatic invasive tumor implantation of left ureter.       Problem/Plan - 3:Cystitis.   ·  Plan: Patient w/ suprapubic tenderness + dysuria + hematuria for three days. UA + w/ indwelling Arevalo catheter. + many pseudohyphae present. Arevalo placed on 11/21 prior to DC as patient failed TOV. Arevalo has now been replaced by urology (11/27 am). . Patient's symptoms and elevated WBC are likely reactive to blood in the bladder however cannot rule out fungal cystitis. Will cont fluconazole for 2 weeks (start 12/2).    Problem/Plan - 4: Bladder spasm.   Patient w/ bladder spasm likely 2/2 arevalo. Pt reports improved s/s with increased frequency, but does not report resolution of s/s. cont oxybutynin 5 mg TID.      Problem/Plan - 5: Hydronephrosis, left.    CT 11/18: Uterine soft tissue mass encases and severely narrows distal left ureter with associated moderate left hydronephrosis. Urology following, would ultimately need stenting but patient spoke to her oncologist and decided not to pursue at the time. Urology is following, and has spoken to her about risks of both intervention vs. no intervention. Cr is stable at this time.     Problem/Plan - 6: Ovarian cancer.   History of metastatic ovarian/uterine cancer actively on chemotherapy with Dr. Hidalgo. Diagnosed in 2015 w/ metastasis to liver, lungs, and peritoneum in 2018/2019. Last treatment approximately 4 week ago. Past regimen includes avastin use, current regimen on keytruda, dose reduced gemzar, cytoxan, 5-FU continual infusion, docetaxel, and carboplatin. Presented with DVT on last admission likely 2/2 hypercoagulable state, placed on full anticoagulation. Is currently off AC and ultrasounds from this admission showed that her previous DVT (11/3) has resolved. Patient going towards home hospice. Plan:   Morphine 2mg IV PRN q8  Dilaudid 0.25 mg PRN q2 for breakthrough pain.    Problem/Plan - 7: DVT, lower extremity.   In the setting of metastatic cancer. Diagnosed w/ LLE DVT on 11/03 admission. Doppler then found Left common femoral vein and saphenofemoral junction deep venous thrombosis. Was discharged on Lovenox. Continues to have edema of LLE.   - BL LE doppler negative  -vascular doesn't recommend any vascular procedure      Patient was discharged to: Home hospice  New medications: None  Changes to old medications: None  Medications that were stopped: Stopping AC    Physical exam at time of discharge:   General: NAD, AAOx3  HEENT: atraumatic, normocephalic  Pulmonary: clear to auscultation bilaterally; No wheeze  Cardiovascular: Regular rate and rhythm; no murmurs, rubs or gallops. Normal S1S2  Gastrointestinal: Soft, positive suprapubic tenderness, nontender, nondistended; bowel sounds present  Musculoskeletal: 2+ peripheral pulses, no clubbing, cyanosis or edema  Neurology: Pt. alert and oriented, fluent speech, able to move all extremities  Skin: no rashes or lesions   Patient is a 71 yo female with PMH of ovarian/uterine cancer with spread to the liver, lungs, peritoneum, DVT, anemia, indwelling arevalo catheter who presented with glen hematuria (11/26), admitted for hematuria 2/2 traumatic arevalo placement vs implantation of metastatic disease on left ureter, UTI and anemia. Patient received 1 unit PRBC on admission. She later developed hematuria and Hgb dropped to <7 and patient was given another unit of PRBC. She was started on CBI and Hgb was uptrending. Patient is pending home hospice.     Problem/Plan - 1:Acute blood loss anemia.    Patient presented with Hgb 6.4, repeat 6.1. Likely multifactorial i/s/o malignancy, CORBIN, and hematuria. Suspect blood loss is likely anatomical given acuity (see below). Now s/p 2 units PRBC throughout hospital stay. Urology advises against cystoscopy at this time. Palliative consulted and patient pending home hospice.       Problem/Plan - 2: Hematuria.   Patient with 2 day onset of hematuria, and ultimately admitted for glen red blood in arevalo catheter. During last admission, retroperitoneal ultrasound showed two left renal calculi. Patient now with worsening dark urine in AM suspect new blood loss secondary to new arevalo placement vs. candida UTI vs. metastatic invasive tumor implantation of left ureter.       Problem/Plan - 3:Cystitis.   ·  Plan: Patient w/ suprapubic tenderness + dysuria + hematuria for three days. UA + w/ indwelling Arevalo catheter. + many pseudohyphae present. Arevalo placed on 11/21 prior to DC as patient failed TOV. Arevalo has now been replaced by urology (11/27 am). . Patient's symptoms and elevated WBC are likely reactive to blood in the bladder however cannot rule out fungal cystitis. Will cont fluconazole for 2 weeks (start 12/2).    Problem/Plan - 4: Bladder spasm.   Patient w/ bladder spasm likely 2/2 arevalo. Pt reports improved s/s with increased frequency, but does not report resolution of s/s. cont oxybutynin 5 mg TID.      Problem/Plan - 5: Hydronephrosis, left.    CT 11/18: Uterine soft tissue mass encases and severely narrows distal left ureter with associated moderate left hydronephrosis. Urology following, would ultimately need stenting but patient spoke to her oncologist and decided not to pursue at the time. Urology is following, and has spoken to her about risks of both intervention vs. no intervention. Cr is stable at this time.     Problem/Plan - 6: Ovarian cancer.   History of metastatic ovarian/uterine cancer actively on chemotherapy with Dr. Hidalgo. Diagnosed in 2015 w/ metastasis to liver, lungs, and peritoneum in 2018/2019. Last treatment approximately 4 week ago. Past regimen includes avastin use, current regimen on keytruda, dose reduced gemzar, cytoxan, 5-FU continual infusion, docetaxel, and carboplatin. Presented with DVT on last admission likely 2/2 hypercoagulable state, placed on full anticoagulation. Is currently off AC and ultrasounds from this admission showed that her previous DVT (11/3) has resolved. Patient going towards home hospice. Plan:   Morphine 2mg IV PRN q8  Dilaudid 0.25 mg PRN q2 for breakthrough pain.    Problem/Plan - 7: DVT, lower extremity.   In the setting of metastatic cancer. Diagnosed w/ LLE DVT on 11/03 admission. Doppler then found Left common femoral vein and saphenofemoral junction deep venous thrombosis. Was discharged on Lovenox. Continues to have edema of LLE.   - BL LE doppler negative  -vascular doesn't recommend any vascular procedure      Patient was discharged to: Home hospice  New medications: None  Changes to old medications: None  Medications that were stopped: Stopping AC    Physical exam at time of discharge:   General: NAD, AAOx3  HEENT: atraumatic, normocephalic  Pulmonary: clear to auscultation bilaterally; No wheeze  Cardiovascular: Regular rate and rhythm; no murmurs, rubs or gallops. Normal S1S2  Gastrointestinal: Soft, positive suprapubic tenderness, nontender, nondistended; bowel sounds present  Musculoskeletal: 2+ peripheral pulses, no clubbing, cyanosis or edema  : arevalo present draining clear urine.  Neurology: Pt. alert and oriented, fluent speech, able to move all extremities  Skin: no rashes or lesions

## 2022-11-29 NOTE — PROGRESS NOTE ADULT - ATTENDING COMMENTS
H/H remains stable after initiation of full dose lovenox 11/28. will monitor for another 24h and plan for dc  venous duplex u/s negative for DVT  urology reccs appreciated re: hematuria resolution and initiation of AC  if she re-bleeds will require heme/onc c/s for AC guidance given elevated VTE risk in setting of malignancy.

## 2022-11-30 DIAGNOSIS — D62 ACUTE POSTHEMORRHAGIC ANEMIA: ICD-10-CM

## 2022-11-30 LAB
ANION GAP SERPL CALC-SCNC: 9 MMOL/L — SIGNIFICANT CHANGE UP (ref 5–17)
BASOPHILS # BLD AUTO: 0.07 K/UL — SIGNIFICANT CHANGE UP (ref 0–0.2)
BASOPHILS NFR BLD AUTO: 0.5 % — SIGNIFICANT CHANGE UP (ref 0–2)
BLD GP AB SCN SERPL QL: NEGATIVE — SIGNIFICANT CHANGE UP
BUN SERPL-MCNC: 23 MG/DL — SIGNIFICANT CHANGE UP (ref 7–23)
CALCIUM SERPL-MCNC: 8.2 MG/DL — LOW (ref 8.4–10.5)
CHLORIDE SERPL-SCNC: 110 MMOL/L — HIGH (ref 96–108)
CO2 SERPL-SCNC: 22 MMOL/L — SIGNIFICANT CHANGE UP (ref 22–31)
CREAT SERPL-MCNC: 1.24 MG/DL — SIGNIFICANT CHANGE UP (ref 0.5–1.3)
EGFR: 46 ML/MIN/1.73M2 — LOW
EOSINOPHIL # BLD AUTO: 0.17 K/UL — SIGNIFICANT CHANGE UP (ref 0–0.5)
EOSINOPHIL NFR BLD AUTO: 1.2 % — SIGNIFICANT CHANGE UP (ref 0–6)
GLUCOSE SERPL-MCNC: 99 MG/DL — SIGNIFICANT CHANGE UP (ref 70–99)
HCT VFR BLD CALC: 25.4 % — LOW (ref 34.5–45)
HCT VFR BLD CALC: 26.7 % — LOW (ref 34.5–45)
HGB BLD-MCNC: 8.1 G/DL — LOW (ref 11.5–15.5)
HGB BLD-MCNC: 8.6 G/DL — LOW (ref 11.5–15.5)
IMM GRANULOCYTES NFR BLD AUTO: 1.1 % — HIGH (ref 0–0.9)
LMWH PPP CHRO-ACNC: 0.38 IU/ML — LOW (ref 0.5–1.1)
LYMPHOCYTES # BLD AUTO: 0.45 K/UL — LOW (ref 1–3.3)
LYMPHOCYTES # BLD AUTO: 3.1 % — LOW (ref 13–44)
MAGNESIUM SERPL-MCNC: 2 MG/DL — SIGNIFICANT CHANGE UP (ref 1.6–2.6)
MCHC RBC-ENTMCNC: 31.9 GM/DL — LOW (ref 32–36)
MCHC RBC-ENTMCNC: 31.9 PG — SIGNIFICANT CHANGE UP (ref 27–34)
MCHC RBC-ENTMCNC: 32 PG — SIGNIFICANT CHANGE UP (ref 27–34)
MCHC RBC-ENTMCNC: 32.2 GM/DL — SIGNIFICANT CHANGE UP (ref 32–36)
MCV RBC AUTO: 100 FL — SIGNIFICANT CHANGE UP (ref 80–100)
MCV RBC AUTO: 99.3 FL — SIGNIFICANT CHANGE UP (ref 80–100)
MONOCYTES # BLD AUTO: 1.25 K/UL — HIGH (ref 0–0.9)
MONOCYTES NFR BLD AUTO: 8.6 % — SIGNIFICANT CHANGE UP (ref 2–14)
NEUTROPHILS # BLD AUTO: 12.4 K/UL — HIGH (ref 1.8–7.4)
NEUTROPHILS NFR BLD AUTO: 85.5 % — HIGH (ref 43–77)
NRBC # BLD: 0 /100 WBCS — SIGNIFICANT CHANGE UP (ref 0–0)
NRBC # BLD: 0 /100 WBCS — SIGNIFICANT CHANGE UP (ref 0–0)
PHOSPHATE SERPL-MCNC: 3.1 MG/DL — SIGNIFICANT CHANGE UP (ref 2.5–4.5)
PLATELET # BLD AUTO: 178 K/UL — SIGNIFICANT CHANGE UP (ref 150–400)
PLATELET # BLD AUTO: 185 K/UL — SIGNIFICANT CHANGE UP (ref 150–400)
POTASSIUM SERPL-MCNC: 3.7 MMOL/L — SIGNIFICANT CHANGE UP (ref 3.5–5.3)
POTASSIUM SERPL-SCNC: 3.7 MMOL/L — SIGNIFICANT CHANGE UP (ref 3.5–5.3)
RBC # BLD: 2.54 M/UL — LOW (ref 3.8–5.2)
RBC # BLD: 2.69 M/UL — LOW (ref 3.8–5.2)
RBC # FLD: 18.8 % — HIGH (ref 10.3–14.5)
RBC # FLD: 19.1 % — HIGH (ref 10.3–14.5)
RH IG SCN BLD-IMP: POSITIVE — SIGNIFICANT CHANGE UP
SODIUM SERPL-SCNC: 141 MMOL/L — SIGNIFICANT CHANGE UP (ref 135–145)
WBC # BLD: 12.31 K/UL — HIGH (ref 3.8–10.5)
WBC # BLD: 14.5 K/UL — HIGH (ref 3.8–10.5)
WBC # FLD AUTO: 12.31 K/UL — HIGH (ref 3.8–10.5)
WBC # FLD AUTO: 14.5 K/UL — HIGH (ref 3.8–10.5)

## 2022-11-30 PROCEDURE — 99233 SBSQ HOSP IP/OBS HIGH 50: CPT | Mod: GC

## 2022-11-30 RX ORDER — ACETAMINOPHEN 500 MG
975 TABLET ORAL EVERY 6 HOURS
Refills: 0 | Status: DISCONTINUED | OUTPATIENT
Start: 2022-11-30 | End: 2022-12-09

## 2022-11-30 RX ORDER — ENOXAPARIN SODIUM 100 MG/ML
50 INJECTION SUBCUTANEOUS EVERY 12 HOURS
Refills: 0 | Status: DISCONTINUED | OUTPATIENT
Start: 2022-11-30 | End: 2022-11-30

## 2022-11-30 RX ORDER — GABAPENTIN 400 MG/1
100 CAPSULE ORAL ONCE
Refills: 0 | Status: COMPLETED | OUTPATIENT
Start: 2022-11-30 | End: 2022-11-30

## 2022-11-30 RX ORDER — GABAPENTIN 400 MG/1
200 CAPSULE ORAL
Refills: 0 | Status: DISCONTINUED | OUTPATIENT
Start: 2022-11-30 | End: 2022-12-09

## 2022-11-30 RX ADMIN — Medication 975 MILLIGRAM(S): at 18:46

## 2022-11-30 RX ADMIN — HYDROMORPHONE HYDROCHLORIDE 0.25 MILLIGRAM(S): 2 INJECTION INTRAMUSCULAR; INTRAVENOUS; SUBCUTANEOUS at 13:11

## 2022-11-30 RX ADMIN — GABAPENTIN 200 MILLIGRAM(S): 400 CAPSULE ORAL at 18:46

## 2022-11-30 RX ADMIN — OXYCODONE HYDROCHLORIDE 5 MILLIGRAM(S): 5 TABLET ORAL at 12:10

## 2022-11-30 RX ADMIN — Medication 5 MILLIGRAM(S): at 11:26

## 2022-11-30 RX ADMIN — Medication 975 MILLIGRAM(S): at 14:10

## 2022-11-30 RX ADMIN — Medication 975 MILLIGRAM(S): at 19:15

## 2022-11-30 RX ADMIN — GABAPENTIN 100 MILLIGRAM(S): 400 CAPSULE ORAL at 06:51

## 2022-11-30 RX ADMIN — SENNA PLUS 2 TABLET(S): 8.6 TABLET ORAL at 22:02

## 2022-11-30 RX ADMIN — Medication 5 MILLIGRAM(S): at 22:02

## 2022-11-30 RX ADMIN — HYDROMORPHONE HYDROCHLORIDE 0.25 MILLIGRAM(S): 2 INJECTION INTRAMUSCULAR; INTRAVENOUS; SUBCUTANEOUS at 13:26

## 2022-11-30 RX ADMIN — Medication 1 MILLIGRAM(S): at 11:26

## 2022-11-30 RX ADMIN — ENOXAPARIN SODIUM 50 MILLIGRAM(S): 100 INJECTION SUBCUTANEOUS at 06:51

## 2022-11-30 RX ADMIN — HYDROMORPHONE HYDROCHLORIDE 0.25 MILLIGRAM(S): 2 INJECTION INTRAMUSCULAR; INTRAVENOUS; SUBCUTANEOUS at 05:30

## 2022-11-30 RX ADMIN — GABAPENTIN 100 MILLIGRAM(S): 400 CAPSULE ORAL at 13:23

## 2022-11-30 RX ADMIN — POLYETHYLENE GLYCOL 3350 17 GRAM(S): 17 POWDER, FOR SOLUTION ORAL at 11:25

## 2022-11-30 RX ADMIN — OXYCODONE HYDROCHLORIDE 5 MILLIGRAM(S): 5 TABLET ORAL at 19:15

## 2022-11-30 RX ADMIN — ESCITALOPRAM OXALATE 7.5 MILLIGRAM(S): 10 TABLET, FILM COATED ORAL at 11:25

## 2022-11-30 RX ADMIN — Medication 975 MILLIGRAM(S): at 13:23

## 2022-11-30 RX ADMIN — LEVETIRACETAM 500 MILLIGRAM(S): 250 TABLET, FILM COATED ORAL at 06:51

## 2022-11-30 RX ADMIN — HYDROMORPHONE HYDROCHLORIDE 0.25 MILLIGRAM(S): 2 INJECTION INTRAMUSCULAR; INTRAVENOUS; SUBCUTANEOUS at 04:56

## 2022-11-30 RX ADMIN — OXYCODONE HYDROCHLORIDE 5 MILLIGRAM(S): 5 TABLET ORAL at 04:37

## 2022-11-30 RX ADMIN — OXYCODONE HYDROCHLORIDE 5 MILLIGRAM(S): 5 TABLET ORAL at 18:46

## 2022-11-30 RX ADMIN — OXYCODONE HYDROCHLORIDE 5 MILLIGRAM(S): 5 TABLET ORAL at 11:26

## 2022-11-30 RX ADMIN — OXYCODONE HYDROCHLORIDE 5 MILLIGRAM(S): 5 TABLET ORAL at 03:37

## 2022-11-30 RX ADMIN — LEVETIRACETAM 500 MILLIGRAM(S): 250 TABLET, FILM COATED ORAL at 18:46

## 2022-11-30 NOTE — PROGRESS NOTE ADULT - PROBLEM SELECTOR PLAN 2
Patient w/ suprapubic tenderness + dysuria + hematuria for two days. UA + w/ indwelling Michaels catheter. + many pseudohyphae present. Michaels placed on 11/21 prior to DC as patient failed TOV. Michaels has now been replaced by urology (11/27 am)     Plan:   -monitor off ABx  - uro recommends against further bladder irrigation  - f/u urology recs   - f/u repeat ua and urine cultures

## 2022-11-30 NOTE — PROGRESS NOTE ADULT - PROBLEM SELECTOR PLAN 3
Patient with 2 day onset of hematuria, and ultimately admitted for glen red blood in arevalo catheter. During last admission, retroperitoneal ultrasound showed two left renal calculi. Patient denies flank pain. Patient now with worsening dark urine in AM suspect new blood loss.     Plan:   - urology consulted,   - arevalo draining dark urine

## 2022-11-30 NOTE — CHART NOTE - NSCHARTNOTEFT_GEN_A_CORE
[facility]  Christian Genao Palliative Care Attending  Contact Info: Call [phone] (HEAL Line) or message on Microsoft Teams    HPI:  73 yo female PMHx of HTN, Ovarian and uterine cancer w/ spread to the liver, lungs, and peritoneum (on chemo, last tx 3 weeks ago), anemia, urinary retention, and scoliosis who also had multiple recent admissions for PRES and new onset LLE DVT (11/3) and an pneumonia + hydroureter 2/2 metastatic disease and anemia requiring 2u pRBC's from (-) at which time she was discharged with an indwelling Arevalo catheter. Initially, patient had blood tinged urine but patient presents yesterday from [facility] for glen blood in her arevalo. Per the patient, she is has experienced intermittent burning in her bladder for the last two days. She otherwise denies fevers/chills, no cough, wheezing, SOB, chest pain, abdominal pain, nausea, vomitting.     ED course:   Vitals: T: 98.5 BP: 124/76, HR: 105, RR: 19, 95% on RA   Labs: WBC 15.11, Hb: 6.4 (repeat 6.1), MCV: 108.1, RDW: 20.2, HCO3: 20, A, BUN 28, Ca: 8.1, Alk Phos: 776, UA: Red urine, >300 protein, nitrites, +leuk esterase, + bacteria. FOBT+.   Imaging: CXR: cannonball mets to lungs   Intervention: 6mg IV morphine (2X3), Percocet (5mg/325mg), Zosyn 3.375 g IV   Procedures: None  Interventions: Urology consulted      (2022 08:43)    PERTINENT PM/SXH:   Ovarian cancer    Ovarian ca    HTN (hypertension)    Anemia      No significant past surgical history      FAMILY HISTORY:  No history of malignancy in remote family/.    ITEMS NOT CHECKED ARE NOT PRESENT    SOCIAL HISTORY:   Significant other/partner:  [x]  Children:  []   Substance hx:  []   Tobacco hx:  [x]   Alcohol hx: []   Home Opioid hx:  [] I-Stop Reference No:  - no active Rx's / see chart note  Living Situation: [x]Home  []Long term care  []Rehab []Other  Moravian/Spiritual practice: ; Role of organized Hoahaoism [ ] important [x ] some [ ] unable to assess  Coping: [ ] well [ ] with difficulty [ x] poor coping [ ] unable to assess  Support system: [ ] strong [x ] adequate [ ] inadequate    ADVANCE DIRECTIVES:    []MOLST  []Living Will  DECISION MAKER(s):  [] Health Care Proxy(s)  [x] Surrogate(s)  [] Guardian           Name(s)/Phone Number(s): Yash Bergman (friend)    BASELINE (I)ADLs (prior to admission):  [place]: []Total  [] Moderate [x]Dependent    ALLERGIES:  Benadryl (Other)  Compazine (Unknown)    MEDICATIONS  (STANDING):    MEDICATIONS  (PRN):    PRESENT SYMPTOMS: []Unable to obtain due to poor mentation/encephalopathy  Source if other than patient:  []Family   []Team     Pain: [x ] yes [ ] no  QOL impact - unable to do ADLs  Location - Pelvis                    Aggravating Factors - movement  Quality - sharp  Radiation - none  Timing - intermittent  Severity (0-10 scale) - 8  Minimal Acceptable Level (0-10 scale) - 5    PAIN AD Score:  [email or web address] (press ctrl +  left click to view)    Dyspnea:                           [x ]Mild  [ ]Moderate [ ]Severe  Anxiety:                             [ x]Mild [ ]Moderate [ ]Severe  Fatigue:                             [ ]Mild [ ]Moderate [ x]Severe  Nausea:                             [x ]Mild [ ]Moderate [ ]Severe  Loss of Appetite:             [ ]Mild [ ]Moderate [ x]Severe  Constipation:                   [ x]Mild [ ]Moderate [ ]Severe    Other Symptoms:  [ x]All other review of systems negative     Palliative Performance Status Version 2: 50%    http://npcrc.org/files/news/palliative_performance_scale_ppsv2.pdf    PHYSICAL EXAM:  GENERAL:  [ x]Alert  [x ]Oriented x  3 [ ]Lethargic  [ ]Cachexia  [ ]Unarousable  [ ]Verbal  [ ]Non-Verbal  Behavioral:   [ ]Anxiety  [ ]Delirium [ ]Agitation [ x]Cooperative  HEENT:  [ ]Normal   [x ]Dry mouth   [ ]ET Tube/Trach  [ ]Oral lesions  PULMONARY:   [ ]Clear []Tachypnea  []Audible excessive secretions   [ x]Rhonchi        [ ]Right [ ]Left [x ]Bilateral  [ ]Crackles        [ ]Right [ ]Left [ ]Bilateral  [ ]Wheezing     [ ]Right [ ]Left [ ]Bilateral  CARDIOVASCULAR:    [ x]Regular [ ]Irregular [ ]Tachy  [ ]Lonnie [ ]Murmur [ ]Other  GASTROINTESTINAL:  [ x]Soft  [ ]Distended   [ x]+BS  [x ]Non tender [ ]Tender  [ ]PEG [ ]OGT/ NGT  Last BM:   GENITOURINARY:  [ ]Normal [ ] Incontinent   [ ]Oliguria/Anuria   [x ]Arevalo  MUSCULOSKELETAL:   [ ]Normal   [ x]Weakness  [ ]Bed/Wheelchair bound [ ]Edema  NEUROLOGIC:   [ x]No focal deficits  [ ]Cognitive impairment  [ ]Dysphagia [ ]Dysarthria [ ]Paresis [ ]Encephalopathic   SKIN:   [ x]Normal   [ ]Pressure ulcer(s)  [ ]Rash    CRITICAL CARE:  [ ]Shock Present  [ ]Septic [ ]Cardiogenic [ ]Neurologic [ ]Hypovolemic  [ ]Vasopressors [ ]Inotropes   [ ]Respiratory failure present [ ]Mechanical Ventilation [ ]Non-invasive ventilatory support [ ]High-Flow  [ ]Acute  [ ]Chronic [ ]Hypoxic  [ ]Hypercarbic  [ ]Other organ failure    Vital Signs Last 24 Hrs  T(C): --  T(F): -- 98.4  HR: -- 98  BP: -- 118/60  BP(mean): --  RR: -- 22  SpO2: -- 97     I&O's Summary    Reviewed    LABS:  Anemia to 7.4        RADIOLOGY & ADDITIONAL STUDIES:    Chest Xray revealing extensive pulmonary metastatic disease    PROTEIN CALORIE MALNUTRITION PRESENT: [ ]mild [ ]moderate [ ]severe [ ]underweight [ ]morbid obesity  [ ]PPSV2 < or = to 30% [ ]significant weight loss  [ ]poor nutritional intake [ ]catabolic state [ ]anasarca     Artificial Nutrition [ ]     REFERRALS:  [x]Social Work  [ ]Case management [ ]PT/OT [ ]Chaplaincy  [ ]Hospice  [ ]Patient/Family Support      AP: 72 F with stage IV ovarian and uterine cancer, neoplasm related pain, hematuria, encounter for palliative care.     1) Stage IV ovarian/uterine cancer:  - Extensive metastatic disease.  - Patient previously received chemotherapy 3 weeks prior to admission, but prospects of further disease modifying interventions are unlikely given progressive functional decline, ongoing hematuria, progression of malignancy. Hospice care is appropriate.  2) Neoplasm related Pain:  - Dilaudid 0.5mg IV Q4 hours prn. while inpatient.  - Likely a transition to Oxycodone upon discharge.  - Bowel regimen.  3) Hematuria:   - Ongoing.  - Patient requiring PRBC transfusions.  - Unclear whether interventions will be available.   4) Encounter for Palliative Care:  - Patient with stage IV metastatic disease. Progressive decline, unclear whether further disease modifying interventions are available.  - Hospice care is appropriate.  5) Advanced Care Planning:  - 35 minutes spent with patient at bedside.  - Discussion about progression of metastatic disease, ongoing hematuria as well as end of life care.  - Patient remains tearful, scared.  - DNR/DNI, MOLST form addended in the chart to reflect wishes.  - Hospice care and no further escalation of care discussed.   - Patient is requesting more time to think about it as well as to discuss with her oncologist.
Michaels catheter was manually irrigated with 1L of sterile water without return of clots. Urine was fruit punch colored prior to irrigation, slightly lighter fruit punch colored after irrigation.
PALLIATIVE MEDICINE COORDINATION OF CARE NOTE FOR DESHAWN KING  [  ] ED Trigger   [  ] MICU Trigger     [ x ] Consult    Patient last assessed: __22___  to manage: GOC/AD, Symptoms, and Support was recommended:___22___    ____40__ Minutes; Start: __900___  End: 940____, of non-face-to-face prolonged service provided that relates to (face-to-face) care that has or will occur and ongoing patient management, including one or more of the following:   - Reviewed records from other physicians or other health care professional services, including one or more of the following: other medical records and diagnostic / radiology study results     HPI:  71 yo female PMHx of HTN, Ovarian and uterine cancer w/ spread to the liver, lungs, and peritoneum (on chemo, last tx 3 weeks ago), anemia, urinary retention, and scoliosis who also had multiple recent admissions for PRES and new onset LLE DVT (11/3) and an pneumonia + hydroureter 2/2 metastatic disease and anemia requiring 2u pRBC's from (-) at which time she was discharged with an indwelling Arevalo catheter. Initially, patient had blood tinged urine but patient presents yesterday from [facility] for glen blood in her arevalo. Per the patient, she is has experienced intermittent burning in her bladder for the last two days. She otherwise denies fevers/chills, no cough, wheezing, SOB, chest pain, abdominal pain, nausea, vomitting.     ED course:   Vitals: T: 98.5 BP: 124/76, HR: 105, RR: 19, 95% on RA   Labs: WBC 15.11, Hb: 6.4 (repeat 6.1), MCV: 108.1, RDW: 20.2, HCO3: 20, A, BUN 28, Ca: 8.1, Alk Phos: 776, UA: Red urine, >300 protein, nitrites, +leuk esterase, + bacteria. FOBT+.   Imaging: CXR: cannonball mets to lungs   Intervention: 6mg IV morphine (2X3), Percocet (5mg/325mg), Zosyn 3.375 g IV   Procedures: None  Interventions: Urology consulted      (2022 08:43)      - Other: iStop reviewed.    - Other: Medication reviewed.    The patient HAS NOT used PRN's in the last 24h.    MEDICATIONS  (STANDING):    MEDICATIONS  (PRN):      - Other: Advanced directives     Full Code     No documented MOLST form found on [facility]     No documented HCP form found on [facility]     No Living will / POA / Advance directives found on [place] / [facility].     No documented GOC notes on [facility]    - Other: Coordination/Plan of care     __3__ admissions in 1 year     Current admission LOS: _1__ days     LACE score: _11___ ADVANCE ILLNESS PATIENT.     Patient NOT previously seen by palliative medicine consult service.      Discussed with  Consult request for: " Stage IV uterine cancer   "    Patient is an Advanced Illness patient hence the primary team will need to complete GOC document of any prior GOC discussions that were done during this admission so far as well as information available for Proxy/surrogates/NOK/guardians prior to a palliative contact.    Full consult to follow within 24h.

## 2022-11-30 NOTE — PROGRESS NOTE ADULT - ASSESSMENT
Patient is a 73 yo female with PMH of ovarian/uterine cancer with spread to the liver, lungs, peritoneum, DVT, anemia, indwelling arevalo catheter who presents with glen hematuria of one day onset, admitted for hematuria, UTI and anemia. Patient with Hgb <7 overnight s/p 1 unit PRBC and new dark urine.

## 2022-11-30 NOTE — PROGRESS NOTE ADULT - PROBLEM SELECTOR PLAN 8
Patient with tearful affect during admission. Did not want to be left alone. During last hospitalization, patient met with a counselor and disclosed that her  has essentially opted out from being her emotional support. Patient gave two contacts for her friends incase  cannot be reached.     Sho Sahni: [phone]   Barb Cobb: [phone]    Plan:   - c/w Escitalopram 5 mg - 1.5 tab orally qd   - Lorazepam 0.5 mg q8 prn   - consider inpatient psychotherapy or palliative consult

## 2022-11-30 NOTE — PROGRESS NOTE ADULT - PROBLEM SELECTOR PLAN 1
Patient presented with Hgb 6.4, repeat 6.1. Likely multifactorial i/s/o malignancy, CORBIN, and hematuria. FOBT (+). Suspect blood loss is likely anatomical given acuity. Patient with Hgb 6.3 overnight now s/p 2 units PRBC throughout hospital stay.     Plan:   - Transfusion threshold <7  - hold iron supplementation i/s/o infection   - T&S active (11/27)  - f/u outpatient oncologist  - f/u urology recs for blood loss.  - hold lovenox for now in setting of acute bleed.

## 2022-11-30 NOTE — PROGRESS NOTE ADULT - SUBJECTIVE AND OBJECTIVE BOX
DESHAWN KING 72y Female  MRN#: [id number]   CODE STATUS: DNR/DNI      SUBJECTIVE  Patient is a 72y old Female who presents with a chief complaint of hematuria (30 Nov 2022 08:21)  Currently admitted to medicine with the primary diagnosis of Severe anemia    Today is hospital day 3d, and this morning she is resting in bed concerned about the blood in her arevalo catheter    Present Today:           Arevalo Catheter ()No/ (x)Yes? Indication: retention          Central Line (x)No/ ()Yes? Indication:          IV Fluids (x)No/ ()Yes? Type:  Rate:  Indication:      OBJECTIVE  PAST MEDICAL & SURGICAL HISTORY  Ovarian cancer    Ovarian ca    HTN (hypertension)    Anemia    No significant past surgical history      Home Meds:  acetaminophen 325 mg oral tablet: 2 tab(s) orally every 6 hours, As needed, Temp greater or equal to 38C (100.4F), Mild Pain (1 - 3)  aluminum hydroxide-magnesium hydroxide 200 mg-200 mg/5 mL oral suspension: 30 milliliter(s) orally every 4 hours, As needed, Dyspepsia  enoxaparin 60 mg/0.6 mL injectable solution: 60 milligram(s) subcutaneously once a day   Please draw CBC and BMP on 11/28/22. Provided that Hb is stable, may be able to redose lovenox according to CrCl.  ESCITALOPRAM 5MG TABLETS: 1.5 each orally once a day  ferrous sulfate 325 mg (65 mg elemental iron) oral tablet: 1 tab(s) orally every other day (at bedtime)  folic acid 1 mg oral tablet: 1 tab(s) orally once a day  gabapentin 100 mg oral capsule: 1 cap(s) orally 2 times a day  levETIRAcetam 500 mg oral tablet: 1 tab(s) orally 2 times a day  LORAZEPAM  0.5 MG TABS: 1 tab(s) orally every 8 hours, As Needed  Lovenox 40 mg/0.4 mL injectable solution: 60 milligram(s) injectable once a day   Check CBC and BMP within 7 days to adjust lovenox dosage according to CrCl if Hb stable  melatonin 3 mg oral tablet: 1 tab(s) orally once a day (at bedtime), As needed, Insomnia  NIFEdipine 60 mg oral tablet, extended release: 1 tab(s) orally once a day  ondansetron 2 mg/mL injectable solution: 4 milligram(s) injectable every 8 hours, As Needed for nausea  polyethylene glycol 3350 oral powder for reconstitution: 17 gram(s) orally once a day  senna leaf extract oral tablet: 2 tab(s) orally once a day (at bedtime)    ALLERGIES:  Benadryl (Other)  Compazine (Unknown)    MEDICATIONS:  STANDING MEDICATIONS  acetaminophen     Tablet .. 975 milliGRAM(s) Oral every 6 hours  escitalopram 7.5 milliGRAM(s) Oral daily  folic acid 1 milliGRAM(s) Oral daily  gabapentin 200 milliGRAM(s) Oral two times a day  levETIRAcetam 500 milliGRAM(s) Oral two times a day  oxybutynin 5 milliGRAM(s) Oral every 12 hours  polyethylene glycol 3350 17 Gram(s) Oral daily  senna 2 Tablet(s) Oral at bedtime    PRN MEDICATIONS  aluminum hydroxide/magnesium hydroxide/simethicone Suspension 30 milliLiter(s) Oral every 4 hours PRN  HYDROmorphone  Injectable 0.25 milliGRAM(s) IV Push every 6 hours PRN  LORazepam     Tablet 0.5 milliGRAM(s) Oral every 8 hours PRN  melatonin 3 milliGRAM(s) Oral at bedtime PRN  ondansetron Injectable 4 milliGRAM(s) IV Push every 8 hours PRN  oxyCODONE    IR 5 milliGRAM(s) Oral every 4 hours PRN      VITAL SIGNS: Last 24 Hours  T(C): 36.8 (30 Nov 2022 11:43), Max: 37.2 (29 Nov 2022 20:50)  T(F): 98.3 (30 Nov 2022 11:43), Max: 99 (29 Nov 2022 20:50)  HR: 88 (30 Nov 2022 11:43) (88 - 96)  BP: 150/73 (30 Nov 2022 11:43) (142/80 - 150/73)  BP(mean): --  RR: 18 (30 Nov 2022 11:43) (16 - 18)  SpO2: 94% (30 Nov 2022 11:43) (94% - 94%)    LABS:                        8.6    14.50 )-----------( 185      ( 30 Nov 2022 05:30 )             26.7     11-30    141  |  110<H>  |  23  ----------------------------<  99  3.7   |  22  |  1.24    Ca    8.2<L>      30 Nov 2022 05:30  Phos  3.1     11-30  Mg     2.0     11-30                Culture - Urine (collected 28 Nov 2022 05:06)  Source: Catheterized None  Final Report (29 Nov 2022 09:34):    Less than 10,000 cols/cc; Insignificant amount of growth.    Urinalysis with Rflx Culture (collected 28 Nov 2022 05:06)          RADIOLOGY:  < from: US Duplex Venous Lower Ext Complete, Bilateral (11.28.22 @ 21:41) >  IMPRESSION:  No DVT identified on the current study.    < end of copied text >      PHYSICAL EXAM:  General: in bed alert and oriented concerned about her hematuria.   HEENT: atraumatic, normocephalic  Pulmonary: + crackles in lungs b/l  Cardiovascular: Regular rate and rhythm; no murmurs, rubs or gallops. Normal S1S2  Gastrointestinal: Soft, nontender, nondistended; bowel sounds present  Musculoskeletal: 2+ peripheral pulses, no clubbing, cyanosis 1+ pitting edema b/l  Neurology: Pt. alert and oriented, fluent speech, able to move all extremities  Skin: no rashes or lesions

## 2022-12-01 DIAGNOSIS — J91.8 PLEURAL EFFUSION IN OTHER CONDITIONS CLASSIFIED ELSEWHERE: ICD-10-CM

## 2022-12-01 DIAGNOSIS — N18.9 CHRONIC KIDNEY DISEASE, UNSPECIFIED: ICD-10-CM

## 2022-12-01 DIAGNOSIS — J96.01 ACUTE RESPIRATORY FAILURE WITH HYPOXIA: ICD-10-CM

## 2022-12-01 DIAGNOSIS — E43 UNSPECIFIED SEVERE PROTEIN-CALORIE MALNUTRITION: ICD-10-CM

## 2022-12-01 DIAGNOSIS — D63.8 ANEMIA IN OTHER CHRONIC DISEASES CLASSIFIED ELSEWHERE: ICD-10-CM

## 2022-12-01 DIAGNOSIS — I12.9 HYPERTENSIVE CHRONIC KIDNEY DISEASE WITH STAGE 1 THROUGH STAGE 4 CHRONIC KIDNEY DISEASE, OR UNSPECIFIED CHRONIC KIDNEY DISEASE: ICD-10-CM

## 2022-12-01 DIAGNOSIS — C78.7 SECONDARY MALIGNANT NEOPLASM OF LIVER AND INTRAHEPATIC BILE DUCT: ICD-10-CM

## 2022-12-01 DIAGNOSIS — D64.81 ANEMIA DUE TO ANTINEOPLASTIC CHEMOTHERAPY: ICD-10-CM

## 2022-12-01 DIAGNOSIS — C55 MALIGNANT NEOPLASM OF UTERUS, PART UNSPECIFIED: ICD-10-CM

## 2022-12-01 DIAGNOSIS — N17.0 ACUTE KIDNEY FAILURE WITH TUBULAR NECROSIS: ICD-10-CM

## 2022-12-01 DIAGNOSIS — J96.00 ACUTE RESPIRATORY FAILURE, UNSPECIFIED WHETHER WITH HYPOXIA OR HYPERCAPNIA: ICD-10-CM

## 2022-12-01 DIAGNOSIS — I82.412 ACUTE EMBOLISM AND THROMBOSIS OF LEFT FEMORAL VEIN: ICD-10-CM

## 2022-12-01 DIAGNOSIS — D62 ACUTE POSTHEMORRHAGIC ANEMIA: ICD-10-CM

## 2022-12-01 DIAGNOSIS — D56.9 THALASSEMIA, UNSPECIFIED: ICD-10-CM

## 2022-12-01 DIAGNOSIS — R82.71 BACTERIURIA: ICD-10-CM

## 2022-12-01 DIAGNOSIS — C78.00 SECONDARY MALIGNANT NEOPLASM OF UNSPECIFIED LUNG: ICD-10-CM

## 2022-12-01 DIAGNOSIS — N13.1 HYDRONEPHROSIS WITH URETERAL STRICTURE, NOT ELSEWHERE CLASSIFIED: ICD-10-CM

## 2022-12-01 DIAGNOSIS — C78.6 SECONDARY MALIGNANT NEOPLASM OF RETROPERITONEUM AND PERITONEUM: ICD-10-CM

## 2022-12-01 DIAGNOSIS — R31.9 HEMATURIA, UNSPECIFIED: ICD-10-CM

## 2022-12-01 DIAGNOSIS — C56.9 MALIGNANT NEOPLASM OF UNSPECIFIED OVARY: ICD-10-CM

## 2022-12-01 DIAGNOSIS — Z66 DO NOT RESUSCITATE: ICD-10-CM

## 2022-12-01 DIAGNOSIS — R33.9 RETENTION OF URINE, UNSPECIFIED: ICD-10-CM

## 2022-12-01 DIAGNOSIS — J18.9 PNEUMONIA, UNSPECIFIED ORGANISM: ICD-10-CM

## 2022-12-01 LAB
ANION GAP SERPL CALC-SCNC: 10 MMOL/L — SIGNIFICANT CHANGE UP (ref 5–17)
BLD GP AB SCN SERPL QL: NEGATIVE — SIGNIFICANT CHANGE UP
BUN SERPL-MCNC: 25 MG/DL — HIGH (ref 7–23)
CALCIUM SERPL-MCNC: 7.9 MG/DL — LOW (ref 8.4–10.5)
CHLORIDE SERPL-SCNC: 106 MMOL/L — SIGNIFICANT CHANGE UP (ref 96–108)
CO2 SERPL-SCNC: 22 MMOL/L — SIGNIFICANT CHANGE UP (ref 22–31)
CREAT SERPL-MCNC: 1.24 MG/DL — SIGNIFICANT CHANGE UP (ref 0.5–1.3)
EGFR: 46 ML/MIN/1.73M2 — LOW
GLUCOSE SERPL-MCNC: 104 MG/DL — HIGH (ref 70–99)
HCT VFR BLD CALC: 25 % — LOW (ref 34.5–45)
HCT VFR BLD CALC: 26.3 % — LOW (ref 34.5–45)
HGB BLD-MCNC: 7.8 G/DL — LOW (ref 11.5–15.5)
HGB BLD-MCNC: 8.2 G/DL — LOW (ref 11.5–15.5)
MAGNESIUM SERPL-MCNC: 1.8 MG/DL — SIGNIFICANT CHANGE UP (ref 1.6–2.6)
MCHC RBC-ENTMCNC: 31.2 GM/DL — LOW (ref 32–36)
MCHC RBC-ENTMCNC: 31.2 GM/DL — LOW (ref 32–36)
MCHC RBC-ENTMCNC: 31.5 PG — SIGNIFICANT CHANGE UP (ref 27–34)
MCHC RBC-ENTMCNC: 31.7 PG — SIGNIFICANT CHANGE UP (ref 27–34)
MCV RBC AUTO: 101.2 FL — HIGH (ref 80–100)
MCV RBC AUTO: 101.6 FL — HIGH (ref 80–100)
NRBC # BLD: 0 /100 WBCS — SIGNIFICANT CHANGE UP (ref 0–0)
NRBC # BLD: 0 /100 WBCS — SIGNIFICANT CHANGE UP (ref 0–0)
PHOSPHATE SERPL-MCNC: 3.1 MG/DL — SIGNIFICANT CHANGE UP (ref 2.5–4.5)
PLATELET # BLD AUTO: 183 K/UL — SIGNIFICANT CHANGE UP (ref 150–400)
PLATELET # BLD AUTO: 187 K/UL — SIGNIFICANT CHANGE UP (ref 150–400)
POTASSIUM SERPL-MCNC: 3.8 MMOL/L — SIGNIFICANT CHANGE UP (ref 3.5–5.3)
POTASSIUM SERPL-SCNC: 3.8 MMOL/L — SIGNIFICANT CHANGE UP (ref 3.5–5.3)
RBC # BLD: 2.46 M/UL — LOW (ref 3.8–5.2)
RBC # BLD: 2.6 M/UL — LOW (ref 3.8–5.2)
RBC # FLD: 19.3 % — HIGH (ref 10.3–14.5)
RBC # FLD: 19.4 % — HIGH (ref 10.3–14.5)
RH IG SCN BLD-IMP: POSITIVE — SIGNIFICANT CHANGE UP
SODIUM SERPL-SCNC: 138 MMOL/L — SIGNIFICANT CHANGE UP (ref 135–145)
WBC # BLD: 11.83 K/UL — HIGH (ref 3.8–10.5)
WBC # BLD: 14.01 K/UL — HIGH (ref 3.8–10.5)
WBC # FLD AUTO: 11.83 K/UL — HIGH (ref 3.8–10.5)
WBC # FLD AUTO: 14.01 K/UL — HIGH (ref 3.8–10.5)

## 2022-12-01 PROCEDURE — 99233 SBSQ HOSP IP/OBS HIGH 50: CPT | Mod: GC

## 2022-12-01 PROCEDURE — 99231 SBSQ HOSP IP/OBS SF/LOW 25: CPT | Mod: GC

## 2022-12-01 RX ORDER — OXYCODONE HYDROCHLORIDE 5 MG/1
5 TABLET ORAL EVERY 4 HOURS
Refills: 0 | Status: DISCONTINUED | OUTPATIENT
Start: 2022-12-01 | End: 2022-12-08

## 2022-12-01 RX ADMIN — GABAPENTIN 200 MILLIGRAM(S): 400 CAPSULE ORAL at 18:36

## 2022-12-01 RX ADMIN — OXYCODONE HYDROCHLORIDE 5 MILLIGRAM(S): 5 TABLET ORAL at 14:20

## 2022-12-01 RX ADMIN — Medication 975 MILLIGRAM(S): at 23:50

## 2022-12-01 RX ADMIN — GABAPENTIN 200 MILLIGRAM(S): 400 CAPSULE ORAL at 07:24

## 2022-12-01 RX ADMIN — Medication 975 MILLIGRAM(S): at 12:57

## 2022-12-01 RX ADMIN — Medication 975 MILLIGRAM(S): at 23:17

## 2022-12-01 RX ADMIN — POLYETHYLENE GLYCOL 3350 17 GRAM(S): 17 POWDER, FOR SOLUTION ORAL at 12:57

## 2022-12-01 RX ADMIN — HYDROMORPHONE HYDROCHLORIDE 0.25 MILLIGRAM(S): 2 INJECTION INTRAMUSCULAR; INTRAVENOUS; SUBCUTANEOUS at 09:44

## 2022-12-01 RX ADMIN — HYDROMORPHONE HYDROCHLORIDE 0.25 MILLIGRAM(S): 2 INJECTION INTRAMUSCULAR; INTRAVENOUS; SUBCUTANEOUS at 16:15

## 2022-12-01 RX ADMIN — Medication 975 MILLIGRAM(S): at 00:57

## 2022-12-01 RX ADMIN — ESCITALOPRAM OXALATE 7.5 MILLIGRAM(S): 10 TABLET, FILM COATED ORAL at 12:57

## 2022-12-01 RX ADMIN — Medication 975 MILLIGRAM(S): at 13:15

## 2022-12-01 RX ADMIN — Medication 975 MILLIGRAM(S): at 08:22

## 2022-12-01 RX ADMIN — Medication 5 MILLIGRAM(S): at 12:57

## 2022-12-01 RX ADMIN — Medication 975 MILLIGRAM(S): at 19:00

## 2022-12-01 RX ADMIN — SENNA PLUS 2 TABLET(S): 8.6 TABLET ORAL at 22:03

## 2022-12-01 RX ADMIN — Medication 975 MILLIGRAM(S): at 18:35

## 2022-12-01 RX ADMIN — LEVETIRACETAM 500 MILLIGRAM(S): 250 TABLET, FILM COATED ORAL at 07:25

## 2022-12-01 RX ADMIN — HYDROMORPHONE HYDROCHLORIDE 0.25 MILLIGRAM(S): 2 INJECTION INTRAMUSCULAR; INTRAVENOUS; SUBCUTANEOUS at 10:05

## 2022-12-01 RX ADMIN — HYDROMORPHONE HYDROCHLORIDE 0.25 MILLIGRAM(S): 2 INJECTION INTRAMUSCULAR; INTRAVENOUS; SUBCUTANEOUS at 15:51

## 2022-12-01 RX ADMIN — Medication 975 MILLIGRAM(S): at 07:24

## 2022-12-01 RX ADMIN — Medication 5 MILLIGRAM(S): at 22:03

## 2022-12-01 RX ADMIN — OXYCODONE HYDROCHLORIDE 5 MILLIGRAM(S): 5 TABLET ORAL at 13:50

## 2022-12-01 RX ADMIN — Medication 1 MILLIGRAM(S): at 12:57

## 2022-12-01 RX ADMIN — LEVETIRACETAM 500 MILLIGRAM(S): 250 TABLET, FILM COATED ORAL at 18:36

## 2022-12-01 NOTE — PROGRESS NOTE ADULT - SUBJECTIVE AND OBJECTIVE BOX
DESHAWN KING 72y Female  MRN#: [id number]   CODE STATUS: FULL      SUBJECTIVE  Patient is a 72y old female with widely metastatic ovarian cancer (liver, lung, and peritoneum), anemia, urinary retention, DVT (dx 11/3) and hydroureter 2/2 to her cancer. She presented with a chief complaint of hematuria on 11/26.    Currently admitted to medicine with the primary diagnosis of severe anemia, hematuria and UTI.    Today is hospital day 6, and this morning she reports no overnight events. On inspection, the arevalo is draining merlot red fluid. Pt. denies fever, chills, chest pain, shortness of breath, belly pain, nausea, vomiting, or diarrhea. Her last bowel movement was two days ago.     Hospital course: Pt was recently discharged (11/23) and came back (11/26) for continued hematuria and anemia. On admission her hgb was 6.4. She responded adequately to 1 unit of packed red blood cells (11/27 8.6), but dropped again to 6.4 (11/29). She got another unit and responded adequately.     Present Today:           Arevalo Catheter ()Yes Indication: Failure of trial of void during her last hospital stay (11/16-11/23)      OBJECTIVE  PAST MEDICAL & SURGICAL HISTORY  Ovarian cancer    Ovarian ca    HTN (hypertension)    Anemia    No significant past surgical history      Home Meds:  acetaminophen 325 mg oral tablet: 2 tab(s) orally every 6 hours, As needed, Temp greater or equal to 38C (100.4F), Mild Pain (1 - 3)  aluminum hydroxide-magnesium hydroxide 200 mg-200 mg/5 mL oral suspension: 30 milliliter(s) orally every 4 hours, As needed, Dyspepsia  enoxaparin 60 mg/0.6 mL injectable solution: 60 milligram(s) subcutaneously once a day   Please draw CBC and BMP on 11/28/22. Provided that Hb is stable, may be able to redose lovenox according to CrCl.  ESCITALOPRAM 5MG TABLETS: 1.5 each orally once a day  ferrous sulfate 325 mg (65 mg elemental iron) oral tablet: 1 tab(s) orally every other day (at bedtime)  folic acid 1 mg oral tablet: 1 tab(s) orally once a day  gabapentin 100 mg oral capsule: 1 cap(s) orally 2 times a day  levETIRAcetam 500 mg oral tablet: 1 tab(s) orally 2 times a day  LORAZEPAM  0.5 MG TABS: 1 tab(s) orally every 8 hours, As Needed  Lovenox 40 mg/0.4 mL injectable solution: 60 milligram(s) injectable once a day   Check CBC and BMP within 7 days to adjust lovenox dosage according to CrCl if Hb stable  melatonin 3 mg oral tablet: 1 tab(s) orally once a day (at bedtime), As needed, Insomnia  NIFEdipine 60 mg oral tablet, extended release: 1 tab(s) orally once a day  ondansetron 2 mg/mL injectable solution: 4 milligram(s) injectable every 8 hours, As Needed for nausea  polyethylene glycol 3350 oral powder for reconstitution: 17 gram(s) orally once a day  senna leaf extract oral tablet: 2 tab(s) orally once a day (at bedtime)    ALLERGIES:  Benadryl (Other)  Compazine (Unknown)    MEDICATIONS:  STANDING MEDICATIONS  acetaminophen     Tablet .. 975 milliGRAM(s) Oral every 6 hours  escitalopram 7.5 milliGRAM(s) Oral daily  folic acid 1 milliGRAM(s) Oral daily  gabapentin 200 milliGRAM(s) Oral two times a day  levETIRAcetam 500 milliGRAM(s) Oral two times a day  oxybutynin 5 milliGRAM(s) Oral every 12 hours  polyethylene glycol 3350 17 Gram(s) Oral daily  senna 2 Tablet(s) Oral at bedtime    PRN MEDICATIONS  aluminum hydroxide/magnesium hydroxide/simethicone Suspension 30 milliLiter(s) Oral every 4 hours PRN  HYDROmorphone  Injectable 0.25 milliGRAM(s) IV Push every 6 hours PRN  LORazepam     Tablet 0.5 milliGRAM(s) Oral every 8 hours PRN  melatonin 3 milliGRAM(s) Oral at bedtime PRN  ondansetron Injectable 4 milliGRAM(s) IV Push every 8 hours PRN  oxyCODONE    IR 5 milliGRAM(s) Oral every 4 hours PRN      VITAL SIGNS: Last 24 Hours  T(C): 36.7 (01 Dec 2022 06:00), Max: 36.7 (30 Nov 2022 22:03)  T(F): 98.1 (01 Dec 2022 06:00), Max: 98.1 (01 Dec 2022 06:00)  HR: 89 (01 Dec 2022 06:00) (89 - 94)  BP: 133/80 (01 Dec 2022 06:00) (124/84 - 133/80)  BP(mean): --  RR: 16 (01 Dec 2022 06:00) (16 - 16)  SpO2: 94% (01 Dec 2022 06:00) (92% - 94%)    LABS:                        7.8    11.83 )-----------( 183      ( 01 Dec 2022 08:04 )             25.0     12-01    138  |  106  |  25<H>  ----------------------------<  104<H>  3.8   |  22  |  1.24    Ca    7.9<L>      01 Dec 2022 08:04  Phos  3.1     12-01  Mg     1.8     12-01    RADIOLOGY: No new imaging.      PHYSICAL EXAM:  General: NAD, AAOx3, tired appearing and pale.  HEENT: atraumatic, normocephalic  Pulmonary: clear to auscultation bilaterally; No wheeze  Cardiovascular: Regular rate and rhythm; no murmurs, rubs or gallops. Normal S1S2  Gastrointestinal: Soft, nontender, nondistended; bowel sounds present  Musculoskeletal: 2+ peripheral pulses, no clubbing, cyanosis or edema  Neurology: Pt. alert and oriented, fluent speech, able to move all extremities  Skin: no rashes or lesions

## 2022-12-01 NOTE — PROGRESS NOTE ADULT - ATTENDING COMMENTS
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 Patient with Hgb 7.8 down from 8.1.  - AC was restarted for DVT and patient was being monitored, had new recurrence of hematuria, will hold AC at this time  - Urology following, resumed CBI  - trend Hgb  - keep active type and screen    #Bacteruria  - monitor off abx, insignificant growth on culture    Remainder of plan as above  Dispo pending resolution of glen hematuria

## 2022-12-01 NOTE — PROGRESS NOTE ADULT - ASSESSMENT
72y F with PMHx of metastatic ovarian/uterine cancer, urinary retention, scoliosis currently admitted for hematuria. 18F 3-way catheter in place, CBI removed off 11/28, urine clear now on AC restarted 11/28, restarted CBI 12/1, was irrigated and very little clot removed    Plan:  - sig hematuria, restarted CBI to clear up urine and prevent clots  - transfuse per primary team  - f/u palliative/GOC discussion  - care per primary team  - discussed with attending  - arevalo change every month    NELY Lopez MD (PGY-2)  Consult Urology Resident  Please feel free to reach out on Teams Chat or text me at

## 2022-12-01 NOTE — PROGRESS NOTE ADULT - PROBLEM SELECTOR PLAN 2
Patient w/ suprapubic tenderness + dysuria + hematuria for three days. UA + w/ indwelling Michaels catheter. + many pseudohyphae present. Michaels placed on 11/21 prior to DC as patient failed TOV. Michaels has now been replaced by urology (11/27 am)     Plan:   -monitor off ABx  - f/u urology recs   - f/u repeat ua and urine cultures  -Request cystoscopy from urology as her hgb continues to drop (down to 7.8 from 8.6 on 11/30) Patient with 2 day onset of hematuria, and ultimately admitted for glen red blood in arevalo catheter. During last admission, retroperitoneal ultrasound showed two left renal calculi. Patient denies flank pain. Patient now with worsening dark urine in AM suspect new blood loss.     Plan:   - urology consulted,   - arevalo draining merlot red dark urine

## 2022-12-01 NOTE — PROGRESS NOTE ADULT - PROBLEM SELECTOR PLAN 8
Patient with tearful affect during admission. Did not want to be left alone. During last hospitalization, patient met with a counselor and disclosed that her  has essentially opted out from being her emotional support. Patient gave two contacts for her friends incase  cannot be reached.     Sho Sahni: [phone]   Barb Cobb: [phone]    Plan:   - c/w Escitalopram 5 mg - 1.5 tab orally qd   - Lorazepam 0.5 mg q8 prn   - consider inpatient psychotherapy or palliative consult

## 2022-12-01 NOTE — PROGRESS NOTE ADULT - ASSESSMENT
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 Patient with Hgb 7.8 down from 8.1.

## 2022-12-01 NOTE — PROGRESS NOTE ADULT - PROBLEM SELECTOR PLAN 3
Patient with 2 day onset of hematuria, and ultimately admitted for glen red blood in arevalo catheter. During last admission, retroperitoneal ultrasound showed two left renal calculi. Patient denies flank pain. Patient now with worsening dark urine in AM suspect new blood loss.     Plan:   - urology consulted,   - arevalo draining merlot red dark urine Patient w/ suprapubic tenderness + dysuria + hematuria for three days. UA + w/ indwelling Michaels catheter. + many pseudohyphae present. Michaels placed on 11/21 prior to DC as patient failed TOV. Michaels has now been replaced by urology (11/27 am)     Plan:   -monitor off ABx, insignificant bacteria grew on culture  - f/u urology recs   - f/u repeat ua and urine cultures  -Request cystoscopy from urology as her hgb continues to drop (down to 7.8 from 8.6 on 11/30)

## 2022-12-01 NOTE — PROGRESS NOTE ADULT - ATTENDING COMMENTS
71 yo F with candida UTI and symptomatic hematuria    Begin treatment for funguria: 14 days of 200mg daily (she only received a single dose)  Would change catheter during treatment phase after resolution of hematuria  Please consult palliative care for severe pain management

## 2022-12-01 NOTE — PROGRESS NOTE ADULT - SUBJECTIVE AND OBJECTIVE BOX
UROLOGY PROGRESS NOTE    SUBJECTIVE: Patient seen and examined bedside. Patient c/o of constant pain, crying often, tolerating arevalo        Vital Signs Last 24 Hrs  T(C): 36.7 (01 Dec 2022 06:00), Max: 36.8 (30 Nov 2022 11:43)  T(F): 98.1 (01 Dec 2022 06:00), Max: 98.3 (30 Nov 2022 11:43)  HR: 89 (01 Dec 2022 06:00) (88 - 94)  BP: 133/80 (01 Dec 2022 06:00) (124/84 - 150/73)  BP(mean): --  RR: 16 (01 Dec 2022 06:00) (16 - 18)  SpO2: 94% (01 Dec 2022 06:00) (92% - 94%)    Parameters below as of 01 Dec 2022 06:00  Patient On (Oxygen Delivery Method): nasal cannula  O2 Flow (L/min): 3    I&O's Detail    30 Nov 2022 07:01  -  01 Dec 2022 07:00  --------------------------------------------------------  IN:  Total IN: 0 mL    OUT:    Indwelling Catheter - Urethral (mL): 1425 mL  Total OUT: 1425 mL    Total NET: -1425 mL          PHYSICAL EXAM    General: NAD, resting comfortably in bed  C/V: NSR  Pulm: Nonlabored breathing, no respiratory distress  Abd: soft, ND, NT  : arevalo draining dark merlot urine  Extrem: [facility]        LABS:                        7.8    11.83 )-----------( 183      ( 01 Dec 2022 08:04 )             25.0     12-01    138  |  106  |  25<H>  ----------------------------<  104<H>  3.8   |  22  |  1.24    Ca    7.9<L>      01 Dec 2022 08:04  Phos  3.1     12-01  Mg     1.8     12-01            CULTURES:      Culture - Blood (collected 11-26-22 @ 20:06)  Source: .Blood Blood  Preliminary Report (11-28-22 @ 03:02):    No growth to date.    Culture - Blood (collected 11-26-22 @ 20:06)  Source: .Blood Blood  Preliminary Report (11-28-22 @ 03:02):    No growth to date.        Culture - Urine (collected 11-28-22 @ 05:06)  Source: Catheterized None  Final Report (11-29-22 @ 09:34):    Less than 10,000 cols/cc; Insignificant amount of growth.    Culture - Urine (collected 11-26-22 @ 18:42)  Source: Clean Catch Clean Catch (Midstream)  Final Report (11-28-22 @ 15:43):    >100,000 CFU/ml Candida albicans "Susceptibilities not performed"        RADIOLOGY & ADDITIONAL STUDIES:

## 2022-12-02 DIAGNOSIS — N30.90 CYSTITIS, UNSPECIFIED WITHOUT HEMATURIA: ICD-10-CM

## 2022-12-02 LAB
ANION GAP SERPL CALC-SCNC: 9 MMOL/L — SIGNIFICANT CHANGE UP (ref 5–17)
BUN SERPL-MCNC: 27 MG/DL — HIGH (ref 7–23)
CALCIUM SERPL-MCNC: 7.9 MG/DL — LOW (ref 8.4–10.5)
CHLORIDE SERPL-SCNC: 104 MMOL/L — SIGNIFICANT CHANGE UP (ref 96–108)
CO2 SERPL-SCNC: 22 MMOL/L — SIGNIFICANT CHANGE UP (ref 22–31)
CREAT SERPL-MCNC: 1.18 MG/DL — SIGNIFICANT CHANGE UP (ref 0.5–1.3)
CULTURE RESULTS: SIGNIFICANT CHANGE UP
CULTURE RESULTS: SIGNIFICANT CHANGE UP
EGFR: 49 ML/MIN/1.73M2 — LOW
GLUCOSE SERPL-MCNC: 85 MG/DL — SIGNIFICANT CHANGE UP (ref 70–99)
HCT VFR BLD CALC: 26.2 % — LOW (ref 34.5–45)
HCT VFR BLD CALC: 29.1 % — LOW (ref 34.5–45)
HGB BLD-MCNC: 8.6 G/DL — LOW (ref 11.5–15.5)
HGB BLD-MCNC: 9 G/DL — LOW (ref 11.5–15.5)
MAGNESIUM SERPL-MCNC: 1.8 MG/DL — SIGNIFICANT CHANGE UP (ref 1.6–2.6)
MCHC RBC-ENTMCNC: 30.9 GM/DL — LOW (ref 32–36)
MCHC RBC-ENTMCNC: 31.4 PG — SIGNIFICANT CHANGE UP (ref 27–34)
MCHC RBC-ENTMCNC: 32.5 PG — SIGNIFICANT CHANGE UP (ref 27–34)
MCHC RBC-ENTMCNC: 32.8 GM/DL — SIGNIFICANT CHANGE UP (ref 32–36)
MCV RBC AUTO: 101.4 FL — HIGH (ref 80–100)
MCV RBC AUTO: 98.9 FL — SIGNIFICANT CHANGE UP (ref 80–100)
NRBC # BLD: 0 /100 WBCS — SIGNIFICANT CHANGE UP (ref 0–0)
NRBC # BLD: 0 /100 WBCS — SIGNIFICANT CHANGE UP (ref 0–0)
PHOSPHATE SERPL-MCNC: 3.6 MG/DL — SIGNIFICANT CHANGE UP (ref 2.5–4.5)
PLATELET # BLD AUTO: 159 K/UL — SIGNIFICANT CHANGE UP (ref 150–400)
PLATELET # BLD AUTO: 191 K/UL — SIGNIFICANT CHANGE UP (ref 150–400)
POTASSIUM SERPL-MCNC: 3.9 MMOL/L — SIGNIFICANT CHANGE UP (ref 3.5–5.3)
POTASSIUM SERPL-SCNC: 3.9 MMOL/L — SIGNIFICANT CHANGE UP (ref 3.5–5.3)
RBC # BLD: 2.65 M/UL — LOW (ref 3.8–5.2)
RBC # BLD: 2.87 M/UL — LOW (ref 3.8–5.2)
RBC # FLD: 19.4 % — HIGH (ref 10.3–14.5)
RBC # FLD: 19.8 % — HIGH (ref 10.3–14.5)
SODIUM SERPL-SCNC: 135 MMOL/L — SIGNIFICANT CHANGE UP (ref 135–145)
SPECIMEN SOURCE: SIGNIFICANT CHANGE UP
SPECIMEN SOURCE: SIGNIFICANT CHANGE UP
WBC # BLD: 11.65 K/UL — HIGH (ref 3.8–10.5)
WBC # BLD: 13.21 K/UL — HIGH (ref 3.8–10.5)
WBC # FLD AUTO: 11.65 K/UL — HIGH (ref 3.8–10.5)
WBC # FLD AUTO: 13.21 K/UL — HIGH (ref 3.8–10.5)

## 2022-12-02 PROCEDURE — 99233 SBSQ HOSP IP/OBS HIGH 50: CPT | Mod: GC

## 2022-12-02 PROCEDURE — 99231 SBSQ HOSP IP/OBS SF/LOW 25: CPT | Mod: GC

## 2022-12-02 RX ORDER — MAGNESIUM SULFATE 500 MG/ML
2 VIAL (ML) INJECTION ONCE
Refills: 0 | Status: COMPLETED | OUTPATIENT
Start: 2022-12-02 | End: 2022-12-02

## 2022-12-02 RX ORDER — FLUCONAZOLE 150 MG/1
100 TABLET ORAL EVERY 24 HOURS
Refills: 0 | Status: DISCONTINUED | OUTPATIENT
Start: 2022-12-02 | End: 2022-12-09

## 2022-12-02 RX ADMIN — GABAPENTIN 200 MILLIGRAM(S): 400 CAPSULE ORAL at 18:42

## 2022-12-02 RX ADMIN — Medication 975 MILLIGRAM(S): at 18:42

## 2022-12-02 RX ADMIN — OXYCODONE HYDROCHLORIDE 5 MILLIGRAM(S): 5 TABLET ORAL at 19:57

## 2022-12-02 RX ADMIN — Medication 5 MILLIGRAM(S): at 22:23

## 2022-12-02 RX ADMIN — GABAPENTIN 200 MILLIGRAM(S): 400 CAPSULE ORAL at 06:42

## 2022-12-02 RX ADMIN — LEVETIRACETAM 500 MILLIGRAM(S): 250 TABLET, FILM COATED ORAL at 06:43

## 2022-12-02 RX ADMIN — Medication 975 MILLIGRAM(S): at 19:33

## 2022-12-02 RX ADMIN — Medication 975 MILLIGRAM(S): at 06:42

## 2022-12-02 RX ADMIN — Medication 975 MILLIGRAM(S): at 12:17

## 2022-12-02 RX ADMIN — OXYCODONE HYDROCHLORIDE 5 MILLIGRAM(S): 5 TABLET ORAL at 12:17

## 2022-12-02 RX ADMIN — OXYCODONE HYDROCHLORIDE 5 MILLIGRAM(S): 5 TABLET ORAL at 20:48

## 2022-12-02 RX ADMIN — Medication 975 MILLIGRAM(S): at 19:42

## 2022-12-02 RX ADMIN — Medication 1 MILLIGRAM(S): at 12:19

## 2022-12-02 RX ADMIN — FLUCONAZOLE 100 MILLIGRAM(S): 150 TABLET ORAL at 12:17

## 2022-12-02 RX ADMIN — OXYCODONE HYDROCHLORIDE 5 MILLIGRAM(S): 5 TABLET ORAL at 12:50

## 2022-12-02 RX ADMIN — ESCITALOPRAM OXALATE 7.5 MILLIGRAM(S): 10 TABLET, FILM COATED ORAL at 12:17

## 2022-12-02 RX ADMIN — Medication 975 MILLIGRAM(S): at 08:22

## 2022-12-02 RX ADMIN — Medication 25 GRAM(S): at 12:27

## 2022-12-02 RX ADMIN — Medication 975 MILLIGRAM(S): at 01:57

## 2022-12-02 RX ADMIN — LEVETIRACETAM 500 MILLIGRAM(S): 250 TABLET, FILM COATED ORAL at 18:42

## 2022-12-02 RX ADMIN — Medication 5 MILLIGRAM(S): at 12:18

## 2022-12-02 NOTE — PROGRESS NOTE ADULT - ASSESSMENT
72y F with PMHx of metastatic ovarian/uterine cancer, urinary retention, scoliosis currently admitted for hematuria. 18F 3-way catheter in place, CBI removed off 11/28, urine clear now on AC restarted 11/28, restarted CBI 12/1, was irrigated and very little clot removed, UCx positive for Paula    Plan:  - c/w CBI to clear up urine and prevent clots, titrate to light pink  - transfuse per primary team  - treat for funguria - 14 days of 200mg daily  - f/u palliative/GOC discussion  - care per primary team  - discussed with attending  - f/u with Dr. Hart on discharge  - will change catheter during treatment phase after resolution of hematuria     NELY Lopez MD (PGY-2)  Consult Urology Resident  Please feel free to reach out on Teams Chat or text me at

## 2022-12-02 NOTE — PROGRESS NOTE ADULT - SUBJECTIVE AND OBJECTIVE BOX
UROLOGY PROGRESS NOTE    SUBJECTIVE: Patient seen and examined bedside. Pain is better controlled, occasional bladder spasms.    fluconAZOLE   Tablet 100 milliGRAM(s) Oral every 24 hours      Vital Signs Last 24 Hrs  T(C): 36.5 (02 Dec 2022 05:49), Max: 36.5 (02 Dec 2022 05:49)  T(F): 97.7 (02 Dec 2022 05:49), Max: 97.7 (02 Dec 2022 05:49)  HR: 81 (02 Dec 2022 05:49) (76 - 81)  BP: 138/86 (02 Dec 2022 05:49) (127/80 - 156/85)  BP(mean): --  RR: 20 (02 Dec 2022 05:49) (18 - 20)  SpO2: 93% (02 Dec 2022 05:49) (92% - 95%)    Parameters below as of 02 Dec 2022 05:49  Patient On (Oxygen Delivery Method): nasal cannula  O2 Flow (L/min): 3    I&O's Detail    01 Dec 2022 07:01  -  02 Dec 2022 07:00  --------------------------------------------------------  IN:    Continuous Bladder Irrigation (mL): 00194 mL  Total IN: 51097 mL    OUT:    Continuous Bladder Irrigation (mL): 9500 mL    Indwelling Catheter - Urethral (mL): 3000 mL  Total OUT: 89073 mL    Total NET: -500 mL      02 Dec 2022 07:01  -  02 Dec 2022 12:16  --------------------------------------------------------  IN:    Continuous Bladder Irrigation (mL): 6000 mL  Total IN: 6000 mL    OUT:    Continuous Bladder Irrigation (mL): 7200 mL  Total OUT: 7200 mL    Total NET: -1200 mL          PHYSICAL EXAM    General: NAD, resting comfortably in bed  C/V: NSR  Pulm: Nonlabored breathing, no respiratory distress on room air  Abd: soft, ND, mildly tender suprapubic, no guarding, no rebound  : CBI running, unkinked and drained out some dark merlot urine, quickly lightens to light pink        LABS:                        8.6    13.21 )-----------( 191      ( 02 Dec 2022 05:30 )             26.2     12-02    135  |  104  |  27<H>  ----------------------------<  85  3.9   |  22  |  1.18    Ca    7.9<L>      02 Dec 2022 05:30  Phos  3.6     12-02  Mg     1.8     12-02            CULTURES:      Culture - Blood (collected 11-26-22 @ 20:06)  Source: .Blood Blood  Final Report (12-02-22 @ 03:01):    No Growth Final    Culture - Blood (collected 11-26-22 @ 20:06)  Source: .Blood Blood  Final Report (12-02-22 @ 03:01):    No Growth Final        Culture - Urine (collected 11-28-22 @ 05:06)  Source: Catheterized None  Final Report (11-29-22 @ 09:34):    Less than 10,000 cols/cc; Insignificant amount of growth.    Culture - Urine (collected 11-26-22 @ 18:42)  Source: Clean Catch Clean Catch (Midstream)  Final Report (11-28-22 @ 15:43):    >100,000 CFU/ml Candida albicans "Susceptibilities not performed"        RADIOLOGY & ADDITIONAL STUDIES:

## 2022-12-02 NOTE — PROGRESS NOTE ADULT - SUBJECTIVE AND OBJECTIVE BOX
DESHAWN KING 72y Female  MRN#: [id number]   CODE STATUS: DNR/DNI      SUBJECTIVE  Patient is a 72y old Female who presents with a chief complaint of hematuria (02 Dec 2022 12:15)  Currently admitted to medicine with the primary diagnosis of Severe anemia    Today is hospital day 5d, and this morning she is in bed, alert, and more energetic than previously on exam.     Present Today:           Arevalo Catheter ()No/ (x)Yes? Indication: retention          Central Line (x)No/ ()Yes? Indication:          IV Fluids (x)No/ ()Yes? Type:  Rate:  Indication:      OBJECTIVE  PAST MEDICAL & SURGICAL HISTORY  Ovarian cancer    Ovarian ca    HTN (hypertension)    Anemia    No significant past surgical history      Home Meds:  acetaminophen 325 mg oral tablet: 2 tab(s) orally every 6 hours, As needed, Temp greater or equal to 38C (100.4F), Mild Pain (1 - 3)  aluminum hydroxide-magnesium hydroxide 200 mg-200 mg/5 mL oral suspension: 30 milliliter(s) orally every 4 hours, As needed, Dyspepsia  enoxaparin 60 mg/0.6 mL injectable solution: 60 milligram(s) subcutaneously once a day   Please draw CBC and BMP on 11/28/22. Provided that Hb is stable, may be able to redose lovenox according to CrCl.  ESCITALOPRAM 5MG TABLETS: 1.5 each orally once a day  ferrous sulfate 325 mg (65 mg elemental iron) oral tablet: 1 tab(s) orally every other day (at bedtime)  folic acid 1 mg oral tablet: 1 tab(s) orally once a day  gabapentin 100 mg oral capsule: 1 cap(s) orally 2 times a day  levETIRAcetam 500 mg oral tablet: 1 tab(s) orally 2 times a day  LORAZEPAM  0.5 MG TABS: 1 tab(s) orally every 8 hours, As Needed  Lovenox 40 mg/0.4 mL injectable solution: 60 milligram(s) injectable once a day   Check CBC and BMP within 7 days to adjust lovenox dosage according to CrCl if Hb stable  melatonin 3 mg oral tablet: 1 tab(s) orally once a day (at bedtime), As needed, Insomnia  NIFEdipine 60 mg oral tablet, extended release: 1 tab(s) orally once a day  ondansetron 2 mg/mL injectable solution: 4 milligram(s) injectable every 8 hours, As Needed for nausea  polyethylene glycol 3350 oral powder for reconstitution: 17 gram(s) orally once a day  senna leaf extract oral tablet: 2 tab(s) orally once a day (at bedtime)    ALLERGIES:  Benadryl (Other)  Compazine (Unknown)    MEDICATIONS:  STANDING MEDICATIONS  acetaminophen     Tablet .. 975 milliGRAM(s) Oral every 6 hours  escitalopram 7.5 milliGRAM(s) Oral daily  fluconAZOLE   Tablet 100 milliGRAM(s) Oral every 24 hours  folic acid 1 milliGRAM(s) Oral daily  gabapentin 200 milliGRAM(s) Oral two times a day  levETIRAcetam 500 milliGRAM(s) Oral two times a day  oxybutynin 5 milliGRAM(s) Oral every 12 hours  polyethylene glycol 3350 17 Gram(s) Oral daily  senna 2 Tablet(s) Oral at bedtime    PRN MEDICATIONS  aluminum hydroxide/magnesium hydroxide/simethicone Suspension 30 milliLiter(s) Oral every 4 hours PRN  HYDROmorphone  Injectable 0.25 milliGRAM(s) IV Push every 6 hours PRN  LORazepam     Tablet 0.5 milliGRAM(s) Oral every 8 hours PRN  melatonin 3 milliGRAM(s) Oral at bedtime PRN  ondansetron Injectable 4 milliGRAM(s) IV Push every 8 hours PRN  oxyCODONE    IR 5 milliGRAM(s) Oral every 4 hours PRN      VITAL SIGNS: Last 24 Hours  T(C): 36.2 (02 Dec 2022 13:37), Max: 36.5 (02 Dec 2022 05:49)  T(F): 97.2 (02 Dec 2022 13:37), Max: 97.7 (02 Dec 2022 05:49)  HR: 75 (02 Dec 2022 13:37) (75 - 81)  BP: 116/75 (02 Dec 2022 13:37) (116/75 - 138/86)  BP(mean): --  RR: 18 (02 Dec 2022 13:37) (18 - 20)  SpO2: 94% (02 Dec 2022 13:37) (92% - 94%)    LABS:                        8.6    13.21 )-----------( 191      ( 02 Dec 2022 05:30 )             26.2     12-02    135  |  104  |  27<H>  ----------------------------<  85  3.9   |  22  |  1.18    Ca    7.9<L>      02 Dec 2022 05:30  Phos  3.6     12-02  Mg     1.8     12-02        RADIOLOGY:      PHYSICAL EXAM:  General: NAD, AAOx3  HEENT: atraumatic, normocephalic  Pulmonary: clear to auscultation bilaterally; No wheeze  Cardiovascular: Regular rate and rhythm; no murmurs, rubs or gallops. Normal S1S2  Gastrointestinal: Soft, +suprapubic tenderness, nondistended; bowel sounds present   Musculoskeletal: 2+ peripheral pulses, no clubbing, cyanosis or edema  : arevalo bag with light pink urine  Neurology: Pt. alert and oriented, fluent speech, able to move all extremities  Skin: no rashes or lesions

## 2022-12-02 NOTE — PROGRESS NOTE ADULT - PROBLEM SELECTOR PLAN 3
Patient w/ suprapubic tenderness + dysuria + hematuria for three days. UA + w/ indwelling Michaels catheter. + many pseudohyphae present. Michaels placed on 11/21 prior to DC as patient failed TOV. Michaels has now been replaced by urology (11/27 am). . Patient's symptoms and elevated WBC are likely reactive to blood in the bladder however cannot rule out fungal cystitis.     Plan:   -monitor off ABx, insignificant bacteria grew on culture  - repeat urine culture negative, however confounded by ongoing bladder irrigation  - will start fluconazole for 2 weeks (start 12/2)

## 2022-12-02 NOTE — PROGRESS NOTE ADULT - ATTENDING COMMENTS
71 yo with hematuria, left severe hydro, candiduria    - urine cleared after dose of fluconazole  - treat for funguria - 14 days of 200mg daily  - f/u palliative/GOC discussion  - catheter change after on treatment and hematuria resolved, treatment will increase chance of long term resolution of her hematuria

## 2022-12-02 NOTE — PROGRESS NOTE ADULT - PROBLEM SELECTOR PLAN 2
Patient with 2 day onset of hematuria, and ultimately admitted for glen red blood in arevalo catheter. During last admission, retroperitoneal ultrasound showed two left renal calculi. Patient now with worsening dark urine in AM suspect new blood loss secondary to new arevalo placement vs. candida UTI.     Plan:   - urology consulted,   - arevalo draining merlot red dark urine -> now light pink  - cont. CBI and urology recs

## 2022-12-02 NOTE — PROGRESS NOTE ADULT - ASSESSMENT
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 Patient received 1 unit PRBC on admission. She later developed hematuria and Hgb dropped to <7 and patient was given another unit of PRBC. She was started on CBI and Hgb is uptrending.

## 2022-12-02 NOTE — PROGRESS NOTE ADULT - PROBLEM SELECTOR PLAN 8
Patient with tearful affect during admission. Did not want to be left alone. During last hospitalization, patient met with a counselor and disclosed that her  has essentially opted out from being her emotional support. Patient gave two contacts for her friends incase  cannot be reached.     Sho Sahni: [phone]   Barb Cobb: [phone]    Plan:   - c/w Escitalopram 5 mg - 1.5 tab orally qd   - Lorazepam 0.5 mg q8 prn   - consider inpatient psychotherapy or palliative consult

## 2022-12-02 NOTE — PROGRESS NOTE ADULT - PROBLEM SELECTOR PLAN 1
Patient presented with Hgb 6.4, repeat 6.1. Likely multifactorial i/s/o malignancy, CORBIN, and hematuria. Suspect blood loss is likely anatomical given acuity. Patient with Hgb 7.8. Now s/p 2 units PRBC throughout hospital stay.     Plan:   - Transfusion threshold <7  - hold iron supplementation i/s/o infection   - T&S active (12/1)  - continue CBI  - hgb uptrending.   - f/u urology recs. Once CBI stopped, will need to evaluate for continued blood loss.

## 2022-12-02 NOTE — PROGRESS NOTE ADULT - ATTENDING COMMENTS
Patient was seen and examined at bedside. Case discuss with resident. Pt with abdominal discomfort this morning.     OBJECTIVE:  Vital Signs Last 24 Hrs  T(C): 36.5 (02 Dec 2022 05:49), Max: 36.5 (02 Dec 2022 05:49)  T(F): 97.7 (02 Dec 2022 05:49), Max: 97.7 (02 Dec 2022 05:49)  HR: 81 (02 Dec 2022 05:49) (76 - 81)  BP: 138/86 (02 Dec 2022 05:49) (127/80 - 156/85)  BP(mean): --  RR: 20 (02 Dec 2022 05:49) (18 - 20)  SpO2: 93% (02 Dec 2022 05:49) (92% - 95%)    Parameters below as of 02 Dec 2022 05:49  Patient On (Oxygen Delivery Method): nasal cannula  O2 Flow (L/min): 3      PE: mild abdominal tenderness no rebound or guarding   + arevalo in place with dark red urine   Additional exam as per resident note documented above     LABS:                        8.6    13.21 )-----------( 191      ( 02 Dec 2022 05:30 )             26.2     12-02    135  |  104  |  27<H>  ----------------------------<  85  3.9   |  22  |  1.18    Ca    7.9<L>      02 Dec 2022 05:30  Phos  3.6     12-02  Mg     1.8     12-02 11/26 Blood cx: NTD   11/26 Urine cx: candida     MEDICATIONS  (STANDING):  acetaminophen     Tablet .. 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 Patient with Hgb 7.8 down from 8.1.    #Hematuria   - The pt was initially started on AC was restarted for DVT  however the pt had  new recurrence of hematuria therefore AC was held  on 11/30.   - Urology following, resumed CBI  - trend Hgb  - Maintain an  active type and screen    #Candiduria  #Leukocytosis  - Will start Fluconazole (12/2   -Will f/u WBC     -Additional plan as per resident note on 12/2.

## 2022-12-03 LAB
ANION GAP SERPL CALC-SCNC: 11 MMOL/L — SIGNIFICANT CHANGE UP (ref 5–17)
BUN SERPL-MCNC: 32 MG/DL — HIGH (ref 7–23)
CALCIUM SERPL-MCNC: 8 MG/DL — LOW (ref 8.4–10.5)
CHLORIDE SERPL-SCNC: 104 MMOL/L — SIGNIFICANT CHANGE UP (ref 96–108)
CO2 SERPL-SCNC: 20 MMOL/L — LOW (ref 22–31)
CREAT SERPL-MCNC: 1.29 MG/DL — SIGNIFICANT CHANGE UP (ref 0.5–1.3)
EGFR: 44 ML/MIN/1.73M2 — LOW
GLUCOSE SERPL-MCNC: 76 MG/DL — SIGNIFICANT CHANGE UP (ref 70–99)
HCT VFR BLD CALC: 25.2 % — LOW (ref 34.5–45)
HCT VFR BLD CALC: 27.8 % — LOW (ref 34.5–45)
HGB BLD-MCNC: 8.1 G/DL — LOW (ref 11.5–15.5)
HGB BLD-MCNC: 8.8 G/DL — LOW (ref 11.5–15.5)
MAGNESIUM SERPL-MCNC: 2.1 MG/DL — SIGNIFICANT CHANGE UP (ref 1.6–2.6)
MCHC RBC-ENTMCNC: 31.7 GM/DL — LOW (ref 32–36)
MCHC RBC-ENTMCNC: 32.1 GM/DL — SIGNIFICANT CHANGE UP (ref 32–36)
MCHC RBC-ENTMCNC: 32.1 PG — SIGNIFICANT CHANGE UP (ref 27–34)
MCHC RBC-ENTMCNC: 32.3 PG — SIGNIFICANT CHANGE UP (ref 27–34)
MCV RBC AUTO: 100.4 FL — HIGH (ref 80–100)
MCV RBC AUTO: 101.5 FL — HIGH (ref 80–100)
NRBC # BLD: 0 /100 WBCS — SIGNIFICANT CHANGE UP (ref 0–0)
NRBC # BLD: 0 /100 WBCS — SIGNIFICANT CHANGE UP (ref 0–0)
PHOSPHATE SERPL-MCNC: 4.5 MG/DL — SIGNIFICANT CHANGE UP (ref 2.5–4.5)
PLATELET # BLD AUTO: 208 K/UL — SIGNIFICANT CHANGE UP (ref 150–400)
PLATELET # BLD AUTO: 226 K/UL — SIGNIFICANT CHANGE UP (ref 150–400)
POTASSIUM SERPL-MCNC: 4.5 MMOL/L — SIGNIFICANT CHANGE UP (ref 3.5–5.3)
POTASSIUM SERPL-SCNC: 4.5 MMOL/L — SIGNIFICANT CHANGE UP (ref 3.5–5.3)
RBC # BLD: 2.51 M/UL — LOW (ref 3.8–5.2)
RBC # BLD: 2.74 M/UL — LOW (ref 3.8–5.2)
RBC # FLD: 19.6 % — HIGH (ref 10.3–14.5)
RBC # FLD: 19.6 % — HIGH (ref 10.3–14.5)
SODIUM SERPL-SCNC: 135 MMOL/L — SIGNIFICANT CHANGE UP (ref 135–145)
WBC # BLD: 10.58 K/UL — HIGH (ref 3.8–10.5)
WBC # BLD: 11.26 K/UL — HIGH (ref 3.8–10.5)
WBC # FLD AUTO: 10.58 K/UL — HIGH (ref 3.8–10.5)
WBC # FLD AUTO: 11.26 K/UL — HIGH (ref 3.8–10.5)

## 2022-12-03 PROCEDURE — 99233 SBSQ HOSP IP/OBS HIGH 50: CPT

## 2022-12-03 RX ORDER — SALIVA SUBSTITUTE COMB NO.11 351 MG
5 POWDER IN PACKET (EA) MUCOUS MEMBRANE
Refills: 0 | Status: DISCONTINUED | OUTPATIENT
Start: 2022-12-03 | End: 2022-12-09

## 2022-12-03 RX ADMIN — Medication 975 MILLIGRAM(S): at 12:10

## 2022-12-03 RX ADMIN — ONDANSETRON 4 MILLIGRAM(S): 8 TABLET, FILM COATED ORAL at 21:13

## 2022-12-03 RX ADMIN — Medication 975 MILLIGRAM(S): at 17:54

## 2022-12-03 RX ADMIN — Medication 5 MILLILITER(S): at 21:16

## 2022-12-03 RX ADMIN — GABAPENTIN 200 MILLIGRAM(S): 400 CAPSULE ORAL at 17:54

## 2022-12-03 RX ADMIN — Medication 975 MILLIGRAM(S): at 06:06

## 2022-12-03 RX ADMIN — Medication 975 MILLIGRAM(S): at 01:15

## 2022-12-03 RX ADMIN — FLUCONAZOLE 100 MILLIGRAM(S): 150 TABLET ORAL at 11:10

## 2022-12-03 RX ADMIN — Medication 975 MILLIGRAM(S): at 07:00

## 2022-12-03 RX ADMIN — Medication 975 MILLIGRAM(S): at 11:10

## 2022-12-03 RX ADMIN — Medication 1 MILLIGRAM(S): at 11:10

## 2022-12-03 RX ADMIN — OXYCODONE HYDROCHLORIDE 5 MILLIGRAM(S): 5 TABLET ORAL at 22:13

## 2022-12-03 RX ADMIN — Medication 5 MILLIGRAM(S): at 22:31

## 2022-12-03 RX ADMIN — OXYCODONE HYDROCHLORIDE 5 MILLIGRAM(S): 5 TABLET ORAL at 21:13

## 2022-12-03 RX ADMIN — HYDROMORPHONE HYDROCHLORIDE 0.25 MILLIGRAM(S): 2 INJECTION INTRAMUSCULAR; INTRAVENOUS; SUBCUTANEOUS at 12:58

## 2022-12-03 RX ADMIN — GABAPENTIN 200 MILLIGRAM(S): 400 CAPSULE ORAL at 06:06

## 2022-12-03 RX ADMIN — Medication 975 MILLIGRAM(S): at 00:22

## 2022-12-03 RX ADMIN — ESCITALOPRAM OXALATE 7.5 MILLIGRAM(S): 10 TABLET, FILM COATED ORAL at 11:10

## 2022-12-03 RX ADMIN — Medication 5 MILLIGRAM(S): at 11:10

## 2022-12-03 RX ADMIN — LEVETIRACETAM 500 MILLIGRAM(S): 250 TABLET, FILM COATED ORAL at 06:06

## 2022-12-03 RX ADMIN — OXYCODONE HYDROCHLORIDE 5 MILLIGRAM(S): 5 TABLET ORAL at 11:35

## 2022-12-03 RX ADMIN — Medication 975 MILLIGRAM(S): at 18:54

## 2022-12-03 RX ADMIN — OXYCODONE HYDROCHLORIDE 5 MILLIGRAM(S): 5 TABLET ORAL at 12:35

## 2022-12-03 RX ADMIN — LEVETIRACETAM 500 MILLIGRAM(S): 250 TABLET, FILM COATED ORAL at 17:54

## 2022-12-03 RX ADMIN — HYDROMORPHONE HYDROCHLORIDE 0.25 MILLIGRAM(S): 2 INJECTION INTRAMUSCULAR; INTRAVENOUS; SUBCUTANEOUS at 13:15

## 2022-12-03 NOTE — PROGRESS NOTE ADULT - ASSESSMENT
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 Patient received 1 unit PRBC on admission. She later developed hematuria and Hgb dropped to <7 and patient was given another unit of PRBC. She was started on CBI and Hgb is uptrending.

## 2022-12-03 NOTE — PROGRESS NOTE ADULT - SUBJECTIVE AND OBJECTIVE BOX
OVERNIGHT EVENTS:    SUBJECTIVE / INTERVAL HPI: Patient seen and examined at bedside.     VITAL SIGNS:  Vital Signs Last 24 Hrs  T(C): 36.2 (03 Dec 2022 12:00), Max: 36.7 (02 Dec 2022 20:35)  T(F): 97.2 (03 Dec 2022 12:00), Max: 98 (02 Dec 2022 20:35)  HR: 81 (03 Dec 2022 12:00) (80 - 83)  BP: 120/74 (03 Dec 2022 12:00) (118/72 - 131/75)  BP(mean): --  RR: 16 (03 Dec 2022 12:00) (16 - 16)  SpO2: 95% (03 Dec 2022 12:00) (93% - 95%)    Parameters below as of 03 Dec 2022 12:00  Patient On (Oxygen Delivery Method): room air        PHYSICAL EXAM:    General: Well developed, well nourished, no acute distress  HEENT: NC/AT; PERRL, anicteric sclera; MMM  Neck: supple  Cardiovascular: +S1/S2, RRR, no murmurs, rubs, gallops  Respiratory: CTA B/L; no W/R/R  Gastrointestinal: soft, NT/ND; +BSx4  Extremities: WWP; no edema, clubbing or cyanosis  Vascular: 2+ radial, DP/PT pulses B/L  Neurological: AAOx3; no focal deficits    MEDICATIONS:  MEDICATIONS  (STANDING):  acetaminophen     Tablet .. 975 milliGRAM(s) Oral every 6 hours  Biotene Dry Mouth Oral Rinse 5 milliLiter(s) Swish and Spit two times a day  escitalopram 7.5 milliGRAM(s) Oral daily  fluconAZOLE   Tablet 100 milliGRAM(s) Oral every 24 hours  folic acid 1 milliGRAM(s) Oral daily  gabapentin 200 milliGRAM(s) Oral two times a day  levETIRAcetam 500 milliGRAM(s) Oral two times a day  oxybutynin 5 milliGRAM(s) Oral every 12 hours  polyethylene glycol 3350 17 Gram(s) Oral daily  senna 2 Tablet(s) Oral at bedtime    MEDICATIONS  (PRN):  aluminum hydroxide/magnesium hydroxide/simethicone Suspension 30 milliLiter(s) Oral every 4 hours PRN Dyspepsia  HYDROmorphone  Injectable 0.25 milliGRAM(s) IV Push every 6 hours PRN Severe Pain (7 - 10)  LORazepam     Tablet 0.5 milliGRAM(s) Oral every 8 hours PRN Anxiety  melatonin 3 milliGRAM(s) Oral at bedtime PRN Insomnia  ondansetron Injectable 4 milliGRAM(s) IV Push every 8 hours PRN Nausea and/or Vomiting  oxyCODONE    IR 5 milliGRAM(s) Oral every 4 hours PRN Moderate Pain (4 - 6)      ALLERGIES:  Allergies    Benadryl (Other)  Compazine (Unknown)    Intolerances        LABS:                        8.1    11.26 )-----------( 208      ( 03 Dec 2022 06:27 )             25.2     12-03    135  |  104  |  32<H>  ----------------------------<  76  4.5   |  20<L>  |  1.29    Ca    8.0<L>      03 Dec 2022 06:27  Phos  4.5     12-03  Mg     2.1     12-03          CAPILLARY BLOOD GLUCOSE          RADIOLOGY & ADDITIONAL TESTS: Reviewed.    PLAN:

## 2022-12-03 NOTE — PROGRESS NOTE ADULT - PROBLEM SELECTOR PLAN 8
Patient with tearful affect during admission. Did not want to be left alone. During last hospitalization, patient met with a counselor and disclosed that her  has essentially opted out from being her emotional support. Patient gave two contacts for her friends incase  cannot be reached.     Sho Sahni: [phone]   Barb Cobb: [phone]    Plan:   - c/w Escitalopram 5 mg - 1.5 tab orally qd   - Lorazepam 0.5 mg q8 prn   - consider inpatient psychotherapy or palliative consult

## 2022-12-03 NOTE — PROGRESS NOTE ADULT - ATTENDING COMMENTS
Patient was seen and examined at bedside. Case discuss with resident. Pt w/o any complaints this morning. Pt denied abdominal pain.     OBJECTIVE:  Vital Signs Last 24 Hrs  T(C): 36.2 (03 Dec 2022 12:00), Max: 36.7 (02 Dec 2022 20:35)  T(F): 97.2 (03 Dec 2022 12:00), Max: 98 (02 Dec 2022 20:35)  HR: 81 (03 Dec 2022 12:00) (75 - 83)  BP: 120/74 (03 Dec 2022 12:00) (116/75 - 131/75)  BP(mean): --  RR: 16 (03 Dec 2022 12:00) (16 - 18)  SpO2: 95% (03 Dec 2022 12:00) (93% - 95%)    PE: Additional exam as per resident note documented above     LABS:                        8.1    11.26 )-----------( 208      ( 03 Dec 2022 06:27 )             25.2     12-03    135  |  104  |  32<H>  ----------------------------<  76  4.5   |  20<L>  |  1.29    Ca    8.0<L>      03 Dec 2022 06:27  Phos  4.5     12-03  Mg     2.1     12-03    MEDICATIONS  (STANDING):  acetaminophen     Tablet .. 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 Patient with Hgb 7.8 down from 8.1.    #Hematuria   #DVT   - The pt was initially started on AC was restarted for DVT  however the pt had  new recurrence of hematuria therefore AC was held on 11/30.   - Urology following, resumed CBI  - trend Hgb  - Maintain an active type and screen    #Candiduria  #Leukocytosis  - Will continue  Fluconazole (12/2   -Will f/u WBC     -Additional plan as per resident note

## 2022-12-04 LAB
ALBUMIN SERPL ELPH-MCNC: 1.5 G/DL — LOW (ref 3.3–5)
ALP SERPL-CCNC: 1566 U/L — HIGH (ref 40–120)
ALT FLD-CCNC: 28 U/L — SIGNIFICANT CHANGE UP (ref 10–45)
ANION GAP SERPL CALC-SCNC: 9 MMOL/L — SIGNIFICANT CHANGE UP (ref 5–17)
AST SERPL-CCNC: 69 U/L — HIGH (ref 10–40)
BILIRUB SERPL-MCNC: 0.4 MG/DL — SIGNIFICANT CHANGE UP (ref 0.2–1.2)
BUN SERPL-MCNC: 37 MG/DL — HIGH (ref 7–23)
CALCIUM SERPL-MCNC: 7.9 MG/DL — LOW (ref 8.4–10.5)
CHLORIDE SERPL-SCNC: 105 MMOL/L — SIGNIFICANT CHANGE UP (ref 96–108)
CO2 SERPL-SCNC: 21 MMOL/L — LOW (ref 22–31)
CREAT SERPL-MCNC: 1.36 MG/DL — HIGH (ref 0.5–1.3)
EGFR: 41 ML/MIN/1.73M2 — LOW
GLUCOSE SERPL-MCNC: 75 MG/DL — SIGNIFICANT CHANGE UP (ref 70–99)
HCT VFR BLD CALC: 25.7 % — LOW (ref 34.5–45)
HCT VFR BLD CALC: 28.5 % — LOW (ref 34.5–45)
HGB BLD-MCNC: 8 G/DL — LOW (ref 11.5–15.5)
HGB BLD-MCNC: 8.8 G/DL — LOW (ref 11.5–15.5)
MCHC RBC-ENTMCNC: 30.9 GM/DL — LOW (ref 32–36)
MCHC RBC-ENTMCNC: 31.1 GM/DL — LOW (ref 32–36)
MCHC RBC-ENTMCNC: 32 PG — SIGNIFICANT CHANGE UP (ref 27–34)
MCHC RBC-ENTMCNC: 32.3 PG — SIGNIFICANT CHANGE UP (ref 27–34)
MCV RBC AUTO: 103.6 FL — HIGH (ref 80–100)
MCV RBC AUTO: 103.6 FL — HIGH (ref 80–100)
NRBC # BLD: 0 /100 WBCS — SIGNIFICANT CHANGE UP (ref 0–0)
NRBC # BLD: 0 /100 WBCS — SIGNIFICANT CHANGE UP (ref 0–0)
PLATELET # BLD AUTO: 230 K/UL — SIGNIFICANT CHANGE UP (ref 150–400)
PLATELET # BLD AUTO: 249 K/UL — SIGNIFICANT CHANGE UP (ref 150–400)
POTASSIUM SERPL-MCNC: 4.6 MMOL/L — SIGNIFICANT CHANGE UP (ref 3.5–5.3)
POTASSIUM SERPL-SCNC: 4.6 MMOL/L — SIGNIFICANT CHANGE UP (ref 3.5–5.3)
PROT SERPL-MCNC: 4.6 G/DL — LOW (ref 6–8.3)
RBC # BLD: 2.48 M/UL — LOW (ref 3.8–5.2)
RBC # BLD: 2.75 M/UL — LOW (ref 3.8–5.2)
RBC # FLD: 19.9 % — HIGH (ref 10.3–14.5)
RBC # FLD: 20.2 % — HIGH (ref 10.3–14.5)
SODIUM SERPL-SCNC: 135 MMOL/L — SIGNIFICANT CHANGE UP (ref 135–145)
WBC # BLD: 10.81 K/UL — HIGH (ref 3.8–10.5)
WBC # BLD: 12.41 K/UL — HIGH (ref 3.8–10.5)
WBC # FLD AUTO: 10.81 K/UL — HIGH (ref 3.8–10.5)
WBC # FLD AUTO: 12.41 K/UL — HIGH (ref 3.8–10.5)

## 2022-12-04 PROCEDURE — 99232 SBSQ HOSP IP/OBS MODERATE 35: CPT

## 2022-12-04 RX ADMIN — Medication 975 MILLIGRAM(S): at 12:03

## 2022-12-04 RX ADMIN — Medication 1 MILLIGRAM(S): at 11:03

## 2022-12-04 RX ADMIN — FLUCONAZOLE 100 MILLIGRAM(S): 150 TABLET ORAL at 11:02

## 2022-12-04 RX ADMIN — Medication 975 MILLIGRAM(S): at 18:16

## 2022-12-04 RX ADMIN — Medication 975 MILLIGRAM(S): at 07:30

## 2022-12-04 RX ADMIN — Medication 5 MILLILITER(S): at 22:42

## 2022-12-04 RX ADMIN — Medication 975 MILLIGRAM(S): at 19:16

## 2022-12-04 RX ADMIN — Medication 975 MILLIGRAM(S): at 06:31

## 2022-12-04 RX ADMIN — Medication 5 MILLIGRAM(S): at 22:43

## 2022-12-04 RX ADMIN — HYDROMORPHONE HYDROCHLORIDE 0.25 MILLIGRAM(S): 2 INJECTION INTRAMUSCULAR; INTRAVENOUS; SUBCUTANEOUS at 22:45

## 2022-12-04 RX ADMIN — ESCITALOPRAM OXALATE 7.5 MILLIGRAM(S): 10 TABLET, FILM COATED ORAL at 11:04

## 2022-12-04 RX ADMIN — ONDANSETRON 4 MILLIGRAM(S): 8 TABLET, FILM COATED ORAL at 23:43

## 2022-12-04 RX ADMIN — SENNA PLUS 2 TABLET(S): 8.6 TABLET ORAL at 22:42

## 2022-12-04 RX ADMIN — GABAPENTIN 200 MILLIGRAM(S): 400 CAPSULE ORAL at 06:34

## 2022-12-04 RX ADMIN — LEVETIRACETAM 500 MILLIGRAM(S): 250 TABLET, FILM COATED ORAL at 06:33

## 2022-12-04 RX ADMIN — GABAPENTIN 200 MILLIGRAM(S): 400 CAPSULE ORAL at 18:16

## 2022-12-04 RX ADMIN — Medication 975 MILLIGRAM(S): at 11:03

## 2022-12-04 RX ADMIN — Medication 5 MILLIGRAM(S): at 11:02

## 2022-12-04 RX ADMIN — Medication 975 MILLIGRAM(S): at 23:45

## 2022-12-04 RX ADMIN — Medication 975 MILLIGRAM(S): at 01:01

## 2022-12-04 RX ADMIN — HYDROMORPHONE HYDROCHLORIDE 0.25 MILLIGRAM(S): 2 INJECTION INTRAMUSCULAR; INTRAVENOUS; SUBCUTANEOUS at 23:15

## 2022-12-04 RX ADMIN — Medication 5 MILLILITER(S): at 11:03

## 2022-12-04 RX ADMIN — OXYCODONE HYDROCHLORIDE 5 MILLIGRAM(S): 5 TABLET ORAL at 19:29

## 2022-12-04 RX ADMIN — LEVETIRACETAM 500 MILLIGRAM(S): 250 TABLET, FILM COATED ORAL at 18:16

## 2022-12-04 RX ADMIN — OXYCODONE HYDROCHLORIDE 5 MILLIGRAM(S): 5 TABLET ORAL at 18:29

## 2022-12-04 RX ADMIN — Medication 975 MILLIGRAM(S): at 00:01

## 2022-12-04 NOTE — PROGRESS NOTE ADULT - SUBJECTIVE AND OBJECTIVE BOX
Patient was seen and examined at bedside. Case discuss with resident. Pt w/o any complaints this morning.     OBJECTIVE:  Vital Signs Last 24 Hrs  T(C): 36.7 (04 Dec 2022 11:15), Max: 36.7 (04 Dec 2022 06:04)  T(F): 98 (04 Dec 2022 11:15), Max: 98.1 (04 Dec 2022 06:04)  HR: 82 (04 Dec 2022 11:15) (78 - 84)  BP: 112/62 (04 Dec 2022 11:15) (112/62 - 118/65)  BP(mean): --  RR: 17 (04 Dec 2022 11:15) (16 - 17)  SpO2: 96% (04 Dec 2022 11:15) (95% - 97%)    Parameters below as of 04 Dec 2022 11:15  Patient On (Oxygen Delivery Method): room air      PHYSICAL EXAM:  Gen: NAD laying in bed  CV: RRR, +S1/S2, no mumur  Pulm: CTA b/l no wheezing or crackles   Abd: soft, NTND + BS no rebound or guarding + arevalo in place       LABS:                        8.0    10.81 )-----------( 230      ( 04 Dec 2022 05:30 )             25.7     12-04    135  |  105  |  37<H>  ----------------------------<  75  4.6   |  21<L>  |  1.36<H>    Ca    7.9<L>      04 Dec 2022 05:30  Phos  4.5     12-03  Mg     2.1     12-03    TPro  4.6<L>  /  Alb  1.5<L>  /  TBili  0.4  /  DBili  x   /  AST  69<H>  /  ALT  28  /  AlkPhos  1566<H>  12-04    LIVER FUNCTIONS - ( 04 Dec 2022 05:30 )  Alb: 1.5 g/dL / Pro: 4.6 g/dL / ALK PHOS: 1566 U/L / ALT: 28 U/L / AST: 69 U/L / GGT: x           MEDICATIONS  (STANDING):  acetaminophen     Tablet .. 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 Patient with Hgb 7.8 down from 8.1.    #Hematuria   #DVT   - The pt was initially started on AC was restarted for DVT however the pt had  new recurrence of hematuria therefore AC was held on 11/30.   - Urology following, resumed CBI  - trend Hgb  - Maintain an active type and screen    #Candiduria  #Leukocytosis  - Will continue  Fluconazole (12/2   -Will f/u WBC     #DISPO  - Will f/u SW re CHARLY placement early  this week  as pt's H/H has remained stable

## 2022-12-05 LAB
ANION GAP SERPL CALC-SCNC: 12 MMOL/L — SIGNIFICANT CHANGE UP (ref 5–17)
BUN SERPL-MCNC: 41 MG/DL — HIGH (ref 7–23)
CALCIUM SERPL-MCNC: 7.9 MG/DL — LOW (ref 8.4–10.5)
CHLORIDE SERPL-SCNC: 106 MMOL/L — SIGNIFICANT CHANGE UP (ref 96–108)
CO2 SERPL-SCNC: 18 MMOL/L — LOW (ref 22–31)
CREAT SERPL-MCNC: 1.43 MG/DL — HIGH (ref 0.5–1.3)
EGFR: 39 ML/MIN/1.73M2 — LOW
GLUCOSE SERPL-MCNC: 76 MG/DL — SIGNIFICANT CHANGE UP (ref 70–99)
HCT VFR BLD CALC: 24 % — LOW (ref 34.5–45)
HGB BLD-MCNC: 7.4 G/DL — LOW (ref 11.5–15.5)
MAGNESIUM SERPL-MCNC: 2.2 MG/DL — SIGNIFICANT CHANGE UP (ref 1.6–2.6)
MCHC RBC-ENTMCNC: 30.8 GM/DL — LOW (ref 32–36)
MCHC RBC-ENTMCNC: 32 PG — SIGNIFICANT CHANGE UP (ref 27–34)
MCV RBC AUTO: 103.9 FL — HIGH (ref 80–100)
NRBC # BLD: 0 /100 WBCS — SIGNIFICANT CHANGE UP (ref 0–0)
PHOSPHATE SERPL-MCNC: 4.9 MG/DL — HIGH (ref 2.5–4.5)
PLATELET # BLD AUTO: 235 K/UL — SIGNIFICANT CHANGE UP (ref 150–400)
POTASSIUM SERPL-MCNC: 4.6 MMOL/L — SIGNIFICANT CHANGE UP (ref 3.5–5.3)
POTASSIUM SERPL-SCNC: 4.6 MMOL/L — SIGNIFICANT CHANGE UP (ref 3.5–5.3)
RBC # BLD: 2.31 M/UL — LOW (ref 3.8–5.2)
RBC # FLD: 20.2 % — HIGH (ref 10.3–14.5)
SODIUM SERPL-SCNC: 136 MMOL/L — SIGNIFICANT CHANGE UP (ref 135–145)
WBC # BLD: 9.6 K/UL — SIGNIFICANT CHANGE UP (ref 3.8–10.5)
WBC # FLD AUTO: 9.6 K/UL — SIGNIFICANT CHANGE UP (ref 3.8–10.5)

## 2022-12-05 PROCEDURE — 99233 SBSQ HOSP IP/OBS HIGH 50: CPT | Mod: GC

## 2022-12-05 PROCEDURE — 99231 SBSQ HOSP IP/OBS SF/LOW 25: CPT | Mod: GC

## 2022-12-05 RX ORDER — ENOXAPARIN SODIUM 100 MG/ML
50 INJECTION SUBCUTANEOUS EVERY 12 HOURS
Refills: 0 | Status: DISCONTINUED | OUTPATIENT
Start: 2022-12-05 | End: 2022-12-05

## 2022-12-05 RX ORDER — ENOXAPARIN SODIUM 100 MG/ML
40 INJECTION SUBCUTANEOUS EVERY 12 HOURS
Refills: 0 | Status: DISCONTINUED | OUTPATIENT
Start: 2022-12-05 | End: 2022-12-05

## 2022-12-05 RX ORDER — SODIUM CHLORIDE 9 MG/ML
1000 INJECTION, SOLUTION INTRAVENOUS
Refills: 0 | Status: DISCONTINUED | OUTPATIENT
Start: 2022-12-05 | End: 2022-12-06

## 2022-12-05 RX ADMIN — LEVETIRACETAM 500 MILLIGRAM(S): 250 TABLET, FILM COATED ORAL at 18:42

## 2022-12-05 RX ADMIN — Medication 975 MILLIGRAM(S): at 18:42

## 2022-12-05 RX ADMIN — FLUCONAZOLE 100 MILLIGRAM(S): 150 TABLET ORAL at 12:06

## 2022-12-05 RX ADMIN — Medication 5 MILLIGRAM(S): at 12:06

## 2022-12-05 RX ADMIN — Medication 975 MILLIGRAM(S): at 12:17

## 2022-12-05 RX ADMIN — SENNA PLUS 2 TABLET(S): 8.6 TABLET ORAL at 22:23

## 2022-12-05 RX ADMIN — SODIUM CHLORIDE 60 MILLILITER(S): 9 INJECTION, SOLUTION INTRAVENOUS at 09:14

## 2022-12-05 RX ADMIN — POLYETHYLENE GLYCOL 3350 17 GRAM(S): 17 POWDER, FOR SOLUTION ORAL at 12:17

## 2022-12-05 RX ADMIN — HYDROMORPHONE HYDROCHLORIDE 0.25 MILLIGRAM(S): 2 INJECTION INTRAMUSCULAR; INTRAVENOUS; SUBCUTANEOUS at 22:23

## 2022-12-05 RX ADMIN — Medication 975 MILLIGRAM(S): at 00:15

## 2022-12-05 RX ADMIN — Medication 5 MILLILITER(S): at 12:13

## 2022-12-05 RX ADMIN — Medication 1 MILLIGRAM(S): at 12:16

## 2022-12-05 RX ADMIN — LEVETIRACETAM 500 MILLIGRAM(S): 250 TABLET, FILM COATED ORAL at 07:12

## 2022-12-05 RX ADMIN — Medication 5 MILLIGRAM(S): at 22:23

## 2022-12-05 RX ADMIN — ESCITALOPRAM OXALATE 7.5 MILLIGRAM(S): 10 TABLET, FILM COATED ORAL at 12:16

## 2022-12-05 RX ADMIN — GABAPENTIN 200 MILLIGRAM(S): 400 CAPSULE ORAL at 07:13

## 2022-12-05 RX ADMIN — OXYCODONE HYDROCHLORIDE 5 MILLIGRAM(S): 5 TABLET ORAL at 15:40

## 2022-12-05 RX ADMIN — GABAPENTIN 200 MILLIGRAM(S): 400 CAPSULE ORAL at 18:42

## 2022-12-05 RX ADMIN — Medication 5 MILLILITER(S): at 23:15

## 2022-12-05 RX ADMIN — HYDROMORPHONE HYDROCHLORIDE 0.25 MILLIGRAM(S): 2 INJECTION INTRAMUSCULAR; INTRAVENOUS; SUBCUTANEOUS at 22:48

## 2022-12-05 RX ADMIN — Medication 975 MILLIGRAM(S): at 13:00

## 2022-12-05 RX ADMIN — Medication 975 MILLIGRAM(S): at 07:13

## 2022-12-05 NOTE — PROGRESS NOTE ADULT - SUBJECTIVE AND OBJECTIVE BOX
UROLOGY PROGRESS NOTE    SUBJECTIVE: Patient seen and examined bedside. Patient denies fever/chills, no n/v, reports feeling stronger. CBI found clamped in the AM    fluconAZOLE   Tablet 100 milliGRAM(s) Oral every 24 hours      Vital Signs Last 24 Hrs  T(C): 36.7 (05 Dec 2022 05:32), Max: 36.7 (04 Dec 2022 11:15)  T(F): 98 (05 Dec 2022 05:32), Max: 98 (04 Dec 2022 11:15)  HR: 88 (05 Dec 2022 05:32) (82 - 88)  BP: 135/69 (05 Dec 2022 05:32) (112/62 - 135/69)  BP(mean): --  RR: 17 (05 Dec 2022 05:32) (17 - 17)  SpO2: 95% (05 Dec 2022 05:32) (95% - 96%)    Parameters below as of 05 Dec 2022 05:32  Patient On (Oxygen Delivery Method): room air      I&O's Detail    04 Dec 2022 07:01  -  05 Dec 2022 07:00  --------------------------------------------------------  IN:    Continuous Bladder Irrigation (mL): 9000 mL  Total IN: 9000 mL    OUT:    Continuous Bladder Irrigation (mL): 20973 mL  Total OUT: 51816 mL    Total NET: -1100 mL          PHYSICAL EXAM    General: NAD, resting comfortably in bed, cachetic  C/V: NSR  Pulm: Nonlabored breathing, no respiratory distress   Abd: soft, ND, mildly TTP suprapubic  : arevalo draining clear yellow urine with CBI clamped  Extrem: [facility]        LABS:                        8.8    12.41 )-----------( 249      ( 04 Dec 2022 18:29 )             28.5     12-05    136  |  106  |  41<H>  ----------------------------<  76  4.6   |  18<L>  |  1.43<H>    Ca    7.9<L>      05 Dec 2022 07:10  Phos  4.9     12-05  Mg     2.2     12-05    TPro  4.6<L>  /  Alb  1.5<L>  /  TBili  0.4  /  DBili  x   /  AST  69<H>  /  ALT  28  /  AlkPhos  1566<H>  12-04          CULTURES:          RADIOLOGY & ADDITIONAL STUDIES:

## 2022-12-05 NOTE — PROGRESS NOTE ADULT - ATTENDING COMMENTS
73 yo F with metastatic ovarian/uterine ca     - ongoing hematuria with each time anticoagulation started  - discussed that PCN would likely increase hematuria risk, slow rate is not conducive to IR techniques, and fulguration very unlikely to cause benefit and has definite risk of aggravating or worsening hematuria  - Discussed with patient and Richie for more than 60 minutes pros and cons and advised will continue to discuss with medical team however most I can offer would by cystoscopy and fulguration which could equally likely make situation worse

## 2022-12-05 NOTE — DIETITIAN INITIAL EVALUATION ADULT - PERTINENT MEDS FT
MEDICATIONS  (STANDING):  acetaminophen     Tablet .. 975 milliGRAM(s) Oral every 6 hours  Biotene Dry Mouth Oral Rinse 5 milliLiter(s) Swish and Spit two times a day  enoxaparin Injectable 50 milliGRAM(s) SubCutaneous every 12 hours  escitalopram 7.5 milliGRAM(s) Oral daily  fluconAZOLE   Tablet 100 milliGRAM(s) Oral every 24 hours  folic acid 1 milliGRAM(s) Oral daily  gabapentin 200 milliGRAM(s) Oral two times a day  lactated ringers. 1000 milliLiter(s) (60 mL/Hr) IV Continuous <Continuous>  levETIRAcetam 500 milliGRAM(s) Oral two times a day  oxybutynin 5 milliGRAM(s) Oral every 12 hours  polyethylene glycol 3350 17 Gram(s) Oral daily  senna 2 Tablet(s) Oral at bedtime    MEDICATIONS  (PRN):  aluminum hydroxide/magnesium hydroxide/simethicone Suspension 30 milliLiter(s) Oral every 4 hours PRN Dyspepsia  HYDROmorphone  Injectable 0.25 milliGRAM(s) IV Push every 6 hours PRN Severe Pain (7 - 10)  LORazepam     Tablet 0.5 milliGRAM(s) Oral every 8 hours PRN Anxiety  melatonin 3 milliGRAM(s) Oral at bedtime PRN Insomnia  ondansetron Injectable 4 milliGRAM(s) IV Push every 8 hours PRN Nausea and/or Vomiting  oxyCODONE    IR 5 milliGRAM(s) Oral every 4 hours PRN Moderate Pain (4 - 6)

## 2022-12-05 NOTE — DIETITIAN INITIAL EVALUATION ADULT - OTHER INFO
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     Pt seen in room for nutrition assessment, sitting up in bed eating breakfast. Pt reports decreased PO intake d/t "bad taste" in mouth as well as oral discomfort. Reports bland, moist foods are better tolerated. Observed pt eating ~25% cream of wheat. Pt likes Ensure, chocolate flavor. Per EMR wts, stable at ~115-120lbs. Severe muscle/fat loss noted. Per ASPEN guidelines, pt meets criteria for severe protein-calorie malnutrition 2/2 NFPE findings. RD provided education on importance of optimal PO intake, discussed best tolerated foods. Pt expressed understanding. Please see full nutrition recommendations below. Will continue to follow per RD protocol.  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     Pt seen in room for nutrition assessment, sitting up in bed eating breakfast. Pt reports decreased PO intake d/t "bad taste" in mouth as well as oral discomfort. Reports bland, moist foods are better tolerated. Observed pt eating ~25% cream of wheat. Pt likes Ensure, chocolate flavor. Per EMR wts, stable at ~115-120lbs. Severe muscle/fat loss noted. Per ASPEN guidelines, pt meets criteria for severe protein-calorie malnutrition 2/2 NFPE findings. RD provided education on importance of optimal PO intake, discussed best tolerated foods. Pt expressed understanding. No N/V/D/C noted at present, last BM 12/3. Please see full nutrition recommendations below. Will continue to follow per RD protocol.

## 2022-12-05 NOTE — DIETITIAN INITIAL EVALUATION ADULT - ADD RECOMMEND
1. Continue soft and bite sized diet + add Ensure Enlive BID (700kcal/40gpro)   2. Consider magic mouthwash d/t oral discomfort   3. Encourage PO intake and monitor tolerance   4. Reinforce education prn

## 2022-12-05 NOTE — PROGRESS NOTE ADULT - ATTENDING COMMENTS
A/P:  1. hematuria due to possible L ureteral mass with L hydronephrosis vs hematuria related to arevalo placement -- H/H downtrending to 7.4 today despite off AC for few days. discussed with urology. hematuria may be due to invasive tumor bleeding (likely ureteral area) -- urological intervention such as cauterization has limited role and risk of re-bleed remains high. Limited role in IR given high suspicion of slow bleed.   - failed TOV last admission, can consider repeat TOV after hematuria clears again however high likelihood of requiring chronic arevalo  - given limited urological interventions, palliative care consulted.     2. candiduria  - treat w/ fluconazole    3. ovarian/uterine cancer with mets - spoke to oncologist today -- pancytopenia has resolved and plan is to continue chemo if hematuria resolves, however given that this process is likely to recur often, will address with oncologist for further chemo plans.    4. DVT on eliquis, complicated by acute blood loss anemia    plan for GOC with palliative and urology.

## 2022-12-05 NOTE — PROGRESS NOTE ADULT - ASSESSMENT
72y F with PMHx of metastatic ovarian/uterine cancer, urinary retention, scoliosis currently admitted for hematuria. 18F 3-way catheter in place, CBI removed off 11/28, urine clear now on AC restarted 11/28, restarted CBI 12/1, was irrigated and very little clot removed, UCx positive for Candida now on txt (12/2- ). CBI clamped 12/5    Plan:  - can trial clamp CBI and start AC  - transfuse per primary team  - treat for funguria - 14 days of 200mg daily  - f/u palliative/GOC discussion  - care per primary team  - discussed with attending  - f/u with Dr. Hart on discharge  - will change catheter during treatment phase after resolution of hematuria    NELY Lopez MD (PGY-2)  Consult Urology Resident  Please feel free to reach out on Teams Chat or text me at

## 2022-12-05 NOTE — DIETITIAN INITIAL EVALUATION ADULT - PERTINENT LABORATORY DATA
12-05    136  |  106  |  41<H>  ----------------------------<  76  4.6   |  18<L>  |  1.43<H>    Ca    7.9<L>      05 Dec 2022 07:10  Phos  4.9     12-05  Mg     2.2     12-05    TPro  4.6<L>  /  Alb  1.5<L>  /  TBili  0.4  /  DBili  x   /  AST  69<H>  /  ALT  28  /  AlkPhos  1566<H>  12-04  A1C with Estimated Average Glucose Result: 4.8 % (11-03-22 @ 05:30)

## 2022-12-05 NOTE — PROGRESS NOTE ADULT - SUBJECTIVE AND OBJECTIVE BOX
DESHAWN KING 72y Female  MRN#: [id number]   CODE STATUS: DNR/DNI      SUBJECTIVE  Patient is a 72y old Female who presents with a chief complaint of SOUZA CATHETER ISSUES     (05 Dec 2022 11:05)  Currently admitted to medicine with the primary diagnosis of Severe anemia    Today is hospital day 8d, and this morning she is resting in bed with no overnight events.     Present Today:           Souza Catheter ()No/ (x)Yes? Indication:          Central Line (x)No/ ()Yes? Indication:          IV Fluids (x)No/ ()Yes? Type:  Rate:  Indication:      OBJECTIVE  PAST MEDICAL & SURGICAL HISTORY  Ovarian cancer    Ovarian ca    HTN (hypertension)    Anemia    No significant past surgical history      Home Meds:  acetaminophen 325 mg oral tablet: 2 tab(s) orally every 6 hours, As needed, Temp greater or equal to 38C (100.4F), Mild Pain (1 - 3)  aluminum hydroxide-magnesium hydroxide 200 mg-200 mg/5 mL oral suspension: 30 milliliter(s) orally every 4 hours, As needed, Dyspepsia  enoxaparin 60 mg/0.6 mL injectable solution: 60 milligram(s) subcutaneously once a day   Please draw CBC and BMP on 11/28/22. Provided that Hb is stable, may be able to redose lovenox according to CrCl.  ESCITALOPRAM 5MG TABLETS: 1.5 each orally once a day  ferrous sulfate 325 mg (65 mg elemental iron) oral tablet: 1 tab(s) orally every other day (at bedtime)  folic acid 1 mg oral tablet: 1 tab(s) orally once a day  gabapentin 100 mg oral capsule: 1 cap(s) orally 2 times a day  levETIRAcetam 500 mg oral tablet: 1 tab(s) orally 2 times a day  LORAZEPAM  0.5 MG TABS: 1 tab(s) orally every 8 hours, As Needed  Lovenox 40 mg/0.4 mL injectable solution: 60 milligram(s) injectable once a day   Check CBC and BMP within 7 days to adjust lovenox dosage according to CrCl if Hb stable  melatonin 3 mg oral tablet: 1 tab(s) orally once a day (at bedtime), As needed, Insomnia  NIFEdipine 60 mg oral tablet, extended release: 1 tab(s) orally once a day  ondansetron 2 mg/mL injectable solution: 4 milligram(s) injectable every 8 hours, As Needed for nausea  polyethylene glycol 3350 oral powder for reconstitution: 17 gram(s) orally once a day  senna leaf extract oral tablet: 2 tab(s) orally once a day (at bedtime)    ALLERGIES:  Benadryl (Other)  Compazine (Unknown)    MEDICATIONS:  STANDING MEDICATIONS  acetaminophen     Tablet .. 975 milliGRAM(s) Oral every 6 hours  Biotene Dry Mouth Oral Rinse 5 milliLiter(s) Swish and Spit two times a day  escitalopram 7.5 milliGRAM(s) Oral daily  fluconAZOLE   Tablet 100 milliGRAM(s) Oral every 24 hours  folic acid 1 milliGRAM(s) Oral daily  gabapentin 200 milliGRAM(s) Oral two times a day  lactated ringers. 1000 milliLiter(s) IV Continuous <Continuous>  levETIRAcetam 500 milliGRAM(s) Oral two times a day  oxybutynin 5 milliGRAM(s) Oral every 12 hours  polyethylene glycol 3350 17 Gram(s) Oral daily  senna 2 Tablet(s) Oral at bedtime    PRN MEDICATIONS  aluminum hydroxide/magnesium hydroxide/simethicone Suspension 30 milliLiter(s) Oral every 4 hours PRN  HYDROmorphone  Injectable 0.25 milliGRAM(s) IV Push every 6 hours PRN  LORazepam     Tablet 0.5 milliGRAM(s) Oral every 8 hours PRN  melatonin 3 milliGRAM(s) Oral at bedtime PRN  ondansetron Injectable 4 milliGRAM(s) IV Push every 8 hours PRN  oxyCODONE    IR 5 milliGRAM(s) Oral every 4 hours PRN      VITAL SIGNS: Last 24 Hours  T(C): 36.5 (06 Dec 2022 04:57), Max: 36.7 (05 Dec 2022 20:44)  T(F): 97.7 (06 Dec 2022 04:57), Max: 98.1 (05 Dec 2022 20:44)  HR: 82 (06 Dec 2022 04:57) (73 - 82)  BP: 121/69 (06 Dec 2022 04:57) (121/69 - 132/75)  BP(mean): --  RR: 18 (06 Dec 2022 04:57) (17 - 18)  SpO2: 97% (06 Dec 2022 04:57) (96% - 97%)    LABS:                        7.4    9.60  )-----------( 235      ( 05 Dec 2022 07:10 )             24.0     12-05    136  |  106  |  41<H>  ----------------------------<  76  4.6   |  18<L>  |  1.43<H>    Ca    7.9<L>      05 Dec 2022 07:10  Phos  4.9     12-05  Mg     2.2     12-05                    RADIOLOGY:      PHYSICAL EXAM:  General: NAD, AAOx3  HEENT: atraumatic, normocephalic  Pulmonary: clear to auscultation bilaterally; No wheeze  Cardiovascular: Regular rate and rhythm; no murmurs, rubs or gallops. Normal S1S2  Gastrointestinal: Soft, nontender, nondistended; bowel sounds present  Musculoskeletal: 2+ peripheral pulses, no clubbing, cyanosis or edema  Neurology: Pt. alert and oriented, fluent speech, able to move all extremities  Skin: no rashes or lesions

## 2022-12-06 DIAGNOSIS — Z51.5 ENCOUNTER FOR PALLIATIVE CARE: ICD-10-CM

## 2022-12-06 DIAGNOSIS — G89.3 NEOPLASM RELATED PAIN (ACUTE) (CHRONIC): ICD-10-CM

## 2022-12-06 LAB
ANION GAP SERPL CALC-SCNC: 14 MMOL/L — SIGNIFICANT CHANGE UP (ref 5–17)
BLD GP AB SCN SERPL QL: NEGATIVE — SIGNIFICANT CHANGE UP
BUN SERPL-MCNC: 40 MG/DL — HIGH (ref 7–23)
CALCIUM SERPL-MCNC: 8.3 MG/DL — LOW (ref 8.4–10.5)
CHLORIDE SERPL-SCNC: 103 MMOL/L — SIGNIFICANT CHANGE UP (ref 96–108)
CO2 SERPL-SCNC: 19 MMOL/L — LOW (ref 22–31)
CREAT SERPL-MCNC: 1.31 MG/DL — HIGH (ref 0.5–1.3)
EGFR: 43 ML/MIN/1.73M2 — LOW
GLUCOSE SERPL-MCNC: 73 MG/DL — SIGNIFICANT CHANGE UP (ref 70–99)
HCT VFR BLD CALC: 26.2 % — LOW (ref 34.5–45)
HGB BLD-MCNC: 8 G/DL — LOW (ref 11.5–15.5)
MAGNESIUM SERPL-MCNC: 2.1 MG/DL — SIGNIFICANT CHANGE UP (ref 1.6–2.6)
MCHC RBC-ENTMCNC: 30.5 GM/DL — LOW (ref 32–36)
MCHC RBC-ENTMCNC: 32.1 PG — SIGNIFICANT CHANGE UP (ref 27–34)
MCV RBC AUTO: 105.2 FL — HIGH (ref 80–100)
NRBC # BLD: 0 /100 WBCS — SIGNIFICANT CHANGE UP (ref 0–0)
PHOSPHATE SERPL-MCNC: 4.2 MG/DL — SIGNIFICANT CHANGE UP (ref 2.5–4.5)
PLATELET # BLD AUTO: 279 K/UL — SIGNIFICANT CHANGE UP (ref 150–400)
POTASSIUM SERPL-MCNC: 4.7 MMOL/L — SIGNIFICANT CHANGE UP (ref 3.5–5.3)
POTASSIUM SERPL-SCNC: 4.7 MMOL/L — SIGNIFICANT CHANGE UP (ref 3.5–5.3)
RBC # BLD: 2.49 M/UL — LOW (ref 3.8–5.2)
RBC # FLD: 21 % — HIGH (ref 10.3–14.5)
RH IG SCN BLD-IMP: POSITIVE — SIGNIFICANT CHANGE UP
SODIUM SERPL-SCNC: 136 MMOL/L — SIGNIFICANT CHANGE UP (ref 135–145)
WBC # BLD: 9.53 K/UL — SIGNIFICANT CHANGE UP (ref 3.8–10.5)
WBC # FLD AUTO: 9.53 K/UL — SIGNIFICANT CHANGE UP (ref 3.8–10.5)

## 2022-12-06 PROCEDURE — 99233 SBSQ HOSP IP/OBS HIGH 50: CPT | Mod: GC

## 2022-12-06 PROCEDURE — 99231 SBSQ HOSP IP/OBS SF/LOW 25: CPT | Mod: GC

## 2022-12-06 PROCEDURE — 99232 SBSQ HOSP IP/OBS MODERATE 35: CPT

## 2022-12-06 RX ORDER — OXYBUTYNIN CHLORIDE 5 MG
5 TABLET ORAL THREE TIMES A DAY
Refills: 0 | Status: DISCONTINUED | OUTPATIENT
Start: 2022-12-06 | End: 2022-12-09

## 2022-12-06 RX ADMIN — Medication 975 MILLIGRAM(S): at 12:29

## 2022-12-06 RX ADMIN — ESCITALOPRAM OXALATE 7.5 MILLIGRAM(S): 10 TABLET, FILM COATED ORAL at 12:29

## 2022-12-06 RX ADMIN — LEVETIRACETAM 500 MILLIGRAM(S): 250 TABLET, FILM COATED ORAL at 18:16

## 2022-12-06 RX ADMIN — Medication 975 MILLIGRAM(S): at 06:42

## 2022-12-06 RX ADMIN — FLUCONAZOLE 100 MILLIGRAM(S): 150 TABLET ORAL at 10:21

## 2022-12-06 RX ADMIN — Medication 975 MILLIGRAM(S): at 00:08

## 2022-12-06 RX ADMIN — GABAPENTIN 200 MILLIGRAM(S): 400 CAPSULE ORAL at 06:28

## 2022-12-06 RX ADMIN — HYDROMORPHONE HYDROCHLORIDE 0.25 MILLIGRAM(S): 2 INJECTION INTRAMUSCULAR; INTRAVENOUS; SUBCUTANEOUS at 10:28

## 2022-12-06 RX ADMIN — GABAPENTIN 200 MILLIGRAM(S): 400 CAPSULE ORAL at 18:16

## 2022-12-06 RX ADMIN — Medication 5 MILLILITER(S): at 10:27

## 2022-12-06 RX ADMIN — POLYETHYLENE GLYCOL 3350 17 GRAM(S): 17 POWDER, FOR SOLUTION ORAL at 12:30

## 2022-12-06 RX ADMIN — Medication 975 MILLIGRAM(S): at 06:28

## 2022-12-06 RX ADMIN — Medication 5 MILLIGRAM(S): at 18:17

## 2022-12-06 RX ADMIN — Medication 5 MILLIGRAM(S): at 22:14

## 2022-12-06 RX ADMIN — Medication 975 MILLIGRAM(S): at 18:52

## 2022-12-06 RX ADMIN — Medication 975 MILLIGRAM(S): at 13:00

## 2022-12-06 RX ADMIN — HYDROMORPHONE HYDROCHLORIDE 0.25 MILLIGRAM(S): 2 INJECTION INTRAMUSCULAR; INTRAVENOUS; SUBCUTANEOUS at 10:45

## 2022-12-06 RX ADMIN — Medication 975 MILLIGRAM(S): at 18:16

## 2022-12-06 RX ADMIN — Medication 975 MILLIGRAM(S): at 01:43

## 2022-12-06 RX ADMIN — LEVETIRACETAM 500 MILLIGRAM(S): 250 TABLET, FILM COATED ORAL at 06:28

## 2022-12-06 RX ADMIN — Medication 1 MILLIGRAM(S): at 12:29

## 2022-12-06 RX ADMIN — Medication 5 MILLIGRAM(S): at 10:21

## 2022-12-06 RX ADMIN — SENNA PLUS 2 TABLET(S): 8.6 TABLET ORAL at 22:14

## 2022-12-06 RX ADMIN — Medication 5 MILLILITER(S): at 22:21

## 2022-12-06 NOTE — PROGRESS NOTE ADULT - ASSESSMENT
72 F with stage IV ovarian/cervical cancer, neoplasm related pain, hematuria, encounter for palliative care.

## 2022-12-06 NOTE — PROGRESS NOTE ADULT - PROBLEM SELECTOR PLAN 8
Patient with tearful affect during admission. Did not want to be left alone. During last hospitalization, patient met with a counselor and disclosed that her  has essentially opted out from being her emotional support. Patient gave two contacts for her friends incase  cannot be reached.     Sho Sahni: [phone]   Barb Cobb: [phone]    Plan:   - c/w Escitalopram 5 mg - 1.5 tab orally qd   - Lorazepam 0.5 mg q8 prn   - consider inpatient psychotherapy or palliative consult

## 2022-12-06 NOTE — PROGRESS NOTE ADULT - ATTENDING COMMENTS
71 yo F with metastatic ovarian/uterine ca     - ongoing hematuria with each time anticoagulation started  - discussed case with several colleagues who concurred intervention unlikely to improve situation and may worsen. Discussed with medical team as well and their opinion also was to avoid procedure given no reasonable expectation of improvement after. Discussed with patient.  - Discussed with patient and Richie for more than 45 minutes pros and cons and advised will plan for no operative intervention. Likely will hold AC and plan for palliative/hospice care. She was tearful and stated she did have a lot of trouble with chemotherapy, and most wants to be home and if possible to walk again even briefly.   - If team meeting occurs thursday please advise of time and I will make every effort to attend

## 2022-12-06 NOTE — PROGRESS NOTE ADULT - SUBJECTIVE AND OBJECTIVE BOX
DESHAWN KING 72y Female  MRN#: [id number]   CODE STATUS: DNR      SUBJECTIVE  Patient is a 72y old Female who presents with a chief complaint of hematuria (06 Dec 2022 09:58)  Currently admitted to medicine with the primary diagnosis of acute blood loss anemia 2/2 metastatic implantation of ureter vs. traumatic arevalo placement    Hospital course has been complicated by _______.   Today is hospital day 9d, and this morning she is well appearing and in NAD and reports no overnight events.     Present Today:           Arevalo Catheter ()No/ (x)Yes? Indication: Hematuria and urinary retention          Central Line (x)No/ ()Yes? Indication:          IV Fluids ()No/ (x)Yes? Type: LR 1000 ml Rate:  Indication:      OBJECTIVE  PAST MEDICAL & SURGICAL HISTORY  Ovarian cancer    Ovarian ca    HTN (hypertension)    Anemia    No significant past surgical history      Home Meds:  acetaminophen 325 mg oral tablet: 2 tab(s) orally every 6 hours, As needed, Temp greater or equal to 38C (100.4F), Mild Pain (1 - 3)  aluminum hydroxide-magnesium hydroxide 200 mg-200 mg/5 mL oral suspension: 30 milliliter(s) orally every 4 hours, As needed, Dyspepsia  enoxaparin 60 mg/0.6 mL injectable solution: 60 milligram(s) subcutaneously once a day   Please draw CBC and BMP on 11/28/22. Provided that Hb is stable, may be able to redose lovenox according to CrCl.  ESCITALOPRAM 5MG TABLETS: 1.5 each orally once a day  ferrous sulfate 325 mg (65 mg elemental iron) oral tablet: 1 tab(s) orally every other day (at bedtime)  folic acid 1 mg oral tablet: 1 tab(s) orally once a day  gabapentin 100 mg oral capsule: 1 cap(s) orally 2 times a day  levETIRAcetam 500 mg oral tablet: 1 tab(s) orally 2 times a day  LORAZEPAM  0.5 MG TABS: 1 tab(s) orally every 8 hours, As Needed  Lovenox 40 mg/0.4 mL injectable solution: 60 milligram(s) injectable once a day   Check CBC and BMP within 7 days to adjust lovenox dosage according to CrCl if Hb stable  melatonin 3 mg oral tablet: 1 tab(s) orally once a day (at bedtime), As needed, Insomnia  NIFEdipine 60 mg oral tablet, extended release: 1 tab(s) orally once a day  ondansetron 2 mg/mL injectable solution: 4 milligram(s) injectable every 8 hours, As Needed for nausea  polyethylene glycol 3350 oral powder for reconstitution: 17 gram(s) orally once a day  senna leaf extract oral tablet: 2 tab(s) orally once a day (at bedtime)    ALLERGIES:  Benadryl (Other)  Compazine (Unknown)    MEDICATIONS:  STANDING MEDICATIONS  acetaminophen     Tablet .. 975 milliGRAM(s) Oral every 6 hours  Biotene Dry Mouth Oral Rinse 5 milliLiter(s) Swish and Spit two times a day  escitalopram 7.5 milliGRAM(s) Oral daily  fluconAZOLE   Tablet 100 milliGRAM(s) Oral every 24 hours  folic acid 1 milliGRAM(s) Oral daily  gabapentin 200 milliGRAM(s) Oral two times a day  lactated ringers. 1000 milliLiter(s) IV Continuous <Continuous>  levETIRAcetam 500 milliGRAM(s) Oral two times a day  polyethylene glycol 3350 17 Gram(s) Oral daily  senna 2 Tablet(s) Oral at bedtime    PRN MEDICATIONS  aluminum hydroxide/magnesium hydroxide/simethicone Suspension 30 milliLiter(s) Oral every 4 hours PRN  HYDROmorphone  Injectable 0.25 milliGRAM(s) IV Push every 6 hours PRN  LORazepam     Tablet 0.5 milliGRAM(s) Oral every 8 hours PRN  melatonin 3 milliGRAM(s) Oral at bedtime PRN  ondansetron Injectable 4 milliGRAM(s) IV Push every 8 hours PRN  oxyCODONE    IR 5 milliGRAM(s) Oral every 4 hours PRN      VITAL SIGNS: Last 24 Hours  T(C): 36.5 (06 Dec 2022 04:57), Max: 36.7 (05 Dec 2022 20:44)  T(F): 97.7 (06 Dec 2022 04:57), Max: 98.1 (05 Dec 2022 20:44)  HR: 82 (06 Dec 2022 04:57) (73 - 82)  BP: 121/69 (06 Dec 2022 04:57) (121/69 - 132/75)  BP(mean): --  RR: 18 (06 Dec 2022 04:57) (17 - 18)  SpO2: 97% (06 Dec 2022 04:57) (96% - 97%)    LABS:                        8.0    9.53  )-----------( 279      ( 06 Dec 2022 08:14 )             26.2     12-06    136  |  103  |  40<H>  ----------------------------<  73  4.7   |  19<L>  |  1.31<H>    Ca    8.3<L>      06 Dec 2022 08:14  Phos  4.2     12-06  Mg     2.1     12-06                    RADIOLOGY:      PHYSICAL EXAM:  General: NAD, AAOx3  HEENT: atraumatic, normocephalic  Pulmonary: clear to auscultation bilaterally; No wheeze  Cardiovascular: Regular rate and rhythm; no murmurs, rubs or gallops. Normal S1S2  Gastrointestinal: Soft, nontender, nondistended; bowel sounds present  Musculoskeletal: 2+ peripheral pulses, no clubbing, cyanosis or edema  Neurology: Pt. alert and oriented, fluent speech, able to move all extremities  Skin: no rashes or lesions      ADMISSION SUMMARY  Patient is a 72y old Female who presents with a chief complaint of hematuria (06 Dec 2022 09:58)        ASSESSMENT & PLAN      Dvt ppx:  PPI ppx:   Diet  Dispo:   Activity:        DESHAWN KING 72y Female  MRN#: [id number]   CODE STATUS: DNR      SUBJECTIVE  Patient is a 72y old Female who presents with a chief complaint of hematuria (06 Dec 2022 09:58)  Currently admitted to medicine with the primary diagnosis of acute blood loss anemia 2/2 metastatic implantation of ureter vs. traumatic arevalo placement    Hospital course has been complicated by _______.   Today is hospital day 9d, and this morning she is well appearing and in NAD and reports no overnight events.     Present Today:           Arevalo Catheter ()No/ (x)Yes? Indication: Hematuria and urinary retention          Central Line (x)No/ ()Yes? Indication:          IV Fluids ()No/ (x)Yes? Type: LR 1000 ml Rate: 60 ml/hr  Indication:      OBJECTIVE  PAST MEDICAL & SURGICAL HISTORY  Ovarian cancer    Ovarian ca    HTN (hypertension)    Anemia    No significant past surgical history      Home Meds:  acetaminophen 325 mg oral tablet: 2 tab(s) orally every 6 hours, As needed, Temp greater or equal to 38C (100.4F), Mild Pain (1 - 3)  aluminum hydroxide-magnesium hydroxide 200 mg-200 mg/5 mL oral suspension: 30 milliliter(s) orally every 4 hours, As needed, Dyspepsia  enoxaparin 60 mg/0.6 mL injectable solution: 60 milligram(s) subcutaneously once a day   Please draw CBC and BMP on 11/28/22. Provided that Hb is stable, may be able to redose lovenox according to CrCl.  ESCITALOPRAM 5MG TABLETS: 1.5 each orally once a day  ferrous sulfate 325 mg (65 mg elemental iron) oral tablet: 1 tab(s) orally every other day (at bedtime)  folic acid 1 mg oral tablet: 1 tab(s) orally once a day  gabapentin 100 mg oral capsule: 1 cap(s) orally 2 times a day  levETIRAcetam 500 mg oral tablet: 1 tab(s) orally 2 times a day  LORAZEPAM  0.5 MG TABS: 1 tab(s) orally every 8 hours, As Needed  Lovenox 40 mg/0.4 mL injectable solution: 60 milligram(s) injectable once a day   Check CBC and BMP within 7 days to adjust lovenox dosage according to CrCl if Hb stable  melatonin 3 mg oral tablet: 1 tab(s) orally once a day (at bedtime), As needed, Insomnia  NIFEdipine 60 mg oral tablet, extended release: 1 tab(s) orally once a day  ondansetron 2 mg/mL injectable solution: 4 milligram(s) injectable every 8 hours, As Needed for nausea  polyethylene glycol 3350 oral powder for reconstitution: 17 gram(s) orally once a day  senna leaf extract oral tablet: 2 tab(s) orally once a day (at bedtime)    ALLERGIES:  Benadryl (Other)  Compazine (Unknown)    MEDICATIONS:  STANDING MEDICATIONS  acetaminophen     Tablet .. 975 milliGRAM(s) Oral every 6 hours  Biotene Dry Mouth Oral Rinse 5 milliLiter(s) Swish and Spit two times a day  escitalopram 7.5 milliGRAM(s) Oral daily  fluconAZOLE   Tablet 100 milliGRAM(s) Oral every 24 hours  folic acid 1 milliGRAM(s) Oral daily  gabapentin 200 milliGRAM(s) Oral two times a day  lactated ringers. 1000 milliLiter(s) IV Continuous <Continuous>  levETIRAcetam 500 milliGRAM(s) Oral two times a day  polyethylene glycol 3350 17 Gram(s) Oral daily  senna 2 Tablet(s) Oral at bedtime    PRN MEDICATIONS  aluminum hydroxide/magnesium hydroxide/simethicone Suspension 30 milliLiter(s) Oral every 4 hours PRN  HYDROmorphone  Injectable 0.25 milliGRAM(s) IV Push every 6 hours PRN  LORazepam     Tablet 0.5 milliGRAM(s) Oral every 8 hours PRN  melatonin 3 milliGRAM(s) Oral at bedtime PRN  ondansetron Injectable 4 milliGRAM(s) IV Push every 8 hours PRN  oxyCODONE    IR 5 milliGRAM(s) Oral every 4 hours PRN      VITAL SIGNS: Last 24 Hours  T(C): 36.5 (06 Dec 2022 04:57), Max: 36.7 (05 Dec 2022 20:44)  T(F): 97.7 (06 Dec 2022 04:57), Max: 98.1 (05 Dec 2022 20:44)  HR: 82 (06 Dec 2022 04:57) (73 - 82)  BP: 121/69 (06 Dec 2022 04:57) (121/69 - 132/75)  BP(mean): --  RR: 18 (06 Dec 2022 04:57) (17 - 18)  SpO2: 97% (06 Dec 2022 04:57) (96% - 97%)    LABS:                        8.0    9.53  )-----------( 279      ( 06 Dec 2022 08:14 )             26.2     12-06    136  |  103  |  40<H>  ----------------------------<  73  4.7   |  19<L>  |  1.31<H>    Ca    8.3<L>      06 Dec 2022 08:14  Phos  4.2     12-06  Mg     2.1     12-06                    RADIOLOGY:      PHYSICAL EXAM:  General: NAD, AAOx3  HEENT: atraumatic, normocephalic  Pulmonary: clear to auscultation bilaterally; No wheeze  Cardiovascular: Regular rate and rhythm; no murmurs, rubs or gallops. Normal S1S2  Gastrointestinal: Tense, diffusively tender and distended; bowel sounds present  Musculoskeletal: 2+ peripheral pulses, no clubbing, cyanosis or edema  Neurology: Pt. alert and oriented, fluent speech, able to move all extremities  Skin: no rashes or lesions      ADMISSION SUMMARY  Patient is a 72y old Female who presents with a chief complaint of hematuria (06 Dec 2022 09:58)        ASSESSMENT & PLAN      Dvt ppx: held for paracentesis   PPI ppx:   Diet  Dispo:   Activity:        DESHAWN KING 72y Female  MRN#: [id number]   CODE STATUS: DNR/DNI      SUBJECTIVE  Patient is a 72y old Female who presents with a chief complaint of hematuria (06 Dec 2022 09:58)  Currently admitted to medicine with the primary diagnosis of acute blood loss anemia 2/2 metastatic implantation of ureter vs. traumatic arevalo placement    Today is hospital day 9d, and this morning she is well appearing and in NAD and reports no overnight events.     Present Today:           Arevalo Catheter ()No/ (x)Yes? Indication: Hematuria and urinary retention          Central Line (x)No/ ()Yes? Indication:          IV Fluids (X)No/ ()Yes? Type: Rate:  Indication:      OBJECTIVE  PAST MEDICAL & SURGICAL HISTORY  Ovarian cancer    Ovarian ca    HTN (hypertension)    Anemia    No significant past surgical history      Home Meds:  acetaminophen 325 mg oral tablet: 2 tab(s) orally every 6 hours, As needed, Temp greater or equal to 38C (100.4F), Mild Pain (1 - 3)  aluminum hydroxide-magnesium hydroxide 200 mg-200 mg/5 mL oral suspension: 30 milliliter(s) orally every 4 hours, As needed, Dyspepsia  enoxaparin 60 mg/0.6 mL injectable solution: 60 milligram(s) subcutaneously once a day   Please draw CBC and BMP on 11/28/22. Provided that Hb is stable, may be able to redose lovenox according to CrCl.  ESCITALOPRAM 5MG TABLETS: 1.5 each orally once a day  ferrous sulfate 325 mg (65 mg elemental iron) oral tablet: 1 tab(s) orally every other day (at bedtime)  folic acid 1 mg oral tablet: 1 tab(s) orally once a day  gabapentin 100 mg oral capsule: 1 cap(s) orally 2 times a day  levETIRAcetam 500 mg oral tablet: 1 tab(s) orally 2 times a day  LORAZEPAM  0.5 MG TABS: 1 tab(s) orally every 8 hours, As Needed  Lovenox 40 mg/0.4 mL injectable solution: 60 milligram(s) injectable once a day   Check CBC and BMP within 7 days to adjust lovenox dosage according to CrCl if Hb stable  melatonin 3 mg oral tablet: 1 tab(s) orally once a day (at bedtime), As needed, Insomnia  NIFEdipine 60 mg oral tablet, extended release: 1 tab(s) orally once a day  ondansetron 2 mg/mL injectable solution: 4 milligram(s) injectable every 8 hours, As Needed for nausea  polyethylene glycol 3350 oral powder for reconstitution: 17 gram(s) orally once a day  senna leaf extract oral tablet: 2 tab(s) orally once a day (at bedtime)    ALLERGIES:  Benadryl (Other)  Compazine (Unknown)    MEDICATIONS:  STANDING MEDICATIONS  acetaminophen     Tablet .. 975 milliGRAM(s) Oral every 6 hours  Biotene Dry Mouth Oral Rinse 5 milliLiter(s) Swish and Spit two times a day  escitalopram 7.5 milliGRAM(s) Oral daily  fluconAZOLE   Tablet 100 milliGRAM(s) Oral every 24 hours  folic acid 1 milliGRAM(s) Oral daily  gabapentin 200 milliGRAM(s) Oral two times a day  lactated ringers. 1000 milliLiter(s) IV Continuous <Continuous>  levETIRAcetam 500 milliGRAM(s) Oral two times a day  polyethylene glycol 3350 17 Gram(s) Oral daily  senna 2 Tablet(s) Oral at bedtime    PRN MEDICATIONS  aluminum hydroxide/magnesium hydroxide/simethicone Suspension 30 milliLiter(s) Oral every 4 hours PRN  HYDROmorphone  Injectable 0.25 milliGRAM(s) IV Push every 6 hours PRN  LORazepam     Tablet 0.5 milliGRAM(s) Oral every 8 hours PRN  melatonin 3 milliGRAM(s) Oral at bedtime PRN  ondansetron Injectable 4 milliGRAM(s) IV Push every 8 hours PRN  oxyCODONE    IR 5 milliGRAM(s) Oral every 4 hours PRN      VITAL SIGNS: Last 24 Hours  T(C): 36.5 (06 Dec 2022 04:57), Max: 36.7 (05 Dec 2022 20:44)  T(F): 97.7 (06 Dec 2022 04:57), Max: 98.1 (05 Dec 2022 20:44)  HR: 82 (06 Dec 2022 04:57) (73 - 82)  BP: 121/69 (06 Dec 2022 04:57) (121/69 - 132/75)  BP(mean): --  RR: 18 (06 Dec 2022 04:57) (17 - 18)  SpO2: 97% (06 Dec 2022 04:57) (96% - 97%)    LABS:                        8.0    9.53  )-----------( 279      ( 06 Dec 2022 08:14 )             26.2     12-06    136  |  103  |  40<H>  ----------------------------<  73  4.7   |  19<L>  |  1.31<H>    Ca    8.3<L>      06 Dec 2022 08:14  Phos  4.2     12-06  Mg     2.1     12-06                RADIOLOGY:      PHYSICAL EXAM:  General: NAD, AAOx3  HEENT: atraumatic, normocephalic  Pulmonary: clear to auscultation bilaterally; No wheeze  Cardiovascular: Regular rate and rhythm; no murmurs, rubs or gallops. Normal S1S2  Gastrointestinal: Tense, mildly suprapubic tenderness; bowel sounds present  Musculoskeletal: 2+ peripheral pulses, no clubbing, cyanosis or edema  Neurology: Pt. alert and oriented, fluent speech, able to move all extremities  Skin: no rashes or lesions

## 2022-12-06 NOTE — PROGRESS NOTE ADULT - ASSESSMENT
72y F with PMHx of metastatic ovarian/uterine cancer, urinary retention, scoliosis currently admitted for hematuria. 18F 3-way catheter in place, CBI removed off 11/28, urine clear now on AC restarted 11/28, restarted CBI 12/1, was irrigated and very little clot removed, UCx positive for Candida now on txt (12/2- ). CBI clamped 12/5, restarted 12/5    Plan:  - c/w CBI titrate to light pink  - transfuse per primary team  - treat for funguria - 14 days of 100mg daily (12/2- )  - f/u palliative/GOC discussion  - care per primary team  - discussed with attending  - f/u with Dr. Hart on discharge  - will change catheter during treatment phase after resolution of hematuria  - possibly consider OR, pending attending to attending ciara Lopez MD (PGY-2)  Consult Urology Resident  Please feel free to reach out on Teams Chat or text me at    72y F with PMHx of metastatic ovarian/uterine cancer, urinary retention, scoliosis currently admitted for hematuria. 18F 3-way catheter in place, CBI removed off 11/28, urine clear now on AC restarted 11/28, restarted CBI 12/1, was irrigated and very little clot removed, UCx positive for Candida now on txt (12/2- ). CBI clamped 12/5, restarted 12/5    Plan:  - c/w CBI titrate to light pink  - transfuse per primary team  - treat for funguria - 14 days of 100mg daily (12/2- )  - f/u palliative/GOC discussion  - care per primary team  - discussed with attending  - f/u with Dr. Hart on discharge  - If home hospice is considered, catheter changes every month in office    B John AMARO (PGY-2)  Consult Urology Resident  Please feel free to reach out on Teams Chat or text me at

## 2022-12-06 NOTE — PROGRESS NOTE ADULT - PROBLEM SELECTOR PLAN 2
Patient with 2 day onset of hematuria, and ultimately admitted for glen red blood in arevalo catheter. During last admission, retroperitoneal ultrasound showed two left renal calculi. Patient now with worsening dark urine in AM suspect new blood loss secondary to new arevalo placement vs. candida UTI vs. metastatic invasive tumor implantation of left ureter.     Plan:   - urology consulted,   - arevalo draining tea vs clear  - cont. CBI and urology recs

## 2022-12-06 NOTE — PROGRESS NOTE ADULT - PROBLEM SELECTOR PLAN 2
- Ongoing.  - Urology following.   - Patient on CBI.  - Has required PRBC transfusions.  - Tentative discussion regarding cauterization.

## 2022-12-06 NOTE — PROGRESS NOTE ADULT - ATTENDING COMMENTS
A/P:  1. hematuria due to possible L ureteral mass with L hydronephrosis vs hematuria related to arevalo placement -- H/H of 8.0/26.2 up from 7.4/24.0, still off of AC. discussed with urology. hematuria may be due to invasive tumor bleeding (likely ureteral area) -- urological intervention such as cauterization has limited role and risk of re-bleed remains high. Limited role in IR given high suspicion of slow bleed.   - failed TOV last admission, can consider repeat TOV after hematuria clears again however high likelihood of requiring chronic arevalo  - given limited urological interventions, palliative care consulted.     2. candiduria  - treat w/ fluconazole    3. ovarian/uterine cancer with mets - spoke to oncologist today -- pancytopenia has resolved and plan is to continue chemo if hematuria resolves, however given that this process is likely to recur often, will address with oncologist for further chemo plans.    4. DVT on eliquis, complicated by acute blood loss anemia    plan for GOC with palliative and urology. A/P:  1. hematuria due to possible L ureteral mass with L hydronephrosis vs hematuria related to arevalo placement -- H/H of 8.0/26.2 up from 7.4/24.0, still off of AC. discussed with urology. hematuria may be due to invasive tumor bleeding (likely ureteral area) -- urological intervention such as cauterization has limited role and risk of re-bleed remains high. Limited role in IR given high suspicion of slow bleed.   - failed TOV last admission, can consider repeat TOV after hematuria clears again however high likelihood of requiring chronic arevalo  - given limited urological interventions, palliative care consulted.   - 12/6: discussed with urology re: low utility of cauterization of ureter. Per palliative and urology [facility] discussion this evening with patient and , plan for OR thursday for cauterization and transition to home hospice. Oncologist will be notified in AM    2. candiduria  - treat w/ fluconazole    3. ovarian/uterine cancer with mets - on hold currently.     4. DVT on eliquis, complicated by acute blood loss anemia, AC on hold.

## 2022-12-06 NOTE — PROGRESS NOTE ADULT - PROBLEM SELECTOR PLAN 1
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  - Disease modifying interventions are still being offered by Oncology, as long as hematuria can be controlled.  - Further chemotherapy is palliative and not curative.  - Prognosis is extremely guarded, as functional status, nutritional status and overall symptomatology are worsening.

## 2022-12-06 NOTE — PROGRESS NOTE ADULT - PROBLEM SELECTOR PLAN 4
Patient w/ bladder spasm likely 2/2 arevalo.     Plan:   - oxybutynin 5 mg TID Patient w/ bladder spasm likely 2/2 arevalo.     Plan:   - Increase oxybutynin 5 mg BID to TID

## 2022-12-06 NOTE — PROGRESS NOTE ADULT - SUBJECTIVE AND OBJECTIVE BOX
UROLOGY PROGRESS NOTE    SUBJECTIVE: Patient seen and examined bedside. CBI restarted yesterday after clot retention. Feels a bit depressed about the situation.     fluconAZOLE   Tablet 100 milliGRAM(s) Oral every 24 hours      Vital Signs Last 24 Hrs  T(C): 36.5 (06 Dec 2022 04:57), Max: 36.7 (05 Dec 2022 20:44)  T(F): 97.7 (06 Dec 2022 04:57), Max: 98.1 (05 Dec 2022 20:44)  HR: 82 (06 Dec 2022 04:57) (73 - 82)  BP: 121/69 (06 Dec 2022 04:57) (121/69 - 132/75)  BP(mean): --  RR: 18 (06 Dec 2022 04:57) (17 - 18)  SpO2: 97% (06 Dec 2022 04:57) (96% - 97%)    Parameters below as of 05 Dec 2022 20:44  Patient On (Oxygen Delivery Method): room air      I&O's Detail    05 Dec 2022 07:01  -  06 Dec 2022 07:00  --------------------------------------------------------  IN:    Continuous Bladder Irrigation (mL): 6000 mL  Total IN: 6000 mL    OUT:    Continuous Bladder Irrigation (mL): 6350 mL    Indwelling Catheter - Urethral (mL): 625 mL  Total OUT: 6975 mL    Total NET: -975 mL      06 Dec 2022 07:01  -  06 Dec 2022 09:59  --------------------------------------------------------  IN:    Continuous Bladder Irrigation (mL): 6000 mL  Total IN: 6000 mL    OUT:    Continuous Bladder Irrigation (mL): 2900 mL  Total OUT: 2900 mL    Total NET: 3100 mL          PHYSICAL EXAM    General: NAD, resting comfortably in bed  C/V: NSR  Pulm: Nonlabored breathing, no respiratory distress  Abd: soft, ND, suprapubic TTP mild  : arevalo wine red urine        LABS:                        8.0    9.53  )-----------( 279      ( 06 Dec 2022 08:14 )             26.2     12-06    136  |  103  |  40<H>  ----------------------------<  73  4.7   |  19<L>  |  1.31<H>    Ca    8.3<L>      06 Dec 2022 08:14  Phos  4.2     12-06  Mg     2.1     12-06            CULTURES:          RADIOLOGY & ADDITIONAL STUDIES:

## 2022-12-06 NOTE — PROGRESS NOTE ADULT - PROBLEM SELECTOR PLAN 3
- Multifactorial, in the setting of advanced malignancy, bladder spasm as well as blood clots.  - Currently receiving Dilaudid 0.25mg IV prn, Oxycodone 5 mg PO prn.  - Reports better control with Dilaudid.  - Oxybutynin also helpful.  - No role for long acting opiates at this point.

## 2022-12-06 NOTE — PROGRESS NOTE ADULT - PROBLEM SELECTOR PLAN 4
- Patient with advanced gynecologic/urologic malignancy.  - Worsening symptomatology as well as disease progression.  - Goals of care have been ongoing, but patient extremely hesitant to discuss end of life care.   - She is still looking to receive disease modifying interventions as long as its offered.   - She values quantity of life, even if it means a decrease in quality.   - Once disease modifying interventions are no longer offered, Palliative Care can readdress hospice with patient.

## 2022-12-06 NOTE — PROGRESS NOTE ADULT - SUBJECTIVE AND OBJECTIVE BOX
SUBJECTIVE AND OBJECTIVE:  Reports ongoing cramping from bladder.  Anxiety is also ongoing,  Extremely tearful during conversation.    INTERVAL HPI/OVERNIGHT EVENTS:  Ongoing discussion about urologic procedure.    DNR on chart:   Allergies    Benadryl (Other)  Compazine (Unknown)    Intolerances    MEDICATIONS  (STANDING):  acetaminophen     Tablet .. 975 milliGRAM(s) Oral every 6 hours  Biotene Dry Mouth Oral Rinse 5 milliLiter(s) Swish and Spit two times a day  escitalopram 7.5 milliGRAM(s) Oral daily  fluconAZOLE   Tablet 100 milliGRAM(s) Oral every 24 hours  folic acid 1 milliGRAM(s) Oral daily  gabapentin 200 milliGRAM(s) Oral two times a day  lactated ringers. 1000 milliLiter(s) (60 mL/Hr) IV Continuous <Continuous>  levETIRAcetam 500 milliGRAM(s) Oral two times a day  oxybutynin 5 milliGRAM(s) Oral three times a day  polyethylene glycol 3350 17 Gram(s) Oral daily  senna 2 Tablet(s) Oral at bedtime    MEDICATIONS  (PRN):  aluminum hydroxide/magnesium hydroxide/simethicone Suspension 30 milliLiter(s) Oral every 4 hours PRN Dyspepsia  HYDROmorphone  Injectable 0.25 milliGRAM(s) IV Push every 6 hours PRN Severe Pain (7 - 10)  LORazepam     Tablet 0.5 milliGRAM(s) Oral every 8 hours PRN Anxiety  melatonin 3 milliGRAM(s) Oral at bedtime PRN Insomnia  ondansetron Injectable 4 milliGRAM(s) IV Push every 8 hours PRN Nausea and/or Vomiting  oxyCODONE    IR 5 milliGRAM(s) Oral every 4 hours PRN Moderate Pain (4 - 6)      ITEMS UNCHECKED ARE NOT PRESENT    PRESENT SYMPTOMS: [ ]Unable to obtain due to poor mentation   Source if other than patient:  [ ]Family   [ ]Team     Pain (Impact on QOL):  Unable to do ADLs  Location: bladder  Minimal acceptable level (0-10 scale): 4      Aggravating factors: none  Quality: cramping  Radiation: none  Severity (0-10 scale): 7    Timing: intermittent    Dyspnea:                           [ ]Mild [ ]Moderate [ ]Severe  Anxiety:                             [ ]Mild [ ]Moderate [x]Severe  Fatigue:                             [ ]Mild [ ]Moderate [ ]Severe  Nausea:                             [ ]Mild [ ]Moderate [ ]Severe  Loss of appetite:              [ ]Mild [ ]Moderate [ ]Severe  Constipation:                    [ ]Mild [ ]Moderate [ ]Severe  Grief Present                    [x ] Yes  [ ] No   PAIN AD Score:	  [email or web address] (Ctrl + left click to view)    Other Symptoms:  [x ]All other review of systems negative     Karnofsky Performance Score/Palliative Performance Status Version 2:  40       %    http://palliative.info/resource_material/[email or web address].pdf  PHYSICAL EXAM:  Vital Signs Last 24 Hrs  T(C): 36.5 (06 Dec 2022 04:57), Max: 36.7 (05 Dec 2022 20:44)  T(F): 97.7 (06 Dec 2022 04:57), Max: 98.1 (05 Dec 2022 20:44)  HR: 82 (06 Dec 2022 04:57) (73 - 82)  BP: 121/69 (06 Dec 2022 04:57) (121/69 - 132/75)  BP(mean): --  RR: 18 (06 Dec 2022 04:57) (17 - 18)  SpO2: 97% (06 Dec 2022 04:57) (96% - 97%)    Parameters below as of 05 Dec 2022 20:44  Patient On (Oxygen Delivery Method): room air     I&O's Summary    05 Dec 2022 07:01  -  06 Dec 2022 07:00  --------------------------------------------------------  IN: 6000 mL / OUT: 6975 mL / NET: -975 mL    06 Dec 2022 07:01  -  06 Dec 2022 11:36  --------------------------------------------------------  IN: 6000 mL / OUT: 6100 mL / NET: -100 mL     GENERAL:  [x ]Alert  [x ]Oriented x 3  [ ]Lethargic  [x ]Cachexia  [ ]Unarousable  [ ]Verbal  [ ]Non-Verbal  Behavioral:   [ ] Anxiety  [ ] Delirium [ ] Agitation [ x] Other  HEENT:  [ ]Normal   [ x]Dry mouth   [ ]ET Tube/Trach  [ ]Oral lesions  PULMONARY:   [x ]Clear [ ]Tachypnea  [ ]Audible excessive secretions   [ ]Rhonchi        [ ]Right [ ]Left [ ]Bilateral  [ ]Crackles        [ ]Right [ ]Left [ ]Bilateral  [ ]Wheezing     [ ]Right [ ]Left [ ]Bilateral  CARDIOVASCULAR:    [x ]Regular [ ]Irregular [ ]Tachy  [ ]Lonnie [ ]Murmur [ ]Other  GASTROINTESTINAL:  [x ]Soft  [ ]Distended   [x ]+BS  [x ]Non tender [ ]Tender  [ ]PEG [ ]OGT/ NGT   Last BM:  GENITOURINARY:  [ ]Normal [ ] Incontinent   [ ]Oliguria/Anuria   [ x]Michaels  MUSCULOSKELETAL:   [ ]Normal   [ ]Weakness  [x ]Bed/Wheelchair bound [ ]Edema  NEUROLOGIC:   [x ]No focal deficits  [ ] Cognitive impairment  [ ] Dysphagia [ ]Dysarthria [ ] Paresis [ ]Other   SKIN:   [x ]Normal   [ ]Pressure ulcer(s)  [ ]Rash    CRITICAL CARE:  [ ] Shock Present  [ ]Septic [ ]Cardiogenic [ ]Neurologic [ ]Hypovolemic  [ ]  Vasopressors [ ]  Inotropes   [ ] Respiratory failure present  [ ] Acute  [ ] Chronic [ ] Hypoxic  [ ] Hypercarbic [ ] Other  [ ] Other organ failure     LABS:                        8.0    9.53  )-----------( 279      ( 06 Dec 2022 08:14 )             26.2   12-06    136  |  103  |  40<H>  ----------------------------<  73  4.7   |  19<L>  |  1.31<H>    Ca    8.3<L>      06 Dec 2022 08:14  Phos  4.2     12-06  Mg     2.1     12-06          RADIOLOGY & ADDITIONAL STUDIES:    Protein Calorie Malnutrition Present: [ ] yes [ ] no  [ ] PPSV2 < or = 30%  [ ] significant weight loss [ ] poor nutritional intake [ ] anasarca [ ] catabolic state Artificial Nutrition [ ]     REFERRALS:   [ ]Chaplaincy  [ ] Hospice  [ ]Child Life  [ ]Social Work  [ ]Case management [ ]Holistic Therapy   Goals of Care Document:

## 2022-12-06 NOTE — PROGRESS NOTE ADULT - ASSESSMENT
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 Patient received 1 unit PRBC on admission. She later developed hematuria and Hgb dropped to <7 and patient was given another unit of PRBC. She was started on CBI and Hgb was uptrending. This AM 12/6 Hgb increased to 8.0 from 7.4, arevalo observed today with tea colored urine, but it is difficult to determine the amount of blood 2/2 continuous bladder irrigation.

## 2022-12-06 NOTE — PROGRESS NOTE ADULT - PROBLEM SELECTOR PLAN 1
Patient presented with Hgb 6.4, repeat 6.1. Likely multifactorial i/s/o malignancy, CORBIN, and hematuria. Suspect blood loss is likely anatomical given acuity. Now s/p 2 units PRBC throughout hospital stay.     Plan:   - Transfusion threshold <7  - hold iron supplementation i/s/o infection   - T&S active (12/1)  - continue CBI  - hgb uptrending.   - f/u urology recs. CBI stopped this AM with subsequent hematuria. Patient presented with Hgb 6.4, repeat 6.1. Likely multifactorial i/s/o malignancy, CORBIN, and hematuria. Suspect blood loss is likely anatomical given acuity (see below). Now s/p 2 units PRBC throughout hospital stay.     Plan:   - Transfusion threshold <7  - hold iron supplementation  - T&S active (12/1)  - continue CBI  - hgb uptrending.   - f/u urology recs.   - Pt. may have cystoscopy on 12/8 or 12/9

## 2022-12-07 LAB
ANION GAP SERPL CALC-SCNC: 14 MMOL/L — SIGNIFICANT CHANGE UP (ref 5–17)
BUN SERPL-MCNC: 34 MG/DL — HIGH (ref 7–23)
CALCIUM SERPL-MCNC: 8.5 MG/DL — SIGNIFICANT CHANGE UP (ref 8.4–10.5)
CHLORIDE SERPL-SCNC: 104 MMOL/L — SIGNIFICANT CHANGE UP (ref 96–108)
CO2 SERPL-SCNC: 18 MMOL/L — LOW (ref 22–31)
CREAT SERPL-MCNC: 1.15 MG/DL — SIGNIFICANT CHANGE UP (ref 0.5–1.3)
EGFR: 50 ML/MIN/1.73M2 — LOW
GLUCOSE SERPL-MCNC: 80 MG/DL — SIGNIFICANT CHANGE UP (ref 70–99)
HCT VFR BLD CALC: 30.3 % — LOW (ref 34.5–45)
HGB BLD-MCNC: 9.1 G/DL — LOW (ref 11.5–15.5)
MAGNESIUM SERPL-MCNC: 2 MG/DL — SIGNIFICANT CHANGE UP (ref 1.6–2.6)
MCHC RBC-ENTMCNC: 30 GM/DL — LOW (ref 32–36)
MCHC RBC-ENTMCNC: 33.2 PG — SIGNIFICANT CHANGE UP (ref 27–34)
MCV RBC AUTO: 110.6 FL — HIGH (ref 80–100)
NRBC # BLD: 0 /100 WBCS — SIGNIFICANT CHANGE UP (ref 0–0)
PHOSPHATE SERPL-MCNC: 4 MG/DL — SIGNIFICANT CHANGE UP (ref 2.5–4.5)
PLATELET # BLD AUTO: 285 K/UL — SIGNIFICANT CHANGE UP (ref 150–400)
POTASSIUM SERPL-MCNC: 5.2 MMOL/L — SIGNIFICANT CHANGE UP (ref 3.5–5.3)
POTASSIUM SERPL-SCNC: 5.2 MMOL/L — SIGNIFICANT CHANGE UP (ref 3.5–5.3)
RBC # BLD: 2.74 M/UL — LOW (ref 3.8–5.2)
RBC # FLD: 21.4 % — HIGH (ref 10.3–14.5)
SODIUM SERPL-SCNC: 136 MMOL/L — SIGNIFICANT CHANGE UP (ref 135–145)
WBC # BLD: 11.33 K/UL — HIGH (ref 3.8–10.5)
WBC # FLD AUTO: 11.33 K/UL — HIGH (ref 3.8–10.5)

## 2022-12-07 PROCEDURE — 99233 SBSQ HOSP IP/OBS HIGH 50: CPT | Mod: GC

## 2022-12-07 RX ORDER — OXYBUTYNIN CHLORIDE 5 MG
1 TABLET ORAL
Qty: 21 | Refills: 0
Start: 2022-12-07 | End: 2022-12-13

## 2022-12-07 RX ORDER — NALOXONE HYDROCHLORIDE 4 MG/.1ML
1 SPRAY NASAL
Qty: 2 | Refills: 0
Start: 2022-12-07

## 2022-12-07 RX ORDER — OXYCODONE HYDROCHLORIDE 5 MG/1
1 TABLET ORAL
Qty: 42 | Refills: 0
Start: 2022-12-07 | End: 2022-12-13

## 2022-12-07 RX ADMIN — Medication 5 MILLILITER(S): at 11:00

## 2022-12-07 RX ADMIN — OXYCODONE HYDROCHLORIDE 5 MILLIGRAM(S): 5 TABLET ORAL at 12:29

## 2022-12-07 RX ADMIN — Medication 5 MILLIGRAM(S): at 15:26

## 2022-12-07 RX ADMIN — Medication 975 MILLIGRAM(S): at 13:05

## 2022-12-07 RX ADMIN — Medication 975 MILLIGRAM(S): at 06:53

## 2022-12-07 RX ADMIN — LEVETIRACETAM 500 MILLIGRAM(S): 250 TABLET, FILM COATED ORAL at 05:53

## 2022-12-07 RX ADMIN — SENNA PLUS 2 TABLET(S): 8.6 TABLET ORAL at 21:29

## 2022-12-07 RX ADMIN — Medication 975 MILLIGRAM(S): at 05:53

## 2022-12-07 RX ADMIN — Medication 975 MILLIGRAM(S): at 19:00

## 2022-12-07 RX ADMIN — LEVETIRACETAM 500 MILLIGRAM(S): 250 TABLET, FILM COATED ORAL at 18:31

## 2022-12-07 RX ADMIN — GABAPENTIN 200 MILLIGRAM(S): 400 CAPSULE ORAL at 05:53

## 2022-12-07 RX ADMIN — Medication 975 MILLIGRAM(S): at 12:35

## 2022-12-07 RX ADMIN — ONDANSETRON 4 MILLIGRAM(S): 8 TABLET, FILM COATED ORAL at 12:36

## 2022-12-07 RX ADMIN — ESCITALOPRAM OXALATE 7.5 MILLIGRAM(S): 10 TABLET, FILM COATED ORAL at 12:30

## 2022-12-07 RX ADMIN — Medication 5 MILLIGRAM(S): at 05:53

## 2022-12-07 RX ADMIN — OXYCODONE HYDROCHLORIDE 5 MILLIGRAM(S): 5 TABLET ORAL at 13:00

## 2022-12-07 RX ADMIN — FLUCONAZOLE 100 MILLIGRAM(S): 150 TABLET ORAL at 10:59

## 2022-12-07 RX ADMIN — Medication 975 MILLIGRAM(S): at 18:32

## 2022-12-07 RX ADMIN — Medication 5 MILLILITER(S): at 22:28

## 2022-12-07 RX ADMIN — Medication 5 MILLIGRAM(S): at 21:29

## 2022-12-07 RX ADMIN — GABAPENTIN 200 MILLIGRAM(S): 400 CAPSULE ORAL at 18:31

## 2022-12-07 RX ADMIN — Medication 1 MILLIGRAM(S): at 12:35

## 2022-12-07 NOTE — PROGRESS NOTE ADULT - PROBLEM SELECTOR PLAN 4
Patient w/ bladder spasm likely 2/2 arevalo.     Plan:   - Increase oxybutynin 5 mg BID to TID Patient w/ bladder spasm likely 2/2 arevalo. Pt reports improved s/s with increased frequency, but does not report resolution of s/s.     Plan:   - Increase oxybutynin 5 mg BID to TID Patient w/ bladder spasm likely 2/2 arevalo. Pt reports improved s/s with increased frequency, but does not report resolution of s/s.     Plan:   - cont oxybutynin 5 mg TID

## 2022-12-07 NOTE — DIETITIAN NUTRITION RISK NOTIFICATION - PHYSICAL ASSESSMENT DORSAL HAND
mild Gen: NAD, non-toxic, conversational  Eyes: PERRLA, EOMI   HENT: Normocephalic, atraumatic. External ears normal, no rhinorrhea, moist mucous membranes.   CV: RRR, no M/R/G  Resp: CTAB, non-labored, speaking without difficulty on room air  Abd: soft, non tender, non rigid, no guarding or rebound tenderness  : right inguinal lymph node that is enlarged and fixed in place, minimally ttp, no overlying erythema or warmth. No penile discharge or ulceration.   Back: No CVAT bilaterally, no midline ttp  Skin: dry, wwp   Neuro: AOx3, speech is fluent and appropriate  Psych: Mood cocnerned, affect euthymic

## 2022-12-07 NOTE — PROGRESS NOTE ADULT - PROBLEM SELECTOR PLAN 8
Patient with tearful affect during admission. Did not want to be left alone. During last hospitalization, patient met with a counselor and disclosed that her  has essentially opted out from being her emotional support. Patient gave two contacts for her friends incase  cannot be reached.     Sho Sahni: [phone]   Barb Cobb: [phone]    Plan:   - c/w Escitalopram 5 mg - 1.5 tab orally qd   - Lorazepam 0.5 mg q8 prn   - consider inpatient psychotherapy or palliative consult

## 2022-12-07 NOTE — PROGRESS NOTE ADULT - ATTENDING COMMENTS
A/P:  1. hematuria due to possible L ureteral mass with L hydronephrosis vs hematuria related to arevalo placement -- H/H of 8.0/26.2 up from 7.4/24.0, still off of AC. discussed with urology. hematuria may be due to invasive tumor bleeding (likely ureteral area) -- urological intervention such as cauterization has limited role and risk of re-bleed remains high. Limited role in IR given high suspicion of slow bleed.   - failed TOV last admission, can consider repeat TOV after hematuria clears again however high likelihood of requiring chronic arevalo  - given limited urological interventions, palliative care consulted.   - 12/6: discussed with urology re: low utility of cauterization of ureter. Per palliative and urology [facility] discussion this evening with patient and , plan for OR thursday for cauterization and transition to home hospice. Oncologist will be notified in AM    2. candiduria  - treat w/ fluconazole    3. ovarian/uterine cancer with mets - on hold currently.     4. DVT on eliquis, complicated by acute blood loss anemia, AC on hold. A/P:  1. hematuria due to possible L ureteral mass with L hydronephrosis vs hematuria related to arevalo placement -- H/H of 8.0/26.2 up from 7.4/24.0, still off of AC. discussed with urology. hematuria may be due to invasive tumor bleeding (likely ureteral area) -- urological intervention such as cauterization has limited role and risk of re-bleed remains high. Limited role in IR given high suspicion of slow bleed.   - failed TOV last admission, can consider repeat TOV after hematuria clears again however high likelihood of requiring chronic arevalo  - given limited urological interventions, palliative care consulted.   - 12/6: discussed with urology re: low utility of cauterization of ureter. Per palliative and urology [facility] discussion this evening with patient and , plan for OR thursday for cauterization and transition to home hospice. Oncologist will be notified in AM  - 12/7: discussed with palliative and urology. No plan for cystoscopy, plan for home hospice in 2 days.    2. candiduria  - treat w/ fluconazole    3. ovarian/uterine cancer with mets - on hold currently.     4. DVT on eliquis, complicated by acute blood loss anemia, AC on hold.    Plan for home hospice in 2 days.

## 2022-12-07 NOTE — PROGRESS NOTE ADULT - SUBJECTIVE AND OBJECTIVE BOX
DESHAWN KING 72y Female  MRN#: [id number]   CODE STATUS:________      SUBJECTIVE  Patient is a 72y old Female who presents with a chief complaint of hematuria (07 Dec 2022 08:34)  Currently admitted to medicine with the primary diagnosis of Severe anemia    Hospital course has been complicated by intermittent hematuria.   Today is hospital day 10d, and this morning she is well appearing and in NAD and reports no overnight events.     Present Today:           Michaels Catheter ()No/ (x)Yes? Indication: hematuria and urinary retention          Central Line (x)No/ ()Yes? Indication:          IV Fluids (x)No/ ()Yes? Type:  Rate:  Indication:      OBJECTIVE  PAST MEDICAL & SURGICAL HISTORY  Ovarian cancer    Ovarian ca    HTN (hypertension)    Anemia    No significant past surgical history      Home Meds:  acetaminophen 325 mg oral tablet: 2 tab(s) orally every 6 hours, As needed, Temp greater or equal to 38C (100.4F), Mild Pain (1 - 3)  aluminum hydroxide-magnesium hydroxide 200 mg-200 mg/5 mL oral suspension: 30 milliliter(s) orally every 4 hours, As needed, Dyspepsia  enoxaparin 60 mg/0.6 mL injectable solution: 60 milligram(s) subcutaneously once a day   Please draw CBC and BMP on 11/28/22. Provided that Hb is stable, may be able to redose lovenox according to CrCl.  ESCITALOPRAM 5MG TABLETS: 1.5 each orally once a day  ferrous sulfate 325 mg (65 mg elemental iron) oral tablet: 1 tab(s) orally every other day (at bedtime)  folic acid 1 mg oral tablet: 1 tab(s) orally once a day  gabapentin 100 mg oral capsule: 1 cap(s) orally 2 times a day  levETIRAcetam 500 mg oral tablet: 1 tab(s) orally 2 times a day  LORAZEPAM  0.5 MG TABS: 1 tab(s) orally every 8 hours, As Needed  Lovenox 40 mg/0.4 mL injectable solution: 60 milligram(s) injectable once a day   Check CBC and BMP within 7 days to adjust lovenox dosage according to CrCl if Hb stable  melatonin 3 mg oral tablet: 1 tab(s) orally once a day (at bedtime), As needed, Insomnia  NIFEdipine 60 mg oral tablet, extended release: 1 tab(s) orally once a day  ondansetron 2 mg/mL injectable solution: 4 milligram(s) injectable every 8 hours, As Needed for nausea  polyethylene glycol 3350 oral powder for reconstitution: 17 gram(s) orally once a day  senna leaf extract oral tablet: 2 tab(s) orally once a day (at bedtime)    ALLERGIES:  Benadryl (Other)  Compazine (Unknown)    MEDICATIONS:  STANDING MEDICATIONS  acetaminophen     Tablet .. 975 milliGRAM(s) Oral every 6 hours  Biotene Dry Mouth Oral Rinse 5 milliLiter(s) Swish and Spit two times a day  escitalopram 7.5 milliGRAM(s) Oral daily  fluconAZOLE   Tablet 100 milliGRAM(s) Oral every 24 hours  folic acid 1 milliGRAM(s) Oral daily  gabapentin 200 milliGRAM(s) Oral two times a day  levETIRAcetam 500 milliGRAM(s) Oral two times a day  oxybutynin 5 milliGRAM(s) Oral three times a day  polyethylene glycol 3350 17 Gram(s) Oral daily  senna 2 Tablet(s) Oral at bedtime    PRN MEDICATIONS  aluminum hydroxide/magnesium hydroxide/simethicone Suspension 30 milliLiter(s) Oral every 4 hours PRN  LORazepam     Tablet 0.5 milliGRAM(s) Oral every 8 hours PRN  melatonin 3 milliGRAM(s) Oral at bedtime PRN  ondansetron Injectable 4 milliGRAM(s) IV Push every 8 hours PRN  oxyCODONE    IR 5 milliGRAM(s) Oral every 4 hours PRN      VITAL SIGNS: Last 24 Hours  T(C): 36.7 (07 Dec 2022 05:45), Max: 36.7 (07 Dec 2022 05:45)  T(F): 98.1 (07 Dec 2022 05:45), Max: 98.1 (07 Dec 2022 05:45)  HR: 85 (07 Dec 2022 05:45) (70 - 85)  BP: 119/69 (07 Dec 2022 05:45) (117/73 - 128/76)  BP(mean): --  RR: 18 (07 Dec 2022 05:45) (18 - 18)  SpO2: 96% (07 Dec 2022 05:45) (96% - 97%)    LABS:                        8.0    9.53  )-----------( 279      ( 06 Dec 2022 08:14 )             26.2     12-06    136  |  103  |  40<H>  ----------------------------<  73  4.7   |  19<L>  |  1.31<H>    Ca    8.3<L>      06 Dec 2022 08:14  Phos  4.2     12-06  Mg     2.1     12-06                    RADIOLOGY:      PHYSICAL EXAM:  General: NAD, AAOx3  HEENT: atraumatic, normocephalic  Pulmonary: clear to auscultation bilaterally; No wheeze  Cardiovascular: Regular rate and rhythm; no murmurs, rubs or gallops. Normal S1S2  Gastrointestinal: Soft, positive suprapubic tenderness, nontender, nondistended; bowel sounds present  Musculoskeletal: 2+ peripheral pulses, no clubbing, cyanosis or edema  Neurology: Pt. alert and oriented, fluent speech, able to move all extremities  Skin: no rashes or lesions      ADMISSION SUMMARY  Patient is a 72y old Female who presents with a chief complaint of hematuria (07 Dec 2022 08:34)        ASSESSMENT & PLAN      Dvt ppx:  PPI ppx:   Diet  Dispo:   Activity:        DESHAWN KING 72y Female  MRN#: [id number]   CODE STATUS: DNR      SUBJECTIVE  Patient is a 72y old Female who presents with a chief complaint of hematuria (07 Dec 2022 08:34)  Currently admitted to medicine with the primary diagnosis of Severe anemia    Hospital course has been complicated by intermittent hematuria.   Today is hospital day 10d, and this morning she is well appearing and in NAD and reports no overnight events.   Pt today has no new complaints. Pt states that she still has intermittent bladder cramping pain. Pt reports improved s/s from yesterday after the increase of oxybutynin from 5 mg BID to 5 mg TID. Pt expresses little desire for addition of new pain control medication or to increase dose of current pain control medication.    Present Today:           Michaels Catheter ()No/ (x)Yes? Indication: hematuria and urinary retention          Central Line (x)No/ ()Yes? Indication:          IV Fluids (x)No/ ()Yes? Type:  Rate:  Indication:      OBJECTIVE  PAST MEDICAL & SURGICAL HISTORY  Ovarian cancer    Ovarian ca    HTN (hypertension)    Anemia    No significant past surgical history      Home Meds:  acetaminophen 325 mg oral tablet: 2 tab(s) orally every 6 hours, As needed, Temp greater or equal to 38C (100.4F), Mild Pain (1 - 3)  aluminum hydroxide-magnesium hydroxide 200 mg-200 mg/5 mL oral suspension: 30 milliliter(s) orally every 4 hours, As needed, Dyspepsia  enoxaparin 60 mg/0.6 mL injectable solution: 60 milligram(s) subcutaneously once a day   Please draw CBC and BMP on 11/28/22. Provided that Hb is stable, may be able to redose lovenox according to CrCl.  ESCITALOPRAM 5MG TABLETS: 1.5 each orally once a day  ferrous sulfate 325 mg (65 mg elemental iron) oral tablet: 1 tab(s) orally every other day (at bedtime)  folic acid 1 mg oral tablet: 1 tab(s) orally once a day  gabapentin 100 mg oral capsule: 1 cap(s) orally 2 times a day  levETIRAcetam 500 mg oral tablet: 1 tab(s) orally 2 times a day  LORAZEPAM  0.5 MG TABS: 1 tab(s) orally every 8 hours, As Needed  Lovenox 40 mg/0.4 mL injectable solution: 60 milligram(s) injectable once a day   Check CBC and BMP within 7 days to adjust lovenox dosage according to CrCl if Hb stable  melatonin 3 mg oral tablet: 1 tab(s) orally once a day (at bedtime), As needed, Insomnia  NIFEdipine 60 mg oral tablet, extended release: 1 tab(s) orally once a day  ondansetron 2 mg/mL injectable solution: 4 milligram(s) injectable every 8 hours, As Needed for nausea  polyethylene glycol 3350 oral powder for reconstitution: 17 gram(s) orally once a day  senna leaf extract oral tablet: 2 tab(s) orally once a day (at bedtime)    ALLERGIES:  Benadryl (Other)  Compazine (Unknown)    MEDICATIONS:  STANDING MEDICATIONS  acetaminophen     Tablet .. 975 milliGRAM(s) Oral every 6 hours  Biotene Dry Mouth Oral Rinse 5 milliLiter(s) Swish and Spit two times a day  escitalopram 7.5 milliGRAM(s) Oral daily  fluconAZOLE   Tablet 100 milliGRAM(s) Oral every 24 hours  folic acid 1 milliGRAM(s) Oral daily  gabapentin 200 milliGRAM(s) Oral two times a day  levETIRAcetam 500 milliGRAM(s) Oral two times a day  oxybutynin 5 milliGRAM(s) Oral three times a day  polyethylene glycol 3350 17 Gram(s) Oral daily  senna 2 Tablet(s) Oral at bedtime    PRN MEDICATIONS  aluminum hydroxide/magnesium hydroxide/simethicone Suspension 30 milliLiter(s) Oral every 4 hours PRN  LORazepam     Tablet 0.5 milliGRAM(s) Oral every 8 hours PRN  melatonin 3 milliGRAM(s) Oral at bedtime PRN  ondansetron Injectable 4 milliGRAM(s) IV Push every 8 hours PRN  oxyCODONE    IR 5 milliGRAM(s) Oral every 4 hours PRN      VITAL SIGNS: Last 24 Hours  T(C): 36.7 (07 Dec 2022 05:45), Max: 36.7 (07 Dec 2022 05:45)  T(F): 98.1 (07 Dec 2022 05:45), Max: 98.1 (07 Dec 2022 05:45)  HR: 85 (07 Dec 2022 05:45) (70 - 85)  BP: 119/69 (07 Dec 2022 05:45) (117/73 - 128/76)  BP(mean): --  RR: 18 (07 Dec 2022 05:45) (18 - 18)  SpO2: 96% (07 Dec 2022 05:45) (96% - 97%)    LABS:                        8.0    9.53  )-----------( 279      ( 06 Dec 2022 08:14 )             26.2     12-06    136  |  103  |  40<H>  ----------------------------<  73  4.7   |  19<L>  |  1.31<H>    Ca    8.3<L>      06 Dec 2022 08:14  Phos  4.2     12-06  Mg     2.1     12-06                    RADIOLOGY:      PHYSICAL EXAM:  General: NAD, AAOx3  HEENT: atraumatic, normocephalic  Pulmonary: clear to auscultation bilaterally; No wheeze  Cardiovascular: Regular rate and rhythm; no murmurs, rubs or gallops. Normal S1S2  Gastrointestinal: Soft, positive suprapubic tenderness, nontender, nondistended; bowel sounds present  Musculoskeletal: 2+ peripheral pulses, no clubbing, cyanosis or edema  Neurology: Pt. alert and oriented, fluent speech, able to move all extremities  Skin: no rashes or lesions      ADMISSION SUMMARY  Patient is a 72y old Female who presents with a chief complaint of hematuria (07 Dec 2022 08:34)        ASSESSMENT & PLAN      Dvt ppx:  PPI ppx:   Diet  Dispo:   Activity:        DESHAWN KING 72y Female  MRN#: [id number]   CODE STATUS: DNR      SUBJECTIVE  Patient is a 72y old Female who presents with a chief complaint of hematuria (07 Dec 2022 08:34)  Currently admitted to medicine with the primary diagnosis of Severe anemia    Hospital course has been complicated by intermittent hematuria.   Today is hospital day 10d, and this morning she is well appearing and in NAD and reports no overnight events.   Pt today has no new complaints. Pt states that she still has intermittent bladder cramping pain. Pt reports improved s/s from yesterday after the increase of oxybutynin from 5 mg BID to 5 mg TID. Pt expresses little desire for addition of new pain control medication or to increase dose of current pain control medication.    Present Today:           Michaels Catheter ()No/ (x)Yes? Indication: hematuria and urinary retention          Central Line (x)No/ ()Yes? Indication:          IV Fluids (x)No/ ()Yes? Type:  Rate:  Indication:      OBJECTIVE  PAST MEDICAL & SURGICAL HISTORY  Ovarian cancer    Ovarian ca    HTN (hypertension)    Anemia    No significant past surgical history      Home Meds:  acetaminophen 325 mg oral tablet: 2 tab(s) orally every 6 hours, As needed, Temp greater or equal to 38C (100.4F), Mild Pain (1 - 3)  aluminum hydroxide-magnesium hydroxide 200 mg-200 mg/5 mL oral suspension: 30 milliliter(s) orally every 4 hours, As needed, Dyspepsia  enoxaparin 60 mg/0.6 mL injectable solution: 60 milligram(s) subcutaneously once a day   Please draw CBC and BMP on 11/28/22. Provided that Hb is stable, may be able to redose lovenox according to CrCl.  ESCITALOPRAM 5MG TABLETS: 1.5 each orally once a day  ferrous sulfate 325 mg (65 mg elemental iron) oral tablet: 1 tab(s) orally every other day (at bedtime)  folic acid 1 mg oral tablet: 1 tab(s) orally once a day  gabapentin 100 mg oral capsule: 1 cap(s) orally 2 times a day  levETIRAcetam 500 mg oral tablet: 1 tab(s) orally 2 times a day  LORAZEPAM  0.5 MG TABS: 1 tab(s) orally every 8 hours, As Needed  Lovenox 40 mg/0.4 mL injectable solution: 60 milligram(s) injectable once a day   Check CBC and BMP within 7 days to adjust lovenox dosage according to CrCl if Hb stable  melatonin 3 mg oral tablet: 1 tab(s) orally once a day (at bedtime), As needed, Insomnia  NIFEdipine 60 mg oral tablet, extended release: 1 tab(s) orally once a day  ondansetron 2 mg/mL injectable solution: 4 milligram(s) injectable every 8 hours, As Needed for nausea  polyethylene glycol 3350 oral powder for reconstitution: 17 gram(s) orally once a day  senna leaf extract oral tablet: 2 tab(s) orally once a day (at bedtime)    ALLERGIES:  Benadryl (Other)  Compazine (Unknown)    MEDICATIONS:  STANDING MEDICATIONS  acetaminophen     Tablet .. 975 milliGRAM(s) Oral every 6 hours  Biotene Dry Mouth Oral Rinse 5 milliLiter(s) Swish and Spit two times a day  escitalopram 7.5 milliGRAM(s) Oral daily  fluconAZOLE   Tablet 100 milliGRAM(s) Oral every 24 hours  folic acid 1 milliGRAM(s) Oral daily  gabapentin 200 milliGRAM(s) Oral two times a day  levETIRAcetam 500 milliGRAM(s) Oral two times a day  oxybutynin 5 milliGRAM(s) Oral three times a day  polyethylene glycol 3350 17 Gram(s) Oral daily  senna 2 Tablet(s) Oral at bedtime    PRN MEDICATIONS  aluminum hydroxide/magnesium hydroxide/simethicone Suspension 30 milliLiter(s) Oral every 4 hours PRN  LORazepam     Tablet 0.5 milliGRAM(s) Oral every 8 hours PRN  melatonin 3 milliGRAM(s) Oral at bedtime PRN  ondansetron Injectable 4 milliGRAM(s) IV Push every 8 hours PRN  oxyCODONE    IR 5 milliGRAM(s) Oral every 4 hours PRN      VITAL SIGNS: Last 24 Hours  T(C): 36.7 (07 Dec 2022 05:45), Max: 36.7 (07 Dec 2022 05:45)  T(F): 98.1 (07 Dec 2022 05:45), Max: 98.1 (07 Dec 2022 05:45)  HR: 85 (07 Dec 2022 05:45) (70 - 85)  BP: 119/69 (07 Dec 2022 05:45) (117/73 - 128/76)  BP(mean): --  RR: 18 (07 Dec 2022 05:45) (18 - 18)  SpO2: 96% (07 Dec 2022 05:45) (96% - 97%)    LABS:                        8.0    9.53  )-----------( 279      ( 06 Dec 2022 08:14 )             26.2     12-06    136  |  103  |  40<H>  ----------------------------<  73  4.7   |  19<L>  |  1.31<H>    Ca    8.3<L>      06 Dec 2022 08:14  Phos  4.2     12-06  Mg     2.1     12-06                    RADIOLOGY:      PHYSICAL EXAM:  General: NAD, AAOx3  HEENT: atraumatic, normocephalic  Pulmonary: clear to auscultation bilaterally; No wheeze  Cardiovascular: Regular rate and rhythm; no murmurs, rubs or gallops. Normal S1S2  Gastrointestinal: Soft, positive suprapubic tenderness, nontender, nondistended; bowel sounds present  Musculoskeletal: 2+ peripheral pulses, no clubbing, cyanosis or edema  Neurology: Pt. alert and oriented, fluent speech, able to move all extremities  Skin: no rashes or lesions

## 2022-12-07 NOTE — PROGRESS NOTE ADULT - PROBLEM SELECTOR PLAN 1
Patient presented with Hgb 6.4, repeat 6.1. Likely multifactorial i/s/o malignancy, CORBIN, and hematuria. Suspect blood loss is likely anatomical given acuity (see below). Now s/p 2 units PRBC throughout hospital stay.     Plan:   - Transfusion threshold <7  - hold iron supplementation  - T&S active (12/1)  - continue CBI  - hgb uptrending.   - f/u urology recs.   - Pt. may have cystoscopy on 12/8 or 12/9 Patient presented with Hgb 6.4, repeat 6.1. Likely multifactorial i/s/o malignancy, CORBIN, and hematuria. Suspect blood loss is likely anatomical given acuity (see below). Now s/p 2 units PRBC throughout hospital stay.     Plan:   - Transfusion threshold <7  - hold iron supplementation  - T&S active (12/1)  - continue CBI  - hgb uptrending.   - f/u urology recs.   - Urology advises against cystoscopy at this time. Palliative consulted Patient presented with Hgb 6.4, repeat 6.1. Likely multifactorial i/s/o malignancy, CORBIN, and hematuria. Suspect blood loss is likely anatomical given acuity (see below). Now s/p 2 units PRBC throughout hospital stay.     Plan:   - hold iron supplementation  - continue CBI  - hgb stable  - Urology advises against cystoscopy at this time. Palliative consulted

## 2022-12-07 NOTE — PROGRESS NOTE ADULT - ASSESSMENT
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 Patient received 1 unit PRBC on admission. She later developed hematuria and Hgb dropped to <7 and patient was given another unit of PRBC. She was started on CBI and Hgb was uptrending. This AM 12/7 Hgb ______ from 8.0 to _____, arevalo observed today is clear urine, but it is difficult to determine the amount of blood 2/2 continuous bladder irrigation.  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 Patient received 1 unit PRBC on admission. She later developed hematuria and Hgb dropped to <7 and patient was given another unit of PRBC. She was started on CBI and Hgb was uptrending. This AM hgb stable. Patient is pending home hospice.

## 2022-12-08 PROCEDURE — 99231 SBSQ HOSP IP/OBS SF/LOW 25: CPT | Mod: GC

## 2022-12-08 RX ORDER — ENOXAPARIN SODIUM 100 MG/ML
60 INJECTION SUBCUTANEOUS
Qty: 0 | Refills: 0 | DISCHARGE

## 2022-12-08 RX ORDER — FLUCONAZOLE 150 MG/1
1 TABLET ORAL
Qty: 6 | Refills: 0
Start: 2022-12-08 | End: 2022-12-13

## 2022-12-08 RX ADMIN — GABAPENTIN 200 MILLIGRAM(S): 400 CAPSULE ORAL at 05:41

## 2022-12-08 RX ADMIN — Medication 975 MILLIGRAM(S): at 05:40

## 2022-12-08 RX ADMIN — Medication 975 MILLIGRAM(S): at 06:40

## 2022-12-08 RX ADMIN — OXYCODONE HYDROCHLORIDE 5 MILLIGRAM(S): 5 TABLET ORAL at 09:28

## 2022-12-08 RX ADMIN — Medication 5 MILLIGRAM(S): at 22:52

## 2022-12-08 RX ADMIN — Medication 975 MILLIGRAM(S): at 17:20

## 2022-12-08 RX ADMIN — SENNA PLUS 2 TABLET(S): 8.6 TABLET ORAL at 22:52

## 2022-12-08 RX ADMIN — ESCITALOPRAM OXALATE 7.5 MILLIGRAM(S): 10 TABLET, FILM COATED ORAL at 11:54

## 2022-12-08 RX ADMIN — OXYCODONE HYDROCHLORIDE 5 MILLIGRAM(S): 5 TABLET ORAL at 10:17

## 2022-12-08 RX ADMIN — Medication 975 MILLIGRAM(S): at 17:26

## 2022-12-08 RX ADMIN — Medication 5 MILLIGRAM(S): at 13:36

## 2022-12-08 RX ADMIN — Medication 5 MILLILITER(S): at 10:45

## 2022-12-08 RX ADMIN — Medication 5 MILLILITER(S): at 22:00

## 2022-12-08 RX ADMIN — GABAPENTIN 200 MILLIGRAM(S): 400 CAPSULE ORAL at 17:20

## 2022-12-08 RX ADMIN — FLUCONAZOLE 100 MILLIGRAM(S): 150 TABLET ORAL at 10:44

## 2022-12-08 RX ADMIN — Medication 5 MILLIGRAM(S): at 05:40

## 2022-12-08 RX ADMIN — Medication 975 MILLIGRAM(S): at 11:54

## 2022-12-08 RX ADMIN — LEVETIRACETAM 500 MILLIGRAM(S): 250 TABLET, FILM COATED ORAL at 05:40

## 2022-12-08 RX ADMIN — Medication 975 MILLIGRAM(S): at 12:30

## 2022-12-08 RX ADMIN — LEVETIRACETAM 500 MILLIGRAM(S): 250 TABLET, FILM COATED ORAL at 17:20

## 2022-12-08 NOTE — PROGRESS NOTE ADULT - ASSESSMENT
72y F with PMHx of metastatic ovarian/uterine cancer, urinary retention, scoliosis currently admitted for hematuria. 18F 3-way catheter in place, CBI removed off 11/28, urine clear now on AC restarted 11/28, restarted CBI 12/1, was irrigated and very little clot removed, UCx positive for Candida now on txt (12/2- ). CBI clamped 12/5, restarted 12/5, clamped 12/8    Plan:  - CBI clamped, please call if dark merlot hematuria  - transfuse per primary team  - treat for funguria - 14 days of 100mg daily (12/2- )  - f/u palliative/GOC discussion  - care per primary team  - discussed with attending  - f/u with Dr. Hart on discharge  - If home hospice is considered, catheter changes every month in office  - will change catheter today    NELY Lopez MD (PGY-2)  Consult Urology Resident  Please feel free to reach out on Teams Chat or text me at    72y F with PMHx of metastatic ovarian/uterine cancer, urinary retention, scoliosis currently admitted for hematuria. 18F 3-way catheter in place, CBI removed off 11/28, urine clear now on AC restarted 11/28, restarted CBI 12/1, was irrigated and very little clot removed, UCx positive for Candida now on txt (12/2- ). CBI clamped 12/5, restarted 12/5, clamped 12/8    Plan:  - CBI clamped, please call if stops draining. Some hematuria is acceptable. Can flush if any question to maintain patency.  - transfuse per primary team  - treat for funguria - 14 days of 100mg daily (12/2- )  - f/u palliative/GOC discussion  - care per primary team  - discussed with attending  - f/u with Dr. Hart or Dr. Correia on discharge  - If home hospice is considered, catheter changes every month in office  - will change catheter today    NELY Lopez MD (PGY-2)  Consult Urology Resident  Please feel free to reach out on Teams Chat or text me at

## 2022-12-08 NOTE — PROGRESS NOTE ADULT - PROBLEM SELECTOR PLAN 11
Dvt ppx: none in setting of active bleeding  PPI ppx: None  Diet: soft and bite sized  Dispo: RMF  Activity: as tolerated
Dvt ppx: none in setting of active bleeding  PPI ppx: None  Diet: soft and bite sized  Dispo: RMF -> pending home hospice  Activity: as tolerated
Dvt ppx: none in setting of active bleeding  PPI ppx: None  Diet: soft and bite sized  Dispo: RMF  Activity: as tolerated
Dvt ppx: none in setting of active bleeding  PPI ppx: None  Diet: soft and bite sized  Dispo: RMF  Activity: as tolerated
Discharged on nifedipine during last admission.     Plan:   - holding antihypertensives for now  - resume as able
Dvt ppx: none in setting of active bleeding  PPI ppx: None  Diet: soft and bite sized  Dispo: RMF  Activity: as tolerated
Discharged on nifedipine during last admission.     Plan:   - holding antihypertensives for now  - resume as able

## 2022-12-08 NOTE — PROGRESS NOTE ADULT - PROBLEM SELECTOR PLAN 1
Patient presented with Hgb 6.4, repeat 6.1. Likely multifactorial i/s/o malignancy, CORBIN, and hematuria. Suspect blood loss is likely anatomical given acuity (see below). Now s/p 2 units PRBC throughout hospital stay.     Plan:   - hold iron supplementation  - clamped CBI as per urology  - hgb stable  - will avoid blood draws for patient comfort.  - Urology advises against cystoscopy at this time. Palliative consulted

## 2022-12-08 NOTE — PROGRESS NOTE ADULT - PROBLEM SELECTOR PROBLEM 10
HTN (hypertension)
Chronic kidney disease (CKD)
Chronic kidney disease (CKD)
HTN (hypertension)

## 2022-12-08 NOTE — PROGRESS NOTE ADULT - PROBLEM/PLAN-10
DISPLAY PLAN FREE TEXT
Previously Declined (within the last year)
DISPLAY PLAN FREE TEXT

## 2022-12-08 NOTE — PROGRESS NOTE ADULT - PROBLEM SELECTOR PLAN 9
Patient with tearful affect during admission. Did not want to be left alone. During last hospitalization, patient met with a counselor and disclosed that her  has essentially opted out from being her emotional support. Patient gave two contacts for her friends incase  cannot be reached.     Sho Sahni: [phone]   Barb Cobb: [phone]    Plan:   - c/w Escitalopram 5 mg - 1.5 tab orally qd   - Lorazepam 0.5 mg q8 prn   - consider inpatient psychotherapy or palliative consult
Baseline 1.5 per chart review. On admission: 1.18.     Plan:   - continue to monitor   - daily CrCl monitoring for medication dosage
Patient with tearful affect during admission. Did not want to be left alone. During last hospitalization, patient met with a counselor and disclosed that her  has essentially opted out from being her emotional support. Patient gave two contacts for her friends incase  cannot be reached.     Sho Sahni: [phone]   Barb Cobb: [phone]    Plan:   - c/w Escitalopram 5 mg - 1.5 tab orally qd   - Lorazepam 0.5 mg q8 prn   - consider inpatient psychotherapy or palliative consult
Baseline 1.5 per chart review. On admission: 1.18.     Plan:   - continue to monitor   - daily CrCl monitoring for medication dosage
Baseline 1.5 per chart review. On admission: 1.18.     Plan:   - continue to monitor   - will stop monitoring Cr. as it is back to baseline.
Baseline 1.5 per chart review. On admission: 1.18.     Plan:   - continue to monitor   - daily CrCl monitoring for medication dosage

## 2022-12-08 NOTE — PROGRESS NOTE ADULT - PROBLEM SELECTOR PROBLEM 1
Ovarian cancer
Acute blood loss anemia
Anemia
Anemia
Acute blood loss anemia

## 2022-12-08 NOTE — PROGRESS NOTE ADULT - NUTRITIONAL ASSESSMENT
This patient has been assessed with a concern for Malnutrition and has been determined to have a diagnosis/diagnoses of Severe protein-calorie malnutrition and Underweight (BMI < 19).    This patient is being managed with:   Diet Soft and Bite Sized-  Entered: Nov 27 2022  5:59PM    

## 2022-12-08 NOTE — PROGRESS NOTE ADULT - PROBLEM SELECTOR PROBLEM 5
Hydronephrosis, left
Bladder spasm
Hydronephrosis, left
Hydronephrosis, left
Bladder spasm
Hydronephrosis, left

## 2022-12-08 NOTE — PROGRESS NOTE ADULT - PROBLEM SELECTOR PROBLEM 11
Prophylactic measure
HTN (hypertension)
HTN (hypertension)
Prophylactic measure
Prophylactic measure

## 2022-12-08 NOTE — PROGRESS NOTE ADULT - PROBLEM SELECTOR PROBLEM 7
DVT, lower extremity
Ovarian cancer
Ovarian cancer
DVT, lower extremity
DVT, lower extremity

## 2022-12-08 NOTE — PROGRESS NOTE ADULT - PROBLEM SELECTOR PLAN 6
CT 11/18: Uterine soft tissue mass encases and severely narrows distal left ureter with associated moderate left hydronephrosis. Urology following, would ultimately need stenting but patient spoke to her oncologist and decided not to pursue at the time. Urology is following, and has spoken to her about risks of both intervention vs. no intervention. Cr is stable at this time.       Plan:   - Urology following
History of metastatic ovarian/uterine cancer actively on chemotherapy with Dr. Hidalgo. Diagnosed in 2015 w/ metastasis to liver, lungs, and peritoneum in 2018/2019. Last treatment approximately 4 week ago. Past regimen includes avastin use, current regimen on keytruda, dose reduced gemzar, cytoxan, 5-FU continual infusion, docetaxel, and carboplatin. Presented with DVT on last admission likely 2/2 hypercoagulable state, placed on full anticoagulation. Is currently off AC and ultrasounds from this admission showed that her previous DVT (11/3) has resolved.     Pain:   Morphine 2mg IV PRN q8  Dilaudid 0.25 mg PRN q2 for breakthrough pain
History of metastatic ovarian/uterine cancer actively on chemotherapy with Dr. Hidalgo. Diagnosed in 2015 w/ metastasis to liver, lungs, and peritoneum in 2018/2019. Last treatment approximately 4 week ago. Past regimen includes avastin use, current regimen on keytruda, dose reduced gemzar, cytoxan, 5-FU continual infusion, docetaxel, and carboplatin. Presented with DVT on last admission likely 2/2 hypercoagulable state, placed on full anticoagulation. Is currently off AC and ultrasounds from this admission showed that her previous DVT (11/3) has resolved.     Pain:   Morphine 2mg IV PRN q8  Dilaudid 0.25 mg PRN q2 for breakthrough pain
CT 11/18: Uterine soft tissue mass encases and severely narrows distal left ureter with associated moderate left hydronephrosis. Urology following, would ultimately need stenting but patient spoke to her oncologist and decided not to pursue at the time. Urology is following, and has spoken to her about risks of both intervention vs. no intervention. Cr is stable at this time.       Plan:   - Urology following
History of metastatic ovarian/uterine cancer actively on chemotherapy with Dr. Hidalgo. Diagnosed in 2015 w/ metastasis to liver, lungs, and peritoneum in 2018/2019. Last treatment approximately 4 week ago. Past regimen includes avastin use, current regimen on keytruda, dose reduced gemzar, cytoxan, 5-FU continual infusion, docetaxel, and carboplatin. Presented with DVT on last admission likely 2/2 hypercoagulable state, placed on full anticoagulation.     Pain:   Morphine 2mg IV PRN q8  Dilaudid 0.25 mg PRN q2 for breakthrough pain
History of metastatic ovarian/uterine cancer actively on chemotherapy with Dr. Hidalgo. Diagnosed in 2015 w/ metastasis to liver, lungs, and peritoneum in 2018/2019. Last treatment approximately 4 week ago. Past regimen includes avastin use, current regimen on keytruda, dose reduced gemzar, cytoxan, 5-FU continual infusion, docetaxel, and carboplatin. Presented with DVT on last admission likely 2/2 hypercoagulable state, placed on full anticoagulation. Is currently off AC and ultrasounds from this admission showed that her previous DVT (11/3) has resolved.     Pain:   Morphine 2mg IV PRN q8  Dilaudid 0.25 mg PRN q2 for breakthrough pain
History of metastatic ovarian/uterine cancer actively on chemotherapy with Dr. Hidaglo. Diagnosed in 2015 w/ metastasis to liver, lungs, and peritoneum in 2018/2019. Last treatment approximately 4 week ago. Past regimen includes avastin use, current regimen on keytruda, dose reduced gemzar, cytoxan, 5-FU continual infusion, docetaxel, and carboplatin. Presented with DVT on last admission likely 2/2 hypercoagulable state, placed on full anticoagulation. Is currently off AC and ultrasounds from this admission showed that her previous DVT (11/3) has resolved.     Pain:   Morphine 2mg IV PRN q8  Dilaudid 0.25 mg PRN q2 for breakthrough pain
History of metastatic ovarian/uterine cancer actively on chemotherapy with Dr. Hidalgo. Diagnosed in 2015 w/ metastasis to liver, lungs, and peritoneum in 2018/2019. Last treatment approximately 4 week ago. Past regimen includes avastin use, current regimen on keytruda, dose reduced gemzar, cytoxan, 5-FU continual infusion, docetaxel, and carboplatin. Presented with DVT on last admission likely 2/2 hypercoagulable state, placed on full anticoagulation. Is currently off AC and ultrasounds from this admission showed that her previous DVT (11/3) has resolved.     Pain:   Morphine 2mg IV PRN q8  Dilaudid 0.25 mg PRN q2 for breakthrough pain

## 2022-12-08 NOTE — PROGRESS NOTE ADULT - PROBLEM SELECTOR PLAN 5
CT 11/18: Uterine soft tissue mass encases and severely narrows distal left ureter with associated moderate left hydronephrosis. Urology following, would ultimately need stenting but patient spoke to her oncologist and decided not to pursue at the time. Urology is following, and has spoken to her about risks of both intervention vs. no intervention. Cr is stable at this time.       Plan:   - Urology following
CT 11/18: Uterine soft tissue mass encases and severely narrows distal left ureter with associated moderate left hydronephrosis. Urology following, would ultimately need stenting but patient spoke to her oncologist and decided not to pursue at the time. Urology is following, and has spoken to her about risks of both intervention vs. no intervention. Cr is stable at this time.       Plan:   - Urology following
Patient w/ bladder spasm likely 2/2 arevalo.     Plan:   - oxybutynin 5 mg BID
CT 11/18: Uterine soft tissue mass encases and severely narrows distal left ureter with associated moderate left hydronephrosis. Urology following, would ultimately need stenting but patient spoke to her oncologist and decided not to pursue at the time. Urology is following, and has spoken to her about risks of both intervention vs. no intervention. Cr is stable at this time.       Plan:   - Urology following
Patient w/ bladder spasm likely 2/2 arevalo.     Plan:   - oxybutynin 5 mg BID
CT 11/18: Uterine soft tissue mass encases and severely narrows distal left ureter with associated moderate left hydronephrosis. Urology following, would ultimately need stenting but patient spoke to her oncologist and decided not to pursue at the time. Urology is following, and has spoken to her about risks of both intervention vs. no intervention. Cr is stable at this time.       Plan:   - Urology following
CT 11/18: Uterine soft tissue mass encases and severely narrows distal left ureter with associated moderate left hydronephrosis. Urology following, would ultimately need stenting but patient spoke to her oncologist and decided not to pursue at the time. Urology is following, and has spoken to her about risks of both intervention vs. no intervention. Cr is stable at this time.       Plan:   - Urology following

## 2022-12-08 NOTE — PROGRESS NOTE ADULT - SUBJECTIVE AND OBJECTIVE BOX
DESHAWN IKNG 72y Female  MRN#: [id number]   CODE STATUS: FULL      SUBJECTIVE  Patient is a 72y old Female who presents with a chief complaint of hematuria (08 Dec 2022 10:52)  Currently admitted to medicine with the primary diagnosis of Severe anemia    Today is hospital day 11d, and this morning she is resting in bed comfortably with no active complaints. Her arevalo bag is clear.     Present Today:           Arevalo Catheter ()No/ (x)Yes? Indication: retention          Central Line (x)No/ ()Yes? Indication:          IV Fluids (x)No/ ()Yes? Type:  Rate:  Indication:      OBJECTIVE  PAST MEDICAL & SURGICAL HISTORY  Ovarian cancer    Ovarian ca    HTN (hypertension)    Anemia    No significant past surgical history      Home Meds:  acetaminophen 325 mg oral tablet: 2 tab(s) orally every 6 hours, As needed, Temp greater or equal to 38C (100.4F), Mild Pain (1 - 3)  aluminum hydroxide-magnesium hydroxide 200 mg-200 mg/5 mL oral suspension: 30 milliliter(s) orally every 4 hours, As needed, Dyspepsia  enoxaparin 60 mg/0.6 mL injectable solution: 60 milligram(s) subcutaneously once a day   Please draw CBC and BMP on 11/28/22. Provided that Hb is stable, may be able to redose lovenox according to CrCl.  ESCITALOPRAM 5MG TABLETS: 1.5 each orally once a day  ferrous sulfate 325 mg (65 mg elemental iron) oral tablet: 1 tab(s) orally every other day (at bedtime)  folic acid 1 mg oral tablet: 1 tab(s) orally once a day  gabapentin 100 mg oral capsule: 1 cap(s) orally 2 times a day  levETIRAcetam 500 mg oral tablet: 1 tab(s) orally 2 times a day  LORAZEPAM  0.5 MG TABS: 1 tab(s) orally every 8 hours, As Needed  Lovenox 40 mg/0.4 mL injectable solution: 60 milligram(s) injectable once a day   Check CBC and BMP within 7 days to adjust lovenox dosage according to CrCl if Hb stable  melatonin 3 mg oral tablet: 1 tab(s) orally once a day (at bedtime), As needed, Insomnia  naloxone 4 mg/0.1 mL nasal spray: 1 milligram(s) intranasally every 5 minutes, As Needed for overdose  NIFEdipine 60 mg oral tablet, extended release: 1 tab(s) orally once a day  ondansetron 2 mg/mL injectable solution: 4 milligram(s) injectable every 8 hours, As Needed for nausea  oxybutynin 5 mg oral tablet: 1 tab(s) orally every 8 hours MDD:3 tabs  oxyCODONE 5 mg oral tablet: 1 tab(s) orally every 4 hours, As Needed -for severe pain MDD:6 tabs  polyethylene glycol 3350 oral powder for reconstitution: 17 gram(s) orally once a day  senna leaf extract oral tablet: 2 tab(s) orally once a day (at bedtime)    ALLERGIES:  Benadryl (Other)  Compazine (Unknown)    MEDICATIONS:  STANDING MEDICATIONS  acetaminophen     Tablet .. 975 milliGRAM(s) Oral every 6 hours  Biotene Dry Mouth Oral Rinse 5 milliLiter(s) Swish and Spit two times a day  escitalopram 7.5 milliGRAM(s) Oral daily  fluconAZOLE   Tablet 100 milliGRAM(s) Oral every 24 hours  gabapentin 200 milliGRAM(s) Oral two times a day  levETIRAcetam 500 milliGRAM(s) Oral two times a day  oxybutynin 5 milliGRAM(s) Oral three times a day  polyethylene glycol 3350 17 Gram(s) Oral daily  senna 2 Tablet(s) Oral at bedtime    PRN MEDICATIONS  aluminum hydroxide/magnesium hydroxide/simethicone Suspension 30 milliLiter(s) Oral every 4 hours PRN  LORazepam     Tablet 0.5 milliGRAM(s) Oral every 8 hours PRN  melatonin 3 milliGRAM(s) Oral at bedtime PRN  ondansetron Injectable 4 milliGRAM(s) IV Push every 8 hours PRN  oxyCODONE    IR 5 milliGRAM(s) Oral every 4 hours PRN      VITAL SIGNS: Last 24 Hours  T(C): 36.4 (08 Dec 2022 11:18), Max: 36.7 (07 Dec 2022 20:28)  T(F): 97.5 (08 Dec 2022 11:18), Max: 98 (07 Dec 2022 20:28)  HR: 67 (08 Dec 2022 11:18) (67 - 79)  BP: 110/70 (08 Dec 2022 11:18) (108/67 - 117/70)  BP(mean): --  RR: 18 (08 Dec 2022 11:18) (16 - 18)  SpO2: 100% (08 Dec 2022 11:18) (97% - 100%)    LABS:                        9.1    11.33 )-----------( 285      ( 07 Dec 2022 12:00 )             30.3     12-07    136  |  104  |  34<H>  ----------------------------<  80  5.2   |  18<L>  |  1.15    Ca    8.5      07 Dec 2022 12:00  Phos  4.0     12-07  Mg     2.0     12-07            RADIOLOGY:      PHYSICAL EXAM:  General: NAD, AAOx3  HEENT: atraumatic, normocephalic  Pulmonary: clear to auscultation bilaterally; No wheeze  Cardiovascular: Regular rate and rhythm; no murmurs, rubs or gallops. Normal S1S2  Gastrointestinal: Soft, positive suprapubic tenderness, nontender, nondistended; bowel sounds present  Musculoskeletal: 2+ peripheral pulses, no clubbing no cyanosis with 2+ pitting edema in b/l lower extremities.   Neurology: Pt. alert and oriented, fluent speech, able to move all extremities  Skin: no rashes or lesions

## 2022-12-08 NOTE — PROGRESS NOTE ADULT - PROBLEM SELECTOR PROBLEM 9
Chronic kidney disease (CKD)
Chronic kidney disease (CKD)
Depression, major
Chronic kidney disease (CKD)
Depression, major
Chronic kidney disease (CKD)

## 2022-12-08 NOTE — PROGRESS NOTE ADULT - PROBLEM SELECTOR PLAN 10
Discharged on nifedipine during last admission.     Plan:   - holding antihypertensives for now  - resume as able
Baseline 1.5 per chart review. On admission: 1.18.     Plan:   - continue to monitor   - daily CrCl monitoring for medication dosage
Discharged on nifedipine during last admission.     Plan:   - holding antihypertensives for now  - resume as able
Discharged on nifedipine during last admission.     Plan:   - holding antihypertensives for now  - resume as able
Baseline 1.5 per chart review. On admission: 1.18.     Plan:   - continue to monitor   - daily CrCl monitoring for medication dosage
Discharged on nifedipine during last admission.     Plan:   - holding antihypertensives for now  - resume as able
Discharged on nifedipine during last admission.     Plan:   - holding antihypertensives for now as BP WNL

## 2022-12-08 NOTE — PROGRESS NOTE ADULT - PROBLEM SELECTOR PLAN 4
Patient w/ bladder spasm likely 2/2 arevalo. Pt reports improved s/s with increased frequency, but does not report resolution of s/s.     Plan:   - cont oxybutynin 5 mg TID

## 2022-12-08 NOTE — PROGRESS NOTE ADULT - ATTENDING COMMENTS
there is glendy colored urine in arevalo bag today  she denies feeling light-headed or dizzy  overall appetite is good  she has had some cramping and requested oxycodone IR which has helped    plan for home hospice tomorrow there is glendy colored urine in arevalo bag today  she denies feeling light-headed or dizzy  overall appetite is good  she has had some cramping and requested oxycodone IR which has helped    plan for home hospice tomorrow  left message for oncologist Dr. Hidalgo regarding updates above

## 2022-12-08 NOTE — PROGRESS NOTE ADULT - PROBLEM SELECTOR PLAN 2
Patient with 2 day onset of hematuria, and ultimately admitted for glen red blood in arevalo catheter. During last admission, retroperitoneal ultrasound showed two left renal calculi. Patient now with worsening dark urine in AM suspect new blood loss secondary to new arevalo placement vs. candida UTI vs. metastatic invasive tumor implantation of left ureter.     Plan:   - urology consulted,   - arevalo draining clear fluid  - hold CBI as per urology for now.

## 2022-12-08 NOTE — PROGRESS NOTE ADULT - PROBLEM SELECTOR PROBLEM 4
Encounter for palliative care
Bladder spasm
Hematuria
Hematuria
Bladder spasm
Bladder spasm

## 2022-12-08 NOTE — PROGRESS NOTE ADULT - PROBLEM SELECTOR PLAN 8
Patient with tearful affect during admission. Did not want to be left alone. During last hospitalization, patient met with a counselor and disclosed that her  has essentially opted out from being her emotional support. Patient gave two contacts for her friends incase  cannot be reached.     Sho Sahni: [phone]   Barb Cobb: [phone]    Plan:   - c/w Escitalopram 5 mg - 1.5 tab orally qd   - Lorazepam 0.5 mg q8 prn   - consider inpatient psychotherapy or palliative consult

## 2022-12-08 NOTE — PROGRESS NOTE ADULT - ASSESSMENT
Patient is a 71 yo female with PMH of ovarian/uterine cancer with spread to the liver, lungs, peritoneum, DVT, anemia, indwelling arevalo catheter who presented with glen hematuria (11/26), admitted for hematuria 2/2 traumatic arevalo placement vs implantation of metastatic disease on left ureter, UTI and anemia. Patient received 1 unit PRBC on admission. She later developed hematuria and Hgb dropped to <7 and patient was given another unit of PRBC. She was started on CBI and Hgb was uptrending. Patient is pending home hospice placement.

## 2022-12-08 NOTE — PROGRESS NOTE ADULT - ATTENDING COMMENTS
72y F with PMHx of metastatic ovarian/uterine cancer, urinary retention, scoliosis currently admitted for hematuria. 18F 3-way catheter in place, CBI removed off 11/28, urine clear now on AC restarted 11/28, restarted CBI 12/1, was irrigated and very little clot removed, UCx positive for Candida now on txt (12/2- ). CBI clamped 12/5, restarted 12/5, clamped 12/8    Plan:  - CBI clamped, please call if stops draining. Some hematuria is acceptable. Can flush if any question to maintain patency.  - transfuse per primary team  - treat for funguria - 14 days  - f/u palliative/GOC discussion and hospice planning  - f/u with Dr. Hart or Dr. Correia on discharge  - catheter to be changed prior to discharge  - please call with additional questions

## 2022-12-08 NOTE — PROGRESS NOTE ADULT - PROBLEM SELECTOR PROBLEM 8
Depression, major
DVT, lower extremity
Depression, major
DVT, lower extremity
Depression, major

## 2022-12-08 NOTE — PROGRESS NOTE ADULT - PROBLEM SELECTOR PLAN 3
Patient w/ suprapubic tenderness + dysuria + hematuria for three days. UA + w/ indwelling Michaels catheter. + many pseudohyphae present. Michaels placed on 11/21 prior to DC as patient failed TOV. Michaels has now been replaced by urology (11/27 am). . Patient's symptoms and elevated WBC are likely reactive to blood in the bladder however cannot rule out fungal cystitis.     Plan:   -monitor off ABx, insignificant bacteria grew on culture  - repeat urine culture negative, however confounded by ongoing bladder irrigation  - will start fluconazole for 2 weeks 12/2 - 12/15

## 2022-12-08 NOTE — PROGRESS NOTE ADULT - SUBJECTIVE AND OBJECTIVE BOX
UROLOGY PROGRESS NOTE    SUBJECTIVE: Patient seen and examined bedside by attending, pending hospice conversation. CBI clamped    fluconAZOLE   Tablet 100 milliGRAM(s) Oral every 24 hours      Vital Signs Last 24 Hrs  T(C): 36.5 (08 Dec 2022 05:42), Max: 36.7 (07 Dec 2022 20:28)  T(F): 97.7 (08 Dec 2022 05:42), Max: 98 (07 Dec 2022 20:28)  HR: 77 (08 Dec 2022 05:42) (77 - 79)  BP: 117/70 (08 Dec 2022 05:42) (108/67 - 130/74)  BP(mean): --  RR: 16 (08 Dec 2022 05:42) (16 - 17)  SpO2: 97% (08 Dec 2022 05:42) (97% - 98%)    Parameters below as of 08 Dec 2022 05:42  Patient On (Oxygen Delivery Method): room air      I&O's Detail    07 Dec 2022 07:01  -  08 Dec 2022 07:00  --------------------------------------------------------  IN:    Continuous Bladder Irrigation (mL): 80091 mL  Total IN: 43635 mL    OUT:    Continuous Bladder Irrigation (mL): 67898 mL  Total OUT: 76043 mL    Total NET: 600 mL      08 Dec 2022 07:01  -  08 Dec 2022 09:30  --------------------------------------------------------  IN:    Continuous Bladder Irrigation (mL): 3000 mL  Total IN: 3000 mL    OUT:    Continuous Bladder Irrigation (mL): 3250 mL  Total OUT: 3250 mL    Total NET: -250 mL          PHYSICAL EXAM    General: NAD, resting comfortably in bed  C/V: NSR  Pulm: Nonlabored breathing, no respiratory distress  Abd: soft, ND, mild TTP suprapubic  : CBI running on slow drip, clear yellow  Extrem: WWP, no edema, SCDs in place        LABS:                        9.1    11.33 )-----------( 285      ( 07 Dec 2022 12:00 )             30.3     12-07    136  |  104  |  34<H>  ----------------------------<  80  5.2   |  18<L>  |  1.15    Ca    8.5      07 Dec 2022 12:00  Phos  4.0     12-07  Mg     2.0     12-07            CULTURES:          RADIOLOGY & ADDITIONAL STUDIES:   UROLOGY PROGRESS NOTE    SUBJECTIVE: Patient seen and examined bedside by attending, pending hospice placement. CBI clamped, clear yellow on slow drip.    fluconAZOLE   Tablet 100 milliGRAM(s) Oral every 24 hours      Vital Signs Last 24 Hrs  T(C): 36.5 (08 Dec 2022 05:42), Max: 36.7 (07 Dec 2022 20:28)  T(F): 97.7 (08 Dec 2022 05:42), Max: 98 (07 Dec 2022 20:28)  HR: 77 (08 Dec 2022 05:42) (77 - 79)  BP: 117/70 (08 Dec 2022 05:42) (108/67 - 130/74)  BP(mean): --  RR: 16 (08 Dec 2022 05:42) (16 - 17)  SpO2: 97% (08 Dec 2022 05:42) (97% - 98%)    Parameters below as of 08 Dec 2022 05:42  Patient On (Oxygen Delivery Method): room air      I&O's Detail    07 Dec 2022 07:01  -  08 Dec 2022 07:00  --------------------------------------------------------  IN:    Continuous Bladder Irrigation (mL): 53916 mL  Total IN: 30174 mL    OUT:    Continuous Bladder Irrigation (mL): 38290 mL  Total OUT: 73447 mL    Total NET: 600 mL      08 Dec 2022 07:01  -  08 Dec 2022 09:30  --------------------------------------------------------  IN:    Continuous Bladder Irrigation (mL): 3000 mL  Total IN: 3000 mL    OUT:    Continuous Bladder Irrigation (mL): 3250 mL  Total OUT: 3250 mL    Total NET: -250 mL          PHYSICAL EXAM    General: NAD, resting comfortably in bed  C/V: NSR  Pulm: Nonlabored breathing, no respiratory distress  Abd: soft, ND, mild TTP suprapubic  : CBI running on slow drip, clear yellow  Extrem: WWP, no edema, SCDs in place        LABS:                        9.1    11.33 )-----------( 285      ( 07 Dec 2022 12:00 )             30.3     12-07    136  |  104  |  34<H>  ----------------------------<  80  5.2   |  18<L>  |  1.15    Ca    8.5      07 Dec 2022 12:00  Phos  4.0     12-07  Mg     2.0     12-07            CULTURES:          RADIOLOGY & ADDITIONAL STUDIES:

## 2022-12-09 ENCOUNTER — TRANSCRIPTION ENCOUNTER (OUTPATIENT)
Age: 72
End: 2022-12-09

## 2022-12-09 VITALS
RESPIRATION RATE: 18 BRPM | SYSTOLIC BLOOD PRESSURE: 110 MMHG | DIASTOLIC BLOOD PRESSURE: 67 MMHG | TEMPERATURE: 98 F | HEART RATE: 80 BPM | OXYGEN SATURATION: 97 %

## 2022-12-09 PROCEDURE — 99285 EMERGENCY DEPT VISIT HI MDM: CPT

## 2022-12-09 PROCEDURE — 84100 ASSAY OF PHOSPHORUS: CPT

## 2022-12-09 PROCEDURE — 86900 BLOOD TYPING SEROLOGIC ABO: CPT

## 2022-12-09 PROCEDURE — 97110 THERAPEUTIC EXERCISES: CPT

## 2022-12-09 PROCEDURE — 85730 THROMBOPLASTIN TIME PARTIAL: CPT

## 2022-12-09 PROCEDURE — 97161 PT EVAL LOW COMPLEX 20 MIN: CPT

## 2022-12-09 PROCEDURE — 86923 COMPATIBILITY TEST ELECTRIC: CPT

## 2022-12-09 PROCEDURE — 82962 GLUCOSE BLOOD TEST: CPT

## 2022-12-09 PROCEDURE — P9011: CPT

## 2022-12-09 PROCEDURE — 87040 BLOOD CULTURE FOR BACTERIA: CPT

## 2022-12-09 PROCEDURE — 86985 SPLIT BLOOD OR PRODUCTS: CPT

## 2022-12-09 PROCEDURE — 71045 X-RAY EXAM CHEST 1 VIEW: CPT

## 2022-12-09 PROCEDURE — 80048 BASIC METABOLIC PNL TOTAL CA: CPT

## 2022-12-09 PROCEDURE — 36415 COLL VENOUS BLD VENIPUNCTURE: CPT

## 2022-12-09 PROCEDURE — 93970 EXTREMITY STUDY: CPT

## 2022-12-09 PROCEDURE — 36430 TRANSFUSION BLD/BLD COMPNT: CPT

## 2022-12-09 PROCEDURE — 97530 THERAPEUTIC ACTIVITIES: CPT

## 2022-12-09 PROCEDURE — 85610 PROTHROMBIN TIME: CPT

## 2022-12-09 PROCEDURE — 87077 CULTURE AEROBIC IDENTIFY: CPT

## 2022-12-09 PROCEDURE — 96374 THER/PROPH/DIAG INJ IV PUSH: CPT

## 2022-12-09 PROCEDURE — 87635 SARS-COV-2 COVID-19 AMP PRB: CPT

## 2022-12-09 PROCEDURE — 83735 ASSAY OF MAGNESIUM: CPT

## 2022-12-09 PROCEDURE — 85027 COMPLETE CBC AUTOMATED: CPT

## 2022-12-09 PROCEDURE — 85025 COMPLETE CBC W/AUTO DIFF WBC: CPT

## 2022-12-09 PROCEDURE — 86901 BLOOD TYPING SEROLOGIC RH(D): CPT

## 2022-12-09 PROCEDURE — 99233 SBSQ HOSP IP/OBS HIGH 50: CPT | Mod: GC

## 2022-12-09 PROCEDURE — 82272 OCCULT BLD FECES 1-3 TESTS: CPT

## 2022-12-09 PROCEDURE — 87086 URINE CULTURE/COLONY COUNT: CPT

## 2022-12-09 PROCEDURE — 85520 HEPARIN ASSAY: CPT

## 2022-12-09 PROCEDURE — 86850 RBC ANTIBODY SCREEN: CPT

## 2022-12-09 PROCEDURE — P9040: CPT

## 2022-12-09 PROCEDURE — 80053 COMPREHEN METABOLIC PANEL: CPT

## 2022-12-09 PROCEDURE — 81001 URINALYSIS AUTO W/SCOPE: CPT

## 2022-12-09 PROCEDURE — 83605 ASSAY OF LACTIC ACID: CPT

## 2022-12-09 RX ORDER — FLUCONAZOLE 150 MG/1
100 TABLET ORAL ONCE
Refills: 0 | Status: COMPLETED | OUTPATIENT
Start: 2022-12-09 | End: 2022-12-09

## 2022-12-09 RX ORDER — FLUCONAZOLE 150 MG/1
1 TABLET ORAL
Qty: 6 | Refills: 0
Start: 2022-12-09 | End: 2022-12-14

## 2022-12-09 RX ORDER — FLUCONAZOLE 150 MG/1
100 TABLET ORAL EVERY 24 HOURS
Refills: 0 | Status: DISCONTINUED | OUTPATIENT
Start: 2022-12-09 | End: 2022-12-09

## 2022-12-09 RX ADMIN — FLUCONAZOLE 100 MILLIGRAM(S): 150 TABLET ORAL at 10:22

## 2022-12-09 RX ADMIN — Medication 975 MILLIGRAM(S): at 06:33

## 2022-12-09 RX ADMIN — GABAPENTIN 200 MILLIGRAM(S): 400 CAPSULE ORAL at 06:33

## 2022-12-09 RX ADMIN — Medication 975 MILLIGRAM(S): at 00:00

## 2022-12-09 RX ADMIN — Medication 5 MILLIGRAM(S): at 06:34

## 2022-12-09 RX ADMIN — LEVETIRACETAM 500 MILLIGRAM(S): 250 TABLET, FILM COATED ORAL at 06:34

## 2022-12-09 NOTE — PROGRESS NOTE ADULT - PROVIDER SPECIALTY LIST ADULT
Hospitalist
Internal Medicine
Internal Medicine
Urology
Internal Medicine
Urology
Urology
Vascular Surgery
Internal Medicine
Urology
Vascular Surgery
Urology
Palliative Care
Internal Medicine

## 2022-12-09 NOTE — PROGRESS NOTE ADULT - SUBJECTIVE AND OBJECTIVE BOX
UROLOGY PROGRESS NOTE    SUBJECTIVE: Patient seen and examined bedside. Patient's pain much improved, feeling positive about going home with hospice. Wants to walk eventually    fluconAZOLE   Tablet 100 milliGRAM(s) Oral every 24 hours      Vital Signs Last 24 Hrs  T(C): 36.7 (09 Dec 2022 05:21), Max: 36.7 (09 Dec 2022 05:21)  T(F): 98 (09 Dec 2022 05:21), Max: 98 (09 Dec 2022 05:21)  HR: 80 (09 Dec 2022 05:21) (67 - 80)  BP: 110/67 (09 Dec 2022 05:21) (110/67 - 116/68)  BP(mean): --  RR: 18 (09 Dec 2022 05:21) (16 - 18)  SpO2: 97% (09 Dec 2022 05:21) (97% - 100%)    Parameters below as of 09 Dec 2022 05:21  Patient On (Oxygen Delivery Method): room air      I&O's Detail    08 Dec 2022 07:01  -  09 Dec 2022 07:00  --------------------------------------------------------  IN:    Continuous Bladder Irrigation (mL): 3000 mL  Total IN: 3000 mL    OUT:    Continuous Bladder Irrigation (mL): 3250 mL    Indwelling Catheter - Urethral (mL): 250 mL  Total OUT: 3500 mL    Total NET: -500 mL          PHYSICAL EXAM    General: NAD, resting comfortably in bed, cachetic   C/V: NSR  Pulm: Nonlabored breathing, no respiratory distress  Abd: soft, ND, mild TTP suprapubic   : arevalo draining clear dusky ulises urine.          LABS:                        9.1    11.33 )-----------( 285      ( 07 Dec 2022 12:00 )             30.3     12-07    136  |  104  |  34<H>  ----------------------------<  80  5.2   |  18<L>  |  1.15    Ca    8.5      07 Dec 2022 12:00  Phos  4.0     12-07  Mg     2.0     12-07            CULTURES:          RADIOLOGY & ADDITIONAL STUDIES:

## 2022-12-09 NOTE — PROGRESS NOTE ADULT - ASSESSMENT
72y F with PMHx of metastatic ovarian/uterine cancer, urinary retention, scoliosis currently admitted for hematuria. 18F 3-way catheter in place, CBI removed off 11/28, urine clear now on AC restarted 11/28, restarted CBI 12/1, was irrigated and very little clot removed, UCx positive for Candida now on txt (12/2- ). CBI clamped 12/5, restarted 12/5, clamped 12/8    Plan:  - arevalo exchanged 12/8  - transfuse per primary team  - treat for funguria (12/2- )  - f/u palliative/GOC discussion  - care per primary team  - discussed with attending  - f/u with Dr. Hart or Dr. Correia on discharge  - If home hospice is considered, catheter changes every month in office (last exchanged 12/8)    NELY Lopez MD (PGY-2)  Consult Urology Resident  Please feel free to reach out on Teams Chat or text me at

## 2022-12-09 NOTE — PROGRESS NOTE ADULT - REASON FOR ADMISSION
hematuria

## 2023-07-05 NOTE — DIETITIAN INITIAL EVALUATION ADULT - ETIOLOGY
RT suspected inadequate protein/energy intake to meet increased nutrient needs, hypermetabolic state (CA)
no

## 2023-08-16 NOTE — ED PROVIDER NOTE - NS_BEDUNITTYPES_ED_ALL_ED
Blood pressure is not well controlled. Will need to adjust medication. I sent lisinopril 20mg/[id number] to the pharmacy, patient was on 20,g/12.5mg. Patient will need nurse visit in two weeks for recheck.  Pamela Melchor PA-C on 8/15/2023 at 7:51 PM        [facility]

## 2023-11-13 NOTE — PHYSICAL THERAPY INITIAL EVALUATION ADULT - DIAGNOSIS, PT EVAL
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 The surgical margins are negative for tumor cells.

## 2023-11-15 NOTE — DISCHARGE NOTE PROVIDER - PROVIDER TOKENS
Continue statin     PROVIDER:[TOKEN:[[id number]]] PROVIDER:[TOKEN:[[id number]],SCHEDULEDAPPT:[11/22/2022],SCHEDULEDAPPTTIME:[11:30 AM]]

## 2024-09-09 NOTE — PROGRESS NOTE ADULT - PROBLEM SELECTOR PLAN 8
[0] : 2) Feeling down, depressed, or hopeless: Not at all (0) [[id number]] : 0 [Pain Scale: ___] : On a scale of 1-10, today the patient's pain is a(n) [unfilled]. [Never] : Never [Patient/Caregiver unclear of wishes] : Patient/Caregiver unclear of wishes [Diarrhea Character] : Diarrhea: Grade 0 Patient with tearful affect during admission. Did not want to be left alone. During last hospitalization, patient met with a counselor and disclosed that her  has essentially opted out from being her emotional support. Patient gave two contacts for her friends incase  cannot be reached.     Sho Sahni: [phone]   Barb Cobb: [phone]    Plan:   - c/w Escitalopram 5 mg - 1.5 tab orally qd   - Lorazepam 0.5 mg q8 prn   - consider inpatient psychotherapy or palliative consult

## 2024-12-18 NOTE — PROGRESS NOTE ADULT - PROBLEM SELECTOR PROBLEM 6
18-Dec-2024 20:16
Ovarian cancer
Hydronephrosis, left
Hydronephrosis, left
Ovarian cancer

## 2025-05-15 NOTE — BH CONSULTATION LIAISON ASSESSMENT NOTE - NSBHCONSULTSUBST_PSY_A_CORE
What Is The Reason For Today's Visit?: Full Body Skin Examination What Is The Reason For Today's Visit? (Being Monitored For X): concerning skin lesions on a periodic basis no

## 2025-07-14 NOTE — PATIENT PROFILE ADULT - FUNCTIONAL ASSESSMENT - BASIC MOBILITY 5.
[facility] Alert Outreach     Name: STACEY DOBBINS         Question 1   Follow Up Transportation   Do you need transportation help to get to any of your healthcare appointments? Please reply 1 for yes or reply 2 for no.   Transportation Help         Call Status:     Voicemail Left 1 , Inbound Call        SDOH - Issues List:  Transportation , Wrong Answer Selected by Pt/Error - No Needs Identified        SDOH - Actions List:  Pt Declines Assistance with Transportation   2 = A lot of assistance